# Patient Record
Sex: MALE | Race: WHITE | NOT HISPANIC OR LATINO | Employment: OTHER | URBAN - NONMETROPOLITAN AREA
[De-identification: names, ages, dates, MRNs, and addresses within clinical notes are randomized per-mention and may not be internally consistent; named-entity substitution may affect disease eponyms.]

---

## 2018-10-02 ENCOUNTER — TRANSCRIBE ORDERS (OUTPATIENT)
Dept: ADMINISTRATIVE | Facility: HOSPITAL | Age: 68
End: 2018-10-02

## 2018-10-02 ENCOUNTER — TRANSCRIBE ORDERS (OUTPATIENT)
Dept: INFUSION THERAPY | Facility: HOSPITAL | Age: 68
End: 2018-10-02

## 2018-10-02 DIAGNOSIS — D64.9 ANEMIA, UNSPECIFIED TYPE: Primary | ICD-10-CM

## 2018-10-02 DIAGNOSIS — K62.5 RECTAL BLEED: ICD-10-CM

## 2018-10-02 DIAGNOSIS — K90.9 INTESTINAL MALABSORPTION, UNSPECIFIED TYPE: ICD-10-CM

## 2018-10-02 DIAGNOSIS — D50.9 IRON DEFICIENCY ANEMIA, UNSPECIFIED IRON DEFICIENCY ANEMIA TYPE: Primary | ICD-10-CM

## 2018-10-03 ENCOUNTER — HOSPITAL ENCOUNTER (OUTPATIENT)
Dept: INFUSION THERAPY | Facility: HOSPITAL | Age: 68
Setting detail: INFUSION SERIES
Discharge: HOME | End: 2018-10-03
Attending: INTERNAL MEDICINE

## 2018-10-03 VITALS — SYSTOLIC BLOOD PRESSURE: 137 MMHG | HEART RATE: 69 BPM | TEMPERATURE: 98.3 F | DIASTOLIC BLOOD PRESSURE: 61 MMHG

## 2018-10-03 DIAGNOSIS — D50.9 IRON DEFICIENCY ANEMIA, UNSPECIFIED IRON DEFICIENCY ANEMIA TYPE: ICD-10-CM

## 2018-10-03 PROCEDURE — 25010000002 FERRIC CARBOXYMALTOSE 750 MG/15ML SOLUTION 15 ML VIAL: Performed by: INTERNAL MEDICINE

## 2018-10-03 PROCEDURE — 96365 THER/PROPH/DIAG IV INF INIT: CPT

## 2018-10-03 RX ADMIN — FERRIC CARBOXYMALTOSE INJECTION 750 MG: 50 INJECTION, SOLUTION INTRAVENOUS at 10:01

## 2018-10-03 NOTE — PATIENT INSTRUCTIONS
Ferric carboxymaltose injection  What is this medicine?  FERRIC CARBOXYMALTOSE (ferr-ik car-box-ee-mol-toes) is an iron complex. Iron is used to make healthy red blood cells, which carry oxygen and nutrients throughout the body. This medicine is used to treat anemia in people with chronic kidney disease or people who cannot take iron by mouth.  This medicine may be used for other purposes; ask your health care provider or pharmacist if you have questions.  COMMON BRAND NAME(S): Injectafer  What should I tell my health care provider before I take this medicine?  They need to know if you have any of these conditions:  -anemia not caused by low iron levels  -high levels of iron in the blood  -liver disease  -an unusual or allergic reaction to iron, other medicines, foods, dyes, or preservatives  -pregnant or trying to get pregnant  -breast-feeding  How should I use this medicine?  This medicine is for infusion into a vein. It is given by a health care professional in a hospital or clinic setting.  Talk to your pediatrician regarding the use of this medicine in children. Special care may be needed.  Overdosage: If you think you have taken too much of this medicine contact a poison control center or emergency room at once.  NOTE: This medicine is only for you. Do not share this medicine with others.  What if I miss a dose?  It is important not to miss your dose. Call your doctor or health care professional if you are unable to keep an appointment.  What may interact with this medicine?  Do not take this medicine with any of the following medications:  -deferoxamine  -dimercaprol  -other iron products  This medicine may also interact with the following medications:  -chloramphenicol  -deferasirox  This list may not describe all possible interactions. Give your health care provider a list of all the medicines, herbs, non-prescription drugs, or dietary supplements you use. Also tell them if you smoke, drink alcohol, or use  illegal drugs. Some items may interact with your medicine.  What should I watch for while using this medicine?  Visit your doctor or health care professional regularly. Tell your doctor if your symptoms do not start to get better or if they get worse. You may need blood work done while you are taking this medicine.  You may need to follow a special diet. Talk to your doctor. Foods that contain iron include: whole grains/cereals, dried fruits, beans, or peas, leafy green vegetables, and organ meats (liver, kidney).  What side effects may I notice from receiving this medicine?  Side effects that you should report to your doctor or health care professional as soon as possible:  -allergic reactions like skin rash, itching or hives, swelling of the face, lips, or tongue  -breathing problems  -changes in blood pressure  -feeling faint or lightheaded, falls  -flushing, sweating, or hot feelings  Side effects that usually do not require medical attention (report to your doctor or health care professional if they continue or are bothersome):  -changes in taste  -constipation  -dizziness  -headache  -nausea  -pain, redness, or irritation at site where injected  -vomiting  This list may not describe all possible side effects. Call your doctor for medical advice about side effects. You may report side effects to FDA at 9-151-FDA-1369.  Where should I keep my medicine?  This drug is given in a hospital or clinic and will not be stored at home.  NOTE: This sheet is a summary. It may not cover all possible information. If you have questions about this medicine, talk to your doctor, pharmacist, or health care provider.  © 2018 [employer] (2017-01-19 11:20:47)

## 2018-10-04 ENCOUNTER — HOSPITAL ENCOUNTER (OUTPATIENT)
Dept: CT IMAGING | Facility: HOSPITAL | Age: 68
Discharge: HOME | End: 2018-10-04
Attending: INTERNAL MEDICINE | Admitting: INTERNAL MEDICINE

## 2018-10-04 DIAGNOSIS — D64.9 ANEMIA, UNSPECIFIED TYPE: ICD-10-CM

## 2018-10-04 DIAGNOSIS — K62.5 RECTAL BLEED: ICD-10-CM

## 2018-10-04 LAB — CREAT BLDA-MCNC: 1.4 MG/DL (ref 0.6–1.3)

## 2018-10-04 PROCEDURE — 74177 CT ABD & PELVIS W/CONTRAST: CPT | Performed by: RADIOLOGY

## 2018-10-04 PROCEDURE — 25010000002 IOPAMIDOL 61 % SOLUTION: Performed by: INTERNAL MEDICINE

## 2018-10-04 PROCEDURE — 74177 CT ABD & PELVIS W/CONTRAST: CPT

## 2018-10-04 PROCEDURE — 82565 ASSAY OF CREATININE: CPT

## 2018-10-04 RX ADMIN — IOPAMIDOL 100 ML: 612 INJECTION, SOLUTION INTRAVENOUS at 14:32

## 2018-10-10 ENCOUNTER — HOSPITAL ENCOUNTER (OUTPATIENT)
Dept: INFUSION THERAPY | Facility: HOSPITAL | Age: 68
Setting detail: INFUSION SERIES
Discharge: HOME | End: 2018-10-10
Attending: INTERNAL MEDICINE

## 2018-10-10 VITALS
SYSTOLIC BLOOD PRESSURE: 154 MMHG | HEART RATE: 69 BPM | RESPIRATION RATE: 18 BRPM | TEMPERATURE: 98.3 F | DIASTOLIC BLOOD PRESSURE: 70 MMHG

## 2018-10-10 DIAGNOSIS — D50.9 IRON DEFICIENCY ANEMIA, UNSPECIFIED IRON DEFICIENCY ANEMIA TYPE: ICD-10-CM

## 2018-10-10 PROCEDURE — 96365 THER/PROPH/DIAG IV INF INIT: CPT

## 2018-10-10 PROCEDURE — 25010000002 FERRIC CARBOXYMALTOSE 750 MG/15ML SOLUTION 15 ML VIAL: Performed by: INTERNAL MEDICINE

## 2018-10-10 RX ADMIN — FERRIC CARBOXYMALTOSE INJECTION 750 MG: 50 INJECTION, SOLUTION INTRAVENOUS at 11:39

## 2018-10-10 NOTE — PATIENT INSTRUCTIONS
Ferric carboxymaltose injection  What is this medicine?  FERRIC CARBOXYMALTOSE (ferr-ik car-box-ee-mol-toes) is an iron complex. Iron is used to make healthy red blood cells, which carry oxygen and nutrients throughout the body. This medicine is used to treat anemia in people with chronic kidney disease or people who cannot take iron by mouth.  This medicine may be used for other purposes; ask your health care provider or pharmacist if you have questions.  COMMON BRAND NAME(S): Injectafer  What should I tell my health care provider before I take this medicine?  They need to know if you have any of these conditions:  -anemia not caused by low iron levels  -high levels of iron in the blood  -liver disease  -an unusual or allergic reaction to iron, other medicines, foods, dyes, or preservatives  -pregnant or trying to get pregnant  -breast-feeding  How should I use this medicine?  This medicine is for infusion into a vein. It is given by a health care professional in a hospital or clinic setting.  Talk to your pediatrician regarding the use of this medicine in children. Special care may be needed.  Overdosage: If you think you have taken too much of this medicine contact a poison control center or emergency room at once.  NOTE: This medicine is only for you. Do not share this medicine with others.  What if I miss a dose?  It is important not to miss your dose. Call your doctor or health care professional if you are unable to keep an appointment.  What may interact with this medicine?  Do not take this medicine with any of the following medications:  -deferoxamine  -dimercaprol  -other iron products  This medicine may also interact with the following medications:  -chloramphenicol  -deferasirox  This list may not describe all possible interactions. Give your health care provider a list of all the medicines, herbs, non-prescription drugs, or dietary supplements you use. Also tell them if you smoke, drink alcohol, or use  illegal drugs. Some items may interact with your medicine.  What should I watch for while using this medicine?  Visit your doctor or health care professional regularly. Tell your doctor if your symptoms do not start to get better or if they get worse. You may need blood work done while you are taking this medicine.  You may need to follow a special diet. Talk to your doctor. Foods that contain iron include: whole grains/cereals, dried fruits, beans, or peas, leafy green vegetables, and organ meats (liver, kidney).  What side effects may I notice from receiving this medicine?  Side effects that you should report to your doctor or health care professional as soon as possible:  -allergic reactions like skin rash, itching or hives, swelling of the face, lips, or tongue  -breathing problems  -changes in blood pressure  -feeling faint or lightheaded, falls  -flushing, sweating, or hot feelings  Side effects that usually do not require medical attention (report to your doctor or health care professional if they continue or are bothersome):  -changes in taste  -constipation  -dizziness  -headache  -nausea  -pain, redness, or irritation at site where injected  -vomiting  This list may not describe all possible side effects. Call your doctor for medical advice about side effects. You may report side effects to FDA at 9-618-FDA-3257.  Where should I keep my medicine?  This drug is given in a hospital or clinic and will not be stored at home.  NOTE: This sheet is a summary. It may not cover all possible information. If you have questions about this medicine, talk to your doctor, pharmacist, or health care provider.  © 2018 [employer] Standard (2017-01-19 11:20:47)

## 2018-10-19 ENCOUNTER — CONSULT (OUTPATIENT)
Dept: ONCOLOGY | Facility: CLINIC | Age: 68
End: 2018-10-19

## 2018-10-19 VITALS
HEART RATE: 80 BPM | OXYGEN SATURATION: 98 % | BODY MASS INDEX: 40 KG/M2 | SYSTOLIC BLOOD PRESSURE: 147 MMHG | DIASTOLIC BLOOD PRESSURE: 72 MMHG | HEIGHT: 72 IN | RESPIRATION RATE: 18 BRPM | WEIGHT: 295.3 LBS | TEMPERATURE: 97.1 F

## 2018-10-19 DIAGNOSIS — C20 RECTAL CANCER (HCC): ICD-10-CM

## 2018-10-19 DIAGNOSIS — D50.9 IRON DEFICIENCY ANEMIA, UNSPECIFIED IRON DEFICIENCY ANEMIA TYPE: Primary | ICD-10-CM

## 2018-10-19 DIAGNOSIS — D53.9 NUTRITIONAL ANEMIA: ICD-10-CM

## 2018-10-19 DIAGNOSIS — Z72.0 TOBACCO ABUSE: ICD-10-CM

## 2018-10-19 DIAGNOSIS — R97.20 ELEVATED PROSTATE SPECIFIC ANTIGEN (PSA): ICD-10-CM

## 2018-10-19 LAB
ALBUMIN SERPL-MCNC: 4.8 G/DL (ref 3.4–4.8)
ALBUMIN/GLOB SERPL: 1.4 G/DL (ref 1.5–2.5)
ALP SERPL-CCNC: 78 U/L (ref 40–129)
ALT SERPL W P-5'-P-CCNC: 41 U/L (ref 10–44)
ANION GAP SERPL CALCULATED.3IONS-SCNC: 8.8 MMOL/L (ref 3.6–11.2)
ANISOCYTOSIS BLD QL: NORMAL
AST SERPL-CCNC: 50 U/L (ref 10–34)
BASOPHILS # BLD AUTO: 0.04 10*3/MM3 (ref 0–0.3)
BASOPHILS NFR BLD AUTO: 0.4 % (ref 0–2)
BILIRUB SERPL-MCNC: 0.5 MG/DL (ref 0.2–1.8)
BUN BLD-MCNC: 16 MG/DL (ref 7–21)
BUN/CREAT SERPL: 13.3 (ref 7–25)
CA-I BLD-MCNC: 4.84 MG/DL (ref 4.6–5.3)
CALCIUM SPEC-SCNC: 9.9 MG/DL (ref 7.7–10)
CEA SERPL-MCNC: 18.8 NG/ML (ref 0–5)
CHLORIDE SERPL-SCNC: 102 MMOL/L (ref 99–112)
CO2 SERPL-SCNC: 27.2 MMOL/L (ref 24.3–31.9)
CREAT BLD-MCNC: 1.2 MG/DL (ref 0.43–1.29)
DEPRECATED RDW RBC AUTO: 82.2 FL (ref 37–54)
EOSINOPHIL # BLD AUTO: 0.22 10*3/MM3 (ref 0–0.7)
EOSINOPHIL NFR BLD AUTO: 2 % (ref 0–7)
ERYTHROCYTE [DISTWIDTH] IN BLOOD BY AUTOMATED COUNT: 27.6 % (ref 11.5–14.5)
FERRITIN SERPL-MCNC: 468 NG/ML (ref 21.9–321.7)
FOLATE SERPL-MCNC: 7.48 NG/ML (ref 5.4–20)
GFR SERPL CREATININE-BSD FRML MDRD: 60 ML/MIN/1.73
GLOBULIN UR ELPH-MCNC: 3.5 GM/DL
GLUCOSE BLD-MCNC: 105 MG/DL (ref 70–110)
HCT VFR BLD AUTO: 42.2 % (ref 42–52)
HGB BLD-MCNC: 12.6 G/DL (ref 14–18)
HYPOCHROMIA BLD QL: NORMAL
IMM GRANULOCYTES # BLD: 0.05 10*3/MM3 (ref 0–0.03)
IMM GRANULOCYTES NFR BLD: 0.5 % (ref 0–0.5)
IRON 24H UR-MRATE: 44 MCG/DL (ref 53–167)
IRON SATN MFR SERPL: 11 % (ref 20–50)
LARGE PLATELETS: NORMAL
LYMPHOCYTES # BLD AUTO: 3.22 10*3/MM3 (ref 1–3)
LYMPHOCYTES NFR BLD AUTO: 29.5 % (ref 16–46)
MCH RBC QN AUTO: 25.6 PG (ref 27–33)
MCHC RBC AUTO-ENTMCNC: 29.9 G/DL (ref 33–37)
MCV RBC AUTO: 85.6 FL (ref 80–94)
MONOCYTES # BLD AUTO: 0.66 10*3/MM3 (ref 0.1–0.9)
MONOCYTES NFR BLD AUTO: 6 % (ref 0–12)
NEUTROPHILS # BLD AUTO: 6.74 10*3/MM3 (ref 1.4–6.5)
NEUTROPHILS NFR BLD AUTO: 61.6 % (ref 40–75)
OSMOLALITY SERPL CALC.SUM OF ELEC: 277.2 MOSM/KG (ref 273–305)
PLATELET # BLD AUTO: 239 10*3/MM3 (ref 130–400)
PMV BLD AUTO: 10.4 FL (ref 6–10)
POTASSIUM BLD-SCNC: 4.6 MMOL/L (ref 3.5–5.3)
PROT SERPL-MCNC: 8.3 G/DL (ref 6–8)
PSA SERPL-MCNC: 4.93 NG/ML (ref 0–4)
RBC # BLD AUTO: 4.93 10*6/MM3 (ref 4.7–6.1)
SODIUM BLD-SCNC: 138 MMOL/L (ref 135–153)
TIBC SERPL-MCNC: 387 MCG/DL (ref 241–421)
TSH SERPL DL<=0.05 MIU/L-ACNC: 1.77 MIU/ML (ref 0.55–4.78)
VIT B12 BLD-MCNC: 401 PG/ML (ref 211–911)
WBC NRBC COR # BLD: 10.93 10*3/MM3 (ref 4.5–12.5)

## 2018-10-19 PROCEDURE — 90674 CCIIV4 VAC NO PRSV 0.5 ML IM: CPT | Performed by: INTERNAL MEDICINE

## 2018-10-19 PROCEDURE — 80053 COMPREHEN METABOLIC PANEL: CPT | Performed by: INTERNAL MEDICINE

## 2018-10-19 PROCEDURE — 82746 ASSAY OF FOLIC ACID SERUM: CPT | Performed by: INTERNAL MEDICINE

## 2018-10-19 PROCEDURE — 83540 ASSAY OF IRON: CPT | Performed by: INTERNAL MEDICINE

## 2018-10-19 PROCEDURE — 82728 ASSAY OF FERRITIN: CPT | Performed by: INTERNAL MEDICINE

## 2018-10-19 PROCEDURE — 84443 ASSAY THYROID STIM HORMONE: CPT | Performed by: INTERNAL MEDICINE

## 2018-10-19 PROCEDURE — 99205 OFFICE O/P NEW HI 60 MIN: CPT | Performed by: INTERNAL MEDICINE

## 2018-10-19 PROCEDURE — 82378 CARCINOEMBRYONIC ANTIGEN: CPT | Performed by: INTERNAL MEDICINE

## 2018-10-19 PROCEDURE — 84153 ASSAY OF PSA TOTAL: CPT | Performed by: INTERNAL MEDICINE

## 2018-10-19 PROCEDURE — 85025 COMPLETE CBC W/AUTO DIFF WBC: CPT | Performed by: INTERNAL MEDICINE

## 2018-10-19 PROCEDURE — 82607 VITAMIN B-12: CPT | Performed by: INTERNAL MEDICINE

## 2018-10-19 PROCEDURE — 83550 IRON BINDING TEST: CPT | Performed by: INTERNAL MEDICINE

## 2018-10-19 PROCEDURE — 82330 ASSAY OF CALCIUM: CPT | Performed by: INTERNAL MEDICINE

## 2018-10-19 PROCEDURE — 85007 BL SMEAR W/DIFF WBC COUNT: CPT | Performed by: INTERNAL MEDICINE

## 2018-10-19 PROCEDURE — G0008 ADMIN INFLUENZA VIRUS VAC: HCPCS | Performed by: INTERNAL MEDICINE

## 2018-10-19 RX ORDER — FERROUS SULFATE 325(65) MG
325 TABLET ORAL 2 TIMES DAILY
COMMUNITY
End: 2020-07-21

## 2018-10-19 RX ORDER — AMLODIPINE BESYLATE 10 MG/1
5 TABLET ORAL DAILY
COMMUNITY
End: 2021-03-08 | Stop reason: SDUPTHER

## 2018-10-19 RX ORDER — ESCITALOPRAM OXALATE 10 MG/1
10 TABLET ORAL DAILY
Qty: 30 TABLET | Refills: 11 | Status: SHIPPED | OUTPATIENT
Start: 2018-10-19 | End: 2019-02-05

## 2018-10-19 NOTE — PROGRESS NOTES
DATE OF CONSULTATION:  10/19/2018    REASON FOR REFERRAL: Rectal cancer    REFERRING PHYSICIAN:  Joshua Barnhart MD    CHIEF COMPLAINT:  Rectal cancer      HISTORY OF PRESENT ILLNESS:   David Mandujano is a very pleasant 68 y.o. male who is being seen today at the request of Joshua Barnhart MD for evaluation and treatment of colorectal cancer. He began to notice rectal bleeding in January or February 2018, and he also began to notice fatigue in approximately July. He assumed he was out of shape when he started to become short of breath and started trying to exercise more frequently, which only exacerbated the shortness of breath. He was evaluated by his PCP, Raj Wang MD, who after testing, found him to be severely iron deficient, hyperglycemic, and he also had an elevated PSA. He was on vacation at the time, and his PCP asked him to return home for further evaluation. He was started on oral iron therapy and later received IV iron infusions. He was also scheduled for a colonoscopy with Dr. Barnhart, during which a suspicious rectal mass was biopsied and pathology was positive for an ulcerated invasive, moderately differentiated rectal adenocarcinoma. He and his wife are here for discussion of further treatment.     He continues to be fatigued and short of breath with activity, and he has lost about 18 pound over the last month due to a strict diet his wife has him on. She also says he has been started on several supplements and vitamins including pre and probiotics and Vit C. He is otherwise well and without complaint. He denies pain of any kind.  He is tolerating oral iron without significant difficulty.  He saw Dr. Blackman of Urology in [place] but his wife cancelled f/u appt because she thought other appts were more important for now.      PAST MEDICAL HISTORY:  Past Medical History:   Diagnosis Date   • Anemia    • Colon cancer (CMS/HCC)    • Hypertension    • Wrist fracture     surgically repaired        PAST SURGICAL HISTORY:  Past Surgical History:   Procedure Laterality Date   • COLONOSCOPY     • WRIST SURGERY         FAMILY HISTORY:  Family History   Problem Relation Age of Onset   • Colon cancer Maternal Grandfather    No other family history of malignancy.    SOCIAL HISTORY:  Social History     Social History   • Marital status: Single     Spouse name: N/A   • Number of children: N/A   • Years of education: N/A     Occupational History   • Not on file.     Social History Main Topics   • Smoking status: Never Smoker   • Smokeless tobacco: Current User     Types: Chew   • Alcohol use Yes      Comment: occasional   • Drug use: No   • Sexual activity: Defer     Other Topics Concern   • Not on file     Social History Narrative    He is , and is self employed/semi retired. He is a non smoker but chews tobacco. He used to drink daily, but says he has cut it in half.          REVIEW OF SYSTEMS:   A comprehensive 14 point review of systems was performed.  Significant findings as mentioned above.  All other systems reviewed and are negative.      MEDICATIONS:  The current medication list was reviewed in the EMR    Current Outpatient Prescriptions:   •  amLODIPine (NORVASC) 10 MG tablet, Take 10 mg by mouth Daily., Disp: , Rfl:   •  ferrous sulfate 325 (65 FE) MG tablet, Take 325 mg by mouth Daily With Breakfast., Disp: , Rfl:     ALLERGIES:  No Known Allergies    PHYSICAL EXAM:  Vitals:    10/19/18 1358   BP: 147/72   Pulse: 80   Resp: 18   Temp: 97.1 °F (36.2 °C)   SpO2: 98%   General:  Awake, alert and oriented, in no distress  HEENT:  Pupils are equal, round and reactive to light and accommodation, Extra-ocular movements full, Oropharyx clear, mucous membranes moist  Neck:  No JVD, thyromegaly or lymphadenopathy  CV:  Regular rate and rhythm, no murmurs, rubs or gallops  Resp:  Lungs are clear to auscultation bilaterally  Abd:  Soft, non-tender, somewhat -distended, bowel sounds present, no organomegaly  or masses  Ext:  No clubbing, cyanosis or edema  Lymph:  No cervical, supraclavicular, axillary, inguinal or femoral adenopathy  Neuro:  MS as above, CN II-XII intact, grossly non-focal exam      PATHOLOGY:  10-02-18        ENDOSCOPY:  10-02-18:        IMAGING:  10-04-18 CT Abdomen Pelvis With Contrast   Findings  LOWER THORAX: Patchy nonspecific subpleural nodularity in the left lung  base that could represent sequelae of chronic airspace disease or early  fibrosis.     ABDOMEN:        LIVER: Homogeneous. No focal hepatic mass or ductal dilatation.        GALLBLADDER: GALLSTONES WITHIN THE GALLBLADDER.        PANCREAS: Unremarkable. No mass or ductal dilatation.        SPLEEN: Homogeneous. No splenomegaly.        ADRENALS: No mass.        KIDNEYS: RIGHT RENAL CYST MEASURING 3.6 CM.        GI TRACT: NONSPECIFIC DISTAL SIGMOID COLONIC WALL THICKENING VERSUS  NONDISTENTION.        PERITONEUM: No free air. No free fluid or loculated fluid  collections.        MESENTERY: Unremarkable.        LYMPH NODES: No lymphadenopathy.        VASCULATURE: ATHEROSCLEROTIC VASCULAR CALCIFICATION.        ABDOMINAL WALL: SMALL BILATERAL HUMERAL HERNIAS CONTAINING ONLY FAT.        OTHER: None.     PELVIS:        BLADDER: No focal mass or significant wall thickening        REPRODUCTIVE: Unremarkable as visualized.        APPENDIX: Nondistended. No surrounding inflammation.     BONES: No acute bony abnormality.     IMPRESSION:  Impression:  1. Gallstones within the gallbladder.  2.Nonspecific distal sigmoid colonic wall thickening versus  nondistention.        RECENT LABS:  Lab Results   Component Value Date    WBC 8.3 06/25/2014    HGB 16.5 06/25/2014    HCT 49.3 06/25/2014    MCV 92.7 06/25/2014    RDW 12.5 06/25/2014     06/25/2014    NEUTRORELPCT 70.7 (H) 06/25/2014    LYMPHORELPCT 19.8 (L) 06/25/2014    MONORELPCT 6.9 06/25/2014    EOSRELPCT 2.0 06/25/2014    BASORELPCT 0.4 06/25/2014    NEUTROABS 5.9 06/25/2014    LYMPHSABS  1.7 06/25/2014       Lab Results   Component Value Date     06/25/2014    K 4.0 06/25/2014    CO2 30.9 06/25/2014     06/25/2014    BUN 18 06/25/2014    CREATININE 1.40 (H) 10/04/2018    GLUCOSE 118 (H) 06/25/2014    CALCIUM 10.1 (H) 06/25/2014    ALKPHOS 75 06/25/2014    AST 38 (H) 06/25/2014    ALT 38 06/25/2014    BILITOT 0.6 06/25/2014    ALBUMIN 4.8 06/25/2014    PROTEINTOT 7.9 06/25/2014       Lab Results   Component Value Date/Time    URICACID 9.1 (H) 06/25/2014 01:29 PM         ASSESSMENT & PLAN:  David Mandujano is a very pleasant 68 y.o. male with newly diagnosed rectal cancer.    1.  Rectal cancer:  -  Clinical stage is uncertain at this time.  I reviewed CT and it appears to my eye that the mass may be locally advanced.  -  Will obtain PET-CT for systemic staging purposes and to better evaluate pelvic LN that are borderline in appearance.  -  Will order CEA.  -  Will refer to Dr. Villalobos.  Suspect Dr. Villalobos will likely either order MRI or consider rectal U/S for staging purposes.  -  We discussed likely need for neoadjuvant chemoradiation, but will defer to Dr. Villalobos.  Will place referral to Dr. Brink as well.    2.  Iron deficiency anemia:  - He received IV iron and was placed on Ferrous Sulfate 325 mg BID.  -  Will check Ferritin, iron profile and CBC today.    3.  Prostatism with elevated PSA:  -  He has seen Dr. Blackman in [place].  I suggested he return to see Dr. Rolle who had started evaluation.  I think it is likely that he would benefit from further evaluation and treatment, particularly given upcoming need for chemotherapy, surgery, etc.  Will check PSA.    4.  Depressed mood / Anxiety:  -  Mrs. Mandujano is concerned about her  and says he struggles with anxiety.  He is working to cut back on his alcohol intake, which sounds like it may be related to these symptoms.  -  I suggested trial of Lexapro 10 mg daily.  Mr. Mandujano was somewhat hesitant but his wife was strongly in favor.   Will send prescription and they will talk things over and he will decide if he wants to try it or not.    5.  Prophylaxis:  I recommended Prevnar 13 and Influenza vaccines.  He was willing to take 2018 influenza vaccine so we administered this in the office today.    6.  ACO Quality measures  - David Mandujano does not smoke but he does use tobacco products in the form of chewing tobacco.  - I advised David of the risks of continuing to use tobacco, and I provided him with tobacco cessation educational materials in the After Visit Summary.   During this visit, I spent <3 minutes counseling the patient regarding tobacco cessation.  - Patient's Body mass index is 40.05 kg/m². BMI is above normal parameters. Recommendations include: nutrition counseling.  - Current outpatient and discharge medications have been reconciled for the patient.  Reviewed by: Jyoti Larios MD       This note was scribed for Jyoti Larios MD by Lynn Pinto RN.    I, Jyoti Larios MD, personally performed the services described in this documentation as scribed by the above named individual in my presence, and it is both accurate and complete.  10/19/2018     I spent 60 minutes with David Mandujano today.  More than 50% of the time was spent in counseling / coordination of care for the above problems.      Electronically Signed by: Jyoti Larios MD      CC:   MD Kathleen Shrestha Emmanuel C, MD Shawn Michael Acino, MD Bruce Belin, MD William Richards, MD

## 2018-11-05 NOTE — PROGRESS NOTES
DATE OF CONSULTATION:  11/6/2018    DIAGNOSIS:   Rectal cancer    CHIEF COMPLAINT:  Rectal cancer      HISTORY OF PRESENT ILLNESS:   David Mandujano is a very pleasant 68 y.o. male who was referred by Dr. Barnhart for evaluation and treatment of colorectal cancer. He began to notice rectal bleeding in January or February 2018, and he also began to notice fatigue in approximately July. He assumed he was out of shape when he started to become short of breath and started trying to exercise more frequently, which only exacerbated the shortness of breath. He was evaluated by his PCP, Raj Wang MD, who after testing, found him to be severely iron deficient, hyperglycemic, and he also had an elevated PSA. He was on vacation at the time, and his PCP asked him to return home for further evaluation. He was started on oral iron therapy and later received IV iron infusions. He was also scheduled for a colonoscopy with Dr. Barnhart, during which a suspicious rectal mass was biopsied and pathology was positive for an ulcerated invasive, moderately differentiated rectal adenocarcinoma. He and his wife are here for discussion of further treatment.     He continues to be fatigued and short of breath with activity, and he has lost about 18 pound over the last month due to a strict diet his wife has him on. She also says he has been started on several supplements and vitamins including pre and probiotics and Vit C. He is otherwise well and without complaint. He denies pain of any kind.  He is tolerating oral iron without significant difficulty.  He saw Dr. Blackman of Urology in [place] but his wife cancelled f/u appt because she thought other appts were more important for now.    INTERVAL HISTORY:  Mr. Mandujano is here today with his daughter for f/u of rectal cancer.  He says he is overall doing well.  He says he stopped the diet his wife had him on and he is feeling better.  He is to see Dr. Brink tomorrow to discuss radiation  and Dr. Villalobos on the 12th.  He says he has been working on his alcohol intake and has cut back on the number of days he drinks by about 1/2 and has also cut back the number of ounces he drinks by about 1/2.  He didn't quantify this further.  The Lexapro is at his pharmacy but he says he really didn't want it so he didn't take it.  His daughter, like his wife, encourages him to take it and he says he will try.  He continues to take Ferrous Sulfate BID and is tolerating it thus far.  He is anxious to hear the outcome of his testing.    PAST MEDICAL HISTORY:  Past Medical History:   Diagnosis Date   • Anemia    • Colon cancer (CMS/HCC)    • Hypertension    • Wrist fracture     surgically repaired       PAST SURGICAL HISTORY:  Past Surgical History:   Procedure Laterality Date   • COLONOSCOPY     • WRIST SURGERY         FAMILY HISTORY:  Family History   Problem Relation Age of Onset   • Colon cancer Maternal Grandfather    No other family history of malignancy.    SOCIAL HISTORY:  Social History     Social History   • Marital status: Single     Spouse name: N/A   • Number of children: N/A   • Years of education: N/A     Occupational History   • Not on file.     Social History Main Topics   • Smoking status: Never Smoker   • Smokeless tobacco: Current User     Types: Chew   • Alcohol use Yes      Comment: occasional   • Drug use: No   • Sexual activity: Defer     Other Topics Concern   • Not on file     Social History Narrative    He is , and is self employed/semi retired. He is a non smoker but chews tobacco. He used to drink daily, but says he has cut it in half.          REVIEW OF SYSTEMS:   A comprehensive 14 point review of systems was performed.  Significant findings as mentioned above.  All other systems reviewed and are negative.      MEDICATIONS:  The current medication list was reviewed in the EMR    Current Outpatient Prescriptions:   •  amLODIPine (NORVASC) 10 MG tablet, Take 10 mg by mouth Daily.,  Disp: , Rfl:   •  escitalopram (LEXAPRO) 10 MG tablet, Take 1 tablet by mouth Daily., Disp: 30 tablet, Rfl: 11  •  ferrous sulfate 325 (65 FE) MG tablet, Take 325 mg by mouth Daily With Breakfast., Disp: , Rfl:     ALLERGIES:  No Known Allergies    PHYSICAL EXAM:  Vitals:    11/06/18 1604   BP: (!) 205/80   Pulse: 106   Resp: 18   Temp: 98.7 °F (37.1 °C)   SpO2: 95%   General:  Awake, alert and oriented, anxious but in no distress  HEENT:  Pupils are equal, round and reactive to light and accommodation, Extra-ocular movements full, Oropharyx clear, mucous membranes moist  Neck:  No JVD, thyromegaly or lymphadenopathy  CV:  Regular rate and rhythm, no murmurs, rubs or gallops  Resp:  Lungs are clear to auscultation bilaterally  Abd:  Soft, non-tender, somewhat -distended, bowel sounds present, no organomegaly or masses  Ext:  No clubbing, cyanosis or edema  Lymph:  No cervical, supraclavicular, axillary, inguinal or femoral adenopathy  Neuro:  MS as above, CN II-XII intact, grossly non-focal exam      PATHOLOGY:  10-02-18        ENDOSCOPY:  10-02-18:        IMAGING:  10-04-18 CT Abdomen Pelvis With Contrast   Findings  - LOWER THORAX: Patchy nonspecific subpleural nodularity in the left lung base that could represent sequelae of chronic airspace disease or early fibrosis.     ABDOMEN:  - LIVER: Homogeneous. No focal hepatic mass or ductal dilatation.  - GALLBLADDER: GALLSTONES WITHIN THE GALLBLADDER.  - PANCREAS: Unremarkable. No mass or ductal dilatation.  - SPLEEN: Homogeneous. No splenomegaly.  - ADRENALS: No mass.  - KIDNEYS: RIGHT RENAL CYST MEASURING 3.6 CM.  - GI TRACT: NONSPECIFIC DISTAL SIGMOID COLONIC WALL THICKENING VERSUS  NONDISTENTION.  - PERITONEUM: No free air. No free fluid or loculated fluid collections.  - MESENTERY: Unremarkable.  - LYMPH NODES: No lymphadenopathy.  - VASCULATURE: ATHEROSCLEROTIC VASCULAR CALCIFICATION.  - ABDOMINAL WALL: SMALL BILATERAL HUMERAL HERNIAS CONTAINING ONLY FAT.  -  OTHER: None.     PELVIS:  - BLADDER: No focal mass or significant wall thickening  - REPRODUCTIVE: Unremarkable as visualized.  - APPENDIX: Nondistended. No surrounding inflammation.  - BONES: No acute bony abnormality.     IMPRESSION:  1. Gallstones within the gallbladder.  2.Nonspecific distal sigmoid colonic wall thickening versus nondistention.    PET/CT 10-29-18        RECENT LABS:  Lab Results   Component Value Date    WBC 10.93 10/19/2018    HGB 12.6 (L) 10/19/2018    HCT 42.2 10/19/2018    MCV 85.6 10/19/2018    RDW 27.6 (H) 10/19/2018     10/19/2018    NEUTRORELPCT 61.6 10/19/2018    LYMPHORELPCT 29.5 10/19/2018    MONORELPCT 6.0 10/19/2018    EOSRELPCT 2.0 10/19/2018    BASORELPCT 0.4 10/19/2018    NEUTROABS 6.74 (H) 10/19/2018    LYMPHSABS 3.22 (H) 10/19/2018       Lab Results   Component Value Date     10/19/2018    K 4.6 10/19/2018    CO2 27.2 10/19/2018     10/19/2018    BUN 16 10/19/2018    CREATININE 1.20 10/19/2018    EGFRIFNONA 60 (L) 10/19/2018    GLUCOSE 105 10/19/2018    CALCIUM 9.9 10/19/2018    ALKPHOS 78 10/19/2018    AST 50 (H) 10/19/2018    ALT 41 10/19/2018    BILITOT 0.5 10/19/2018    ALBUMIN 4.80 10/19/2018    PROTEINTOT 8.3 (H) 10/19/2018       Lab Results   Component Value Date/Time    URICACID 9.1 (H) 06/25/2014 01:29 PM     Lab Results   Component Value Date    CEA 18.80 (H) 10/19/2018     Lab Results   Component Value Date    PSA 4.930 (H) 10/19/2018       Lab Results   Component Value Date    FERRITIN 468.00 (H) 10/19/2018    IRON 44 (L) 10/19/2018    TIBC 387 10/19/2018    LABIRON 11 (L) 10/19/2018    OEMSZISG20 401 10/19/2018    FOLATE 7.48 10/19/2018     ASSESSMENT & PLAN:  David Mandujano is a very pleasant 68 y.o. male with newly diagnosed rectal cancer.    1.  Rectal cancer:  -  Clinical stage is uncertain at this time.  I reviewed CT and it appears to my eye that the mass may be locally advanced.  Suspect Dr. Villalobos will either perform U/S or order MRI.  Will  defer this to his judgement.  No pelvic LAD was noted on PET.  -  There is a non-specific sub-pleural nodular opacity in the L lung base which was PET negative.  Perhaps this was inflammatory in nature. This area will require f/u on future imaging.  -  He will meet Dr. Brink tomorrow and Dr. Villalobos on the 12th for treatment planning.  -  Suspect he will need neoadjuvant chemoradiation, but decision regarding this will wait pending evaluation by Dr. Villalobos and further evaluation of local disease.    2.  Iron deficiency anemia:  - He received IV iron and was placed on Ferrous Sulfate 325 mg BID.  -  Iron is improved but still low.  Tolerating iron well.  Will increase to TID.    3.  Prostatism with elevated PSA:  -  He has seen Dr. Blackman in [place].  I suggested he return to see Dr. Rolle who had started evaluation.  I think it is likely that he will benefit from further evaluation and treatment, particularly given upcoming need for chemotherapy, surgery, etc.  PSA somewhat elevated.      4.  Depressed mood / Anxiety:  -  Mrs. Mandujano and his daughter have been concerned about Mr. Mandujano who admits he struggles with anxiety.  He has been working to cut back on his alcohol intake, which sounds like it may be related to these symptoms.  -  I suggested trial of Lexapro 10 mg daily.  Mr. Mandujano was initially somewhat hesitant but says he will try this.    5.  Prophylaxis:  I recommended Prevnar 13 and Influenza vaccines.  He took 2018 influenza vaccine .  He says he will take Prevnar 13 when he is next here for appt.    6.  ACO Quality measures  - David Mandujano does not smoke but he does use tobacco products in the form of chewing tobacco.  - I advised David of the risks of continuing to use tobacco, and I provided him with tobacco cessation educational materials in the After Visit Summary.   During this visit, I spent <3 minutes counseling the patient regarding tobacco cessation.  - Patient's Body mass index is 40.58  kg/m². BMI is above normal parameters. Recommendations include: nutrition counseling.  - Current outpatient and discharge medications have been reconciled for the patient.  Reviewed by: Jyoti Larios MD     This note was scribed for Jyoti Larios MD by Lynn Pinto RN.    I, Jyoti Larios MD, personally performed the services described in this documentation as scribed by the above named individual in my presence, and it is both accurate and complete.  11/06/2018       I spent 30 minutes with David Mandujano today.  More than 50% of the time was spent in counseling / coordination of care for the above problems.      Electronically Signed by: Jyoti Larios MD      CC:   MD Kathleen Coppola Emmanuel C, MD Shawn Michael Acino, MD Bruce Belin, MD William Richards, MD

## 2018-11-06 ENCOUNTER — OFFICE VISIT (OUTPATIENT)
Dept: ONCOLOGY | Facility: CLINIC | Age: 68
End: 2018-11-06

## 2018-11-06 VITALS
OXYGEN SATURATION: 95 % | TEMPERATURE: 98.7 F | WEIGHT: 299.2 LBS | BODY MASS INDEX: 40.58 KG/M2 | SYSTOLIC BLOOD PRESSURE: 205 MMHG | RESPIRATION RATE: 18 BRPM | DIASTOLIC BLOOD PRESSURE: 80 MMHG | HEART RATE: 106 BPM

## 2018-11-06 DIAGNOSIS — F10.10 ALCOHOL ABUSE: ICD-10-CM

## 2018-11-06 DIAGNOSIS — C20 RECTAL CANCER (HCC): Primary | ICD-10-CM

## 2018-11-06 DIAGNOSIS — D50.9 IRON DEFICIENCY ANEMIA, UNSPECIFIED IRON DEFICIENCY ANEMIA TYPE: ICD-10-CM

## 2018-11-06 DIAGNOSIS — F41.9 ANXIETY: ICD-10-CM

## 2018-11-06 DIAGNOSIS — R97.20 ELEVATED PROSTATE SPECIFIC ANTIGEN (PSA): ICD-10-CM

## 2018-11-06 DIAGNOSIS — Z72.0 TOBACCO ABUSE: ICD-10-CM

## 2018-11-06 PROCEDURE — 99214 OFFICE O/P EST MOD 30 MIN: CPT | Performed by: INTERNAL MEDICINE

## 2018-11-12 ENCOUNTER — TRANSCRIBE ORDERS (OUTPATIENT)
Dept: ADMINISTRATIVE | Facility: HOSPITAL | Age: 68
End: 2018-11-12

## 2018-11-12 DIAGNOSIS — C20 RECTAL CANCER (HCC): Primary | ICD-10-CM

## 2018-11-13 ENCOUNTER — HOSPITAL ENCOUNTER (OUTPATIENT)
Dept: MRI IMAGING | Facility: HOSPITAL | Age: 68
Discharge: HOME | End: 2018-11-13
Attending: COLON & RECTAL SURGERY | Admitting: COLON & RECTAL SURGERY

## 2018-11-13 DIAGNOSIS — C20 RECTAL CANCER (HCC): ICD-10-CM

## 2018-11-13 PROCEDURE — 72195 MRI PELVIS W/O DYE: CPT

## 2018-11-20 ENCOUNTER — OFFICE VISIT (OUTPATIENT)
Dept: ONCOLOGY | Facility: CLINIC | Age: 68
End: 2018-11-20

## 2018-11-20 VITALS
HEART RATE: 98 BPM | RESPIRATION RATE: 18 BRPM | DIASTOLIC BLOOD PRESSURE: 70 MMHG | WEIGHT: 299.3 LBS | SYSTOLIC BLOOD PRESSURE: 184 MMHG | TEMPERATURE: 98.4 F | BODY MASS INDEX: 40.59 KG/M2 | OXYGEN SATURATION: 93 %

## 2018-11-20 DIAGNOSIS — F10.10 ALCOHOL ABUSE: ICD-10-CM

## 2018-11-20 DIAGNOSIS — F41.9 ANXIETY: ICD-10-CM

## 2018-11-20 DIAGNOSIS — C20 RECTAL CANCER (HCC): Primary | ICD-10-CM

## 2018-11-20 DIAGNOSIS — R97.20 ELEVATED PROSTATE SPECIFIC ANTIGEN (PSA): ICD-10-CM

## 2018-11-20 DIAGNOSIS — D50.9 IRON DEFICIENCY ANEMIA, UNSPECIFIED IRON DEFICIENCY ANEMIA TYPE: ICD-10-CM

## 2018-11-20 PROCEDURE — 99214 OFFICE O/P EST MOD 30 MIN: CPT | Performed by: INTERNAL MEDICINE

## 2018-11-20 PROCEDURE — 90670 PCV13 VACCINE IM: CPT | Performed by: INTERNAL MEDICINE

## 2018-11-20 PROCEDURE — G0008 ADMIN INFLUENZA VIRUS VAC: HCPCS | Performed by: INTERNAL MEDICINE

## 2018-11-20 NOTE — PROGRESS NOTES
DATE:  11/20/2018    DIAGNOSIS:   Rectal cancer    CHIEF COMPLAINT:  Rectal cancer    HISTORY OF PRESENT ILLNESS:   David Mandujano is a very pleasant 68 y.o. male who was referred by Dr. Barnhart for evaluation and treatment of colorectal cancer. He began to notice rectal bleeding in January or February 2018, and he also began to notice fatigue in approximately July. He assumed he was out of shape when he started to become short of breath and started trying to exercise more frequently, which only exacerbated the shortness of breath. He was evaluated by his PCP, Raj Wang MD, who after testing, found him to be severely iron deficient, hyperglycemic, and he also had an elevated PSA. He was on vacation at the time, and his PCP asked him to return home for further evaluation. He was started on oral iron therapy and later received IV iron infusions. He was also scheduled for a colonoscopy with Dr. Barnhart, during which a suspicious rectal mass was biopsied and pathology was positive for an ulcerated invasive, moderately differentiated rectal adenocarcinoma. He and his wife are here for discussion of further treatment.     He continues to be fatigued and short of breath with activity, and he has lost about 18 pound over the last month due to a strict diet his wife has him on. She also says he has been started on several supplements and vitamins including pre and probiotics and Vit C. He is otherwise well and without complaint. He denies pain of any kind.  He is tolerating oral iron without significant difficulty.  He saw Dr. Blackman of Urology in [place] but his wife cancelled f/u appt because she thought other appts were more important for now.    INTERVAL HISTORY:  Mr. Mandujano is here today for follow up of rectal cancer. Since his last visit, he has been to see Dr. Villalobos who recommended surgical intervention following neoadjuvant chemo/radiation. He was also evaluated by Dr. Brink who plans to do CT sim scan on  Tuesday. He has not been to see Urology yet, but plans to do that Monday. He hasn't yet started Lexapro because he wants to drink on Thanksgiving and didn't think he could do that if he started Lexapro.  He says he was doing better with his drinking but did worse last week because of stress.  He wont' quantify for me how much he is drinking.      PAST MEDICAL HISTORY:  Past Medical History:   Diagnosis Date   • Anemia    • Colon cancer (CMS/HCC)    • Hypertension    • Wrist fracture     surgically repaired       PAST SURGICAL HISTORY:  Past Surgical History:   Procedure Laterality Date   • COLONOSCOPY     • WRIST SURGERY         FAMILY HISTORY:  Family History   Problem Relation Age of Onset   • Colon cancer Maternal Grandfather    No other family history of malignancy.    SOCIAL HISTORY:  Social History     Socioeconomic History   • Marital status: Single     Spouse name: Not on file   • Number of children: Not on file   • Years of education: Not on file   • Highest education level: Not on file   Social Needs   • Financial resource strain: Not on file   • Food insecurity - worry: Not on file   • Food insecurity - inability: Not on file   • Transportation needs - medical: Not on file   • Transportation needs - non-medical: Not on file   Occupational History   • Not on file   Tobacco Use   • Smoking status: Never Smoker   • Smokeless tobacco: Current User     Types: Chew   Substance and Sexual Activity   • Alcohol use: Yes     Comment: occasional   • Drug use: No   • Sexual activity: Defer   Other Topics Concern   • Not on file   Social History Narrative    He is , and is self employed/semi retired. He is a non smoker but chews tobacco. He used to drink daily, but says he has cut it in half.          REVIEW OF SYSTEMS:   A comprehensive 14 point review of systems was performed.  Significant findings as mentioned above.  All other systems reviewed and are negative.      MEDICATIONS:  The current medication list  was reviewed in the EMR    Current Outpatient Medications:   •  amLODIPine (NORVASC) 10 MG tablet, Take 10 mg by mouth Daily., Disp: , Rfl:   •  escitalopram (LEXAPRO) 10 MG tablet, Take 1 tablet by mouth Daily., Disp: 30 tablet, Rfl: 11  •  ferrous sulfate 325 (65 FE) MG tablet, Take 325 mg by mouth Daily With Breakfast., Disp: , Rfl:     ALLERGIES:  No Known Allergies    PHYSICAL EXAM:  Vitals:    11/20/18 1239   BP: (!) 184/70   Pulse: 98   Resp: 18   Temp: 98.4 °F (36.9 °C)   SpO2: 93%   General:  Awake, alert and oriented, anxious but in no distress  HEENT:  Pupils are equal, round and reactive to light and accommodation, Extra-ocular movements full, Oropharyx clear, mucous membranes moist  Neck:  No JVD, thyromegaly or lymphadenopathy  CV:  Regular rate and rhythm, no murmurs, rubs or gallops  Resp:  Lungs are clear to auscultation bilaterally  Abd:  Soft, non-tender, somewhat -distended, bowel sounds present, no organomegaly or masses  Ext:  No clubbing, cyanosis or edema  Lymph:  No cervical, supraclavicular, axillary, inguinal or femoral adenopathy  Neuro:  MS as above, CN II-XII intact, grossly non-focal exam      PATHOLOGY:  10-02-18        ENDOSCOPY:  10-02-18:        IMAGING:  10-04-18 CT Abdomen Pelvis With Contrast   Findings  - LOWER THORAX: Patchy nonspecific subpleural nodularity in the left lung base that could represent sequelae of chronic airspace disease or early fibrosis.     ABDOMEN:  - LIVER: Homogeneous. No focal hepatic mass or ductal dilatation.  - GALLBLADDER: GALLSTONES WITHIN THE GALLBLADDER.  - PANCREAS: Unremarkable. No mass or ductal dilatation.  - SPLEEN: Homogeneous. No splenomegaly.  - ADRENALS: No mass.  - KIDNEYS: RIGHT RENAL CYST MEASURING 3.6 CM.  - GI TRACT: NONSPECIFIC DISTAL SIGMOID COLONIC WALL THICKENING VERSUS  NONDISTENTION.  - PERITONEUM: No free air. No free fluid or loculated fluid collections.  - MESENTERY: Unremarkable.  - LYMPH NODES: No lymphadenopathy.  -  VASCULATURE: ATHEROSCLEROTIC VASCULAR CALCIFICATION.  - ABDOMINAL WALL: SMALL BILATERAL HUMERAL HERNIAS CONTAINING ONLY FAT.  - OTHER: None.     PELVIS:  - BLADDER: No focal mass or significant wall thickening  - REPRODUCTIVE: Unremarkable as visualized.  - APPENDIX: Nondistended. No surrounding inflammation.  - BONES: No acute bony abnormality.     IMPRESSION:  1. Gallstones within the gallbladder.  2.Nonspecific distal sigmoid colonic wall thickening versus nondistention.    PET/CT 10-29-18    11-13-18 MRI Pelvis Without Contrast  FINDINGS:  1.) TUMOR LOCATION AND CHARACTERISTICS        i) Distance of Inferior margin of Tumor from the dentate line: 9.5 CM    ii) Tumor at or below the puborectalis sling:  NO   iii) Relationship to the anterior peritoneal reflection: BELOW   iv) Craniocaudal length of the tumor: 6cm   v) Clock face of tumor: 5o'clock to 2o'clock  vi) Polypoid/ Annular/ Semi-annular: SEMI-ANNULAR  vii) Mucinous: YES     2.) EXTRAMURAL DEPTH OF INVASION AND MR T-CATEGORY         i) Extramural depth of invasion (Use 0mm for T1 or T2 tumor):  APPROXIMATELY 5 MM    ii) T category: T3 B              *Please indicate structures with possible invasion.  Specify laterality,  sequence and slice#: (see list below)     *  Anterior peritoneal reflection (T4a tumor): NO  *  Puborectalis: NO  *  Bladder: NO  *  Vascular Involvement of Iliac Vessels: NO  *  Levator ani: NO  *  Ureters: NO  *  Obturator: NO  *  Prostate: MARGINAL/TETHERED  *  Piriformis: NO  *  Uterus: NOT APPLICABLE  *  Pelvic Bone: NO  *  Vagina: NOT APPLICABLE  *  Sacrum: NO  *  Urethra: NO  *  Other:           iii) For low rectal tumors (maximum tumor depth at or below the  puborectalis sling):     *  Not applicable (tumor above the puborectalis sling: NOT APPLICABLE  *    *  Level 1 (submucosa only, no involvement of internal anal sphincter):       *  Level 2 (confined to the internal anal sphincter; no involvement of  intersphincteric fat):       *  Level 3 (intersphincteric fat involved):      *  Level 4 (involves external sphincter or beyond):         3. RELATIONSHIP OF THE TUMOR TO MESORECTAL FASCIA (MRF)       i) Shortest distance 0mm of the definitive tumour border to the MRF  is: AT 12:00   ii) Are there any tumour spiculations closer to the MRF? NO     iii) Location of Tumor margin to MRF: 12:00, AT THE LEVEL PROSTATE     4. EXTRAMURAL VENOUS INVASION       i) Extramural Venous Invasion (EMVI): ABSENT     5. MESORECTAL LYMPH NODES AND TUMOUR DEPOSITS       i) Any suspicious mesorectal lymph nodes/tumor deposits: YES      *If yes, the most suspicious node/tumor deposit is: 15 MM IN  DIAMETER, ALONG THE UPPER MARGIN OF the tumor with minimum distance 7  MMmm from the MRF at 7o'clock     6. EXTRAMESORECTAL LYMPH NODES        i) Any suspicious extramesorectal lymph nodes: NO      *If yes, location and laterality of suspicious nodes:             Int. Iliac:                Ext. Iliac:                Common Iliac:                Obturator:                Inguinal:                Other:           ii) Is the BETH node station in the field of view: NO        *If Yes, are these nodes suspicious:         7. OTHER FINDINGS (COMPLICATIONS, METASTASES, LIMITATIONS)         NOTE: THERE IS SOME UNCERTAINTY WHETHER THE APPARENT SMALL FOCUS  OF TUMOR EXTENDING TO THE PROSTATE AT THE 12:00 POSITION COULD ACTUALLY  BE PROSTATE NODULE EXTENDING TOWARD THE TUMOR. RECTAL TUMOR IS FAVORED;  ALTHOUGH THIS DETERMINATION CANNOT BE MADE WITH COMPLETE CERTAINTY,  TUMOR IS CONSIDERED T3 B.         IMPRESSIONS:  MRI rectal cancer T category is: T3 B  Maximum EMD of invasion is: 5  Minimum tumor to MRF distance is: 0  Low rectal tumor component: NO  Mesorectal nodes/tumor deposits: SUSPICIOUS  EMVI: ABSENT  Extramesorectal nodes: NEGATIVE      RECENT LABS:  Lab Results   Component Value Date    WBC 10.93 10/19/2018    HGB 12.6 (L) 10/19/2018    HCT 42.2 10/19/2018    MCV 85.6  10/19/2018    RDW 27.6 (H) 10/19/2018     10/19/2018    NEUTRORELPCT 61.6 10/19/2018    LYMPHORELPCT 29.5 10/19/2018    MONORELPCT 6.0 10/19/2018    EOSRELPCT 2.0 10/19/2018    BASORELPCT 0.4 10/19/2018    NEUTROABS 6.74 (H) 10/19/2018    LYMPHSABS 3.22 (H) 10/19/2018       Lab Results   Component Value Date     10/19/2018    K 4.6 10/19/2018    CO2 27.2 10/19/2018     10/19/2018    BUN 16 10/19/2018    CREATININE 1.20 10/19/2018    EGFRIFNONA 60 (L) 10/19/2018    GLUCOSE 105 10/19/2018    CALCIUM 9.9 10/19/2018    ALKPHOS 78 10/19/2018    AST 50 (H) 10/19/2018    ALT 41 10/19/2018    BILITOT 0.5 10/19/2018    ALBUMIN 4.80 10/19/2018    PROTEINTOT 8.3 (H) 10/19/2018       Lab Results   Component Value Date/Time    URICACID 9.1 (H) 06/25/2014 01:29 PM     Lab Results   Component Value Date    CEA 18.80 (H) 10/19/2018     Lab Results   Component Value Date    PSA 4.930 (H) 10/19/2018       Lab Results   Component Value Date    FERRITIN 468.00 (H) 10/19/2018    IRON 44 (L) 10/19/2018    TIBC 387 10/19/2018    LABIRON 11 (L) 10/19/2018    EZZPLFOL77 401 10/19/2018    FOLATE 7.48 10/19/2018     ASSESSMENT & PLAN:  David Mandujano is a very pleasant 68 y.o. male with newly diagnosed rectal cancer. Clinically stage IIIB ([id number]).    1.  Rectal cancer:  -   Dr. Villalobos recommended neaoadjuvant chemoradiation and I am in agreement with this decision.  MRI showed a locally advanced tumor and suspicious pelvic LN.  PET-CT was negative except in the local tumor.   -  There is a non-specific sub-pleural nodular opacity in the L lung base which was PET negative.  Perhaps this was inflammatory in nature. This area will require f/u on future imaging.  -  I have recommended neoadjuvant chemoradiation with Xeloda 825 mg/m2 BID D1-5 or M-F during the course of radiaton.  His dose will be 500 mg x 4 or 2000 mg BID.  We discussed potential risks, benefits and side effects and he would like to proceed.    2.  Iron  "deficiency anemia:  - He received IV iron and was placed on Ferrous Sulfate 325 mg BID.  -  Iron was improved but still low.  Tolerating iron well, so I increased supplement to TID. Will repeat ferritin / iron studies when he returns.    3.  Prostatism with elevated PSA:  -  He has seen Dr. Blackman in [place].  I suggested he return to see Dr. Rolle who had started evaluation.  I think it is likely that he will benefit from further evaluation and treatment, particularly given upcoming need for chemotherapy, surgery, etc.  PSA somewhat elevated. Dr. Villalobos was in agreement.  To see Dr. Blackman next week.     4.  Depressed mood / Anxiety and Alcohol Abuse:  -  Mrs. Mandujano and his daughter have been concerned about Mr. Mandujano who admits he struggles with anxiety.  He has been working to cut back on his alcohol intake, which sounds like it may be related to these symptoms.  -  I suggested trial of Lexapro 10 mg daily.  Mr. Mandujano has beent hesitant but says he will start this tonight.  -  With regards to alcohol intake, he  Has not been willing to tell me how much he is drinking but does says that he was doing better.  He had cut back to drinking from daily to 3x/week and had cut back the amount of alcohol he normally drinks.  This last week was \"a bad week\" due to stress, but he says he is working to do better.  I offered referral for assistance but he declined.    5.  Prophylaxis:  He took 2018 influenza vaccine 10-19-18.  He agrees to take Prevnar 13 today.    6.  ACO Quality measures  - David Mandujano does not smoke but he does use tobacco products in the form of chewing tobacco.  - I advised David of the risks of continuing to use tobacco, and I provided him with tobacco cessation educational materials in the After Visit Summary.   During this visit, I spent <3 minutes counseling the patient regarding tobacco cessation.  - Patient's Body mass index is 40.59 kg/m². BMI is above normal parameters. Recommendations " include: nutrition counseling.  - Current outpatient and discharge medications have been reconciled for the patient.  Reviewed by: Jyoti Larios MD     This note was scribed for Jyoti Larios MD by Lynn Pinto RN.    I, Jyoti Larios MD, personally performed the services described in this documentation as scribed by the above named individual in my presence, and it is both accurate and complete.  11/20/2018        I spent 30 minutes with David Mandujano today.  More than 50% of the time was spent in counseling / coordination of care for the above problems.      Electronically Signed by: Jyoti Larios MD      CC:   MD Kathleen Coppola Emmanuel C, MD Shawn Michael Acino, MD Bruce Belin, MD William Richards, MD

## 2018-11-25 DIAGNOSIS — C20 RECTAL CANCER (HCC): ICD-10-CM

## 2018-11-28 ENCOUNTER — SPECIALTY PHARMACY (OUTPATIENT)
Dept: PHARMACY | Facility: HOSPITAL | Age: 68
End: 2018-11-28

## 2018-11-28 DIAGNOSIS — C20 RECTAL CANCER (HCC): Primary | ICD-10-CM

## 2018-11-28 RX ORDER — ONDANSETRON HYDROCHLORIDE 8 MG/1
8 TABLET, FILM COATED ORAL 3 TIMES DAILY PRN
Qty: 30 TABLET | Refills: 5 | Status: SHIPPED | OUTPATIENT
Start: 2018-11-28 | End: 2019-02-05

## 2018-11-28 RX ORDER — CAPECITABINE 500 MG/1
TABLET, FILM COATED ORAL
Qty: 200 TABLET | Refills: 0 | Status: SHIPPED | OUTPATIENT
Start: 2018-11-28 | End: 2019-01-02 | Stop reason: SDUPTHER

## 2019-01-02 ENCOUNTER — DOCUMENTATION (OUTPATIENT)
Dept: ONCOLOGY | Facility: HOSPITAL | Age: 69
End: 2019-01-02

## 2019-01-02 ENCOUNTER — SPECIALTY PHARMACY (OUTPATIENT)
Dept: PHARMACY | Facility: HOSPITAL | Age: 69
End: 2019-01-02

## 2019-01-02 DIAGNOSIS — C20 RECTAL CANCER (HCC): ICD-10-CM

## 2019-01-02 RX ORDER — CAPECITABINE 500 MG/1
TABLET, FILM COATED ORAL
Qty: 80 TABLET | Refills: 0 | Status: SHIPPED | OUTPATIENT
Start: 2019-01-02 | End: 2019-01-02 | Stop reason: SDUPTHER

## 2019-01-02 RX ORDER — CAPECITABINE 500 MG/1
TABLET, FILM COATED ORAL
Qty: 80 TABLET | Refills: 0 | Status: SHIPPED | OUTPATIENT
Start: 2019-01-02 | End: 2019-01-16 | Stop reason: SDUPTHER

## 2019-01-02 NOTE — PROGRESS NOTES
Called Replaced by [facility] radiation who stated he had completed 14/28 planned treatments. Patient states he needs RF on Xeloda. Contacted Krystal who stated she would send in 14 day refill for patient to take in combination w/ Radiation. Patient to  tomorrow.

## 2019-01-02 NOTE — PROGRESS NOTES
Specialty Pharmacy Note      Name:  David Mandujano  :  1950  Date:  2019         Past Medical History:   Diagnosis Date   • Anemia    • Colon cancer (CMS/HCC)    • Hypertension    • Wrist fracture     surgically repaired       Past Surgical History:   Procedure Laterality Date   • COLONOSCOPY     • WRIST SURGERY         Social History     Socioeconomic History   • Marital status: Single     Spouse name: Not on file   • Number of children: Not on file   • Years of education: Not on file   • Highest education level: Not on file   Social Needs   • Financial resource strain: Not on file   • Food insecurity - worry: Not on file   • Food insecurity - inability: Not on file   • Transportation needs - medical: Not on file   • Transportation needs - non-medical: Not on file   Occupational History   • Not on file   Tobacco Use   • Smoking status: Never Smoker   • Smokeless tobacco: Current User     Types: Chew   Substance and Sexual Activity   • Alcohol use: Yes     Comment: occasional   • Drug use: No   • Sexual activity: Defer   Other Topics Concern   • Not on file   Social History Narrative    He is , and is self employed/semi retired. He is a non smoker but chews tobacco. He used to drink daily, but says he has cut it in half.        Family History   Problem Relation Age of Onset   • Colon cancer Maternal Grandfather        No Known Allergies    Current Outpatient Medications   Medication Sig Dispense Refill   • amLODIPine (NORVASC) 10 MG tablet Take 10 mg by mouth Daily.     • capecitabine (XELODA) 500 MG chemo tablet Take 4 tab(s) by mouth in the AM and 4 tab(s) by mouth in the PM on Monday - Friday with radiation. 80 tablet 0   • escitalopram (LEXAPRO) 10 MG tablet Take 1 tablet by mouth Daily. 30 tablet 11   • ferrous sulfate 325 (65 FE) MG tablet Take 325 mg by mouth Daily With Breakfast.     • ondansetron (ZOFRAN) 8 MG tablet Take 1 tablet by mouth 3 (Three) Times a Day As Needed for Nausea  or Vomiting. 30 tablet 5     No current facility-administered medications for this visit.          LABORATORY:    Lab Results   Component Value Date    IRON 44 (L) 10/19/2018    TSH 1.768 10/19/2018    TIBC 387 10/19/2018     No results found for: PROTIME, INR, PTT  No results found for: HAV, HCVQUANT, JRUHRP64, HCVINFO, HCVGENOTYPE  No results found for: AMPHETSCREEN, BARBITSCNUR, LABBENZSCN, COCAINEUR, LABMETHSCN, ZAAMV6O, [id number], HEPBSAB, OXYCODONESCN, 6ACETYLMORP, BUPRENORSCNU, LABOPIASCN, PCPUR, THCURSCR  Last Urine Toxicity     There is no flowsheet data to display.          ASSESSMENT/PLAN:    Patient Update Assessment (new medications, allergies, medical history): Patient being treated with Xeloda and XRT for   rectal cancer. Per radiation clinic, patient has 2 weeks of radiation left.    Medication(s): Xeloda 825 mg/m2 BID days 1-5 (Monday-Friday) with radiation.     Currently Taking Medication(s): Patient currently taking 2000 mg on Monday-Friday with radiation    Prior Authorization Status: Approved    Any Issues Identified: None identified    Appropriate to Process Prescription(s): Patient still has 2 weeks of radiation remaining per confirmation with radiation team.

## 2019-01-11 ENCOUNTER — OFFICE VISIT (OUTPATIENT)
Dept: ONCOLOGY | Facility: CLINIC | Age: 69
End: 2019-01-11

## 2019-01-11 VITALS
SYSTOLIC BLOOD PRESSURE: 154 MMHG | BODY MASS INDEX: 38.84 KG/M2 | RESPIRATION RATE: 18 BRPM | DIASTOLIC BLOOD PRESSURE: 72 MMHG | HEART RATE: 82 BPM | TEMPERATURE: 99.3 F | WEIGHT: 286.4 LBS | OXYGEN SATURATION: 95 %

## 2019-01-11 DIAGNOSIS — D50.9 IRON DEFICIENCY ANEMIA, UNSPECIFIED IRON DEFICIENCY ANEMIA TYPE: ICD-10-CM

## 2019-01-11 DIAGNOSIS — R01.1 HEART MURMUR: ICD-10-CM

## 2019-01-11 DIAGNOSIS — C20 RECTAL CANCER (HCC): Primary | ICD-10-CM

## 2019-01-11 DIAGNOSIS — R97.20 ELEVATED PROSTATE SPECIFIC ANTIGEN (PSA): ICD-10-CM

## 2019-01-11 DIAGNOSIS — F10.10 ALCOHOL ABUSE: ICD-10-CM

## 2019-01-11 LAB
ALBUMIN SERPL-MCNC: 4.2 G/DL (ref 3.4–4.8)
ALBUMIN/GLOB SERPL: 1.4 G/DL (ref 1.5–2.5)
ALP SERPL-CCNC: 80 U/L (ref 40–129)
ALT SERPL W P-5'-P-CCNC: 41 U/L (ref 10–44)
ANION GAP SERPL CALCULATED.3IONS-SCNC: 11.1 MMOL/L (ref 3.6–11.2)
AST SERPL-CCNC: 33 U/L (ref 10–34)
BASOPHILS # BLD AUTO: 0.01 10*3/MM3 (ref 0–0.3)
BASOPHILS NFR BLD AUTO: 0.1 % (ref 0–2)
BILIRUB SERPL-MCNC: 0.5 MG/DL (ref 0.2–1.8)
BUN BLD-MCNC: 16 MG/DL (ref 7–21)
BUN/CREAT SERPL: 14.2 (ref 7–25)
CALCIUM SPEC-SCNC: 9.1 MG/DL (ref 7.7–10)
CEA SERPL-MCNC: 17.8 NG/ML (ref 0–5)
CHLORIDE SERPL-SCNC: 105 MMOL/L (ref 99–112)
CO2 SERPL-SCNC: 23.9 MMOL/L (ref 24.3–31.9)
CREAT BLD-MCNC: 1.13 MG/DL (ref 0.43–1.29)
DEPRECATED RDW RBC AUTO: 52 FL (ref 37–54)
EOSINOPHIL # BLD AUTO: 0.18 10*3/MM3 (ref 0–0.7)
EOSINOPHIL NFR BLD AUTO: 2.6 % (ref 0–7)
ERYTHROCYTE [DISTWIDTH] IN BLOOD BY AUTOMATED COUNT: 15.8 % (ref 11.5–14.5)
FERRITIN SERPL-MCNC: 69 NG/ML (ref 21.9–321.7)
GFR SERPL CREATININE-BSD FRML MDRD: 65 ML/MIN/1.73
GLOBULIN UR ELPH-MCNC: 3 GM/DL
GLUCOSE BLD-MCNC: 106 MG/DL (ref 70–110)
HCT VFR BLD AUTO: 41.2 % (ref 42–52)
HGB BLD-MCNC: 13.5 G/DL (ref 14–18)
IMM GRANULOCYTES # BLD AUTO: 0.02 10*3/MM3 (ref 0–0.03)
IMM GRANULOCYTES NFR BLD AUTO: 0.3 % (ref 0–0.5)
IRON 24H UR-MRATE: 37 MCG/DL (ref 53–167)
IRON SATN MFR SERPL: 9 % (ref 20–50)
LYMPHOCYTES # BLD AUTO: 0.9 10*3/MM3 (ref 1–3)
LYMPHOCYTES NFR BLD AUTO: 12.8 % (ref 16–46)
MCH RBC QN AUTO: 31.2 PG (ref 27–33)
MCHC RBC AUTO-ENTMCNC: 32.8 G/DL (ref 33–37)
MCV RBC AUTO: 95.2 FL (ref 80–94)
MONOCYTES # BLD AUTO: 0.45 10*3/MM3 (ref 0.1–0.9)
MONOCYTES NFR BLD AUTO: 6.4 % (ref 0–12)
NEUTROPHILS # BLD AUTO: 5.47 10*3/MM3 (ref 1.4–6.5)
NEUTROPHILS NFR BLD AUTO: 77.8 % (ref 40–75)
OSMOLALITY SERPL CALC.SUM OF ELEC: 281 MOSM/KG (ref 273–305)
PLATELET # BLD AUTO: 144 10*3/MM3 (ref 130–400)
PMV BLD AUTO: 9.8 FL (ref 6–10)
POTASSIUM BLD-SCNC: 3.6 MMOL/L (ref 3.5–5.3)
PROT SERPL-MCNC: 7.2 G/DL (ref 6–8)
RBC # BLD AUTO: 4.33 10*6/MM3 (ref 4.7–6.1)
SODIUM BLD-SCNC: 140 MMOL/L (ref 135–153)
TIBC SERPL-MCNC: 398 MCG/DL (ref 241–421)
WBC NRBC COR # BLD: 7.03 10*3/MM3 (ref 4.5–12.5)

## 2019-01-11 PROCEDURE — 82378 CARCINOEMBRYONIC ANTIGEN: CPT | Performed by: INTERNAL MEDICINE

## 2019-01-11 PROCEDURE — 80053 COMPREHEN METABOLIC PANEL: CPT | Performed by: INTERNAL MEDICINE

## 2019-01-11 PROCEDURE — 99214 OFFICE O/P EST MOD 30 MIN: CPT | Performed by: INTERNAL MEDICINE

## 2019-01-11 PROCEDURE — 85025 COMPLETE CBC W/AUTO DIFF WBC: CPT | Performed by: INTERNAL MEDICINE

## 2019-01-11 PROCEDURE — 36415 COLL VENOUS BLD VENIPUNCTURE: CPT | Performed by: INTERNAL MEDICINE

## 2019-01-11 PROCEDURE — 83550 IRON BINDING TEST: CPT | Performed by: INTERNAL MEDICINE

## 2019-01-11 PROCEDURE — 83540 ASSAY OF IRON: CPT | Performed by: INTERNAL MEDICINE

## 2019-01-11 PROCEDURE — 82728 ASSAY OF FERRITIN: CPT | Performed by: INTERNAL MEDICINE

## 2019-01-11 NOTE — PROGRESS NOTES
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  His dose is 500 mg x 4 or 2000 mg BID    CHIEF COMPLAINT:  Rectal cancer    HISTORY OF PRESENT ILLNESS:   David Mandujano is a very pleasant 68 y.o. male who was referred by Dr. Barnhart for evaluation and treatment of colorectal cancer. He began to notice rectal bleeding in January or February 2018, and he also began to notice fatigue in approximately July. He assumed he was out of shape when he started to become short of breath and started trying to exercise more frequently, which only exacerbated the shortness of breath. He was evaluated by his PCP, Raj Wang MD, who after testing, found him to be severely iron deficient, hyperglycemic, and he also had an elevated PSA. He was on vacation at the time, and his PCP asked him to return home for further evaluation. He was started on oral iron therapy and later received IV iron infusions. He was also scheduled for a colonoscopy with Dr. Barnhart, during which a suspicious rectal mass was biopsied and pathology was positive for an ulcerated invasive, moderately differentiated rectal adenocarcinoma. He and his wife are here for discussion of further treatment.     He continues to be fatigued and short of breath with activity, and he has lost about 18 pound over the last month due to a strict diet his wife has him on. She also says he has been started on several supplements and vitamins including pre and probiotics and Vit C. He is otherwise well and without complaint. He denies pain of any kind.  He is tolerating oral iron without significant difficulty.  He saw Dr. Blackman of Urology in [place] but his wife cancelled f/u appt because she thought other appts were more important for now.    INTERVAL HISTORY:  Mr. Mandujano  is here today for follow up of rectal cancer. He is feeling well today. He is taking Xeloda 2000 mg BID w/ XRT and tolerating his treatment well. He is about MCC through radiation and tolerating it well. He is taking 1 ferrous sulfate tab per day and says he feels that his iron is up and he didn't feel that he needed to take 3 tabs per day.  He says he has mostly stopped drinking.  He says he rarely has diarrhea (1-3x/day) and denies rectal bleeding.     PAST MEDICAL HISTORY:  Past Medical History:   Diagnosis Date   • Anemia    • Colon cancer (CMS/HCC)    • Hypertension    • Wrist fracture     surgically repaired       PAST SURGICAL HISTORY:  Past Surgical History:   Procedure Laterality Date   • COLONOSCOPY     • WRIST SURGERY         FAMILY HISTORY:  Family History   Problem Relation Age of Onset   • Colon cancer Maternal Grandfather    No other family history of malignancy.    SOCIAL HISTORY:  Social History     Socioeconomic History   • Marital status: Single     Spouse name: Not on file   • Number of children: Not on file   • Years of education: Not on file   • Highest education level: Not on file   Social Needs   • Financial resource strain: Not on file   • Food insecurity - worry: Not on file   • Food insecurity - inability: Not on file   • Transportation needs - medical: Not on file   • Transportation needs - non-medical: Not on file   Occupational History   • Not on file   Tobacco Use   • Smoking status: Never Smoker   • Smokeless tobacco: Current User     Types: Chew   Substance and Sexual Activity   • Alcohol use: Yes     Comment: occasional   • Drug use: No   • Sexual activity: Defer   Other Topics Concern   • Not on file   Social History Narrative    He is , and is self employed/semi retired. He is a non smoker but chews tobacco. He used to drink daily, but says he has cut it in half.          REVIEW OF SYSTEMS:   A comprehensive 14 point review of systems was performed.  Significant  findings as mentioned above.  All other systems reviewed and are negative.      MEDICATIONS:  The current medication list was reviewed in the EMR    Current Outpatient Medications:   •  amLODIPine (NORVASC) 10 MG tablet, Take 10 mg by mouth Daily., Disp: , Rfl:   •  capecitabine (XELODA) 500 MG chemo tablet, Take 4 tab(s) by mouth in the AM and 4 tab(s) by mouth in the PM on Monday - Friday with radiation., Disp: 80 tablet, Rfl: 0  •  escitalopram (LEXAPRO) 10 MG tablet, Take 1 tablet by mouth Daily., Disp: 30 tablet, Rfl: 11  •  ferrous sulfate 325 (65 FE) MG tablet, Take 325 mg by mouth Daily With Breakfast., Disp: , Rfl:   •  ondansetron (ZOFRAN) 8 MG tablet, Take 1 tablet by mouth 3 (Three) Times a Day As Needed for Nausea or Vomiting., Disp: 30 tablet, Rfl: 5    ALLERGIES:  No Known Allergies    PHYSICAL EXAM:  Vitals:    01/11/19 1555   BP: 154/72   Pulse: 82   Resp: 18   Temp: 99.3 °F (37.4 °C)   SpO2: 95%   General:  Awake, alert and oriented, in good spirits  HEENT:  Pupils are equal, round and reactive to light and accommodation, Extra-ocular movements full, Oropharyx clear, mucous membranes moist  Neck:  No JVD, thyromegaly or lymphadenopathy  CV:  Regular rate and rhythm, III/IV holosystolic murmur, no rubs or gallops  Resp:  Lungs are clear to auscultation bilaterally  Abd:  Soft, non-tender, somewhat -distended, bowel sounds present, no organomegaly or masses  Ext:  No clubbing, cyanosis or edema.  No palmar/plantar erythema or skin changes  Lymph:  No cervical, supraclavicular, axillary, inguinal or femoral adenopathy  Neuro:  MS as above, CN II-XII intact, grossly non-focal exam      PATHOLOGY:  10-02-18        ENDOSCOPY:  10-02-18:        IMAGING:  10-04-18 CT Abdomen Pelvis With Contrast   Findings  - LOWER THORAX: Patchy nonspecific subpleural nodularity in the left lung base that could represent sequelae of chronic airspace disease or early fibrosis.     ABDOMEN:  - LIVER: Homogeneous. No focal  hepatic mass or ductal dilatation.  - GALLBLADDER: GALLSTONES WITHIN THE GALLBLADDER.  - PANCREAS: Unremarkable. No mass or ductal dilatation.  - SPLEEN: Homogeneous. No splenomegaly.  - ADRENALS: No mass.  - KIDNEYS: RIGHT RENAL CYST MEASURING 3.6 CM.  - GI TRACT: NONSPECIFIC DISTAL SIGMOID COLONIC WALL THICKENING VERSUS  NONDISTENTION.  - PERITONEUM: No free air. No free fluid or loculated fluid collections.  - MESENTERY: Unremarkable.  - LYMPH NODES: No lymphadenopathy.  - VASCULATURE: ATHEROSCLEROTIC VASCULAR CALCIFICATION.  - ABDOMINAL WALL: SMALL BILATERAL HUMERAL HERNIAS CONTAINING ONLY FAT.  - OTHER: None.     PELVIS:  - BLADDER: No focal mass or significant wall thickening  - REPRODUCTIVE: Unremarkable as visualized.  - APPENDIX: Nondistended. No surrounding inflammation.  - BONES: No acute bony abnormality.     IMPRESSION:  1. Gallstones within the gallbladder.  2.Nonspecific distal sigmoid colonic wall thickening versus nondistention.    PET/CT 10-29-18    11-13-18 MRI Pelvis Without Contrast  FINDINGS:  1.) TUMOR LOCATION AND CHARACTERISTICS        i) Distance of Inferior margin of Tumor from the dentate line: 9.5 CM    ii) Tumor at or below the puborectalis sling:  NO   iii) Relationship to the anterior peritoneal reflection: BELOW   iv) Craniocaudal length of the tumor: 6cm   v) Clock face of tumor: 5o'clock to 2o'clock  vi) Polypoid/ Annular/ Semi-annular: SEMI-ANNULAR  vii) Mucinous: YES     2.) EXTRAMURAL DEPTH OF INVASION AND MR T-CATEGORY         i) Extramural depth of invasion (Use 0mm for T1 or T2 tumor):  APPROXIMATELY 5 MM    ii) T category: T3 B              *Please indicate structures with possible invasion.  Specify laterality,  sequence and slice#: (see list below)     *  Anterior peritoneal reflection (T4a tumor): NO  *  Puborectalis: NO  *  Bladder: NO  *  Vascular Involvement of Iliac Vessels: NO  *  Levator ani: NO  *  Ureters: NO  *  Obturator: NO  *  Prostate: MARGINAL/TETHERED  *   Piriformis: NO  *  Uterus: NOT APPLICABLE  *  Pelvic Bone: NO  *  Vagina: NOT APPLICABLE  *  Sacrum: NO  *  Urethra: NO  *  Other:           iii) For low rectal tumors (maximum tumor depth at or below the  puborectalis sling):     *  Not applicable (tumor above the puborectalis sling: NOT APPLICABLE  *    *  Level 1 (submucosa only, no involvement of internal anal sphincter):       *  Level 2 (confined to the internal anal sphincter; no involvement of  intersphincteric fat):      *  Level 3 (intersphincteric fat involved):      *  Level 4 (involves external sphincter or beyond):         3. RELATIONSHIP OF THE TUMOR TO MESORECTAL FASCIA (MRF)       i) Shortest distance 0mm of the definitive tumour border to the MRF  is: AT 12:00   ii) Are there any tumour spiculations closer to the MRF? NO     iii) Location of Tumor margin to MRF: 12:00, AT THE LEVEL PROSTATE     4. EXTRAMURAL VENOUS INVASION       i) Extramural Venous Invasion (EMVI): ABSENT     5. MESORECTAL LYMPH NODES AND TUMOUR DEPOSITS       i) Any suspicious mesorectal lymph nodes/tumor deposits: YES      *If yes, the most suspicious node/tumor deposit is: 15 MM IN  DIAMETER, ALONG THE UPPER MARGIN OF the tumor with minimum distance 7  MMmm from the MRF at 7o'clock     6. EXTRAMESORECTAL LYMPH NODES        i) Any suspicious extramesorectal lymph nodes: NO      *If yes, location and laterality of suspicious nodes:             Int. Iliac:                Ext. Iliac:                Common Iliac:                Obturator:                Inguinal:                Other:           ii) Is the BETH node station in the field of view: NO        *If Yes, are these nodes suspicious:         7. OTHER FINDINGS (COMPLICATIONS, METASTASES, LIMITATIONS)         NOTE: THERE IS SOME UNCERTAINTY WHETHER THE APPARENT SMALL FOCUS  OF TUMOR EXTENDING TO THE PROSTATE AT THE 12:00 POSITION COULD ACTUALLY  BE PROSTATE NODULE EXTENDING TOWARD THE TUMOR. RECTAL TUMOR IS FAVORED;  ALTHOUGH  THIS DETERMINATION CANNOT BE MADE WITH COMPLETE CERTAINTY,  TUMOR IS CONSIDERED T3 B.         IMPRESSIONS:  MRI rectal cancer T category is: T3 B  Maximum EMD of invasion is: 5  Minimum tumor to MRF distance is: 0  Low rectal tumor component: NO  Mesorectal nodes/tumor deposits: SUSPICIOUS  EMVI: ABSENT  Extramesorectal nodes: NEGATIVE      RECENT LABS:  Lab Results   Component Value Date    WBC 7.03 01/11/2019    HGB 13.5 (L) 01/11/2019    HCT 41.2 (L) 01/11/2019    MCV 95.2 (H) 01/11/2019    RDW 15.8 (H) 01/11/2019     01/11/2019    NEUTRORELPCT 77.8 (H) 01/11/2019    LYMPHORELPCT 12.8 (L) 01/11/2019    MONORELPCT 6.4 01/11/2019    EOSRELPCT 2.6 01/11/2019    BASORELPCT 0.1 01/11/2019    NEUTROABS 5.47 01/11/2019    LYMPHSABS 0.90 (L) 01/11/2019       Lab Results   Component Value Date     10/19/2018    K 4.6 10/19/2018    CO2 27.2 10/19/2018     10/19/2018    BUN 16 10/19/2018    CREATININE 1.20 10/19/2018    EGFRIFNONA 60 (L) 10/19/2018    GLUCOSE 105 10/19/2018    CALCIUM 9.9 10/19/2018    ALKPHOS 78 10/19/2018    AST 50 (H) 10/19/2018    ALT 41 10/19/2018    BILITOT 0.5 10/19/2018    ALBUMIN 4.80 10/19/2018    PROTEINTOT 8.3 (H) 10/19/2018       Lab Results   Component Value Date/Time    URICACID 9.1 (H) 06/25/2014 01:29 PM     Lab Results   Component Value Date    CEA 18.80 (H) 10/19/2018     Lab Results   Component Value Date    PSA 4.930 (H) 10/19/2018       Lab Results   Component Value Date    FERRITIN 468.00 (H) 10/19/2018    IRON 44 (L) 10/19/2018    TIBC 387 10/19/2018    LABIRON 11 (L) 10/19/2018    UMYEQSRX01 401 10/19/2018    FOLATE 7.48 10/19/2018     ASSESSMENT & PLAN:  David Mandujano is a very pleasant 68 y.o. male with newly diagnosed rectal cancer. Clinically stage IIIB ([id number]).    1.  Rectal cancer:  -   Dr. Villalobos recommended neaoadjuvant chemoradiation and I am in agreement with this decision.  MRI showed a locally advanced tumor and suspicious pelvic LN.  PET-CT was  negative except in the local tumor.   -  There is a non-specific sub-pleural nodular opacity in the L lung base which was PET negative.  Perhaps this was inflammatory in nature. This area will require f/u on future imaging.  -  I have recommended neoadjuvant chemoradiation with Xeloda 825 mg/m2 BID D1-5 or M-F during the course of radiaton.  His dose is 500 mg x 4 or 2000 mg BID.    - He is tolerating his treatment very well and denies any side effects aside from mild intermittent diarrhea which he says hasn't been bad enough to take medication for.    2.  Iron deficiency anemia:  - He received IV iron and was placed on Ferrous Sulfate 325 mg BID.  Temporarily increased to TID but then cut back to once/day.  Hb is improved.  Will repeat iron studies today.    3.  Prostatism with elevated PSA:  -  He has seen Dr. Blackman in [place].  I suggested he return to see Dr. Blackman who had started evaluation.  I think it is likely that he will benefit from further evaluation and treatment, particularly given upcoming need for chemotherapy, surgery, etc.  PSA was somewhat elevated. He says he saw Dr. Blackman back but there were no further recommendations at present.    4.  Depressed mood / Anxiety and Alcohol Abuse:  -  Mrs. Mandujano and his daughter have been concerned about Mr. Mandujano who admits he struggles with anxiety.  He started taking Lexapro 10 mg daily which he is tolerating well.  He says he has mostly stopped drinking but is eating cookies from [place] instead.    5.  Prominent systolic murmur:  He says someone noticed this about 5 years ago and recommended further evaluation which he declined at the time.  I didn't notice this during his prior visits and it is quite prominent today.  He has no symptoms.  Will order echocardiogram.    6.  Prophylaxis:  He took 2018 influenza vaccine 10-19-18. He took Prevnar 13 on 11-20-18.    7.  ACO Quality measures  - David Mandujano does not smoke but he does use tobacco products in  the form of chewing tobacco.  - I advised David of the risks of continuing to use tobacco, and I provided him with tobacco cessation educational materials in the After Visit Summary.   During this visit, I spent <3 minutes counseling the patient regarding tobacco cessation.  - Patient's Body mass index is 38.84 kg/m². BMI is above normal parameters. Recommendations include: f/u with PCP after treatment of his malignancy..  - Current outpatient and discharge medications have been reconciled for the patient.  Reviewed by: Jyoti Larios MD    This note was scribed for Jyoti Larios MD by Lynn Pinto RN.    I, Jyoti Larios MD, personally performed the services described in this documentation as scribed by the above named individual in my presence, and it is both accurate and complete.  01/11/2019       I spent 25 minutes with David Mandujano today.  More than 50% of the time was spent in counseling / coordination of care for the above problems.      Electronically Signed by: Jyoti Larios MD      CC:   MD Kathleen Coppola Emmanuel C, MD Shawn Michael Acino, MD Bruce Belin, MD William Richards, MD

## 2019-01-16 ENCOUNTER — SPECIALTY PHARMACY (OUTPATIENT)
Dept: PHARMACY | Facility: HOSPITAL | Age: 69
End: 2019-01-16

## 2019-01-16 DIAGNOSIS — C20 RECTAL CANCER (HCC): ICD-10-CM

## 2019-01-16 DIAGNOSIS — D50.9 IRON DEFICIENCY ANEMIA, UNSPECIFIED IRON DEFICIENCY ANEMIA TYPE: Primary | ICD-10-CM

## 2019-01-16 RX ORDER — CAPECITABINE 500 MG/1
TABLET, FILM COATED ORAL
Qty: 128 TABLET | Refills: 0 | Status: SHIPPED | OUTPATIENT
Start: 2019-01-16 | End: 2019-02-05

## 2019-01-16 NOTE — PROGRESS NOTES
Specialty Pharmacy Note      Name:  David Mandujano  :  1950  Date:  2019         Past Medical History:   Diagnosis Date   • Anemia    • Colon cancer (CMS/HCC)    • Hypertension    • Wrist fracture     surgically repaired       Past Surgical History:   Procedure Laterality Date   • COLONOSCOPY     • WRIST SURGERY         Social History     Socioeconomic History   • Marital status: Single     Spouse name: Not on file   • Number of children: Not on file   • Years of education: Not on file   • Highest education level: Not on file   Social Needs   • Financial resource strain: Not on file   • Food insecurity - worry: Not on file   • Food insecurity - inability: Not on file   • Transportation needs - medical: Not on file   • Transportation needs - non-medical: Not on file   Occupational History   • Not on file   Tobacco Use   • Smoking status: Never Smoker   • Smokeless tobacco: Current User     Types: Chew   Substance and Sexual Activity   • Alcohol use: Yes     Comment: occasional   • Drug use: No   • Sexual activity: Defer   Other Topics Concern   • Not on file   Social History Narrative    He is , and is self employed/semi retired. He is a non smoker but chews tobacco. He used to drink daily, but says he has cut it in half.        Family History   Problem Relation Age of Onset   • Colon cancer Maternal Grandfather        No Known Allergies    Current Outpatient Medications   Medication Sig Dispense Refill   • amLODIPine (NORVASC) 10 MG tablet Take 10 mg by mouth Daily.     • capecitabine (XELODA) 500 MG chemo tablet Take 4 tab(s) by mouth in the AM and 4 tab(s) by mouth in the PM on Monday - Friday with radiation. 128 tablet 0   • escitalopram (LEXAPRO) 10 MG tablet Take 1 tablet by mouth Daily. 30 tablet 11   • ferrous sulfate 325 (65 FE) MG tablet Take 325 mg by mouth Daily With Breakfast.     • ondansetron (ZOFRAN) 8 MG tablet Take 1 tablet by mouth 3 (Three) Times a Day As Needed for  Nausea or Vomiting. 30 tablet 5     No current facility-administered medications for this visit.          LABORATORY:    Lab Results   Component Value Date    IRON 37 (L) 01/11/2019    TSH 1.768 10/19/2018    TIBC 398 01/11/2019     No results found for: PROTIME, INR, PTT  No results found for: HAV, HCVQUANT, [id number], HCVINFO, HCVGENOTYPE  No results found for: AMPHETSCREEN, BARBITSCNUR, LABBENZSCN, COCAINEUR, LABMETHSCN, RCRHJ7F, [id number], HEPBSAB, OXYCODONESCN, 6ACETYLMORP, BUPRENORSCNU, LABOPIASCN, PCPUR, THCURSCR  Last Urine Toxicity     There is no flowsheet data to display.          ASSESSMENT/PLAN:    Patient Update Assessment (new medications, allergies, medical history): Patient is currently being treated for rectal cancer with Xeloda + radiation treatment. He called oncology office today and reports that his radiation treatment has been extended and will need more Xeloda (16 days worth).    Medication(s): Xeloda 500 mg - Take 4 tablets by mouth BID on Monday-Friday with during radiation treatment    Currently Taking Medication(s): Yes    Experiencing Side Effects: Patient is tolerating well    Prior Authorization Status: Approved    Financial Assistance Status: none needed    Any Issues Identified: No issues identified at present    Appropriate to Process Prescription(s): Yes, patient's radiation treatment has been extended and so he needs additional Xeloda to complete treatment    Counseling Offered: Declined    Next Specialty Pharmacy Visit: Patient should not need additional specialty pharmacy visit since he will have enough medication to complete currently planned radiation.

## 2019-01-18 ENCOUNTER — LAB (OUTPATIENT)
Dept: ONCOLOGY | Facility: CLINIC | Age: 69
End: 2019-01-18

## 2019-01-18 DIAGNOSIS — D50.9 IRON DEFICIENCY ANEMIA, UNSPECIFIED IRON DEFICIENCY ANEMIA TYPE: ICD-10-CM

## 2019-01-18 DIAGNOSIS — C20 RECTAL CANCER (HCC): ICD-10-CM

## 2019-01-18 LAB
ALBUMIN SERPL-MCNC: 4.4 G/DL (ref 3.4–4.8)
ALBUMIN/GLOB SERPL: 1.3 G/DL (ref 1.5–2.5)
ALP SERPL-CCNC: 103 U/L (ref 40–129)
ALT SERPL W P-5'-P-CCNC: 27 U/L (ref 10–44)
ANION GAP SERPL CALCULATED.3IONS-SCNC: 9 MMOL/L (ref 3.6–11.2)
AST SERPL-CCNC: 22 U/L (ref 10–34)
BASOPHILS # BLD AUTO: 0.02 10*3/MM3 (ref 0–0.3)
BASOPHILS NFR BLD AUTO: 0.3 % (ref 0–2)
BILIRUB SERPL-MCNC: 0.6 MG/DL (ref 0.2–1.8)
BUN BLD-MCNC: 14 MG/DL (ref 7–21)
BUN/CREAT SERPL: 12.8 (ref 7–25)
CALCIUM SPEC-SCNC: 9.5 MG/DL (ref 7.7–10)
CHLORIDE SERPL-SCNC: 107 MMOL/L (ref 99–112)
CO2 SERPL-SCNC: 24 MMOL/L (ref 24.3–31.9)
CREAT BLD-MCNC: 1.09 MG/DL (ref 0.43–1.29)
DEPRECATED RDW RBC AUTO: 52.1 FL (ref 37–54)
EOSINOPHIL # BLD AUTO: 0.25 10*3/MM3 (ref 0–0.7)
EOSINOPHIL NFR BLD AUTO: 3.2 % (ref 0–7)
ERYTHROCYTE [DISTWIDTH] IN BLOOD BY AUTOMATED COUNT: 16 % (ref 11.5–14.5)
GFR SERPL CREATININE-BSD FRML MDRD: 67 ML/MIN/1.73
GLOBULIN UR ELPH-MCNC: 3.3 GM/DL
GLUCOSE BLD-MCNC: 105 MG/DL (ref 70–110)
HCT VFR BLD AUTO: 43.2 % (ref 42–52)
HGB BLD-MCNC: 14.1 G/DL (ref 14–18)
IMM GRANULOCYTES # BLD AUTO: 0.03 10*3/MM3 (ref 0–0.03)
IMM GRANULOCYTES NFR BLD AUTO: 0.4 % (ref 0–0.5)
LYMPHOCYTES # BLD AUTO: 0.95 10*3/MM3 (ref 1–3)
LYMPHOCYTES NFR BLD AUTO: 12.1 % (ref 16–46)
MCH RBC QN AUTO: 30.8 PG (ref 27–33)
MCHC RBC AUTO-ENTMCNC: 32.6 G/DL (ref 33–37)
MCV RBC AUTO: 94.3 FL (ref 80–94)
MONOCYTES # BLD AUTO: 0.67 10*3/MM3 (ref 0.1–0.9)
MONOCYTES NFR BLD AUTO: 8.5 % (ref 0–12)
NEUTROPHILS # BLD AUTO: 5.96 10*3/MM3 (ref 1.4–6.5)
NEUTROPHILS NFR BLD AUTO: 75.5 % (ref 40–75)
OSMOLALITY SERPL CALC.SUM OF ELEC: 280.2 MOSM/KG (ref 273–305)
PLATELET # BLD AUTO: 201 10*3/MM3 (ref 130–400)
PMV BLD AUTO: 9.2 FL (ref 6–10)
POTASSIUM BLD-SCNC: 3.9 MMOL/L (ref 3.5–5.3)
PROT SERPL-MCNC: 7.7 G/DL (ref 6–8)
RBC # BLD AUTO: 4.58 10*6/MM3 (ref 4.7–6.1)
SODIUM BLD-SCNC: 140 MMOL/L (ref 135–153)
WBC NRBC COR # BLD: 7.88 10*3/MM3 (ref 4.5–12.5)

## 2019-01-18 PROCEDURE — 80053 COMPREHEN METABOLIC PANEL: CPT | Performed by: INTERNAL MEDICINE

## 2019-01-18 PROCEDURE — 85025 COMPLETE CBC W/AUTO DIFF WBC: CPT | Performed by: INTERNAL MEDICINE

## 2019-01-29 ENCOUNTER — HOSPITAL ENCOUNTER (OUTPATIENT)
Dept: CARDIOLOGY | Facility: HOSPITAL | Age: 69
Discharge: HOME | End: 2019-01-29
Attending: INTERNAL MEDICINE | Admitting: INTERNAL MEDICINE

## 2019-01-29 ENCOUNTER — LAB (OUTPATIENT)
Dept: ONCOLOGY | Facility: CLINIC | Age: 69
End: 2019-01-29

## 2019-01-29 VITALS
DIASTOLIC BLOOD PRESSURE: 83 MMHG | TEMPERATURE: 97 F | HEART RATE: 87 BPM | RESPIRATION RATE: 18 BRPM | OXYGEN SATURATION: 99 % | SYSTOLIC BLOOD PRESSURE: 199 MMHG

## 2019-01-29 DIAGNOSIS — R01.1 HEART MURMUR: ICD-10-CM

## 2019-01-29 DIAGNOSIS — D50.9 IRON DEFICIENCY ANEMIA, UNSPECIFIED IRON DEFICIENCY ANEMIA TYPE: ICD-10-CM

## 2019-01-29 DIAGNOSIS — C20 RECTAL CANCER (HCC): ICD-10-CM

## 2019-01-29 LAB
ALBUMIN SERPL-MCNC: 4.2 G/DL (ref 3.4–4.8)
ALBUMIN/GLOB SERPL: 1.3 G/DL (ref 1.5–2.5)
ALP SERPL-CCNC: 94 U/L (ref 40–129)
ALT SERPL W P-5'-P-CCNC: 30 U/L (ref 10–44)
ANION GAP SERPL CALCULATED.3IONS-SCNC: 12 MMOL/L (ref 3.6–11.2)
AST SERPL-CCNC: 29 U/L (ref 10–34)
BASOPHILS # BLD AUTO: 0.01 10*3/MM3 (ref 0–0.3)
BASOPHILS NFR BLD AUTO: 0.1 % (ref 0–2)
BILIRUB SERPL-MCNC: 0.7 MG/DL (ref 0.2–1.8)
BUN BLD-MCNC: 13 MG/DL (ref 7–21)
BUN/CREAT SERPL: 10.6 (ref 7–25)
CALCIUM SPEC-SCNC: 9.1 MG/DL (ref 7.7–10)
CHLORIDE SERPL-SCNC: 104 MMOL/L (ref 99–112)
CO2 SERPL-SCNC: 25 MMOL/L (ref 24.3–31.9)
CREAT BLD-MCNC: 1.23 MG/DL (ref 0.43–1.29)
DEPRECATED RDW RBC AUTO: 54.9 FL (ref 37–54)
EOSINOPHIL # BLD AUTO: 0.29 10*3/MM3 (ref 0–0.7)
EOSINOPHIL NFR BLD AUTO: 3.6 % (ref 0–7)
ERYTHROCYTE [DISTWIDTH] IN BLOOD BY AUTOMATED COUNT: 16.4 % (ref 11.5–14.5)
GFR SERPL CREATININE-BSD FRML MDRD: 59 ML/MIN/1.73
GLOBULIN UR ELPH-MCNC: 3.2 GM/DL
GLUCOSE BLD-MCNC: 145 MG/DL (ref 70–110)
HCT VFR BLD AUTO: 44 % (ref 42–52)
HGB BLD-MCNC: 14.5 G/DL (ref 14–18)
IMM GRANULOCYTES # BLD AUTO: 0.03 10*3/MM3 (ref 0–0.03)
IMM GRANULOCYTES NFR BLD AUTO: 0.4 % (ref 0–0.5)
LYMPHOCYTES # BLD AUTO: 1.08 10*3/MM3 (ref 1–3)
LYMPHOCYTES NFR BLD AUTO: 13.4 % (ref 16–46)
MCH RBC QN AUTO: 31.9 PG (ref 27–33)
MCHC RBC AUTO-ENTMCNC: 33 G/DL (ref 33–37)
MCV RBC AUTO: 96.9 FL (ref 80–94)
MONOCYTES # BLD AUTO: 0.54 10*3/MM3 (ref 0.1–0.9)
MONOCYTES NFR BLD AUTO: 6.7 % (ref 0–12)
NEUTROPHILS # BLD AUTO: 6.13 10*3/MM3 (ref 1.4–6.5)
NEUTROPHILS NFR BLD AUTO: 75.8 % (ref 40–75)
OSMOLALITY SERPL CALC.SUM OF ELEC: 284 MOSM/KG (ref 273–305)
PLATELET # BLD AUTO: 131 10*3/MM3 (ref 130–400)
PMV BLD AUTO: 9.5 FL (ref 6–10)
POTASSIUM BLD-SCNC: 3.9 MMOL/L (ref 3.5–5.3)
PROT SERPL-MCNC: 7.4 G/DL (ref 6–8)
RBC # BLD AUTO: 4.54 10*6/MM3 (ref 4.7–6.1)
SODIUM BLD-SCNC: 141 MMOL/L (ref 135–153)
WBC NRBC COR # BLD: 8.08 10*3/MM3 (ref 4.5–12.5)

## 2019-01-29 PROCEDURE — 80053 COMPREHEN METABOLIC PANEL: CPT | Performed by: INTERNAL MEDICINE

## 2019-01-29 PROCEDURE — 93306 TTE W/DOPPLER COMPLETE: CPT | Performed by: INTERNAL MEDICINE

## 2019-01-29 PROCEDURE — 85025 COMPLETE CBC W/AUTO DIFF WBC: CPT | Performed by: INTERNAL MEDICINE

## 2019-01-29 PROCEDURE — 93306 TTE W/DOPPLER COMPLETE: CPT

## 2019-01-30 LAB
BH CV ECHO MEAS - % IVS THICK: -13.7 %
BH CV ECHO MEAS - % LVPW THICK: 0.49 %
BH CV ECHO MEAS - ACS: 2.4 CM
BH CV ECHO MEAS - AO MAX PG (FULL): 9.8 MMHG
BH CV ECHO MEAS - AO MAX PG: 47 MMHG
BH CV ECHO MEAS - AO MEAN PG (FULL): 0.14 MMHG
BH CV ECHO MEAS - AO MEAN PG: 18.6 MMHG
BH CV ECHO MEAS - AO ROOT AREA (BSA CORRECTED): 1.5
BH CV ECHO MEAS - AO ROOT AREA: 10.9 CM^2
BH CV ECHO MEAS - AO ROOT DIAM: 3.7 CM
BH CV ECHO MEAS - AO V2 MAX: 342.9 CM/SEC
BH CV ECHO MEAS - AO V2 MEAN: 184.3 CM/SEC
BH CV ECHO MEAS - AO V2 VTI: 73.3 CM
BH CV ECHO MEAS - AVA(I,A): 2.9 CM^2
BH CV ECHO MEAS - AVA(I,D): 2.9 CM^2
BH CV ECHO MEAS - AVA(V,A): 3.3 CM^2
BH CV ECHO MEAS - AVA(V,D): 3.3 CM^2
BH CV ECHO MEAS - BSA(HAYCOCK): 2.6 M^2
BH CV ECHO MEAS - BSA: 2.5 M^2
BH CV ECHO MEAS - BZI_BMI: 38.8 KILOGRAMS/M^2
BH CV ECHO MEAS - BZI_METRIC_HEIGHT: 182.9 CM
BH CV ECHO MEAS - BZI_METRIC_WEIGHT: 129.7 KG
BH CV ECHO MEAS - EDV(CUBED): 135.6 ML
BH CV ECHO MEAS - EDV(MOD-SP4): 114 ML
BH CV ECHO MEAS - EDV(TEICH): 125.9 ML
BH CV ECHO MEAS - EF(CUBED): 75 %
BH CV ECHO MEAS - EF(MOD-SP4): 65.8 %
BH CV ECHO MEAS - EF(TEICH): 66.5 %
BH CV ECHO MEAS - ESV(CUBED): 34 ML
BH CV ECHO MEAS - ESV(MOD-SP4): 39 ML
BH CV ECHO MEAS - ESV(TEICH): 42.2 ML
BH CV ECHO MEAS - FS: 37 %
BH CV ECHO MEAS - IVS/LVPW: 1.2
BH CV ECHO MEAS - IVSD: 1.8 CM
BH CV ECHO MEAS - IVSS: 1.6 CM
BH CV ECHO MEAS - LA DIMENSION: 4 CM
BH CV ECHO MEAS - LA/AO: 1.1
BH CV ECHO MEAS - LV DIASTOLIC VOL/BSA (35-75): 46 ML/M^2
BH CV ECHO MEAS - LV MASS(C)D: 394.4 GRAMS
BH CV ECHO MEAS - LV MASS(C)DI: 159 GRAMS/M^2
BH CV ECHO MEAS - LV MASS(C)S: 184.2 GRAMS
BH CV ECHO MEAS - LV MASS(C)SI: 74.3 GRAMS/M^2
BH CV ECHO MEAS - LV MAX PG: 37.3 MMHG
BH CV ECHO MEAS - LV MEAN PG: 18.4 MMHG
BH CV ECHO MEAS - LV SYSTOLIC VOL/BSA (12-30): 15.7 ML/M^2
BH CV ECHO MEAS - LV V1 MAX: 305.2 CM/SEC
BH CV ECHO MEAS - LV V1 MEAN: 188.6 CM/SEC
BH CV ECHO MEAS - LV V1 VTI: 58.4 CM
BH CV ECHO MEAS - LVIDD: 5.1 CM
BH CV ECHO MEAS - LVIDS: 3.2 CM
BH CV ECHO MEAS - LVLD AP4: 8.5 CM
BH CV ECHO MEAS - LVLS AP4: 7 CM
BH CV ECHO MEAS - LVOT AREA (M): 3.8 CM^2
BH CV ECHO MEAS - LVOT AREA: 3.7 CM^2
BH CV ECHO MEAS - LVOT DIAM: 2.2 CM
BH CV ECHO MEAS - LVPWD: 1.5 CM
BH CV ECHO MEAS - LVPWS: 1.6 CM
BH CV ECHO MEAS - MV A MAX VEL: 135.7 CM/SEC
BH CV ECHO MEAS - MV E MAX VEL: 100.2 CM/SEC
BH CV ECHO MEAS - MV E/A: 0.74
BH CV ECHO MEAS - PA ACC SLOPE: 1491 CM/SEC^2
BH CV ECHO MEAS - PA ACC TIME: 0.11 SEC
BH CV ECHO MEAS - PA PR(ACCEL): 31.5 MMHG
BH CV ECHO MEAS - SI(AO): 322.2 ML/M^2
BH CV ECHO MEAS - SI(CUBED): 41 ML/M^2
BH CV ECHO MEAS - SI(LVOT): 86.9 ML/M^2
BH CV ECHO MEAS - SI(MOD-SP4): 30.2 ML/M^2
BH CV ECHO MEAS - SI(TEICH): 33.8 ML/M^2
BH CV ECHO MEAS - SV(AO): 799.1 ML
BH CV ECHO MEAS - SV(CUBED): 101.6 ML
BH CV ECHO MEAS - SV(LVOT): 215.6 ML
BH CV ECHO MEAS - SV(MOD-SP4): 75 ML
BH CV ECHO MEAS - SV(TEICH): 83.7 ML
MAXIMAL PREDICTED HEART RATE: 152 BPM
STRESS TARGET HR: 129 BPM

## 2019-02-01 ENCOUNTER — OFFICE VISIT (OUTPATIENT)
Dept: ONCOLOGY | Facility: CLINIC | Age: 69
End: 2019-02-01

## 2019-02-01 VITALS
SYSTOLIC BLOOD PRESSURE: 149 MMHG | BODY MASS INDEX: 38.79 KG/M2 | TEMPERATURE: 98.3 F | OXYGEN SATURATION: 97 % | RESPIRATION RATE: 20 BRPM | DIASTOLIC BLOOD PRESSURE: 73 MMHG | WEIGHT: 286 LBS | HEART RATE: 85 BPM

## 2019-02-01 DIAGNOSIS — C20 RECTAL CANCER (HCC): ICD-10-CM

## 2019-02-01 DIAGNOSIS — F41.9 ANXIETY: ICD-10-CM

## 2019-02-01 DIAGNOSIS — Q23.9 ABNORMALITY OF AORTIC VALVE: ICD-10-CM

## 2019-02-01 DIAGNOSIS — F10.10 ALCOHOL ABUSE: ICD-10-CM

## 2019-02-01 DIAGNOSIS — D50.9 IRON DEFICIENCY ANEMIA, UNSPECIFIED IRON DEFICIENCY ANEMIA TYPE: ICD-10-CM

## 2019-02-01 DIAGNOSIS — F32.A DEPRESSION, UNSPECIFIED DEPRESSION TYPE: ICD-10-CM

## 2019-02-01 DIAGNOSIS — R01.1 SYSTOLIC MURMUR: Primary | ICD-10-CM

## 2019-02-01 PROCEDURE — 99214 OFFICE O/P EST MOD 30 MIN: CPT | Performed by: NURSE PRACTITIONER

## 2019-02-01 NOTE — PROGRESS NOTES
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  His dose is 500 mg x 4 or 2000 mg BID    CHIEF COMPLAINT:  Follow up of Rectal cancer    HISTORY OF PRESENT ILLNESS:   David Mandujano is a very pleasant 68 y.o. male who was referred by Dr. Barnhart for evaluation and treatment of colorectal cancer. He began to notice rectal bleeding in January or February 2018, and he also began to notice fatigue in approximately July. He assumed he was out of shape when he started to become short of breath and started trying to exercise more frequently, which only exacerbated the shortness of breath. He was evaluated by his PCP, Raj Wang MD, who after testing, found him to be severely iron deficient, hyperglycemic, and he also had an elevated PSA. He was on vacation at the time, and his PCP asked him to return home for further evaluation. He was started on oral iron therapy and later received IV iron infusions. He was also scheduled for a colonoscopy with Dr. Barnhart, during which a suspicious rectal mass was biopsied and pathology was positive for an ulcerated invasive, moderately differentiated rectal adenocarcinoma. He and his wife are here for discussion of further treatment.     He continues to be fatigued and short of breath with activity, and he has lost about 18 pound over the last month due to a strict diet his wife has him on. She also says he has been started on several supplements and vitamins including pre and probiotics and Vit C. He is otherwise well and without complaint. He denies pain of any kind.  He is tolerating oral iron without significant difficulty.  He saw Dr. Blackman of Urology in [place] but his wife cancelled f/u appt because she thought other appts were more important for now.    INTERVAL HISTORY:  Mr. Mandujano  is here today for follow up of rectal cancer. He has been receiving neoadjuvant chemoradiation with Xeloda 825 mg/m2 BID D1-5 or M-F during the course of radiation which he completed last week. Overall, he reports tolerating this well with mild diarrhea (now resolved) as his only noticeable SE. He will follow up with Dr. Villalobos on 2/11/19 with plan for surgery in mid-March. He continues to take oral iron which he is tolerating well. He denies any blood loss from any source. He plans to go to [place] before his surgery which he is excited about. He reports good appetite and stable weight. He reports stable mood and is no longer taking Lexapro. He denies any complaints today. He says he has been trying to be as active as he can and has been swimming for exercise at the gym.     PAST MEDICAL HISTORY:  Past Medical History:   Diagnosis Date   • Anemia    • Colon cancer (CMS/HCC)    • Hypertension    • Wrist fracture     surgically repaired       PAST SURGICAL HISTORY:  Past Surgical History:   Procedure Laterality Date   • COLONOSCOPY     • WRIST SURGERY         FAMILY HISTORY:  Family History   Problem Relation Age of Onset   • Colon cancer Maternal Grandfather    No other family history of malignancy.    SOCIAL HISTORY:  Social History     Socioeconomic History   • Marital status: Single     Spouse name: Not on file   • Number of children: Not on file   • Years of education: Not on file   • Highest education level: Not on file   Social Needs   • Financial resource strain: Not on file   • Food insecurity - worry: Not on file   • Food insecurity - inability: Not on file   • Transportation needs - medical: Not on file   • Transportation needs - non-medical: Not on file   Occupational History   • Not on file   Tobacco Use   • Smoking status: Never Smoker   • Smokeless tobacco: Current User     Types: Chew   Substance and Sexual Activity   • Alcohol use: Yes     Comment: occasional   • Drug use: No   • Sexual activity:  Defer   Other Topics Concern   • Not on file   Social History Narrative    He is , and is self employed/semi retired. He is a non smoker but chews tobacco. He used to drink daily, but says he has cut it in half.      REVIEW OF SYSTEMS:   A comprehensive 14 point review of systems was performed.  Significant findings as mentioned above.  All other systems reviewed and are negative.      MEDICATIONS:  The current medication list was reviewed in the EMR    Current Outpatient Medications:   •  amLODIPine (NORVASC) 10 MG tablet, Take 10 mg by mouth Daily., Disp: , Rfl:   •  capecitabine (XELODA) 500 MG chemo tablet, Take 4 tab(s) by mouth in the AM and 4 tab(s) by mouth in the PM on Monday - Friday with radiation., Disp: 128 tablet, Rfl: 0  •  escitalopram (LEXAPRO) 10 MG tablet, Take 1 tablet by mouth Daily., Disp: 30 tablet, Rfl: 11  •  ferrous sulfate 325 (65 FE) MG tablet, Take 325 mg by mouth Daily With Breakfast., Disp: , Rfl:   •  ondansetron (ZOFRAN) 8 MG tablet, Take 1 tablet by mouth 3 (Three) Times a Day As Needed for Nausea or Vomiting., Disp: 30 tablet, Rfl: 5    ALLERGIES:  No Known Allergies    PHYSICAL EXAM:  Vitals:    02/01/19 1421   BP: 149/73   Pulse: 85   Resp: 20   Temp: 98.3 °F (36.8 °C)   SpO2: 97%   General:  Awake, alert and oriented, in good spirits  HEENT:  Pupils are equal, round and reactive to light and accommodation, Extra-ocular movements full, Oropharyx clear, mucous membranes moist  Neck:  No JVD, thyromegaly or lymphadenopathy  CV:  Regular rate and rhythm, III/IV systolic murmur, no rubs or gallops  Resp:  Lungs are clear to auscultation bilaterally  Abd:  Soft, non-tender, somewhat -distended, bowel sounds present, no organomegaly or masses  Ext:  No clubbing, cyanosis or edema.    Lymph:  No cervical, supraclavicular, axillary, adenopathy  Neuro:  grossly non-focal exam      PATHOLOGY:  10-02-18        ENDOSCOPY:  10-02-18:        IMAGING:  10-04-18 CT Abdomen Pelvis With  Contrast   Findings  - LOWER THORAX: Patchy nonspecific subpleural nodularity in the left lung base that could represent sequelae of chronic airspace disease or early fibrosis.     ABDOMEN:  - LIVER: Homogeneous. No focal hepatic mass or ductal dilatation.  - GALLBLADDER: GALLSTONES WITHIN THE GALLBLADDER.  - PANCREAS: Unremarkable. No mass or ductal dilatation.  - SPLEEN: Homogeneous. No splenomegaly.  - ADRENALS: No mass.  - KIDNEYS: RIGHT RENAL CYST MEASURING 3.6 CM.  - GI TRACT: NONSPECIFIC DISTAL SIGMOID COLONIC WALL THICKENING VERSUS  NONDISTENTION.  - PERITONEUM: No free air. No free fluid or loculated fluid collections.  - MESENTERY: Unremarkable.  - LYMPH NODES: No lymphadenopathy.  - VASCULATURE: ATHEROSCLEROTIC VASCULAR CALCIFICATION.  - ABDOMINAL WALL: SMALL BILATERAL HUMERAL HERNIAS CONTAINING ONLY FAT.  - OTHER: None.     PELVIS:  - BLADDER: No focal mass or significant wall thickening  - REPRODUCTIVE: Unremarkable as visualized.  - APPENDIX: Nondistended. No surrounding inflammation.  - BONES: No acute bony abnormality.     IMPRESSION:  1. Gallstones within the gallbladder.  2.Nonspecific distal sigmoid colonic wall thickening versus nondistention.    PET/CT 10-29-18    11-13-18 MRI Pelvis Without Contrast  FINDINGS:  1.) TUMOR LOCATION AND CHARACTERISTICS        i) Distance of Inferior margin of Tumor from the dentate line: 9.5 CM    ii) Tumor at or below the puborectalis sling:  NO   iii) Relationship to the anterior peritoneal reflection: BELOW   iv) Craniocaudal length of the tumor: 6cm   v) Clock face of tumor: 5o'clock to 2o'clock  vi) Polypoid/ Annular/ Semi-annular: SEMI-ANNULAR  vii) Mucinous: YES     2.) EXTRAMURAL DEPTH OF INVASION AND MR T-CATEGORY         i) Extramural depth of invasion (Use 0mm for T1 or T2 tumor):  APPROXIMATELY 5 MM    ii) T category: T3 B              *Please indicate structures with possible invasion.  Specify laterality,  sequence and slice#: (see list below)     *   Anterior peritoneal reflection (T4a tumor): NO  *  Puborectalis: NO  *  Bladder: NO  *  Vascular Involvement of Iliac Vessels: NO  *  Levator ani: NO  *  Ureters: NO  *  Obturator: NO  *  Prostate: MARGINAL/TETHERED  *  Piriformis: NO  *  Uterus: NOT APPLICABLE  *  Pelvic Bone: NO  *  Vagina: NOT APPLICABLE  *  Sacrum: NO  *  Urethra: NO  *  Other:           iii) For low rectal tumors (maximum tumor depth at or below the  puborectalis sling):     *  Not applicable (tumor above the puborectalis sling: NOT APPLICABLE  *    *  Level 1 (submucosa only, no involvement of internal anal sphincter):       *  Level 2 (confined to the internal anal sphincter; no involvement of  intersphincteric fat):      *  Level 3 (intersphincteric fat involved):      *  Level 4 (involves external sphincter or beyond):         3. RELATIONSHIP OF THE TUMOR TO MESORECTAL FASCIA (MRF)       i) Shortest distance 0mm of the definitive tumour border to the MRF  is: AT 12:00   ii) Are there any tumour spiculations closer to the MRF? NO     iii) Location of Tumor margin to MRF: 12:00, AT THE LEVEL PROSTATE     4. EXTRAMURAL VENOUS INVASION       i) Extramural Venous Invasion (EMVI): ABSENT     5. MESORECTAL LYMPH NODES AND TUMOUR DEPOSITS       i) Any suspicious mesorectal lymph nodes/tumor deposits: YES      *If yes, the most suspicious node/tumor deposit is: 15 MM IN  DIAMETER, ALONG THE UPPER MARGIN OF the tumor with minimum distance 7  MMmm from the MRF at 7o'clock     6. EXTRAMESORECTAL LYMPH NODES        i) Any suspicious extramesorectal lymph nodes: NO      *If yes, location and laterality of suspicious nodes:             Int. Iliac:                Ext. Iliac:                Common Iliac:                Obturator:                Inguinal:                Other:           ii) Is the BETH node station in the field of view: NO        *If Yes, are these nodes suspicious:         7. OTHER FINDINGS (COMPLICATIONS, METASTASES, LIMITATIONS)          NOTE: THERE IS SOME UNCERTAINTY WHETHER THE APPARENT SMALL FOCUS  OF TUMOR EXTENDING TO THE PROSTATE AT THE 12:00 POSITION COULD ACTUALLY  BE PROSTATE NODULE EXTENDING TOWARD THE TUMOR. RECTAL TUMOR IS FAVORED;  ALTHOUGH THIS DETERMINATION CANNOT BE MADE WITH COMPLETE CERTAINTY,  TUMOR IS CONSIDERED T3 B.         IMPRESSIONS:  MRI rectal cancer T category is: T3 B  Maximum EMD of invasion is: 5  Minimum tumor to MRF distance is: 0  Low rectal tumor component: NO  Mesorectal nodes/tumor deposits: SUSPICIOUS  EMVI: ABSENT  Extramesorectal nodes: NEGATIVE    RECENT LABS:  Lab Results   Component Value Date    WBC 8.08 01/29/2019    HGB 14.5 01/29/2019    HCT 44.0 01/29/2019    MCV 96.9 (H) 01/29/2019    RDW 16.4 (H) 01/29/2019     01/29/2019    NEUTRORELPCT 75.8 (H) 01/29/2019    LYMPHORELPCT 13.4 (L) 01/29/2019    MONORELPCT 6.7 01/29/2019    EOSRELPCT 3.6 01/29/2019    BASORELPCT 0.1 01/29/2019    NEUTROABS 6.13 01/29/2019    LYMPHSABS 1.08 01/29/2019       Lab Results   Component Value Date     01/29/2019    K 3.9 01/29/2019    CO2 25.0 01/29/2019     01/29/2019    BUN 13 01/29/2019    CREATININE 1.23 01/29/2019    EGFRIFNONA 59 (L) 01/29/2019    GLUCOSE 145 (H) 01/29/2019    CALCIUM 9.1 01/29/2019    ALKPHOS 94 01/29/2019    AST 29 01/29/2019    ALT 30 01/29/2019    BILITOT 0.7 01/29/2019    ALBUMIN 4.20 01/29/2019    PROTEINTOT 7.4 01/29/2019       Lab Results   Component Value Date/Time    URICACID 9.1 (H) 06/25/2014 01:29 PM     Lab Results   Component Value Date    CEA 17.80 (H) 01/11/2019    CEA 18.80 (H) 10/19/2018     Lab Results   Component Value Date    PSA 4.930 (H) 10/19/2018       Lab Results   Component Value Date    FERRITIN 69.00 01/11/2019    IRON 37 (L) 01/11/2019    TIBC 398 01/11/2019    LABIRON 9 (L) 01/11/2019    WFSTLFWW97 401 10/19/2018    FOLATE 7.48 10/19/2018     ASSESSMENT & PLAN:  David Mandujano is a very pleasant 68 y.o. male with newly diagnosed rectal cancer.  Clinically stage IIIB ([id number]).    1.  Rectal cancer:  -   Dr. Villalobos recommended neaoadjuvant chemoradiation and agreed with this decision.  MRI showed a locally advanced tumor and suspicious pelvic LN.  PET-CT was negative except in the local tumor.   -  There is a non-specific sub-pleural nodular opacity in the L lung base which was PET negative.  Perhaps this was inflammatory in nature. This area will require f/u on future imaging.  - Recommended neoadjuvant chemoradiation with Xeloda 825 mg/m2 BID D1-5 or M-F during the course of radiaton.  His dose is 500 mg x 4 or 2000 mg BID. Overall, he tolerated this well and completed treatment last week. He is scheduled to follow up with Dr. Villalobos on 2/11/19 with plan for surgery in Mid-March. Will plan to follow up after surgery towards end of March with repeat labs.       2.  Iron deficiency anemia:  - He received IV iron and was placed on Ferrous Sulfate 325 mg BID. He is tolerating this well. Repeat iron studies show iron remains low. Will continue oral iron for now and continue to monitor. Will repeat iron studies with next follow up. If not improved, may need to replace with IV Feraheme.     3.  Depressed mood / Anxiety and Alcohol Abuse:  - Previously given trial of Lexapro but he did not find this helpful and discontinued this. Currently, he says he is doing well in this regard. He says he has cut back on drinking as well.     4.  Prominent systolic murmur:  He says someone noticed this about 5 years ago and recommended further evaluation which he declined at the time. He is asymptomatic. However, ordered echocardiogram to further evaluate which was done on 1/29/19 and summarized above. Given mild valve abnormalities, patient would likely benefit from cardiology evaluation and patient was agreeable. Will place referral today.     5.  Prophylaxis:  He took 2018 influenza vaccine 10-19-18. He took Prevnar 13 on 11-20-18.    6.  ACO Quality measures  - David POLLARD  Delbert does not smoke but he does use tobacco products in the form of chewing tobacco.  - I advised David of the risks of continuing to use tobacco.  During this visit, I spent <3 minutes counseling the patient regarding tobacco cessation.  - Patient's Body mass index is 38.79 kg/m². BMI is above normal parameters. Recommendations include: f/u with PCP after treatment of his malignancy..  - Current outpatient and discharge medications have been reconciled for the patient.  Reviewed by: JOSE CARLOS Damon    I spent 25 minutes with David Mandujano today.  More than 50% of the time was spent in counseling / coordination of care for the above problems.      Electronically Signed by: JOSE CARLOS Damon    CC:   MD Kathleen Coppola Emmanuel C, MD Shawn Michael Acino, MD Bruce Belin, MD William Richards, MD

## 2019-02-05 ENCOUNTER — OFFICE VISIT (OUTPATIENT)
Dept: CARDIOLOGY | Facility: CLINIC | Age: 69
End: 2019-02-05

## 2019-02-05 VITALS
RESPIRATION RATE: 16 BRPM | HEART RATE: 78 BPM | WEIGHT: 287 LBS | BODY MASS INDEX: 38.87 KG/M2 | SYSTOLIC BLOOD PRESSURE: 142 MMHG | DIASTOLIC BLOOD PRESSURE: 77 MMHG | HEIGHT: 72 IN

## 2019-02-05 DIAGNOSIS — Z01.810 PREOPERATIVE CARDIOVASCULAR EXAMINATION: Primary | ICD-10-CM

## 2019-02-05 DIAGNOSIS — I42.2 HYPERTROPHIC CARDIOMYOPATHY (HCC): ICD-10-CM

## 2019-02-05 DIAGNOSIS — C20 RECTAL CANCER (HCC): ICD-10-CM

## 2019-02-05 DIAGNOSIS — I10 ESSENTIAL HYPERTENSION: ICD-10-CM

## 2019-02-05 DIAGNOSIS — D50.9 IRON DEFICIENCY ANEMIA, UNSPECIFIED IRON DEFICIENCY ANEMIA TYPE: ICD-10-CM

## 2019-02-05 PROCEDURE — 99204 OFFICE O/P NEW MOD 45 MIN: CPT | Performed by: INTERNAL MEDICINE

## 2019-02-05 PROCEDURE — 93000 ELECTROCARDIOGRAM COMPLETE: CPT | Performed by: INTERNAL MEDICINE

## 2019-02-05 RX ORDER — METOPROLOL SUCCINATE 50 MG/1
50 TABLET, EXTENDED RELEASE ORAL DAILY
Qty: 30 TABLET | Refills: 11 | Status: SHIPPED | OUTPATIENT
Start: 2019-02-05 | End: 2021-03-08 | Stop reason: SDUPTHER

## 2019-02-05 NOTE — PROGRESS NOTES
Jyoti Larios MD  David Mandujano  1950 02/05/2019    Patient Active Problem List   Diagnosis   • Iron deficiency anemia, unspecified   • Rectal cancer (CMS/HCC)   • Preoperative cardiovascular examination   • Hypertrophic cardiomyopathy with LV OT gradient of about 68 mmHg in February 2019.   • Essential hypertension       Dear :    Subjective     David Mandujano is a 68 y.o. male with the problems as listed above, presents    Chief complaint: Pre operative cardiac evaluation and risk assessment and cardiac clearance prior to undergoing colon surgery for rectal cancer.  Patient was also noted to have hypertrophic cardiomyopathy on recent echo Doppler study.    History of Present Illness: Mr. Mandujano is a pleasant 68-year-old  male with no previous history of known coronary artery disease, was noted to have colon cancer recently for which she underwent radiation and chemotherapy.  He is now referred  here to have preoperative cardiac evaluation and risk assessment and cardiac clearance prior to colon resection.  He had an Echo Doppler study recently that revealed asymmetrical septal hypertrophy with LVOT obstruction with an LVOT gradient of about 68 mmHg.  He also had some aortic valve sclerosis with mild aortic stenosis and mild aortic incompetence.  No other significant valvular abnormalities noted.  On further questioning he denies any complaints of chest pain or discomfort.  He had some dyspnea with exertion recently ( NYHA class III)  and says this has improved after his anemia was treated with iron infusions.  He denies any complaints of dizziness or syncope.  He denies any family history of sudden cardiac death or passing out.  He is a nonsmoker but chews tobacco.  He has history of hypertension for unknown period of time and for some time he was not taking his medications for hypertension.  He is nondiabetic.    No Known Allergies:      Current Outpatient Medications:   •   amLODIPine (NORVASC) 10 MG tablet, Take 10 mg by mouth Daily., Disp: , Rfl:   •  ferrous sulfate 325 (65 FE) MG tablet, Take 325 mg by mouth 2 (Two) Times a Day., Disp: , Rfl:     Past Medical History:   Diagnosis Date   • Anemia    • Colon cancer (CMS/HCC)     s/p chemo therapy   • Hypertension    • Wrist fracture     surgically repaired     Past Surgical History:   Procedure Laterality Date   • COLONOSCOPY     • WRIST SURGERY       Family History   Problem Relation Age of Onset   • Colon cancer Maternal Grandfather    • Other Sister    • Heart disease Sister      Social History     Tobacco Use   • Smoking status: Never Smoker   • Smokeless tobacco: Current User     Types: Chew   Substance Use Topics   • Alcohol use: Yes     Comment: occasional   • Drug use: No       Review of Systems   Constitution: Positive for malaise/fatigue. Negative for diaphoresis, fever, weakness, weight gain and weight loss.   HENT: Positive for hearing loss. Negative for congestion, ear discharge, ear pain, hoarse voice, nosebleeds, sore throat and tinnitus.    Eyes: Negative for blurred vision, discharge, double vision and pain.   Cardiovascular: Negative for chest pain, dyspnea on exertion, irregular heartbeat, leg swelling and palpitations.   Respiratory: Negative for cough, hemoptysis, shortness of breath and wheezing.    Endocrine: Negative for cold intolerance, heat intolerance, polydipsia, polyphagia and polyuria.   Hematologic/Lymphatic: Negative for bleeding problem. Does not bruise/bleed easily.   Skin: Negative for color change, dry skin, flushing, itching and rash.   Musculoskeletal: Negative for arthritis, back pain, joint pain, joint swelling, muscle cramps, muscle weakness, neck pain and stiffness.   Gastrointestinal: Negative for abdominal pain, change in bowel habit, constipation, diarrhea, heartburn, nausea and vomiting.   Genitourinary: Negative for bladder incontinence, decreased libido, dysuria, frequency and  "hematuria.   Neurological: Negative for disturbances in coordination, dizziness, focal weakness, headaches, light-headedness, loss of balance, numbness, paresthesias and tremors.   Psychiatric/Behavioral: Negative for altered mental status, depression and memory loss. The patient does not have insomnia.    Allergic/Immunologic: Negative for hives.       Objective   Blood pressure 142/77, pulse 78, resp. rate 16, height 182.9 cm (72\"), weight 130 kg (287 lb).  Body mass index is 38.92 kg/m².        Physical Exam   Constitutional: He is oriented to person, place, and time. He appears well-developed and well-nourished.   HENT:   Mouth/Throat: Oropharynx is clear and moist.   Eyes: EOM are normal. Pupils are equal, round, and reactive to light.   Neck: Neck supple. No JVD present. No tracheal deviation present. No thyromegaly present.   Cardiovascular: Normal rate, regular rhythm, S1 normal and S2 normal. Exam reveals no gallop and no friction rub.   Murmur heard.   Medium-pitched harsh systolic murmur is present at the upper right sternal border and upper left sternal border. The intensity increases with valsalva. The intensity increases with respiration.  Pulmonary/Chest: Effort normal and breath sounds normal.   Abdominal: Soft. Bowel sounds are normal. He exhibits no mass. There is no tenderness.   Musculoskeletal: Normal range of motion. He exhibits no edema.   Lymphadenopathy:     He has no cervical adenopathy.   Neurological: He is alert and oriented to person, place, and time.   Skin: Skin is warm and dry. No rash noted.   Psychiatric: He has a normal mood and affect.       Lab Results   Component Value Date     01/29/2019    K 3.9 01/29/2019     01/29/2019    CO2 25.0 01/29/2019    BUN 13 01/29/2019    CREATININE 1.23 01/29/2019    GLUCOSE 145 (H) 01/29/2019    CALCIUM 9.1 01/29/2019    AST 29 01/29/2019    ALT 30 01/29/2019    ALKPHOS 94 01/29/2019    LABIL2 1.5 06/25/2014     No results found for: " CKTOTAL  Lab Results   Component Value Date    WBC 8.08 2019    HGB 14.5 2019    HCT 44.0 2019     2019     No results found for: INR  No results found for: MG  Lab Results   Component Value Date    TSH 1.768 10/19/2018    PSA 4.930 (H) 10/19/2018    CHLPL 261 (H) 2014    TRIG 237 (H) 2014    HDL 74 2014     (H) 2014       David Mandujano   Echocardiogram   Order# [id number]   Reading physician: Rogelio Joe MD Ordering physician: Jyoti Larios MD Study date: 19   Patient Information     Patient Name  David Mandujano MRN  [id number] Sex  Male  (Age)  1950 (68 y.o.)   Sedation Narrator Report     Interpretation Summary     · Normal left ventricular cavity size noted. All left ventricular wall segments contract normally.  · Left ventricular wall thickness is consistent with mild to moderate septal asymmetric hypertrophy.  · LVOT obstruction with LVOT gradient of 68 mm Hg.  · Estimated EF appears to be in the range of greater than 70%.  · Left ventricular diastolic dysfunction (grade I) consistent with impaired relaxation.  · The aortic valve is abnormal in structure. There is moderate calcification of the aortic valve mainly affecting the non and right coronary cusp(s).Mild aortic valve regurgitation is present. Mild aortic valve stenosis is present.  · The mitral valve is abnormal in structure. Moderate MAC is present. Moderate posterior leaflet calcification.. Mild mitral valve regurgitation is present. No significant mitral valve stenosis is present.  · The tricuspid valve is normal. No tricuspid valve stenosis is present. No tricuspid valve regurgitation is present.  · Mild dilation of the aortic root is present.  · There is no evidence of pericardial effusion.  · Comments: No previous studies available for comparison.           ECG 12 Lead  Date/Time: 2019 2:22 PM  Performed by: Rogelio Joe MD  Authorized by: Rogelio Joe  MD MAGAN   Rhythm: sinus rhythm  Conduction: conduction normal  ST Segments: ST segments normal  Comments: Nonspecific still T-wave changes noted in leads 1 and aVL.              Assessment/Plan    Diagnosis Plan   1. Preoperative cardiovascular examination  ECG 12 Lead   2. Hypertrophic cardiomyopathy with LV OT gradient of about 68 mmHg in February 2019.     3. Essential hypertension, fair control.      4. Rectal cancer status post chemotherapy and radiation therapy.      5. Iron deficiency anemia, unspecified iron deficiency anemia type          Recommendations:  1. I will decrease amlodipine to 5 mg daily and start him on metoprolol 50 mg by mouth twice a day for his hypertrophic cardiomyopathy.  2. I would like to refer him to  cardiology for his hypertrophic cardiomyopathy for further evaluation, recommendations and management.    No Follow-up on file.    As always, , I appreciate very much the opportunity to participate in the cardiovascular care of your patients. Please do not hesitate to call me with any questions with regards to David Mandujano's evaluation and management.       With Best Regards,        Rogelio Joe MD, FACC    Dragon disclaimer:  Much of this encounter note is an electronic transcription/translation of spoken language to printed text. The electronic translation of spoken language may permit erroneous, or at times, nonsensical words or phrases to be inadvertently transcribed; Although I have reviewed the note for such errors, some may still exist.

## 2019-02-07 NOTE — PROGRESS NOTES
Specialty Pharmacy Note      Name:  David Mandujano  :  1950  Date: 2018        Past Medical History:   Diagnosis Date   • Anemia    • Colon cancer (CMS/HCC)     s/p chemo therapy   • Hypertension    • Wrist fracture     surgically repaired       Past Surgical History:   Procedure Laterality Date   • COLONOSCOPY     • WRIST SURGERY         Social History     Socioeconomic History   • Marital status: Single     Spouse name: Not on file   • Number of children: Not on file   • Years of education: Not on file   • Highest education level: Not on file   Social Needs   • Financial resource strain: Not on file   • Food insecurity - worry: Not on file   • Food insecurity - inability: Not on file   • Transportation needs - medical: Not on file   • Transportation needs - non-medical: Not on file   Occupational History   • Not on file   Tobacco Use   • Smoking status: Never Smoker   • Smokeless tobacco: Current User     Types: Chew   Substance and Sexual Activity   • Alcohol use: Yes     Comment: occasional   • Drug use: No   • Sexual activity: Defer   Other Topics Concern   • Not on file   Social History Narrative    He is , and is self employed/semi retired. He is a non smoker but chews tobacco. He used to drink daily, but says he has cut it in half.        Family History   Problem Relation Age of Onset   • Colon cancer Maternal Grandfather    • Other Sister    • Heart disease Sister        No Known Allergies    Current Outpatient Medications   Medication Sig Dispense Refill   • amLODIPine (NORVASC) 10 MG tablet Take 10 mg by mouth Daily.     • ferrous sulfate 325 (65 FE) MG tablet Take 325 mg by mouth 2 (Two) Times a Day.     • metoprolol succinate XL (TOPROL-XL) 50 MG 24 hr tablet Take 1 tablet by mouth Daily. 30 tablet 11     No current facility-administered medications for this visit.          LABORATORY:    Lab Results   Component Value Date    IRON 37 (L) 2019    TSH 1.768 10/19/2018     [facility] 398 01/11/2019     No results found for: PROTIME, INR, PTT  No results found for: HAV, HCVQUANT, AJZNQA73, HCVINFO, HCVGENOTYPE  No results found for: AMPHETSCREEN, BARBITSCNUR, LABBENZSCN, COCAINEUR, LABMETHSCN, CIRWD3V, QKJBO7XGPDOS, HEPBSAB, OXYCODONESCN, 6ACETYLMORP, BUPRENORSCNU, LABOPIASCN, PCPUR, THCURSCR  Last Urine Toxicity     There is no flowsheet data to display.          ASSESSMENT/PLAN:    Patient Update Assessment (new medications, allergies, medical history): updated    Medication(s): Neoadjuvant chemoradiation with Xeloda 825 mg/m2 BID D1-5 or M-F during the course of radiaton.  His dose is 500 mg x 4 or 2000 mg BID.    Currently Taking Medication(s): New start    Effectiveness of Medication: N/A    Experiencing Side Effects: N/A    Prior Authorization Status: Prior authorization was obtained    Financial Assistance Status: None needed at this time    Any Issues Identified: No issues at this time.    Appropriate to Process Prescription(s): Yes, it is appropriate to process    Counseling Offered: Yes, patient was counseled regarding administration, common/serious side effects, etc.

## 2019-02-15 ENCOUNTER — CONSULT (OUTPATIENT)
Dept: CARDIOLOGY | Facility: CLINIC | Age: 69
End: 2019-02-15

## 2019-02-15 VITALS
BODY MASS INDEX: 38.41 KG/M2 | OXYGEN SATURATION: 96 % | DIASTOLIC BLOOD PRESSURE: 78 MMHG | SYSTOLIC BLOOD PRESSURE: 140 MMHG | WEIGHT: 283.6 LBS | HEIGHT: 72 IN | HEART RATE: 72 BPM

## 2019-02-15 DIAGNOSIS — I05.0 MITRAL VALVE STENOSIS, UNSPECIFIED ETIOLOGY: ICD-10-CM

## 2019-02-15 DIAGNOSIS — I10 ESSENTIAL HYPERTENSION: ICD-10-CM

## 2019-02-15 DIAGNOSIS — E78.2 MIXED HYPERLIPIDEMIA: ICD-10-CM

## 2019-02-15 DIAGNOSIS — Z01.810 PREOP CARDIOVASCULAR EXAM: Primary | ICD-10-CM

## 2019-02-15 DIAGNOSIS — I35.0 AORTIC VALVE STENOSIS, ETIOLOGY OF CARDIAC VALVE DISEASE UNSPECIFIED: ICD-10-CM

## 2019-02-15 PROCEDURE — 99204 OFFICE O/P NEW MOD 45 MIN: CPT | Performed by: INTERNAL MEDICINE

## 2019-02-15 NOTE — PROGRESS NOTES
"[facility] at [facility]  Consultation H&P  David Mandujano  1950     VISIT DATE:  02/15/19    PCP: Jyoti Larios MD  [address] [id number]    IDENTIFICATION: A 68 y.o. male from [place], [place]. Works part time in his small business of [employer].    CC:  Chief Complaint   Patient presents with   • Heart Murmur     Consult   • surgery clearance       PROBLEM LIST:  1. VHD  1. 1/30/19 echo: EF 70%, grade 1 diastolic dysfunction, mild to mod septal asymmetric hypertrophy, AV calcification with functionally bicuspid valve, mild AI and moderate AS, moderate MAC with mild MR, mild MS  2. HTN  3. HLD  1. 6/25/14   HL 74   2. No recent lipids  4. Probable DM  1. 2014 serum glucose 114  2. 1/29/19 serum glucose 145, no a1c  5. Probable ZAHEER  6. Rectal cancer  1. 10/18 Invasive moderately differentiated adenocarcinoma of the rectum, per Dr. Villalobos  2. S/p chemo/radiation for resxn 2019  7. Chewing tobacco abuse   8. Anemia  1. w colon ca, requiring IV iron  9. Surgical Hx:  1. Wrist fx repair 2000    Allergies  No Known Allergies    Current Medications    Current Outpatient Medications:   •  amLODIPine (NORVASC) 10 MG tablet, Take 5 mg by mouth Daily., Disp: , Rfl:   •  ferrous sulfate 325 (65 FE) MG tablet, Take 325 mg by mouth 2 (Two) Times a Day., Disp: , Rfl:   •  metoprolol succinate XL (TOPROL-XL) 50 MG 24 hr tablet, Take 1 tablet by mouth Daily., Disp: 30 tablet, Rfl: 11     History of Present Illness   HPI  David Mandujano is a 68 y.o. year old male with the above mentioned PMH who presents for consult for evaluation of cardiac clearance for colon resection for colon cancer.      The pt reports that he had been told \"off and on\" that he had a heart murmur for the last 5 years, but an echo was not done until recently. The patient's PCP heard a heart murmur in January and ordered the echo that revealed an LVOT gradient of 68 mmHg. The patient was previously " "evaluated by Dr. Joe who recommended that he be evaluated at [facility] due to hypertrophic cardiomyopathy.  Upon further review by Dr. Barrera, the pt has scarring suggestive of rheumatic heart disease with aortic stenosis and mitral stenosis.    Pt reports he was dyspneic, fatigued, and had LE edema last fall, but he was anemic and his symptoms improved after Fe supplementation. Pt denies any chest pain, dyspnea at rest, dyspnea on exertion, orthopnea, palpitations, lower extremity edema, or claudication. Pt denies history of CHF, DVT, PE, MI, CVA, TIA, or rheumatic fever. Does endorse snoring, and wife has witnessed apneic spells. He denies PND.      He states he used to exercise regularly going to the gym multiples a week doing cardio and weights. He stopped this in June when he started feeling poorly. He states since his iron has improved, he has a few times gotten in his exercise pool, and had no limitations.     Pt's reports BP at home tends to be 150s/80s. Reports he eats \"jar after jar\" of pickles. Pt reports he had been on atorvastatin historically, but his doctor retired and he got off of it, and did not resume. Reports he's been told his lipids are ok. His LDL was 140 in 2014. He's never been told he was prediabetic. His recent serum glucose was 145, and we have record of it being 114 in 2014. We do not have an a1c on record. Pt reports his PCP hsa not addressed this.     He is following w Dr. Villalobos and finished 5 weeks of chemo/radiation last month. His grandmother had colon cancer. He had not been getting screening colonoscopies.     ROS  Review of Systems   Constitution: Positive for malaise/fatigue.   Cardiovascular: Negative for chest pain, dyspnea on exertion, leg swelling, near-syncope and syncope.   All other systems reviewed and are negative.      SOCIAL HX  Social History     Socioeconomic History   • Marital status: Single     Spouse name: Not on file   • Number of children: Not on file   • Years " "of education: Not on file   • Highest education level: Not on file   Social Needs   • Financial resource strain: Not on file   • Food insecurity - worry: Not on file   • Food insecurity - inability: Not on file   • Transportation needs - medical: Not on file   • Transportation needs - non-medical: Not on file   Occupational History   • Not on file   Tobacco Use   • Smoking status: Never Smoker   • Smokeless tobacco: Current User     Types: Chew   Substance and Sexual Activity   • Alcohol use: Yes     Comment: occasional   • Drug use: No   • Sexual activity: Defer   Other Topics Concern   • Not on file   Social History Narrative    He is , and is self employed/semi retired. He is a non smoker but chews tobacco. He used to drink daily, but says he has cut it in half.        FAMILY HX  Family History   Problem Relation Age of Onset   • Colon cancer Maternal Grandfather    • Other Sister    • Heart disease Sister        Vitals:    02/15/19 1250   BP: 140/78   BP Location: Right arm   Patient Position: Sitting   Pulse: 72   SpO2: 96%   Weight: 129 kg (283 lb 9.6 oz)   Height: 182.9 cm (72\")       PHYSICAL EXAMINATION:  Physical Exam   Constitutional: He is oriented to person, place, and time. He appears well-developed and well-nourished. No distress.   obese   HENT:   Head: Normocephalic and atraumatic.   Right Ear: External ear normal.   Left Ear: External ear normal.   Nose: Nose normal.   Eyes: Conjunctivae and EOM are normal.   Neck: Neck supple. No hepatojugular reflux and no JVD present. Carotid bruit is not present. No thyromegaly present.   Cardiovascular: Normal rate, regular rhythm, S1 normal, S2 normal, intact distal pulses and normal pulses. Exam reveals no gallop, no distant heart sounds and no midsystolic click.   Murmur heard.   Harsh midsystolic murmur is present at the upper right sternal border radiating to the neck.  Pulses:       Radial pulses are 2+ on the right side, and 2+ on the left side. "        Dorsalis pedis pulses are 2+ on the right side, and 2+ on the left side.        Posterior tibial pulses are 2+ on the right side, and 2+ on the left side.   Pulmonary/Chest: Effort normal and breath sounds normal. No respiratory distress. He has no decreased breath sounds. He has no wheezes. He has no rhonchi. He has no rales.   Abdominal: Soft. Bowel sounds are normal. There is no hepatosplenomegaly. There is no tenderness.   Musculoskeletal: Normal range of motion. He exhibits no edema.   Neurological: He is alert and oriented to person, place, and time.   No focal deficits.   Skin: Skin is warm and dry. No erythema.   Psychiatric: He has a normal mood and affect. Thought content normal.   Nursing note and vitals reviewed.      Diagnostic Data:  Procedures  Lab Results   Component Value Date    CHLPL 261 (H) 06/25/2014    TRIG 237 (H) 06/25/2014    HDL 74 06/25/2014     Lab Results   Component Value Date    GLUCOSE 145 (H) 01/29/2019    BUN 13 01/29/2019    CREATININE 1.23 01/29/2019     01/29/2019    K 3.9 01/29/2019     01/29/2019    CO2 25.0 01/29/2019     No results found for: HGBA1C  Lab Results   Component Value Date    WBC 8.08 01/29/2019    HGB 14.5 01/29/2019    HCT 44.0 01/29/2019     01/29/2019       ASSESSMENT:   Diagnosis Plan   1. Preop cardiovascular exam     2. Aortic valve stenosis, etiology of cardiac valve disease unspecified     3. Mitral valve stenosis, unspecified etiology     4. Essential hypertension     5. Mixed hyperlipidemia       PLAN:  1. Pt is not prohibitive risk for colon resection w Dr. Villalobos.  2. W AS and bicuspid valve, pt will likely require AVR in the future. We will follow echos to assess progression of valvular disease. Volume status perioperatively will be important.  3. Emphasized the importance of tight BP control and avoiding sodium rich foods such as pickles.  4. Pt needs lipid panel, will be statin benefit group. Discussed plaque rupture  benefits of statins. Pt to discuss w PCP.  5. W elevated serum glucose pt is likely untreated diabetic. Counseled pt he needs to f/u w his PCP regarding this and will likely be started on metformin. Counseled to avoid starch rich foods.   6. Pt currently has no anginal equivalent symptoms. Consider ischemic testing in the future but this would not need to be done prior to surgery.     Scribed for David Barrera MD by Tabitha Pina PA-C. 2/15/2019  1:51 PM   David Barrera MD, [facility]  I, David Barrera MD, personally performed the services described in this documentation as scribed by the above named individual in my presence, and it is both accurate and complete.  2/15/2019  1:56 PM

## 2019-03-04 ENCOUNTER — TRANSCRIBE ORDERS (OUTPATIENT)
Dept: ADMINISTRATIVE | Facility: HOSPITAL | Age: 69
End: 2019-03-04

## 2019-03-04 DIAGNOSIS — C20 RECTAL CANCER (HCC): Primary | ICD-10-CM

## 2019-03-06 ENCOUNTER — HOSPITAL ENCOUNTER (OUTPATIENT)
Facility: HOSPITAL | Age: 69
Setting detail: SURGERY ADMIT
End: 2019-03-06
Attending: COLON & RECTAL SURGERY | Admitting: COLON & RECTAL SURGERY

## 2019-03-06 ENCOUNTER — HOSPITAL ENCOUNTER (OUTPATIENT)
Dept: MRI IMAGING | Facility: HOSPITAL | Age: 69
Discharge: HOME | End: 2019-03-06
Admitting: COLON & RECTAL SURGERY

## 2019-03-06 DIAGNOSIS — C20 RECTAL CANCER (HCC): ICD-10-CM

## 2019-03-06 PROCEDURE — 72195 MRI PELVIS W/O DYE: CPT

## 2019-03-20 ENCOUNTER — OFFICE VISIT (OUTPATIENT)
Dept: ONCOLOGY | Facility: CLINIC | Age: 69
End: 2019-03-20

## 2019-03-20 ENCOUNTER — LAB (OUTPATIENT)
Dept: ONCOLOGY | Facility: CLINIC | Age: 69
End: 2019-03-20

## 2019-03-20 VITALS
BODY MASS INDEX: 39.14 KG/M2 | TEMPERATURE: 97.7 F | SYSTOLIC BLOOD PRESSURE: 151 MMHG | DIASTOLIC BLOOD PRESSURE: 82 MMHG | RESPIRATION RATE: 18 BRPM | OXYGEN SATURATION: 98 % | WEIGHT: 288.6 LBS | HEART RATE: 77 BPM

## 2019-03-20 DIAGNOSIS — Q23.0 AORTIC STENOSIS DUE TO BICUSPID AORTIC VALVE: ICD-10-CM

## 2019-03-20 DIAGNOSIS — C20 RECTAL CANCER (HCC): ICD-10-CM

## 2019-03-20 DIAGNOSIS — Q23.1 AORTIC STENOSIS DUE TO BICUSPID AORTIC VALVE: ICD-10-CM

## 2019-03-20 DIAGNOSIS — D50.9 IRON DEFICIENCY ANEMIA, UNSPECIFIED IRON DEFICIENCY ANEMIA TYPE: ICD-10-CM

## 2019-03-20 DIAGNOSIS — R73.9 HYPERGLYCEMIA: ICD-10-CM

## 2019-03-20 DIAGNOSIS — F41.9 ANXIETY: ICD-10-CM

## 2019-03-20 DIAGNOSIS — F10.10 ALCOHOL ABUSE: ICD-10-CM

## 2019-03-20 DIAGNOSIS — C20 RECTAL CANCER (HCC): Primary | ICD-10-CM

## 2019-03-20 LAB
ALBUMIN SERPL-MCNC: 4.38 G/DL (ref 3.5–5.2)
ALBUMIN/GLOB SERPL: 1.2 G/DL
ALP SERPL-CCNC: 94 U/L (ref 39–117)
ALT SERPL W P-5'-P-CCNC: 32 U/L (ref 1–41)
ANION GAP SERPL CALCULATED.3IONS-SCNC: 13.9 MMOL/L
AST SERPL-CCNC: 34 U/L (ref 1–40)
BASOPHILS # BLD AUTO: 0.02 10*3/MM3 (ref 0–0.2)
BASOPHILS NFR BLD AUTO: 0.2 % (ref 0–1.5)
BILIRUB SERPL-MCNC: 0.5 MG/DL (ref 0.2–1.2)
BUN BLD-MCNC: 18 MG/DL (ref 8–23)
BUN/CREAT SERPL: 15.7 (ref 7–25)
CALCIUM SPEC-SCNC: 9.8 MG/DL (ref 8.6–10.5)
CHLORIDE SERPL-SCNC: 96 MMOL/L (ref 98–107)
CO2 SERPL-SCNC: 25.1 MMOL/L (ref 22–29)
CREAT BLD-MCNC: 1.15 MG/DL (ref 0.76–1.27)
DEPRECATED RDW RBC AUTO: 52.5 FL (ref 37–54)
EOSINOPHIL # BLD AUTO: 0.26 10*3/MM3 (ref 0–0.4)
EOSINOPHIL NFR BLD AUTO: 3 % (ref 0.3–6.2)
ERYTHROCYTE [DISTWIDTH] IN BLOOD BY AUTOMATED COUNT: 14.8 % (ref 12.3–15.4)
FERRITIN SERPL-MCNC: 171 NG/ML (ref 30–400)
GFR SERPL CREATININE-BSD FRML MDRD: 63 ML/MIN/1.73
GLOBULIN UR ELPH-MCNC: 3.7 GM/DL
GLUCOSE BLD-MCNC: 121 MG/DL (ref 65–99)
HBA1C MFR BLD: 5.4 % (ref 4.8–5.6)
HCT VFR BLD AUTO: 45.6 % (ref 37.5–51)
HGB BLD-MCNC: 15.5 G/DL (ref 13–17.7)
IMM GRANULOCYTES # BLD AUTO: 0.05 10*3/MM3 (ref 0–0.05)
IMM GRANULOCYTES NFR BLD AUTO: 0.6 % (ref 0–0.5)
IRON 24H UR-MRATE: 162 MCG/DL (ref 59–158)
IRON SATN MFR SERPL: 35 % (ref 20–50)
LYMPHOCYTES # BLD AUTO: 1.35 10*3/MM3 (ref 0.7–3.1)
LYMPHOCYTES NFR BLD AUTO: 15.4 % (ref 19.6–45.3)
MCH RBC QN AUTO: 33.2 PG (ref 26.6–33)
MCHC RBC AUTO-ENTMCNC: 34 G/DL (ref 31.5–35.7)
MCV RBC AUTO: 97.6 FL (ref 79–97)
MONOCYTES # BLD AUTO: 0.71 10*3/MM3 (ref 0.1–0.9)
MONOCYTES NFR BLD AUTO: 8.1 % (ref 5–12)
NEUTROPHILS # BLD AUTO: 6.38 10*3/MM3 (ref 1.4–7)
NEUTROPHILS NFR BLD AUTO: 72.7 % (ref 42.7–76)
PLATELET # BLD AUTO: 168 10*3/MM3 (ref 140–450)
PMV BLD AUTO: 10 FL (ref 6–12)
POTASSIUM BLD-SCNC: 4.6 MMOL/L (ref 3.5–5.2)
PROT SERPL-MCNC: 8.1 G/DL (ref 6–8.5)
RBC # BLD AUTO: 4.67 10*6/MM3 (ref 4.14–5.8)
RETICS # AUTO: 0.09 10*6/MM3 (ref 0.02–0.13)
RETICS/RBC NFR AUTO: 1.83 % (ref 0.5–1.5)
SODIUM BLD-SCNC: 135 MMOL/L (ref 136–145)
TIBC SERPL-MCNC: 457 MCG/DL (ref 298–536)
TRANSFERRIN SERPL-MCNC: 307 MG/DL (ref 200–360)
WBC NRBC COR # BLD: 8.77 10*3/MM3 (ref 3.4–10.8)

## 2019-03-20 PROCEDURE — 80053 COMPREHEN METABOLIC PANEL: CPT | Performed by: NURSE PRACTITIONER

## 2019-03-20 PROCEDURE — 85025 COMPLETE CBC W/AUTO DIFF WBC: CPT | Performed by: INTERNAL MEDICINE

## 2019-03-20 PROCEDURE — 82728 ASSAY OF FERRITIN: CPT | Performed by: NURSE PRACTITIONER

## 2019-03-20 PROCEDURE — 85045 AUTOMATED RETICULOCYTE COUNT: CPT | Performed by: INTERNAL MEDICINE

## 2019-03-20 PROCEDURE — 83540 ASSAY OF IRON: CPT | Performed by: NURSE PRACTITIONER

## 2019-03-20 PROCEDURE — 84466 ASSAY OF TRANSFERRIN: CPT | Performed by: NURSE PRACTITIONER

## 2019-03-20 PROCEDURE — 99214 OFFICE O/P EST MOD 30 MIN: CPT | Performed by: INTERNAL MEDICINE

## 2019-03-20 PROCEDURE — 82378 CARCINOEMBRYONIC ANTIGEN: CPT | Performed by: INTERNAL MEDICINE

## 2019-03-20 PROCEDURE — 83036 HEMOGLOBIN GLYCOSYLATED A1C: CPT | Performed by: INTERNAL MEDICINE

## 2019-03-20 NOTE — PROGRESS NOTES
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  His dose is 500 mg x 4 or 2000 mg BID  Treatment finished 1-21-19.    CHIEF COMPLAINT:  Follow up of Rectal cancer    HISTORY OF PRESENT ILLNESS:   David Mandujano is a very pleasant 68 y.o. male who was referred by Dr. Barnhart for evaluation and treatment of colorectal cancer. He began to notice rectal bleeding in January or February 2018, and he also began to notice fatigue in approximately July. He assumed he was out of shape when he started to become short of breath and started trying to exercise more frequently, which only exacerbated the shortness of breath. He was evaluated by his PCP, Raj Wang MD, who after testing, found him to be severely iron deficient, hyperglycemic, and he also had an elevated PSA. He was on vacation at the time, and his PCP asked him to return home for further evaluation. He was started on oral iron therapy and later received IV iron infusions. He was also scheduled for a colonoscopy with Dr. Barnhart, during which a suspicious rectal mass was biopsied and pathology was positive for an ulcerated invasive, moderately differentiated rectal adenocarcinoma. He and his wife are here for discussion of further treatment.     He continues to be fatigued and short of breath with activity, and he has lost about 18 pound over the last month due to a strict diet his wife has him on. She also says he has been started on several supplements and vitamins including pre and probiotics and Vit C. He is otherwise well and without complaint. He denies pain of any kind.  He is tolerating oral iron without significant difficulty.  He saw Dr. Blackman of Urology in [place] but his wife cancelled f/u appt because she thought other appts were more important for  "now.    INTERVAL HISTORY:  Mr. Mandujano is here today for follow up of rectal cancer.  Since his visit, he states he is \"feeling well physically but not well mentally\". He was evaluated by Dr. 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 He is understandably anxious regarding this procedure. He follows up with Dr. Villalobos on Monday. Surgery is tentatively scheduled for 4-5-19.    PAST MEDICAL HISTORY:  Past Medical History:   Diagnosis Date   • Anemia    • Colon cancer (CMS/HCC)     s/p chemo therapy   • Hypertension    • Wrist fracture     surgically repaired       PAST SURGICAL HISTORY:  Past Surgical History:   Procedure Laterality Date   • COLONOSCOPY     • WRIST SURGERY         FAMILY HISTORY:  Family History   Problem Relation Age of Onset   • Colon cancer Maternal Grandfather    • Other Sister    • Heart disease Sister    No other family history of malignancy.    SOCIAL HISTORY:  Social History     Socioeconomic History   • Marital status: Single     Spouse name: Not on file   • Number of children: Not on file   • Years of education: Not on file   • Highest education level: Not on file   Tobacco Use   • Smoking status: Never Smoker   • Smokeless tobacco: Current User     Types: Chew   Substance and Sexual Activity   • Alcohol use: Yes     Comment: occasional   • Drug use: No   • Sexual activity: Defer   Social History Narrative    He is , and is self employed/semi retired. He is a non smoker but chews tobacco. He used to drink daily, but says he has cut it in half.      REVIEW OF SYSTEMS:   A comprehensive 14 point review of systems was performed.  Significant findings as mentioned above.  All other systems reviewed and are negative.      MEDICATIONS:  The current medication list was reviewed in the EMR    Current Outpatient Medications:   •  amLODIPine (NORVASC) 10 MG tablet, Take 5 mg by mouth Daily., Disp: , Rfl: "   •  ferrous sulfate 325 (65 FE) MG tablet, Take 325 mg by mouth 2 (Two) Times a Day., Disp: , Rfl:   •  metoprolol succinate XL (TOPROL-XL) 50 MG 24 hr tablet, Take 1 tablet by mouth Daily., Disp: 30 tablet, Rfl: 11    ALLERGIES:  No Known Allergies    PHYSICAL EXAM:  Vitals:    03/20/19 1554   BP: (!) 194/89   Pulse: 77   Resp: 18   Temp: 97.7 °F (36.5 °C)   SpO2: 98%   General:  Awake, alert and oriented, appears anxious but otw well.  HEENT:  Pupils are equal, round and reactive to light and accommodation, Extra-ocular movements full, Oropharyx clear, mucous membranes moist  Neck:  No JVD, thyromegaly or lymphadenopathy  CV:  Regular rate and rhythm, III/IV systolic murmur, no rubs or gallops  Resp:  Lungs are clear to auscultation bilaterally  Abd:  Soft, non-tender, somewhat -distended, bowel sounds present, no organomegaly or masses  Ext:  No clubbing, cyanosis or edema.    Lymph:  No cervical, supraclavicular, axillary, adenopathy  Neuro:  grossly non-focal exam      PATHOLOGY:  10-02-18        ENDOSCOPY:  10-02-18:        IMAGING:  10-04-18 CT Abdomen Pelvis With Contrast   Findings  - LOWER THORAX: Patchy nonspecific subpleural nodularity in the left lung base that could represent sequelae of chronic airspace disease or early fibrosis.     ABDOMEN:  - LIVER: Homogeneous. No focal hepatic mass or ductal dilatation.  - GALLBLADDER: GALLSTONES WITHIN THE GALLBLADDER.  - PANCREAS: Unremarkable. No mass or ductal dilatation.  - SPLEEN: Homogeneous. No splenomegaly.  - ADRENALS: No mass.  - KIDNEYS: RIGHT RENAL CYST MEASURING 3.6 CM.  - GI TRACT: NONSPECIFIC DISTAL SIGMOID COLONIC WALL THICKENING VERSUS  NONDISTENTION.  - PERITONEUM: No free air. No free fluid or loculated fluid collections.  - MESENTERY: Unremarkable.  - LYMPH NODES: No lymphadenopathy.  - VASCULATURE: ATHEROSCLEROTIC VASCULAR CALCIFICATION.  - ABDOMINAL WALL: SMALL BILATERAL HUMERAL HERNIAS CONTAINING ONLY FAT.  - OTHER: None.     PELVIS:  -  BLADDER: No focal mass or significant wall thickening  - REPRODUCTIVE: Unremarkable as visualized.  - APPENDIX: Nondistended. No surrounding inflammation.  - BONES: No acute bony abnormality.     IMPRESSION:  1. Gallstones within the gallbladder.  2.Nonspecific distal sigmoid colonic wall thickening versus nondistention.    PET/CT 10-29-18    11-13-18 MRI Pelvis Without Contrast  FINDINGS:  1.) TUMOR LOCATION AND CHARACTERISTICS        i) Distance of Inferior margin of Tumor from the dentate line: 9.5 CM    ii) Tumor at or below the puborectalis sling:  NO   iii) Relationship to the anterior peritoneal reflection: BELOW   iv) Craniocaudal length of the tumor: 6cm   v) Clock face of tumor: 5o'clock to 2o'clock  vi) Polypoid/ Annular/ Semi-annular: SEMI-ANNULAR  vii) Mucinous: YES     2.) EXTRAMURAL DEPTH OF INVASION AND MR T-CATEGORY         i) Extramural depth of invasion (Use 0mm for T1 or T2 tumor):  APPROXIMATELY 5 MM    ii) T category: T3 B              *Please indicate structures with possible invasion.  Specify laterality,  sequence and slice#: (see list below)     *  Anterior peritoneal reflection (T4a tumor): NO  *  Puborectalis: NO  *  Bladder: NO  *  Vascular Involvement of Iliac Vessels: NO  *  Levator ani: NO  *  Ureters: NO  *  Obturator: NO  *  Prostate: MARGINAL/TETHERED  *  Piriformis: NO  *  Uterus: NOT APPLICABLE  *  Pelvic Bone: NO  *  Vagina: NOT APPLICABLE  *  Sacrum: NO  *  Urethra: NO  *  Other:           iii) For low rectal tumors (maximum tumor depth at or below the  puborectalis sling):     *  Not applicable (tumor above the puborectalis sling: NOT APPLICABLE  *    *  Level 1 (submucosa only, no involvement of internal anal sphincter):       *  Level 2 (confined to the internal anal sphincter; no involvement of  intersphincteric fat):      *  Level 3 (intersphincteric fat involved):      *  Level 4 (involves external sphincter or beyond):         3. RELATIONSHIP OF THE TUMOR TO MESORECTAL  FASCIA (MRF)       i) Shortest distance 0mm of the definitive tumour border to the MRF  is: AT 12:00   ii) Are there any tumour spiculations closer to the MRF? NO     iii) Location of Tumor margin to MRF: 12:00, AT THE LEVEL PROSTATE     4. EXTRAMURAL VENOUS INVASION       i) Extramural Venous Invasion (EMVI): ABSENT     5. MESORECTAL LYMPH NODES AND TUMOUR DEPOSITS       i) Any suspicious mesorectal lymph nodes/tumor deposits: YES      *If yes, the most suspicious node/tumor deposit is: 15 MM IN  DIAMETER, ALONG THE UPPER MARGIN OF the tumor with minimum distance 7  MMmm from the MRF at 7o'clock     6. EXTRAMESORECTAL LYMPH NODES        i) Any suspicious extramesorectal lymph nodes: NO      *If yes, location and laterality of suspicious nodes:             Int. Iliac:                Ext. Iliac:                Common Iliac:                Obturator:                Inguinal:                Other:           ii) Is the BETH node station in the field of view: NO        *If Yes, are these nodes suspicious:         7. OTHER FINDINGS (COMPLICATIONS, METASTASES, LIMITATIONS)         NOTE: THERE IS SOME UNCERTAINTY WHETHER THE APPARENT SMALL FOCUS  OF TUMOR EXTENDING TO THE PROSTATE AT THE 12:00 POSITION COULD ACTUALLY  BE PROSTATE NODULE EXTENDING TOWARD THE TUMOR. RECTAL TUMOR IS FAVORED;  ALTHOUGH THIS DETERMINATION CANNOT BE MADE WITH COMPLETE CERTAINTY,  TUMOR IS CONSIDERED T3 B.         IMPRESSIONS:  MRI rectal cancer T category is: T3 B  Maximum EMD of invasion is: 5  Minimum tumor to MRF distance is: 0  Low rectal tumor component: NO  Mesorectal nodes/tumor deposits: SUSPICIOUS  EMVI: ABSENT  Extramesorectal nodes: NEGATIVE    RECENT LABS:  Lab Results   Component Value Date    WBC 8.77 03/20/2019    HGB 15.5 03/20/2019    HCT 45.6 03/20/2019    MCV 97.6 (H) 03/20/2019    RDW 14.8 03/20/2019     03/20/2019    NEUTRORELPCT 72.7 03/20/2019    LYMPHORELPCT 15.4 (L) 03/20/2019    MONORELPCT 8.1 03/20/2019     EOSRELPCT 3.0 03/20/2019    BASORELPCT 0.2 03/20/2019    NEUTROABS 6.38 03/20/2019    LYMPHSABS 1.35 03/20/2019       Lab Results   Component Value Date     (L) 03/20/2019    K 4.6 03/20/2019    CO2 25.1 03/20/2019    CL 96 (L) 03/20/2019    BUN 18 03/20/2019    CREATININE 1.15 03/20/2019    EGFRIFNONA 63 03/20/2019    GLUCOSE 121 (H) 03/20/2019    CALCIUM 9.8 03/20/2019    ALKPHOS 94 03/20/2019    AST 34 03/20/2019    ALT 32 03/20/2019    BILITOT 0.5 03/20/2019    ALBUMIN 4.38 03/20/2019    PROTEINTOT 8.1 03/20/2019       Lab Results   Component Value Date/Time    URICACID 9.1 (H) 06/25/2014 01:29 PM     Lab Results   Component Value Date    CEA 3.40 03/20/2019    CEA 17.80 (H) 01/11/2019    CEA 18.80 (H) 10/19/2018     Lab Results   Component Value Date    PSA 4.930 (H) 10/19/2018       Lab Results   Component Value Date    FERRITIN 171.00 03/20/2019    IRON 162 (H) 03/20/2019    TIBC 457 03/20/2019    LABIRON 35 03/20/2019    MCYWBYIB86 401 10/19/2018    FOLATE 7.48 10/19/2018     ASSESSMENT & PLAN:  David Mandujano is a very pleasant 68 y.o. male with newly diagnosed rectal cancer. Clinically stage IIIC ([id number]).    1.  Rectal cancer:  -   Dr. Villalobos recommended neaoadjuvant chemoradiation and agreed with this decision.  MRI showed a locally advanced tumor and suspicious pelvic LN.  PET-CT was negative except in the local tumor.   -  There is a non-specific sub-pleural nodular opacity in the L lung base which was PET negative.  Perhaps this was inflammatory in nature. This area will require f/u on future imaging.  - Recommended neoadjuvant chemoradiation with Xeloda 825 mg/m2 BID D1-5 or M-F during the course of radiaton.  His dose is 500 mg x 4 or 2000 mg BID. Overall, he tolerated this well and completed treatment l1-21-19.  Unfortunately f/u MRI showed invasion of the prostate.  As such, Dr. Villalobos has recommended pelvic exenteration with tentative plan for surgery 4-5-19.  Mr. Mandujano is understandably  "anxious but does seem to understand the importance of the surgery.  We discussed rationale for surgery.    -  He says he would like to get a second opinion before proceeding with such a big surgery.  Will refer to Dr. Marilynn Yadav for further evaluation/second opinion.    2.  Iron deficiency anemia:  - Now normalized.  Will discontinue oral iron..     3.  Depressed mood / Anxiety and Alcohol Abuse:  - Previously given trial of Lexapro but he did not find this helpful and discontinued this. Currently, he says he is doing well in this regard. He says he has cut back on drinking as well. He again refused to quantify how much he is drinking.  We did discuss the risk of alcohol withdrawal when he is admitted for surgery.  He assures me, \"that won't be a problem.\"  Dr. Villalobos is aware of his alcohol abuse.    4.  Prominent systolic murmur:   -   He was evaluated by Dr. Joe and then by Dr. Barrera.  He underwent echocardiogrm 1-30-19 which showed EF 70%, grade 1 diastolic dysfunction, mild to mod septal asymmetric hypertrophy, AV calcification with functionally bicuspid valve, mild AI and moderate AS, moderate MAC with mild MR, mild MS  -  Thought is that at some point in the future he will require AVR but he is felt to be an acceptable surgical risk at this point.      5.  Prophylaxis:  He took 2018 influenza vaccine 10-19-18. He took Prevnar 13 on 11-20-18.    6.  ACO Quality measures  - David Mandujano does not smoke but he does use tobacco products in the form of chewing tobacco.  - I advised David of the risks of continuing to use tobacco.  During this visit, I spent <3 minutes counseling the patient regarding tobacco cessation.  - Current outpatient and discharge medications have been reconciled for the patient.  Reviewed by: Jyoti Larios MD    This note was scribed for Jyoti Larios MD by Lynn Pinto RN.    I, Jyoti Larios MD, personally performed the services described in this documentation as " scribed by the above named individual in my presence, and it is both accurate and complete.  03/20/2019    I spent 25 minutes with David Mandujano today.  More than 50% of the time was spent in counseling / coordination of care for the above problems.      Electronically Signed by: Jyoti Larios MD      CC:   MD Kathleen Coppola Emmanuel C, MD Shawn Michael Acino, MD Bruce Belin, MD William Richards, MD Sandra Beck, MD

## 2019-03-21 LAB — CEA SERPL-MCNC: 3.4 NG/ML

## 2019-05-02 ENCOUNTER — OFFICE VISIT (OUTPATIENT)
Dept: ONCOLOGY | Facility: CLINIC | Age: 69
End: 2019-05-02

## 2019-05-02 VITALS
HEART RATE: 107 BPM | TEMPERATURE: 98.6 F | DIASTOLIC BLOOD PRESSURE: 87 MMHG | SYSTOLIC BLOOD PRESSURE: 154 MMHG | WEIGHT: 276.2 LBS | BODY MASS INDEX: 37.46 KG/M2 | RESPIRATION RATE: 18 BRPM | OXYGEN SATURATION: 97 %

## 2019-05-02 DIAGNOSIS — D50.9 IRON DEFICIENCY ANEMIA, UNSPECIFIED IRON DEFICIENCY ANEMIA TYPE: Primary | ICD-10-CM

## 2019-05-02 DIAGNOSIS — Q23.1 AORTIC STENOSIS DUE TO BICUSPID AORTIC VALVE: ICD-10-CM

## 2019-05-02 DIAGNOSIS — C20 RECTAL CANCER (HCC): ICD-10-CM

## 2019-05-02 DIAGNOSIS — F10.10 ALCOHOL ABUSE: ICD-10-CM

## 2019-05-02 DIAGNOSIS — Q23.0 AORTIC STENOSIS DUE TO BICUSPID AORTIC VALVE: ICD-10-CM

## 2019-05-02 LAB
ALBUMIN SERPL-MCNC: 4.1 G/DL (ref 3.5–5.2)
ALBUMIN/GLOB SERPL: 1.1 G/DL
ALP SERPL-CCNC: 97 U/L (ref 39–117)
ALT SERPL W P-5'-P-CCNC: 22 U/L (ref 1–41)
ANION GAP SERPL CALCULATED.3IONS-SCNC: 16.4 MMOL/L
AST SERPL-CCNC: 18 U/L (ref 1–40)
BASOPHILS # BLD AUTO: 0.02 10*3/MM3 (ref 0–0.2)
BASOPHILS NFR BLD AUTO: 0.2 % (ref 0–1.5)
BILIRUB SERPL-MCNC: 0.4 MG/DL (ref 0.2–1.2)
BUN BLD-MCNC: 14 MG/DL (ref 8–23)
BUN/CREAT SERPL: 10.7 (ref 7–25)
CALCIUM SPEC-SCNC: 9.6 MG/DL (ref 8.6–10.5)
CHLORIDE SERPL-SCNC: 102 MMOL/L (ref 98–107)
CO2 SERPL-SCNC: 21.6 MMOL/L (ref 22–29)
CREAT BLD-MCNC: 1.31 MG/DL (ref 0.76–1.27)
DEPRECATED RDW RBC AUTO: 44 FL (ref 37–54)
EOSINOPHIL # BLD AUTO: 0.29 10*3/MM3 (ref 0–0.4)
EOSINOPHIL NFR BLD AUTO: 3 % (ref 0.3–6.2)
ERYTHROCYTE [DISTWIDTH] IN BLOOD BY AUTOMATED COUNT: 13.5 % (ref 12.3–15.4)
GFR SERPL CREATININE-BSD FRML MDRD: 54 ML/MIN/1.73
GLOBULIN UR ELPH-MCNC: 3.8 GM/DL
GLUCOSE BLD-MCNC: 114 MG/DL (ref 65–99)
HCT VFR BLD AUTO: 43.9 % (ref 37.5–51)
HGB BLD-MCNC: 14.9 G/DL (ref 13–17.7)
IMM GRANULOCYTES # BLD AUTO: 0.05 10*3/MM3 (ref 0–0.05)
IMM GRANULOCYTES NFR BLD AUTO: 0.5 % (ref 0–0.5)
LYMPHOCYTES # BLD AUTO: 1.41 10*3/MM3 (ref 0.7–3.1)
LYMPHOCYTES NFR BLD AUTO: 14.6 % (ref 19.6–45.3)
MCH RBC QN AUTO: 32 PG (ref 26.6–33)
MCHC RBC AUTO-ENTMCNC: 33.9 G/DL (ref 31.5–35.7)
MCV RBC AUTO: 94.4 FL (ref 79–97)
MONOCYTES # BLD AUTO: 0.55 10*3/MM3 (ref 0.1–0.9)
MONOCYTES NFR BLD AUTO: 5.7 % (ref 5–12)
NEUTROPHILS # BLD AUTO: 7.37 10*3/MM3 (ref 1.7–7)
NEUTROPHILS NFR BLD AUTO: 76 % (ref 42.7–76)
PLATELET # BLD AUTO: 201 10*3/MM3 (ref 140–450)
PMV BLD AUTO: 9.3 FL (ref 6–12)
POTASSIUM BLD-SCNC: 4.2 MMOL/L (ref 3.5–5.2)
PROT SERPL-MCNC: 7.9 G/DL (ref 6–8.5)
RBC # BLD AUTO: 4.65 10*6/MM3 (ref 4.14–5.8)
SODIUM BLD-SCNC: 140 MMOL/L (ref 136–145)
WBC NRBC COR # BLD: 9.69 10*3/MM3 (ref 3.4–10.8)

## 2019-05-02 PROCEDURE — 85025 COMPLETE CBC W/AUTO DIFF WBC: CPT | Performed by: INTERNAL MEDICINE

## 2019-05-02 PROCEDURE — 99214 OFFICE O/P EST MOD 30 MIN: CPT | Performed by: INTERNAL MEDICINE

## 2019-05-02 PROCEDURE — 36415 COLL VENOUS BLD VENIPUNCTURE: CPT | Performed by: INTERNAL MEDICINE

## 2019-05-02 PROCEDURE — 80053 COMPREHEN METABOLIC PANEL: CPT | Performed by: INTERNAL MEDICINE

## 2019-05-02 RX ORDER — DEXTROSE MONOHYDRATE 50 MG/ML
250 INJECTION, SOLUTION INTRAVENOUS ONCE
Status: CANCELLED | OUTPATIENT
Start: 2019-05-28

## 2019-05-02 RX ORDER — FAMOTIDINE 10 MG/ML
20 INJECTION, SOLUTION INTRAVENOUS AS NEEDED
Status: CANCELLED | OUTPATIENT
Start: 2019-05-28

## 2019-05-02 RX ORDER — PALONOSETRON 0.05 MG/ML
0.25 INJECTION, SOLUTION INTRAVENOUS ONCE
Status: CANCELLED | OUTPATIENT
Start: 2019-05-28

## 2019-05-02 RX ORDER — FLUOROURACIL 50 MG/ML
400 INJECTION, SOLUTION INTRAVENOUS ONCE
Status: CANCELLED | OUTPATIENT
Start: 2019-05-28

## 2019-05-02 RX ORDER — DIPHENHYDRAMINE HYDROCHLORIDE 50 MG/ML
50 INJECTION INTRAMUSCULAR; INTRAVENOUS AS NEEDED
Status: CANCELLED | OUTPATIENT
Start: 2019-05-28

## 2019-05-02 NOTE — PROGRESS NOTES
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  Combined chemoradiation with Xeloda 825 mg/m2 BID D1-5 or M-F during the course of radiation.  His dose is 500 mg x 4 or 2000 mg BID  Treatment finished 1-21-19.    2.  Dr. Yadav performed LAR with coloanal anstomosis and loop ileostomy 4-09-19.  Pathology showed  Residual invasive adneocarcinoma.  Tumor invaded the muscularis propria.  Surgical margins clear.  0/14 resected LNs involved.  ypT2N0.      CHIEF COMPLAINT:  Follow up of Rectal cancer    HISTORY OF PRESENT ILLNESS:   David Mandujano is a very pleasant 68 y.o. male who was referred by Dr. Barnhart for evaluation and treatment of colorectal cancer. He began to notice rectal bleeding in January or February 2018, and he also began to notice fatigue in approximately July. He assumed he was out of shape when he started to become short of breath and started trying to exercise more frequently, which only exacerbated the shortness of breath. He was evaluated by his PCP, Raj Wang MD, who after testing, found him to be severely iron deficient, hyperglycemic, and he also had an elevated PSA. He was on vacation at the time, and his PCP asked him to return home for further evaluation. He was started on oral iron therapy and later received IV iron infusions. He was also scheduled for a colonoscopy with Dr. Barnhart, during which a suspicious rectal mass was biopsied and pathology was positive for an ulcerated invasive, moderately differentiated rectal adenocarcinoma.       INTERVAL HISTORY:  Mr. Mandujano is here today for follow up of rectal cancer.  He saw Dr. Yadav who felt it would be possible to do his surgery without pelvic exenteration.  She took him to the OR and performed LAR with clear margins.  He is doing well following his surgery.  He does have some difficulty with his ileostomy and plans to see the stoma  nurse when he follows up with Dr. Yadav next week.  We discussed his pathology as well as rationale for adjuvant chemotherapy.     PAST MEDICAL HISTORY:  Past Medical History:   Diagnosis Date   • Anemia    • Colon cancer (CMS/HCC)     s/p chemo therapy   • Hypertension    • Wrist fracture     surgically repaired       PAST SURGICAL HISTORY:  Past Surgical History:   Procedure Laterality Date   • COLONOSCOPY     • WRIST SURGERY         FAMILY HISTORY:  Family History   Problem Relation Age of Onset   • Colon cancer Maternal Grandfather    • Other Sister    • Heart disease Sister    No other family history of malignancy.    SOCIAL HISTORY:  Social History     Socioeconomic History   • Marital status: Single     Spouse name: Not on file   • Number of children: Not on file   • Years of education: Not on file   • Highest education level: Not on file   Tobacco Use   • Smoking status: Never Smoker   • Smokeless tobacco: Current User     Types: Chew   Substance and Sexual Activity   • Alcohol use: Yes     Comment: occasional   • Drug use: No   • Sexual activity: Defer   Social History Narrative    He is , and is self employed/semi retired. He is a non smoker but chews tobacco. He used to drink daily, but says he has cut it in half.      REVIEW OF SYSTEMS:   A comprehensive 14 point review of systems was performed.  Significant findings as mentioned above.  All other systems reviewed and are negative.      MEDICATIONS:  The current medication list was reviewed in the EMR    Current Outpatient Medications:   •  amLODIPine (NORVASC) 10 MG tablet, Take 5 mg by mouth Daily., Disp: , Rfl:   •  ferrous sulfate 325 (65 FE) MG tablet, Take 325 mg by mouth 2 (Two) Times a Day., Disp: , Rfl:   •  metoprolol succinate XL (TOPROL-XL) 50 MG 24 hr tablet, Take 1 tablet by mouth Daily., Disp: 30 tablet, Rfl: 11    ALLERGIES:  No Known Allergies    PHYSICAL EXAM:  Vitals:    05/02/19 1615   BP: 154/87   Pulse: 107   Resp: 18   Temp:  98.6 °F (37 °C)   SpO2: 97%   General:  Awake, alert and oriented, appears anxious but otw well.  HEENT:  Pupils are equal, round and reactive to light and accommodation, Extra-ocular movements full, Oropharyx clear, mucous membranes moist  Neck:  No JVD, thyromegaly or lymphadenopathy  CV:  Regular rate and rhythm, III/IV systolic murmur, no rubs or gallops  Resp:  Lungs are clear to auscultation bilaterally  Abd:  Soft, non-tender, somewhat -distended, bowel sounds present, no organomegaly or masses.  Surgical incisions healing nicely.  Stoma functioning well.  Ext:  No clubbing, cyanosis or edema.    Lymph:  No cervical, supraclavicular, axillary, adenopathy  Neuro:  grossly non-focal exam      PATHOLOGY:  10-02-18      04-01-19:        04-09-19:        ENDOSCOPY:  10-02-18:        IMAGING:  10-04-18 CT Abdomen Pelvis With Contrast   Findings  - LOWER THORAX: Patchy nonspecific subpleural nodularity in the left lung base that could represent sequelae of chronic airspace disease or early fibrosis.     ABDOMEN:  - LIVER: Homogeneous. No focal hepatic mass or ductal dilatation.  - GALLBLADDER: GALLSTONES WITHIN THE GALLBLADDER.  - PANCREAS: Unremarkable. No mass or ductal dilatation.  - SPLEEN: Homogeneous. No splenomegaly.  - ADRENALS: No mass.  - KIDNEYS: RIGHT RENAL CYST MEASURING 3.6 CM.  - GI TRACT: NONSPECIFIC DISTAL SIGMOID COLONIC WALL THICKENING VERSUS  NONDISTENTION.  - PERITONEUM: No free air. No free fluid or loculated fluid collections.  - MESENTERY: Unremarkable.  - LYMPH NODES: No lymphadenopathy.  - VASCULATURE: ATHEROSCLEROTIC VASCULAR CALCIFICATION.  - ABDOMINAL WALL: SMALL BILATERAL HUMERAL HERNIAS CONTAINING ONLY FAT.  - OTHER: None.     PELVIS:  - BLADDER: No focal mass or significant wall thickening  - REPRODUCTIVE: Unremarkable as visualized.  - APPENDIX: Nondistended. No surrounding inflammation.  - BONES: No acute bony abnormality.     IMPRESSION:  1. Gallstones within the  gallbladder.  2.Nonspecific distal sigmoid colonic wall thickening versus nondistention.    PET/CT 10-29-18    11-13-18 MRI Pelvis Without Contrast  FINDINGS:  1.) TUMOR LOCATION AND CHARACTERISTICS        i) Distance of Inferior margin of Tumor from the dentate line: 9.5 CM    ii) Tumor at or below the puborectalis sling:  NO   iii) Relationship to the anterior peritoneal reflection: BELOW   iv) Craniocaudal length of the tumor: 6cm   v) Clock face of tumor: 5o'clock to 2o'clock  vi) Polypoid/ Annular/ Semi-annular: SEMI-ANNULAR  vii) Mucinous: YES     2.) EXTRAMURAL DEPTH OF INVASION AND MR T-CATEGORY         i) Extramural depth of invasion (Use 0mm for T1 or T2 tumor):  APPROXIMATELY 5 MM    ii) T category: T3 B              *Please indicate structures with possible invasion.  Specify laterality,  sequence and slice#: (see list below)     *  Anterior peritoneal reflection (T4a tumor): NO  *  Puborectalis: NO  *  Bladder: NO  *  Vascular Involvement of Iliac Vessels: NO  *  Levator ani: NO  *  Ureters: NO  *  Obturator: NO  *  Prostate: MARGINAL/TETHERED  *  Piriformis: NO  *  Uterus: NOT APPLICABLE  *  Pelvic Bone: NO  *  Vagina: NOT APPLICABLE  *  Sacrum: NO  *  Urethra: NO  *  Other:           iii) For low rectal tumors (maximum tumor depth at or below the  puborectalis sling):     *  Not applicable (tumor above the puborectalis sling: NOT APPLICABLE  *    *  Level 1 (submucosa only, no involvement of internal anal sphincter):       *  Level 2 (confined to the internal anal sphincter; no involvement of  intersphincteric fat):      *  Level 3 (intersphincteric fat involved):      *  Level 4 (involves external sphincter or beyond):         3. RELATIONSHIP OF THE TUMOR TO MESORECTAL FASCIA (MRF)       i) Shortest distance 0mm of the definitive tumour border to the MRF  is: AT 12:00   ii) Are there any tumour spiculations closer to the MRF? NO     iii) Location of Tumor margin to MRF: 12:00, AT THE LEVEL PROSTATE      4. EXTRAMURAL VENOUS INVASION       i) Extramural Venous Invasion (EMVI): ABSENT     5. MESORECTAL LYMPH NODES AND TUMOUR DEPOSITS       i) Any suspicious mesorectal lymph nodes/tumor deposits: YES      *If yes, the most suspicious node/tumor deposit is: 15 MM IN  DIAMETER, ALONG THE UPPER MARGIN OF the tumor with minimum distance 7  MMmm from the MRF at 7o'clock     6. EXTRAMESORECTAL LYMPH NODES        i) Any suspicious extramesorectal lymph nodes: NO      *If yes, location and laterality of suspicious nodes:             Int. Iliac:                Ext. Iliac:                Common Iliac:                Obturator:                Inguinal:                Other:           ii) Is the BETH node station in the field of view: NO        *If Yes, are these nodes suspicious:         7. OTHER FINDINGS (COMPLICATIONS, METASTASES, LIMITATIONS)         NOTE: THERE IS SOME UNCERTAINTY WHETHER THE APPARENT SMALL FOCUS  OF TUMOR EXTENDING TO THE PROSTATE AT THE 12:00 POSITION COULD ACTUALLY  BE PROSTATE NODULE EXTENDING TOWARD THE TUMOR. RECTAL TUMOR IS FAVORED;  ALTHOUGH THIS DETERMINATION CANNOT BE MADE WITH COMPLETE CERTAINTY,  TUMOR IS CONSIDERED T3 B.         IMPRESSIONS:  MRI rectal cancer T category is: T3 B  Maximum EMD of invasion is: 5  Minimum tumor to MRF distance is: 0  Low rectal tumor component: NO  Mesorectal nodes/tumor deposits: SUSPICIOUS  EMVI: ABSENT  Extramesorectal nodes: NEGATIVE    RECENT LABS:  Lab Results   Component Value Date    WBC 9.69 05/02/2019    HGB 14.9 05/02/2019    HCT 43.9 05/02/2019    MCV 94.4 05/02/2019    RDW 13.5 05/02/2019     05/02/2019    NEUTRORELPCT 76.0 05/02/2019    LYMPHORELPCT 14.6 (L) 05/02/2019    MONORELPCT 5.7 05/02/2019    EOSRELPCT 3.0 05/02/2019    BASORELPCT 0.2 05/02/2019    NEUTROABS 7.37 (H) 05/02/2019    LYMPHSABS 1.41 05/02/2019       Lab Results   Component Value Date     05/02/2019    K 4.2 05/02/2019    CO2 21.6 (L) 05/02/2019     05/02/2019     BUN 14 05/02/2019    CREATININE 1.31 (H) 05/02/2019    EGFRIFNONA 54 (L) 05/02/2019    GLUCOSE 114 (H) 05/02/2019    CALCIUM 9.6 05/02/2019    ALKPHOS 97 05/02/2019    AST 18 05/02/2019    ALT 22 05/02/2019    BILITOT 0.4 05/02/2019    ALBUMIN 4.10 05/02/2019    PROTEINTOT 7.9 05/02/2019       Lab Results   Component Value Date/Time    URICACID 9.1 (H) 06/25/2014 01:29 PM     Lab Results   Component Value Date    CEA 3.40 03/20/2019    CEA 17.80 (H) 01/11/2019    CEA 18.80 (H) 10/19/2018     Lab Results   Component Value Date    PSA 4.930 (H) 10/19/2018       Lab Results   Component Value Date    FERRITIN 171.00 03/20/2019    IRON 162 (H) 03/20/2019    TIBC 457 03/20/2019    LABIRON 35 03/20/2019    GVYAYCGB39 401 10/19/2018    FOLATE 7.48 10/19/2018     ASSESSMENT & PLAN:  David Mandujano is a very pleasant 68 y.o. male with newly diagnosed rectal cancer. Clinically stage IIIC ([id number]).    1.  Rectal cancer:  -  He received standard neoadjuvant chemoradiation as above given locally advanced tumor with suspicious pelvic lymph node.    - pre-treatment MRI showed a locally advanced tumor and suspicious pelvic LN.  PET-CT was negative except in the local tumor.   -  There was a non-specific sub-pleural nodular opacity in the L lung base which was PET negative.  Perhaps this was inflammatory in nature. This area will require f/u on future imaging.  - Recommended neoadjuvant chemoradiation with Xeloda 825 mg/m2 BID D1-5 or M-F during the course of radiaton.  His dose was 500 mg x 4 or 2000 mg BID. Overall, he tolerated this well and completed treatment l1-21-19.    -  He saw Dr. Marilynn Yadav and underwent successful surgical resection as above.    -  I recommended adjuvant FOLFOX chemotherapy to help prevent recurrence .  We discussed rational for and potential risks, benefits and side effects of treatment and he would like to proceed.  -  Will refer for PAC placement.    -  Will tentatively plan to start the week of  5-13.     2.  Iron deficiency anemia:  - Normalized., so discontinued oral iron.  Will repeat iron studies with his next f/u.    3.  Depressed mood / Anxiety and Alcohol Abuse:  - Previously given trial of Lexapro but he did not find this helpful and discontinued this. Currently, he says he is doing well in this regard. He did quit drinking completely around the time of his surgery.  He says Dr. Yadav did mention to him that his liver didn't look good during his operation.  -  We discussed potential involvement in support groups and /or referral for alcohol abuse counseling, but he declined.      4.  Prominent systolic murmur:   -   He was evaluated by Dr. Joe and then by Dr. Barrera.  He underwent echocardiogrm 1-30-19 which showed EF 70%, grade 1 diastolic dysfunction, mild to mod septal asymmetric hypertrophy, AV calcification with functionally bicuspid valve, mild AI and moderate AS, moderate MAC with mild MR, mild MS  -  Thought is that at some point in the future he will require AVR.      5.  Prophylaxis:  He took 2018 influenza vaccine 10-19-18. He took Prevnar 13 on 11-20-18.    6.  ACO Quality measures  - David Mandujano does not smoke but he does use tobacco products in the form of chewing tobacco.  - I advised David of the risks of continuing to use tobacco.  During this visit, I spent <3 minutes counseling the patient regarding tobacco cessation.  - Current outpatient and discharge medications have been reconciled for the patient.  Reviewed by: Jyoti Larios MD    This note was scribed for Jyoti Larios MD by Lynn Pinto RN.    I, Jyoti Larios MD, personally performed the services described in this documentation as scribed by the above named individual in my presence, and it is both accurate and complete.  05/02/2019       I spent 35 minutes with David Mandujano today.  More than 50% of the time was spent in counseling / coordination of care for the above problems.      Electronically  Signed by: Jyoti Larios MD      CC:   MD Kathleen Coppola Emmanuel C, MD Shawn Michael Acino, MD William Richards, MD Sandra Beck, MD

## 2019-05-06 DIAGNOSIS — C20 RECTAL CANCER (HCC): Primary | ICD-10-CM

## 2019-05-13 ENCOUNTER — OFFICE VISIT (OUTPATIENT)
Dept: SURGERY | Facility: CLINIC | Age: 69
End: 2019-05-13

## 2019-05-13 VITALS
WEIGHT: 276 LBS | HEART RATE: 104 BPM | HEIGHT: 72 IN | BODY MASS INDEX: 37.38 KG/M2 | SYSTOLIC BLOOD PRESSURE: 178 MMHG | DIASTOLIC BLOOD PRESSURE: 96 MMHG

## 2019-05-13 DIAGNOSIS — C20 RECTAL CANCER (HCC): Primary | ICD-10-CM

## 2019-05-13 PROCEDURE — 99202 OFFICE O/P NEW SF 15 MIN: CPT | Performed by: SURGERY

## 2019-05-13 NOTE — PROGRESS NOTES
"Subjective   David Mandujano is a 68 y.o. male here today for possible port placement referred by Dr. Larios.    History of Present Illness  Mr. Mandujano was seen in the office today to discuss port placement for adjuvant chemotherapy for his rectal cancer.  No Known Allergies  Current Outpatient Medications   Medication Sig Dispense Refill   • amLODIPine (NORVASC) 10 MG tablet Take 5 mg by mouth Daily.     • ferrous sulfate 325 (65 FE) MG tablet Take 325 mg by mouth 2 (Two) Times a Day.     • metoprolol succinate XL (TOPROL-XL) 50 MG 24 hr tablet Take 1 tablet by mouth Daily. 30 tablet 11     No current facility-administered medications for this visit.      Past Medical History:   Diagnosis Date   • Anemia    • Colon cancer (CMS/HCC)     s/p chemo therapy   • Hypertension    • Wrist fracture     surgically repaired     Past Surgical History:   Procedure Laterality Date   • COLONOSCOPY     • WRIST SURGERY       Review of Systems  General: negative  Integumentary: negative  Eyes: negative  ENT: negative  Respiratory: negative  Gastrointestinal: negative  Cardiovascular: negative  Neurological: negative  Psychiatric: negative  Hematologic/Lymphatic: negative  Genitourinary: negative  Musculoskeletal: negative  Endocrine: negative  Breasts: negative        Objective   /96 (BP Location: Left arm)   Pulse 104   Ht 182.9 cm (72\")   Wt 125 kg (276 lb)   BMI 37.43 kg/m²   Physical Exam  General:  This is a WD WN male in no acute distress  HEENT exam:  WNL. Sclera are anicteric.  EOMI  Neck:  supple, FROM, without thyromegaly, cervical or supraclavicular adenopathy  Lungs:  Respiratory effort normal. Auscultation: Clear, without wheezes, rhonchi, rales  Heart:  Regular rate and rhythm, without murmur, gallop, rub.  No pedal edema  Abdomen: Right-sided ileostomy.  Healing surgical scar  Musculoskeletal:  muscle strength/tone is normal.  Gait and station: normal. No digital cyanosis  Psyc:  alert, oriented x 3.  Mood " and affect are appropriate  skin:  Warm with good turgor.  Without rash or lesion  extremities:  Examination of the extremities revealed no cyanosis, clubbing or edema.  Results/Data    Procedures       Assessment/Plan   Rectal cancer    Proceed with port placement       Discussion/Summary: Risks discussed and information booklet provided    Patient's Body mass index is 37.43 kg/m². BMI is above normal and info given.       Errors in dictation may reflect use of voice recognition software and not all errors in transcription may have been detected prior to signing.    Future Appointments   Date Time Provider Department Center   5/13/2019  3:40 PM Cindy Corrales MD [facility] None   5/22/2019 10:00 AM Jyoti Larios MD [facility]   5/22/2019 10:00 AM [facility] NURSE [facility]   5/29/2019 10:00 AM Jyoti Larios MD [facility]

## 2019-05-14 ENCOUNTER — TELEPHONE (OUTPATIENT)
Dept: SURGERY | Facility: CLINIC | Age: 69
End: 2019-05-14

## 2019-05-14 RX ORDER — CEFAZOLIN SODIUM 2 G/50ML
2 SOLUTION INTRAVENOUS ONCE
Status: CANCELLED | OUTPATIENT
Start: 2019-05-20 | End: 2019-05-14

## 2019-05-14 NOTE — H&P (VIEW-ONLY)
"Subjective   David Mandujano is a 68 y.o. male here today for possible port placement referred by Dr. Larios.    History of Present Illness  Mr. Mandujano was seen in the office today to discuss port placement for adjuvant chemotherapy for his rectal cancer.  No Known Allergies  Current Outpatient Medications   Medication Sig Dispense Refill   • amLODIPine (NORVASC) 10 MG tablet Take 5 mg by mouth Daily.     • ferrous sulfate 325 (65 FE) MG tablet Take 325 mg by mouth 2 (Two) Times a Day.     • metoprolol succinate XL (TOPROL-XL) 50 MG 24 hr tablet Take 1 tablet by mouth Daily. 30 tablet 11     No current facility-administered medications for this visit.      Past Medical History:   Diagnosis Date   • Anemia    • Colon cancer (CMS/HCC)     s/p chemo therapy   • Hypertension    • Wrist fracture     surgically repaired     Past Surgical History:   Procedure Laterality Date   • COLONOSCOPY     • WRIST SURGERY       Review of Systems  General: negative  Integumentary: negative  Eyes: negative  ENT: negative  Respiratory: negative  Gastrointestinal: negative  Cardiovascular: negative  Neurological: negative  Psychiatric: negative  Hematologic/Lymphatic: negative  Genitourinary: negative  Musculoskeletal: negative  Endocrine: negative  Breasts: negative        Objective   /96 (BP Location: Left arm)   Pulse 104   Ht 182.9 cm (72\")   Wt 125 kg (276 lb)   BMI 37.43 kg/m²    Physical Exam  General:  This is a WD WN male in no acute distress  HEENT exam:  WNL. Sclera are anicteric.  EOMI  Neck:  supple, FROM, without thyromegaly, cervical or supraclavicular adenopathy  Lungs:  Respiratory effort normal. Auscultation: Clear, without wheezes, rhonchi, rales  Heart:  Regular rate and rhythm, without murmur, gallop, rub.  No pedal edema  Abdomen: Right-sided ileostomy.  Healing surgical scar  Musculoskeletal:  muscle strength/tone is normal.  Gait and station: normal. No digital cyanosis  Psyc:  alert, oriented x 3.  Mood " and affect are appropriate  skin:  Warm with good turgor.  Without rash or lesion  extremities:  Examination of the extremities revealed no cyanosis, clubbing or edema.  Results/Data    Procedures       Assessment/Plan   Rectal cancer    Proceed with port placement       Discussion/Summary: Risks discussed and information booklet provided    Patient's Body mass index is 37.43 kg/m². BMI is above normal and info given.       Errors in dictation may reflect use of voice recognition software and not all errors in transcription may have been detected prior to signing.    Future Appointments   Date Time Provider Department Center   5/13/2019  3:40 PM Cindy Reyna MD [facility] None   5/22/2019 10:00 AM Jyoti Larios MD [facility]   5/22/2019 10:00 AM [facility]   5/29/2019 10:00 AM Jyoti Larios MD [facility]     This document has been electronically signed by Cindy REYNA MD on May 14, 2019 7:26 AM

## 2019-05-17 ENCOUNTER — APPOINTMENT (OUTPATIENT)
Dept: PREADMISSION TESTING | Facility: HOSPITAL | Age: 69
End: 2019-05-17

## 2019-05-17 ENCOUNTER — TELEPHONE (OUTPATIENT)
Dept: SURGERY | Facility: CLINIC | Age: 69
End: 2019-05-17

## 2019-05-20 ENCOUNTER — ANESTHESIA (OUTPATIENT)
Dept: PERIOP | Facility: HOSPITAL | Age: 69
End: 2019-05-20

## 2019-05-20 ENCOUNTER — APPOINTMENT (OUTPATIENT)
Dept: GENERAL RADIOLOGY | Facility: HOSPITAL | Age: 69
End: 2019-05-20

## 2019-05-20 ENCOUNTER — HOSPITAL ENCOUNTER (OUTPATIENT)
Facility: HOSPITAL | Age: 69
Setting detail: HOSPITAL OUTPATIENT SURGERY
Discharge: HOME | End: 2019-05-20
Attending: SURGERY | Admitting: SURGERY

## 2019-05-20 ENCOUNTER — ANESTHESIA EVENT (OUTPATIENT)
Dept: PERIOP | Facility: HOSPITAL | Age: 69
End: 2019-05-20

## 2019-05-20 VITALS
RESPIRATION RATE: 15 BRPM | DIASTOLIC BLOOD PRESSURE: 74 MMHG | TEMPERATURE: 98 F | SYSTOLIC BLOOD PRESSURE: 144 MMHG | OXYGEN SATURATION: 97 % | WEIGHT: 275 LBS | HEART RATE: 76 BPM | HEIGHT: 72 IN | BODY MASS INDEX: 37.25 KG/M2

## 2019-05-20 DIAGNOSIS — C20 RECTAL CANCER (HCC): ICD-10-CM

## 2019-05-20 PROCEDURE — 25010000002 PROPOFOL 10 MG/ML EMULSION: Performed by: NURSE ANESTHETIST, CERTIFIED REGISTERED

## 2019-05-20 PROCEDURE — 25010000002 PROPOFOL 1000 MG/ML EMULSION: Performed by: NURSE ANESTHETIST, CERTIFIED REGISTERED

## 2019-05-20 PROCEDURE — 25010000002 HEPARIN (PORCINE) PER 1000 UNITS: Performed by: SURGERY

## 2019-05-20 PROCEDURE — 25010000003 CEFAZOLIN SODIUM-DEXTROSE 2-3 GM-%(50ML) RECONSTITUTED SOLUTION: Performed by: SURGERY

## 2019-05-20 PROCEDURE — 71045 X-RAY EXAM CHEST 1 VIEW: CPT | Performed by: RADIOLOGY

## 2019-05-20 PROCEDURE — 25010000002 MIDAZOLAM PER 1 MG: Performed by: NURSE ANESTHETIST, CERTIFIED REGISTERED

## 2019-05-20 PROCEDURE — 25010000002 ONDANSETRON PER 1 MG: Performed by: NURSE ANESTHETIST, CERTIFIED REGISTERED

## 2019-05-20 PROCEDURE — 71045 X-RAY EXAM CHEST 1 VIEW: CPT

## 2019-05-20 PROCEDURE — 76000 FLUOROSCOPY <1 HR PHYS/QHP: CPT

## 2019-05-20 PROCEDURE — C1788 PORT, INDWELLING, IMP: HCPCS | Performed by: SURGERY

## 2019-05-20 PROCEDURE — 77001 FLUOROGUIDE FOR VEIN DEVICE: CPT | Performed by: SURGERY

## 2019-05-20 PROCEDURE — 25010000002 HYDRALAZINE PER 20 MG: Performed by: NURSE ANESTHETIST, CERTIFIED REGISTERED

## 2019-05-20 PROCEDURE — 36561 INSERT TUNNELED CV CATH: CPT | Performed by: SURGERY

## 2019-05-20 PROCEDURE — 25010000002 FENTANYL CITRATE (PF) 100 MCG/2ML SOLUTION: Performed by: NURSE ANESTHETIST, CERTIFIED REGISTERED

## 2019-05-20 DEVICE — VACCESS CT POWER-INJECTABLE IMPLANTABLE PORT (WITH SUTURE PLUGS) (8F)
Type: IMPLANTABLE DEVICE | Status: FUNCTIONAL
Brand: VACCESS

## 2019-05-20 RX ORDER — FENTANYL CITRATE 50 UG/ML
INJECTION, SOLUTION INTRAMUSCULAR; INTRAVENOUS AS NEEDED
Status: DISCONTINUED | OUTPATIENT
Start: 2019-05-20 | End: 2019-05-20 | Stop reason: SURG

## 2019-05-20 RX ORDER — MEPERIDINE HYDROCHLORIDE 25 MG/ML
12.5 INJECTION INTRAMUSCULAR; INTRAVENOUS; SUBCUTANEOUS
Status: DISCONTINUED | OUTPATIENT
Start: 2019-05-20 | End: 2019-05-20 | Stop reason: HOSPADM

## 2019-05-20 RX ORDER — HYDRALAZINE HYDROCHLORIDE 20 MG/ML
INJECTION INTRAMUSCULAR; INTRAVENOUS AS NEEDED
Status: DISCONTINUED | OUTPATIENT
Start: 2019-05-20 | End: 2019-05-20 | Stop reason: SURG

## 2019-05-20 RX ORDER — FENTANYL CITRATE 50 UG/ML
50 INJECTION, SOLUTION INTRAMUSCULAR; INTRAVENOUS
Status: DISCONTINUED | OUTPATIENT
Start: 2019-05-20 | End: 2019-05-20 | Stop reason: HOSPADM

## 2019-05-20 RX ORDER — SODIUM CHLORIDE 0.9 % (FLUSH) 0.9 %
3-10 SYRINGE (ML) INJECTION AS NEEDED
Status: DISCONTINUED | OUTPATIENT
Start: 2019-05-20 | End: 2019-05-20 | Stop reason: HOSPADM

## 2019-05-20 RX ORDER — SODIUM CHLORIDE 0.9 % (FLUSH) 0.9 %
3 SYRINGE (ML) INJECTION EVERY 12 HOURS SCHEDULED
Status: DISCONTINUED | OUTPATIENT
Start: 2019-05-20 | End: 2019-05-20 | Stop reason: HOSPADM

## 2019-05-20 RX ORDER — HYDROCODONE BITARTRATE AND ACETAMINOPHEN 7.5; 325 MG/1; MG/1
1 TABLET ORAL 4 TIMES DAILY PRN
Qty: 12 TABLET | Refills: 0 | Status: SHIPPED | OUTPATIENT
Start: 2019-05-20 | End: 2021-04-01

## 2019-05-20 RX ORDER — ONDANSETRON 2 MG/ML
INJECTION INTRAMUSCULAR; INTRAVENOUS AS NEEDED
Status: DISCONTINUED | OUTPATIENT
Start: 2019-05-20 | End: 2019-05-20 | Stop reason: SURG

## 2019-05-20 RX ORDER — BACTERIOSTATIC SODIUM CHLORIDE 0.9 %
VIAL (ML) INJECTION AS NEEDED
Status: DISCONTINUED | OUTPATIENT
Start: 2019-05-20 | End: 2019-05-20 | Stop reason: HOSPADM

## 2019-05-20 RX ORDER — MAGNESIUM HYDROXIDE 1200 MG/15ML
LIQUID ORAL AS NEEDED
Status: DISCONTINUED | OUTPATIENT
Start: 2019-05-20 | End: 2019-05-20 | Stop reason: HOSPADM

## 2019-05-20 RX ORDER — IPRATROPIUM BROMIDE AND ALBUTEROL SULFATE 2.5; .5 MG/3ML; MG/3ML
3 SOLUTION RESPIRATORY (INHALATION) ONCE AS NEEDED
Status: DISCONTINUED | OUTPATIENT
Start: 2019-05-20 | End: 2019-05-20 | Stop reason: HOSPADM

## 2019-05-20 RX ORDER — OXYCODONE HYDROCHLORIDE AND ACETAMINOPHEN 5; 325 MG/1; MG/1
1 TABLET ORAL ONCE AS NEEDED
Status: DISCONTINUED | OUTPATIENT
Start: 2019-05-20 | End: 2019-05-20 | Stop reason: HOSPADM

## 2019-05-20 RX ORDER — HEPARIN SODIUM 5000 [USP'U]/ML
INJECTION, SOLUTION INTRAVENOUS; SUBCUTANEOUS AS NEEDED
Status: DISCONTINUED | OUTPATIENT
Start: 2019-05-20 | End: 2019-05-20 | Stop reason: HOSPADM

## 2019-05-20 RX ORDER — CEFAZOLIN SODIUM 2 G/50ML
2 SOLUTION INTRAVENOUS ONCE
Status: COMPLETED | OUTPATIENT
Start: 2019-05-20 | End: 2019-05-20

## 2019-05-20 RX ORDER — ONDANSETRON 2 MG/ML
4 INJECTION INTRAMUSCULAR; INTRAVENOUS ONCE AS NEEDED
Status: DISCONTINUED | OUTPATIENT
Start: 2019-05-20 | End: 2019-05-20 | Stop reason: HOSPADM

## 2019-05-20 RX ORDER — PROPOFOL 10 MG/ML
VIAL (ML) INTRAVENOUS AS NEEDED
Status: DISCONTINUED | OUTPATIENT
Start: 2019-05-20 | End: 2019-05-20 | Stop reason: SURG

## 2019-05-20 RX ORDER — CEFAZOLIN SODIUM 2 G/50ML
2 SOLUTION INTRAVENOUS ONCE
Status: DISCONTINUED | OUTPATIENT
Start: 2019-05-20 | End: 2019-05-20 | Stop reason: HOSPADM

## 2019-05-20 RX ORDER — SODIUM CHLORIDE, SODIUM LACTATE, POTASSIUM CHLORIDE, CALCIUM CHLORIDE 600; 310; 30; 20 MG/100ML; MG/100ML; MG/100ML; MG/100ML
125 INJECTION, SOLUTION INTRAVENOUS CONTINUOUS
Status: DISCONTINUED | OUTPATIENT
Start: 2019-05-20 | End: 2019-05-20 | Stop reason: HOSPADM

## 2019-05-20 RX ORDER — MIDAZOLAM HYDROCHLORIDE 1 MG/ML
INJECTION INTRAMUSCULAR; INTRAVENOUS AS NEEDED
Status: DISCONTINUED | OUTPATIENT
Start: 2019-05-20 | End: 2019-05-20 | Stop reason: SURG

## 2019-05-20 RX ORDER — FAMOTIDINE 10 MG/ML
INJECTION, SOLUTION INTRAVENOUS AS NEEDED
Status: DISCONTINUED | OUTPATIENT
Start: 2019-05-20 | End: 2019-05-20 | Stop reason: SURG

## 2019-05-20 RX ORDER — METOPROLOL TARTRATE 5 MG/5ML
INJECTION INTRAVENOUS AS NEEDED
Status: DISCONTINUED | OUTPATIENT
Start: 2019-05-20 | End: 2019-05-20 | Stop reason: SURG

## 2019-05-20 RX ORDER — SODIUM CHLORIDE 9 MG/ML
INJECTION, SOLUTION INTRAVENOUS AS NEEDED
Status: DISCONTINUED | OUTPATIENT
Start: 2019-05-20 | End: 2019-05-20 | Stop reason: HOSPADM

## 2019-05-20 RX ADMIN — SODIUM CHLORIDE, POTASSIUM CHLORIDE, SODIUM LACTATE AND CALCIUM CHLORIDE 1000 ML: 600; 310; 30; 20 INJECTION, SOLUTION INTRAVENOUS at 10:04

## 2019-05-20 RX ADMIN — FENTANYL CITRATE 100 MCG: 50 INJECTION INTRAMUSCULAR; INTRAVENOUS at 10:57

## 2019-05-20 RX ADMIN — ONDANSETRON 4 MG: 2 INJECTION, SOLUTION INTRAMUSCULAR; INTRAVENOUS at 10:57

## 2019-05-20 RX ADMIN — MIDAZOLAM HYDROCHLORIDE 2 MG: 1 INJECTION, SOLUTION INTRAMUSCULAR; INTRAVENOUS at 10:57

## 2019-05-20 RX ADMIN — HYDRALAZINE HYDROCHLORIDE 20 MG: 20 INJECTION INTRAMUSCULAR; INTRAVENOUS at 11:12

## 2019-05-20 RX ADMIN — FAMOTIDINE 20 MG: 10 INJECTION, SOLUTION INTRAVENOUS at 10:57

## 2019-05-20 RX ADMIN — PROPOFOL 160 MCG/KG/MIN: 10 INJECTION, EMULSION INTRAVENOUS at 11:03

## 2019-05-20 RX ADMIN — CEFAZOLIN SODIUM 2 G: 2 SOLUTION INTRAVENOUS at 10:57

## 2019-05-20 RX ADMIN — METOPROLOL TARTRATE 5 MG: 1 INJECTION, SOLUTION INTRAVENOUS at 11:07

## 2019-05-20 RX ADMIN — SODIUM CHLORIDE, POTASSIUM CHLORIDE, SODIUM LACTATE AND CALCIUM CHLORIDE 125 ML/HR: 600; 310; 30; 20 INJECTION, SOLUTION INTRAVENOUS at 10:54

## 2019-05-20 RX ADMIN — PROPOFOL 100 MG: 10 INJECTION, EMULSION INTRAVENOUS at 11:03

## 2019-05-20 NOTE — ANESTHESIA PREPROCEDURE EVALUATION
Anesthesia Evaluation     Patient summary reviewed and Nursing notes reviewed   no history of anesthetic complications:  NPO Solid Status: > 8 hours  NPO Liquid Status: > 8 hours           Airway   Mallampati: III  TM distance: >3 FB  Neck ROM: full  Possible difficult intubation  Dental - normal exam     Pulmonary - negative pulmonary ROS and normal exam   Cardiovascular - normal exam  Exercise tolerance: good (4-7 METS)    NYHA Classification: II    (+) hypertension, valvular problems/murmurs,       Neuro/Psych- negative ROS  GI/Hepatic/Renal/Endo - negative ROS     Musculoskeletal     Abdominal  - normal exam    Bowel sounds: normal.   Substance History - negative use     OB/GYN negative ob/gyn ROS         Other   (+) arthritis   history of cancer                  Anesthesia Plan    ASA 3     general     intravenous induction   Anesthetic plan, all risks, benefits, and alternatives have been provided, discussed and informed consent has been obtained with: patient.    Plan discussed with CRNA.

## 2019-05-20 NOTE — ANESTHESIA POSTPROCEDURE EVALUATION
Patient: David Mandujano    Procedure Summary     Date:  05/20/19 Room / Location:   [facility] OR 02 /  [facility]    Anesthesia Start:  1057 Anesthesia Stop:  1141    Procedure:  INSERTION VENOUS ACCESS DEVICE (N/A ) Diagnosis:       Rectal cancer (CMS/HCC)      (Rectal cancer (CMS/HCC) [C20])    Surgeon:  Cindy Corrales MD Provider:  Joshua Perez DO    Anesthesia Type:  Not recorded ASA Status:  3          Anesthesia Type: No value filed.  Last vitals  BP   178/85 (05/20/19 1057)   Temp   97.2 °F (36.2 °C) (05/20/19 1141)   Pulse   75 (05/20/19 1141)   Resp   15 (05/20/19 1141)     SpO2   96 % (05/20/19 1141)     Post Anesthesia Care and Evaluation    Patient location during evaluation: PHASE II  Patient participation: complete - patient participated  Level of consciousness: awake and alert  Pain score: 1  Pain management: adequate  Airway patency: patent  Anesthetic complications: No anesthetic complications  PONV Status: controlled  Cardiovascular status: acceptable  Respiratory status: acceptable  Hydration status: acceptable

## 2019-05-20 NOTE — OP NOTE
[id number] insertion    Surgeon:  Cindy Corrales M.D., F.A.C.S.    Assistant;  none    Indications: This patient presents for placement of an [id number] for dhemotherapy    Pre-operative Diagnosis: rectal cancer    Post-operative Diagnosis:  Same    Anesthesia: general    Procedure Details   After obtaining informed consent and receiving preoperative antibiotic patient was taken to the operating room and placed in the supine position.  After administration of anesthesia the chest and neck were prepped and draped in sterile fashion.  The left subclavian vein was cannulated with use of the Seldinger technique.  The guidewire was passed and position was confirmed under fluoroscopy.  Following this, a transverse incision was made inferior to the site of guidewire entry and carried down to the pectoralis fashion.  A pocket large enough to admit the port without tension was created.  The guidewire was brought through the incision.    The port and catheter were attached and flushed with heparinized saline, 50 units per mL.  The catheter was cut to appropriate length.  Under fluoroscopic guidance, the dilator sheath was passed over the guidewire.  The guidewire and dilator were removed and the catheter was passed over the peel-away sheath which was then removed.  There was no resistance to irrigation or aspiration of blood.  Fluoroscopy confirmed the catheter to be in good position without kinking.  The port was then sutured in with 3-0 Prolene sutures.  After ensuring hemostasis, the skin was closed in a 2 layer closure of 3-0 Vicryl sutures to Phan's fascia.  The skin was closed with a 4-0 Vicryl subcuticular stitch.  Dressing was placed.     Patient tolerated the procedure well, was taken to the recovery room in stable condition where chest x-ray pending.    Instrument, sponge, and needle counts were correct at closure and at the conclusion of the case.     Findings:  Flouroscopy demonstrated good position of the  port    Estimated Blood Loss: 10 mL or less    Blood administered:  none           Drains: None           Specimens: None        Grafts and Implants: yes         Complications: none           Condition: Stable

## 2019-05-20 NOTE — NURSING NOTE
Patient wanted to go back out to the lobby and wait for a ride home to show up. Offered patient to lay down and get fluids in the meantime but refused.

## 2019-05-21 DIAGNOSIS — C20 RECTAL CANCER (HCC): ICD-10-CM

## 2019-05-21 RX ORDER — PALONOSETRON 0.05 MG/ML
0.25 INJECTION, SOLUTION INTRAVENOUS ONCE
Status: CANCELLED | OUTPATIENT
Start: 2019-06-11

## 2019-05-21 RX ORDER — FLUOROURACIL 50 MG/ML
400 INJECTION, SOLUTION INTRAVENOUS ONCE
Status: CANCELLED | OUTPATIENT
Start: 2019-06-11

## 2019-05-21 RX ORDER — DIPHENHYDRAMINE HYDROCHLORIDE 50 MG/ML
50 INJECTION INTRAMUSCULAR; INTRAVENOUS AS NEEDED
Status: CANCELLED | OUTPATIENT
Start: 2019-06-11

## 2019-05-21 RX ORDER — FAMOTIDINE 10 MG/ML
20 INJECTION, SOLUTION INTRAVENOUS AS NEEDED
Status: CANCELLED | OUTPATIENT
Start: 2019-06-11

## 2019-05-21 RX ORDER — DEXTROSE MONOHYDRATE 50 MG/ML
250 INJECTION, SOLUTION INTRAVENOUS ONCE
Status: CANCELLED | OUTPATIENT
Start: 2019-06-11

## 2019-05-28 ENCOUNTER — DOCUMENTATION (OUTPATIENT)
Dept: ONCOLOGY | Facility: HOSPITAL | Age: 69
End: 2019-05-28

## 2019-05-28 ENCOUNTER — LAB (OUTPATIENT)
Dept: ONCOLOGY | Facility: CLINIC | Age: 69
End: 2019-05-28

## 2019-05-28 ENCOUNTER — SPECIALTY PHARMACY (OUTPATIENT)
Dept: PHARMACY | Facility: HOSPITAL | Age: 69
End: 2019-05-28

## 2019-05-28 ENCOUNTER — INFUSION (OUTPATIENT)
Dept: ONCOLOGY | Facility: HOSPITAL | Age: 69
End: 2019-05-28

## 2019-05-28 VITALS
SYSTOLIC BLOOD PRESSURE: 173 MMHG | HEART RATE: 77 BPM | DIASTOLIC BLOOD PRESSURE: 89 MMHG | OXYGEN SATURATION: 97 % | WEIGHT: 273.9 LBS | RESPIRATION RATE: 18 BRPM | TEMPERATURE: 98 F | BODY MASS INDEX: 37.15 KG/M2

## 2019-05-28 DIAGNOSIS — C20 RECTAL CANCER (HCC): ICD-10-CM

## 2019-05-28 DIAGNOSIS — Z95.828 PORT-A-CATH IN PLACE: Primary | ICD-10-CM

## 2019-05-28 LAB
ALBUMIN SERPL-MCNC: 4.18 G/DL (ref 3.5–5.2)
ALBUMIN/GLOB SERPL: 1.2 G/DL
ALP SERPL-CCNC: 94 U/L (ref 39–117)
ALT SERPL W P-5'-P-CCNC: 27 U/L (ref 1–41)
ANION GAP SERPL CALCULATED.3IONS-SCNC: 14.9 MMOL/L
AST SERPL-CCNC: 29 U/L (ref 1–40)
BASOPHILS # BLD AUTO: 0.02 10*3/MM3 (ref 0–0.2)
BASOPHILS NFR BLD AUTO: 0.2 % (ref 0–1.5)
BILIRUB SERPL-MCNC: 0.5 MG/DL (ref 0.2–1.2)
BUN BLD-MCNC: 13 MG/DL (ref 8–23)
BUN/CREAT SERPL: 10.3 (ref 7–25)
CALCIUM SPEC-SCNC: 9.4 MG/DL (ref 8.6–10.5)
CHLORIDE SERPL-SCNC: 106 MMOL/L (ref 98–107)
CO2 SERPL-SCNC: 21.1 MMOL/L (ref 22–29)
CREAT BLD-MCNC: 1.26 MG/DL (ref 0.76–1.27)
DEPRECATED RDW RBC AUTO: 49.3 FL (ref 37–54)
EOSINOPHIL # BLD AUTO: 0.19 10*3/MM3 (ref 0–0.4)
EOSINOPHIL NFR BLD AUTO: 2.2 % (ref 0.3–6.2)
ERYTHROCYTE [DISTWIDTH] IN BLOOD BY AUTOMATED COUNT: 14.8 % (ref 12.3–15.4)
GFR SERPL CREATININE-BSD FRML MDRD: 57 ML/MIN/1.73
GLOBULIN UR ELPH-MCNC: 3.6 GM/DL
GLUCOSE BLD-MCNC: 132 MG/DL (ref 65–99)
HCT VFR BLD AUTO: 43.3 % (ref 37.5–51)
HGB BLD-MCNC: 14.4 G/DL (ref 13–17.7)
IMM GRANULOCYTES # BLD AUTO: 0.06 10*3/MM3 (ref 0–0.05)
IMM GRANULOCYTES NFR BLD AUTO: 0.7 % (ref 0–0.5)
LYMPHOCYTES # BLD AUTO: 1.24 10*3/MM3 (ref 0.7–3.1)
LYMPHOCYTES NFR BLD AUTO: 14.6 % (ref 19.6–45.3)
MCH RBC QN AUTO: 31.9 PG (ref 26.6–33)
MCHC RBC AUTO-ENTMCNC: 33.3 G/DL (ref 31.5–35.7)
MCV RBC AUTO: 95.8 FL (ref 79–97)
MONOCYTES # BLD AUTO: 0.51 10*3/MM3 (ref 0.1–0.9)
MONOCYTES NFR BLD AUTO: 6 % (ref 5–12)
NEUTROPHILS # BLD AUTO: 6.47 10*3/MM3 (ref 1.7–7)
NEUTROPHILS NFR BLD AUTO: 76.3 % (ref 42.7–76)
PLATELET # BLD AUTO: 185 10*3/MM3 (ref 140–450)
PMV BLD AUTO: 9.5 FL (ref 6–12)
POTASSIUM BLD-SCNC: 4.3 MMOL/L (ref 3.5–5.2)
PROT SERPL-MCNC: 7.8 G/DL (ref 6–8.5)
RBC # BLD AUTO: 4.52 10*6/MM3 (ref 4.14–5.8)
SODIUM BLD-SCNC: 142 MMOL/L (ref 136–145)
WBC NRBC COR # BLD: 8.49 10*3/MM3 (ref 3.4–10.8)

## 2019-05-28 PROCEDURE — 85025 COMPLETE CBC W/AUTO DIFF WBC: CPT | Performed by: INTERNAL MEDICINE

## 2019-05-28 PROCEDURE — 36591 DRAW BLOOD OFF VENOUS DEVICE: CPT

## 2019-05-28 PROCEDURE — 80053 COMPREHEN METABOLIC PANEL: CPT | Performed by: INTERNAL MEDICINE

## 2019-05-28 RX ORDER — CAPECITABINE 500 MG/1
TABLET, FILM COATED ORAL
Qty: 112 TABLET | Refills: 5 | Status: SHIPPED | OUTPATIENT
Start: 2019-05-28 | End: 2020-01-28

## 2019-05-28 RX ORDER — SODIUM CHLORIDE 0.9 % (FLUSH) 0.9 %
10 SYRINGE (ML) INJECTION AS NEEDED
Status: DISCONTINUED | OUTPATIENT
Start: 2019-05-28 | End: 2019-05-28 | Stop reason: HOSPADM

## 2019-05-28 RX ORDER — CAPECITABINE 150 MG/1
TABLET, FILM COATED ORAL
Qty: 28 TABLET | Refills: 5 | Status: SHIPPED | OUTPATIENT
Start: 2019-05-28 | End: 2021-04-01

## 2019-05-28 RX ORDER — SODIUM CHLORIDE 0.9 % (FLUSH) 0.9 %
10 SYRINGE (ML) INJECTION AS NEEDED
Status: CANCELLED | OUTPATIENT
Start: 2019-06-05

## 2019-05-28 RX ADMIN — SODIUM CHLORIDE, PRESERVATIVE FREE 500 UNITS: 5 INJECTION INTRAVENOUS at 11:27

## 2019-05-28 RX ADMIN — SODIUM CHLORIDE, PRESERVATIVE FREE 10 ML: 5 INJECTION INTRAVENOUS at 11:26

## 2019-05-28 NOTE — PROGRESS NOTES
SS initiated social service assessment with patient.  Pt is 67 Y/O white male diagnosed with Rectal Cancer.  Pt states that he has two children: Bouchra and Guillermo.      Pt doesn't utilize any home health or durable medical equipment.    Advance Care Planning   Advance Care Planning Discussion:  Patient states that he does have a living will and daughter Bouchra is patient's power of .    Patient independent with transportation.    Patient declines any assistance programs at this time.    Patient provided with strawberry boost this date per dietary services.    Patient receives Medicare A/B and [employer] Supplement.    Patient completed [facility] Distress Thermometer and scored a 1.  Patient did not indicate depression as an area of concern at this time.    SS will follow as needed.

## 2019-05-31 ENCOUNTER — OFFICE VISIT (OUTPATIENT)
Dept: SURGERY | Facility: CLINIC | Age: 69
End: 2019-05-31

## 2019-05-31 VITALS
WEIGHT: 273 LBS | DIASTOLIC BLOOD PRESSURE: 92 MMHG | SYSTOLIC BLOOD PRESSURE: 178 MMHG | HEART RATE: 92 BPM | BODY MASS INDEX: 36.98 KG/M2 | HEIGHT: 72 IN

## 2019-05-31 DIAGNOSIS — Z95.828 PORT-A-CATH IN PLACE: Primary | ICD-10-CM

## 2019-05-31 PROCEDURE — 99024 POSTOP FOLLOW-UP VISIT: CPT | Performed by: SURGERY

## 2019-05-31 NOTE — PROGRESS NOTES
"Subjective   David Mandujano is a 68 y.o. male here today to have port checked.    History of Present Illness  The patient called the office today with concerns that his port might be infected.  He states it was used earlier in this week and there apparently was some blood dripping out at the end and then he had a lot of swelling.  He does state that the area has decreased in swelling but he is concerned that it is red and might be infected.  No Known Allergies      Current Outpatient Medications   Medication Sig Dispense Refill   • amLODIPine (NORVASC) 10 MG tablet Take 5 mg by mouth Daily.     • capecitabine (XELODA) 150 MG chemo tablet Take 1 tab by mouth in the AM and 1 tab by mouth in the PM on days 1-14, then off for 7 days. Take with 500 mg tabs for total dose of 2150mg 28 tablet 5   • capecitabine (XELODA) 500 MG chemo tablet Take 4 tabs by mouth in the AM and 4 tabs by mouth in the PM on days 1-14, then off 7 days. Take with 150mg tabs for total dose of 2150mg. 112 tablet 5   • ferrous sulfate 325 (65 FE) MG tablet Take 325 mg by mouth 2 (Two) Times a Day.     • HYDROcodone-acetaminophen (NORCO) 7.5-325 MG per tablet Take 1 tablet by mouth 4 (Four) Times a Day As Needed for Moderate Pain . 12 tablet 0   • metoprolol succinate XL (TOPROL-XL) 50 MG 24 hr tablet Take 1 tablet by mouth Daily. 30 tablet 11     No current facility-administered medications for this visit.          Objective   /92 (BP Location: Left arm)   Pulse 92   Ht 182.9 cm (72\")   Wt 124 kg (273 lb)   BMI 37.03 kg/m²    Physical Exam  On examination this is a well-developed well-nourished male in no acute distress  HEENT examination: Within normal limits.  Conjunctiva pink.  Nose and ears appear normal.  Neck: Supple, full range of motion.  No JVD.  Musculoskeletal: Full range of motion all extremities without focal weakness. Normal gait. No digital cyanosis.  Psych: Patient is alert, oriented x3. Mood and affect are " appropriate.  Skin: There is some fullness around the port of a possible recent infiltration/hematoma but there is no erythema, cellulitis or evidence of infection  Results/Data      Procedures     Assessment/Plan   Status post port placement with no evidence of infection    Follow-up as needed       Discussion/Summary    Errors in dictation may reflect use of voice recognition software and not all errors in transcription may have been detected prior to signing.    Future Appointments   Date Time Provider Department Center   6/4/2019 10:30 AM MATT NURSE LAB [facility]   6/4/2019 11:00 AM Yessy Tilley APRN [facility]

## 2019-06-02 DIAGNOSIS — C20 RECTAL CANCER (HCC): ICD-10-CM

## 2019-06-02 RX ORDER — PALONOSETRON 0.05 MG/ML
0.25 INJECTION, SOLUTION INTRAVENOUS ONCE
Status: CANCELLED | OUTPATIENT
Start: 2019-06-05

## 2019-06-02 RX ORDER — DIPHENHYDRAMINE HYDROCHLORIDE 50 MG/ML
50 INJECTION INTRAMUSCULAR; INTRAVENOUS AS NEEDED
Status: CANCELLED | OUTPATIENT
Start: 2019-06-05

## 2019-06-02 RX ORDER — DEXTROSE MONOHYDRATE 50 MG/ML
250 INJECTION, SOLUTION INTRAVENOUS ONCE
Status: CANCELLED | OUTPATIENT
Start: 2019-06-05

## 2019-06-02 RX ORDER — FAMOTIDINE 10 MG/ML
20 INJECTION, SOLUTION INTRAVENOUS AS NEEDED
Status: CANCELLED | OUTPATIENT
Start: 2019-06-05

## 2019-06-05 ENCOUNTER — INFUSION (OUTPATIENT)
Dept: ONCOLOGY | Facility: HOSPITAL | Age: 69
End: 2019-06-05

## 2019-06-05 ENCOUNTER — DOCUMENTATION (OUTPATIENT)
Dept: ONCOLOGY | Facility: HOSPITAL | Age: 69
End: 2019-06-05

## 2019-06-05 VITALS
BODY MASS INDEX: 37.22 KG/M2 | RESPIRATION RATE: 18 BRPM | WEIGHT: 274.4 LBS | TEMPERATURE: 97.7 F | SYSTOLIC BLOOD PRESSURE: 158 MMHG | DIASTOLIC BLOOD PRESSURE: 78 MMHG | HEART RATE: 76 BPM | OXYGEN SATURATION: 96 %

## 2019-06-05 DIAGNOSIS — D50.9 IRON DEFICIENCY ANEMIA, UNSPECIFIED IRON DEFICIENCY ANEMIA TYPE: ICD-10-CM

## 2019-06-05 DIAGNOSIS — Z95.828 PORT-A-CATH IN PLACE: ICD-10-CM

## 2019-06-05 DIAGNOSIS — C20 RECTAL CANCER (HCC): Primary | ICD-10-CM

## 2019-06-05 LAB
ALBUMIN SERPL-MCNC: 4.46 G/DL (ref 3.5–5.2)
ALBUMIN/GLOB SERPL: 1.3 G/DL
ALP SERPL-CCNC: 88 U/L (ref 39–117)
ALT SERPL W P-5'-P-CCNC: 26 U/L (ref 1–41)
ANION GAP SERPL CALCULATED.3IONS-SCNC: 14.7 MMOL/L
AST SERPL-CCNC: 29 U/L (ref 1–40)
BASOPHILS # BLD AUTO: 0.03 10*3/MM3 (ref 0–0.2)
BASOPHILS NFR BLD AUTO: 0.3 % (ref 0–1.5)
BILIRUB SERPL-MCNC: 0.6 MG/DL (ref 0.2–1.2)
BUN BLD-MCNC: 15 MG/DL (ref 8–23)
BUN/CREAT SERPL: 12.2 (ref 7–25)
CALCIUM SPEC-SCNC: 9.7 MG/DL (ref 8.6–10.5)
CHLORIDE SERPL-SCNC: 104 MMOL/L (ref 98–107)
CO2 SERPL-SCNC: 21.3 MMOL/L (ref 22–29)
CREAT BLD-MCNC: 1.23 MG/DL (ref 0.76–1.27)
DEPRECATED RDW RBC AUTO: 51.4 FL (ref 37–54)
EOSINOPHIL # BLD AUTO: 0.18 10*3/MM3 (ref 0–0.4)
EOSINOPHIL NFR BLD AUTO: 2 % (ref 0.3–6.2)
ERYTHROCYTE [DISTWIDTH] IN BLOOD BY AUTOMATED COUNT: 15.3 % (ref 12.3–15.4)
GFR SERPL CREATININE-BSD FRML MDRD: 59 ML/MIN/1.73
GLOBULIN UR ELPH-MCNC: 3.4 GM/DL
GLUCOSE BLD-MCNC: 125 MG/DL (ref 65–99)
HCT VFR BLD AUTO: 43.9 % (ref 37.5–51)
HGB BLD-MCNC: 14.7 G/DL (ref 13–17.7)
IMM GRANULOCYTES # BLD AUTO: 0.05 10*3/MM3 (ref 0–0.05)
IMM GRANULOCYTES NFR BLD AUTO: 0.6 % (ref 0–0.5)
LYMPHOCYTES # BLD AUTO: 1.42 10*3/MM3 (ref 0.7–3.1)
LYMPHOCYTES NFR BLD AUTO: 16.2 % (ref 19.6–45.3)
MCH RBC QN AUTO: 32.3 PG (ref 26.6–33)
MCHC RBC AUTO-ENTMCNC: 33.5 G/DL (ref 31.5–35.7)
MCV RBC AUTO: 96.5 FL (ref 79–97)
MONOCYTES # BLD AUTO: 0.51 10*3/MM3 (ref 0.1–0.9)
MONOCYTES NFR BLD AUTO: 5.8 % (ref 5–12)
NEUTROPHILS # BLD AUTO: 6.6 10*3/MM3 (ref 1.7–7)
NEUTROPHILS NFR BLD AUTO: 75.1 % (ref 42.7–76)
PLATELET # BLD AUTO: 161 10*3/MM3 (ref 140–450)
PMV BLD AUTO: 9.5 FL (ref 6–12)
POTASSIUM BLD-SCNC: 4.3 MMOL/L (ref 3.5–5.2)
PROT SERPL-MCNC: 7.9 G/DL (ref 6–8.5)
RBC # BLD AUTO: 4.55 10*6/MM3 (ref 4.14–5.8)
SODIUM BLD-SCNC: 140 MMOL/L (ref 136–145)
WBC NRBC COR # BLD: 8.79 10*3/MM3 (ref 3.4–10.8)

## 2019-06-05 PROCEDURE — 83540 ASSAY OF IRON: CPT

## 2019-06-05 PROCEDURE — 96415 CHEMO IV INFUSION ADDL HR: CPT

## 2019-06-05 PROCEDURE — 84466 ASSAY OF TRANSFERRIN: CPT

## 2019-06-05 PROCEDURE — 82728 ASSAY OF FERRITIN: CPT

## 2019-06-05 PROCEDURE — 80053 COMPREHEN METABOLIC PANEL: CPT

## 2019-06-05 PROCEDURE — 96413 CHEMO IV INFUSION 1 HR: CPT

## 2019-06-05 PROCEDURE — 96375 TX/PRO/DX INJ NEW DRUG ADDON: CPT

## 2019-06-05 PROCEDURE — 25010000002 OXALIPLATIN PER 0.5 MG: Performed by: INTERNAL MEDICINE

## 2019-06-05 PROCEDURE — 25010000002 DEXAMETHASONE SODIUM PHOSPHATE 20 MG/5ML SOLUTION 5 ML VIAL: Performed by: INTERNAL MEDICINE

## 2019-06-05 PROCEDURE — 25010000002 PALONOSETRON 0.25 MG/5ML SOLUTION PREFILLED SYRINGE: Performed by: INTERNAL MEDICINE

## 2019-06-05 PROCEDURE — 85025 COMPLETE CBC W/AUTO DIFF WBC: CPT

## 2019-06-05 RX ORDER — PROCHLORPERAZINE MALEATE 10 MG
10 TABLET ORAL EVERY 6 HOURS PRN
Qty: 30 TABLET | Refills: 5 | Status: SHIPPED | OUTPATIENT
Start: 2019-06-05 | End: 2019-06-05 | Stop reason: SDUPTHER

## 2019-06-05 RX ORDER — PALONOSETRON 0.05 MG/ML
0.25 INJECTION, SOLUTION INTRAVENOUS ONCE
Status: COMPLETED | OUTPATIENT
Start: 2019-06-05 | End: 2019-06-05

## 2019-06-05 RX ORDER — SODIUM CHLORIDE 0.9 % (FLUSH) 0.9 %
10 SYRINGE (ML) INJECTION AS NEEDED
Status: DISCONTINUED | OUTPATIENT
Start: 2019-06-05 | End: 2019-06-05 | Stop reason: HOSPADM

## 2019-06-05 RX ORDER — ONDANSETRON 8 MG/1
8 TABLET, ORALLY DISINTEGRATING ORAL 3 TIMES DAILY PRN
Qty: 30 TABLET | Refills: 5 | Status: SHIPPED | OUTPATIENT
Start: 2019-06-05 | End: 2021-04-01

## 2019-06-05 RX ORDER — SODIUM CHLORIDE 0.9 % (FLUSH) 0.9 %
10 SYRINGE (ML) INJECTION AS NEEDED
Status: CANCELLED | OUTPATIENT
Start: 2019-06-26

## 2019-06-05 RX ORDER — DEXTROSE MONOHYDRATE 50 MG/ML
250 INJECTION, SOLUTION INTRAVENOUS ONCE
Status: COMPLETED | OUTPATIENT
Start: 2019-06-05 | End: 2019-06-05

## 2019-06-05 RX ORDER — PROCHLORPERAZINE MALEATE 5 MG/1
10 TABLET ORAL EVERY 6 HOURS PRN
Qty: 60 TABLET | Refills: 5 | Status: SHIPPED | OUTPATIENT
Start: 2019-06-05 | End: 2022-03-10 | Stop reason: ALTCHOICE

## 2019-06-05 RX ADMIN — SODIUM CHLORIDE, PRESERVATIVE FREE 10 ML: 5 INJECTION INTRAVENOUS at 13:54

## 2019-06-05 RX ADMIN — DEXAMETHASONE SODIUM PHOSPHATE 12 MG: 4 INJECTION, SOLUTION INTRAMUSCULAR; INTRAVENOUS at 11:03

## 2019-06-05 RX ADMIN — DEXTROSE MONOHYDRATE 250 ML: 50 INJECTION, SOLUTION INTRAVENOUS at 10:59

## 2019-06-05 RX ADMIN — OXALIPLATIN 315 MG: 5 INJECTION, SOLUTION, CONCENTRATE INTRAVENOUS at 11:32

## 2019-06-05 RX ADMIN — SODIUM CHLORIDE, PRESERVATIVE FREE 500 UNITS: 5 INJECTION INTRAVENOUS at 13:55

## 2019-06-05 RX ADMIN — PALONOSETRON 0.25 MG: 0.25 INJECTION, SOLUTION INTRAVENOUS at 11:00

## 2019-06-05 NOTE — PROGRESS NOTES
Specialty Pharmacy Note      Name:  David Mandujano  :  1950  Date:  2019         Past Medical History:   Diagnosis Date   • Anemia    • Arthritis    • Colon cancer (CMS/HCC)     s/p chemo therapy   • Heart murmur    • Hypertension    • Wrist fracture     surgically repaired       Past Surgical History:   Procedure Laterality Date   • ABDOMINAL SURGERY     • COLON SURGERY     • COLONOSCOPY     • FRACTURE SURGERY Left    • VENOUS ACCESS DEVICE (PORT) INSERTION N/A 2019    Procedure: INSERTION VENOUS ACCESS DEVICE;  Surgeon: Cindy Corrales MD;  Location: [facility];  Service: General   • WRIST SURGERY         Social History     Socioeconomic History   • Marital status: Single     Spouse name: Not on file   • Number of children: Not on file   • Years of education: Not on file   • Highest education level: Not on file   Tobacco Use   • Smoking status: Never Smoker   • Smokeless tobacco: Current User     Types: Chew   Substance and Sexual Activity   • Alcohol use: Yes     Comment: occasional   • Drug use: No   • Sexual activity: Defer   Social History Narrative    He is , and is self employed/semi retired. He is a non smoker but chews tobacco. He used to drink daily, but says he has cut it in half.        Family History   Problem Relation Age of Onset   • Colon cancer Maternal Grandfather    • Other Sister    • Heart disease Sister        No Known Allergies    Current Outpatient Medications   Medication Sig Dispense Refill   • amLODIPine (NORVASC) 10 MG tablet Take 5 mg by mouth Daily.     • capecitabine (XELODA) 150 MG chemo tablet Take 1 tab by mouth in the AM and 1 tab by mouth in the PM on days 1-14, then off for 7 days. Take with 500 mg tabs for total dose of 2150mg 28 tablet 5   • capecitabine (XELODA) 500 MG chemo tablet Take 4 tabs by mouth in the AM and 4 tabs by mouth in the PM on days 1-14, then off 7 days. Take with 150mg tabs for total dose of 2150mg. 112 tablet 5   • ferrous  sulfate 325 (65 FE) MG tablet Take 325 mg by mouth 2 (Two) Times a Day.     • HYDROcodone-acetaminophen (NORCO) 7.5-325 MG per tablet Take 1 tablet by mouth 4 (Four) Times a Day As Needed for Moderate Pain . 12 tablet 0   • metoprolol succinate XL (TOPROL-XL) 50 MG 24 hr tablet Take 1 tablet by mouth Daily. 30 tablet 11   • ondansetron ODT (ZOFRAN-ODT) 8 MG disintegrating tablet Dissolve 1 tablet on the tongue 3 (Three) Times a Day As Needed for Nausea or Vomiting. 30 tablet 5   • prochlorperazine (COMPAZINE) 5 MG tablet Take 2 tablets by mouth Every 6 (Six) Hours As Needed for Nausea or Vomiting. 60 tablet 5     No current facility-administered medications for this visit.      Facility-Administered Medications Ordered in Other Visits   Medication Dose Route Frequency Provider Last Rate Last Dose   • heparin flush (porcine) 100 UNIT/ML injection 500 Units  500 Units Intravenous PRN Jyoti Larios MD       • oxaliplatin (ELOXATIN) 315 mg in dextrose (D5W) 5 % 313 mL chemo IVPB  315 mg Intravenous Once RicJyoti rousseau MD   315 mg at 06/05/19 1132   • sodium chloride 0.9 % flush 10 mL  10 mL Intravenous PRN Jyoti Larios MD             LABORATORY:    Lab Results   Component Value Date    IRON 162 (H) 03/20/2019    TSH 1.768 10/19/2018    TIBC 457 03/20/2019     No results found for: PROTIME, INR, PTT  No results found for: HAV, HCVQUANT, MOCCFU80, HCVINFO, HCVGENOTYPE  No results found for: AMPHETSCREEN, BARBITSCNUR, LABBENZSCN, COCAINEUR, LABMETHSCN, IPIXM6A, EVYGF2CDQRFF, HEPBSAB, OXYCODONESCN, 6ACETYLMORP, BUPRENORSCNU, LABOPIASCN, PCPUR, THCURSCR  Last Urine Toxicity     There is no flowsheet data to display.          ASSESSMENT/PLAN:    Patient Update Assessment (new medications, allergies, medical history): Pharmacy consulted to arrange Xeloda therapy for patient as part of CapeOX regimen that includes oxaliplatin on day 1 with Xeloda days 1-14 every 21 days. Patient was previously taking Xeloda with  XRT and tolerated treatment very well.    Medication(s): Xeloda 500 mg - Take 4 tablets by mouth in the morning and evening on days 1-14 along with one 150 mg tablet in the morning and evening on days 1-14 for total dose of 2150 mg/dose.    Currently Taking Medication(s): Patient started Xeloda and oxaliplatin on 6/5/19    Effectiveness of Medication: To be assessed by physician where clinically appropriate    Experiencing Side Effects: Patient started CapeOX dose of Xeloda on 6/5/19. He did take Xeloda previously with radiation and states that he had no remarkable side effects other than a few days of tiredness.     Prior Authorization Status: No prior authorization required. Covered in full by patient's Medicare part B benefits.    Financial Assistance Status: none needed at present    Any Issues Identified: Not aware of any issues    Appropriate to Process Prescription(s): After discussion with patient and Dr. Larios, it is appropriate to process and dispense Xeloda for patient to start treatment. Patient signed chemotherapy consent for Xeloda and oxaliplatin.    Counseling Offered: Patient was provided with information sheet for Xeloda and contents were discussed including common/serious side effects and administration instructions and administration schedule. Patient stated that he was familiar with the medication since he had taken it before. He did verbalize understanding of administration schedule of 2 weeks on/1 week off. He does say he has some Xeloda pills leftover from where he took them with radiation. I instructed him to put these away and to take the new Xeloda prescriptions provided today.     Next Specialty Pharmacy Visit: [facility] to start on 6/26/19, therefore will place on schedule for 6/24/19.

## 2019-06-06 ENCOUNTER — TELEPHONE (OUTPATIENT)
Dept: ONCOLOGY | Facility: HOSPITAL | Age: 69
End: 2019-06-06

## 2019-06-06 DIAGNOSIS — D50.9 IRON DEFICIENCY ANEMIA, UNSPECIFIED IRON DEFICIENCY ANEMIA TYPE: Primary | ICD-10-CM

## 2019-06-06 LAB
FERRITIN SERPL-MCNC: 213.6 NG/ML (ref 30–400)
IRON 24H UR-MRATE: 83 MCG/DL (ref 59–158)
IRON SATN MFR SERPL: 19 % (ref 20–50)
TIBC SERPL-MCNC: 443 MCG/DL (ref 298–536)
TRANSFERRIN SERPL-MCNC: 297 MG/DL (ref 200–360)

## 2019-06-06 NOTE — PROGRESS NOTES
SS spoke with patient and spouse this date.  Patient receiving first treatment.  No needs identified at this time.  SS will follow as needed.

## 2019-06-06 NOTE — TELEPHONE ENCOUNTER
Called to check on patient following yesterday's chemo treatment.  Pt states that he is doing well, only having the expected side effect of Oxaliplatin with tingling to fingertips while touching cold items.  Pt denies any adverse effects otherwise.  Encouraged patient to call with any needs or concerns prior to next appointment.  Pt verbalized understanding.

## 2019-06-11 ENCOUNTER — OFFICE VISIT (OUTPATIENT)
Dept: ONCOLOGY | Facility: CLINIC | Age: 69
End: 2019-06-11

## 2019-06-11 ENCOUNTER — LAB (OUTPATIENT)
Dept: ONCOLOGY | Facility: CLINIC | Age: 69
End: 2019-06-11

## 2019-06-11 VITALS
TEMPERATURE: 97.5 F | OXYGEN SATURATION: 98 % | BODY MASS INDEX: 36.48 KG/M2 | DIASTOLIC BLOOD PRESSURE: 71 MMHG | SYSTOLIC BLOOD PRESSURE: 125 MMHG | HEART RATE: 73 BPM | RESPIRATION RATE: 18 BRPM | WEIGHT: 269 LBS

## 2019-06-11 DIAGNOSIS — Q23.0 AORTIC STENOSIS DUE TO BICUSPID AORTIC VALVE: ICD-10-CM

## 2019-06-11 DIAGNOSIS — F32.A DEPRESSION, UNSPECIFIED DEPRESSION TYPE: ICD-10-CM

## 2019-06-11 DIAGNOSIS — D50.9 IRON DEFICIENCY ANEMIA, UNSPECIFIED IRON DEFICIENCY ANEMIA TYPE: ICD-10-CM

## 2019-06-11 DIAGNOSIS — R01.1 SYSTOLIC MURMUR: ICD-10-CM

## 2019-06-11 DIAGNOSIS — Q23.1 AORTIC STENOSIS DUE TO BICUSPID AORTIC VALVE: ICD-10-CM

## 2019-06-11 DIAGNOSIS — C20 RECTAL CANCER (HCC): Primary | ICD-10-CM

## 2019-06-11 DIAGNOSIS — F10.10 ALCOHOL ABUSE: ICD-10-CM

## 2019-06-11 DIAGNOSIS — C20 RECTAL CANCER (HCC): ICD-10-CM

## 2019-06-11 LAB
ALBUMIN SERPL-MCNC: 4.27 G/DL (ref 3.5–5.2)
ALBUMIN/GLOB SERPL: 1.2 G/DL
ALP SERPL-CCNC: 92 U/L (ref 39–117)
ALT SERPL W P-5'-P-CCNC: 48 U/L (ref 1–41)
ANION GAP SERPL CALCULATED.3IONS-SCNC: 16.3 MMOL/L
AST SERPL-CCNC: 41 U/L (ref 1–40)
BASOPHILS # BLD AUTO: 0.01 10*3/MM3 (ref 0–0.2)
BASOPHILS NFR BLD AUTO: 0.1 % (ref 0–1.5)
BILIRUB SERPL-MCNC: 0.5 MG/DL (ref 0.2–1.2)
BUN BLD-MCNC: 25 MG/DL (ref 8–23)
BUN/CREAT SERPL: 21.6 (ref 7–25)
CALCIUM SPEC-SCNC: 9.6 MG/DL (ref 8.6–10.5)
CHLORIDE SERPL-SCNC: 104 MMOL/L (ref 98–107)
CO2 SERPL-SCNC: 17.7 MMOL/L (ref 22–29)
CREAT BLD-MCNC: 1.16 MG/DL (ref 0.76–1.27)
DEPRECATED RDW RBC AUTO: 50.3 FL (ref 37–54)
EOSINOPHIL # BLD AUTO: 0.46 10*3/MM3 (ref 0–0.4)
EOSINOPHIL NFR BLD AUTO: 4.7 % (ref 0.3–6.2)
ERYTHROCYTE [DISTWIDTH] IN BLOOD BY AUTOMATED COUNT: 14.9 % (ref 12.3–15.4)
GFR SERPL CREATININE-BSD FRML MDRD: 63 ML/MIN/1.73
GLOBULIN UR ELPH-MCNC: 3.6 GM/DL
GLUCOSE BLD-MCNC: 142 MG/DL (ref 65–99)
HCT VFR BLD AUTO: 45.7 % (ref 37.5–51)
HGB BLD-MCNC: 15.1 G/DL (ref 13–17.7)
IMM GRANULOCYTES # BLD AUTO: 0.03 10*3/MM3 (ref 0–0.05)
IMM GRANULOCYTES NFR BLD AUTO: 0.3 % (ref 0–0.5)
LYMPHOCYTES # BLD AUTO: 1.54 10*3/MM3 (ref 0.7–3.1)
LYMPHOCYTES NFR BLD AUTO: 15.6 % (ref 19.6–45.3)
MCH RBC QN AUTO: 31.5 PG (ref 26.6–33)
MCHC RBC AUTO-ENTMCNC: 33 G/DL (ref 31.5–35.7)
MCV RBC AUTO: 95.4 FL (ref 79–97)
MONOCYTES # BLD AUTO: 0.71 10*3/MM3 (ref 0.1–0.9)
MONOCYTES NFR BLD AUTO: 7.2 % (ref 5–12)
NEUTROPHILS # BLD AUTO: 7.11 10*3/MM3 (ref 1.7–7)
NEUTROPHILS NFR BLD AUTO: 72.1 % (ref 42.7–76)
PLATELET # BLD AUTO: 146 10*3/MM3 (ref 140–450)
PMV BLD AUTO: 10 FL (ref 6–12)
POTASSIUM BLD-SCNC: 4.1 MMOL/L (ref 3.5–5.2)
PROT SERPL-MCNC: 7.9 G/DL (ref 6–8.5)
RBC # BLD AUTO: 4.79 10*6/MM3 (ref 4.14–5.8)
SODIUM BLD-SCNC: 138 MMOL/L (ref 136–145)
WBC NRBC COR # BLD: 9.86 10*3/MM3 (ref 3.4–10.8)

## 2019-06-11 PROCEDURE — 99214 OFFICE O/P EST MOD 30 MIN: CPT | Performed by: NURSE PRACTITIONER

## 2019-06-11 PROCEDURE — 85025 COMPLETE CBC W/AUTO DIFF WBC: CPT | Performed by: INTERNAL MEDICINE

## 2019-06-11 PROCEDURE — 80053 COMPREHEN METABOLIC PANEL: CPT | Performed by: INTERNAL MEDICINE

## 2019-06-11 PROCEDURE — 36415 COLL VENOUS BLD VENIPUNCTURE: CPT

## 2019-06-11 NOTE — PROGRESS NOTES
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  Combined chemoradiation with Xeloda 825 mg/m2 BID D1-5 or M-F during the course of radiation.  His dose is 500 mg x 4 or 2000 mg BID  Treatment finished 1-21-19.    2.  Dr. Yadav performed LAR with coloanal anstomosis and loop ileostomy 4-09-19.  Pathology showed  Residual invasive adneocarcinoma.  Tumor invaded the muscularis propria.  Surgical margins clear.  0/14 resected LNs involved.  ypT2N0.      3.       CHIEF COMPLAINT:  Follow up of Rectal cancer/toxicity check    HISTORY OF PRESENT ILLNESS:   David Mandujano is a very pleasant 68 y.o. male who was referred by Dr. Barnhart for evaluation and treatment of colorectal cancer. He began to notice rectal bleeding in January or February 2018, and he also began to notice fatigue in approximately July. He assumed he was out of shape when he started to become short of breath and started trying to exercise more frequently, which only exacerbated the shortness of breath. He was evaluated by his PCP, Raj Wang MD, who after testing, found him to be severely iron deficient, hyperglycemic, and he also had an elevated PSA. He was on vacation at the time, and his PCP asked him to return home for further evaluation. He was started on oral iron therapy and later received IV iron infusions. He was also scheduled for a colonoscopy with Dr. Barnhart, during which a suspicious rectal mass was biopsied and pathology was positive for an ulcerated invasive, moderately differentiated rectal adenocarcinoma.     INTERVAL HISTORY:  Mr. Mandujano is here today for follow up of rectal cancer and toxicity check. He was recently started on adjuvant CapeOX treatment and will complete cycle 1 this week. So far, he reports tolerating the treatment well with no noticeable SE. Previously planned to do adjuvant treatment with FOLFOX but patient  preferred to continue with oral Xeloda as he previously tolerated this well. He denies any specific complaints today. He reports eating and hydrating well. He reports his ileostomy is functioning well.     PAST MEDICAL HISTORY:  Past Medical History:   Diagnosis Date   • Anemia    • Arthritis    • Colon cancer (CMS/HCC)     s/p chemo therapy   • Heart murmur    • Hypertension    • Wrist fracture     surgically repaired       PAST SURGICAL HISTORY:  Past Surgical History:   Procedure Laterality Date   • ABDOMINAL SURGERY     • COLON SURGERY     • COLONOSCOPY     • FRACTURE SURGERY Left    • VENOUS ACCESS DEVICE (PORT) INSERTION N/A 5/20/2019    Procedure: INSERTION VENOUS ACCESS DEVICE;  Surgeon: Cindy Corrlaes MD;  Location: [facility];  Service: General   • WRIST SURGERY         FAMILY HISTORY:  Family History   Problem Relation Age of Onset   • Colon cancer Maternal Grandfather    • Other Sister    • Heart disease Sister    No other family history of malignancy.    SOCIAL HISTORY:  Social History     Socioeconomic History   • Marital status: Single     Spouse name: Not on file   • Number of children: Not on file   • Years of education: Not on file   • Highest education level: Not on file   Tobacco Use   • Smoking status: Never Smoker   • Smokeless tobacco: Current User     Types: Chew   Substance and Sexual Activity   • Alcohol use: Yes     Comment: occasional   • Drug use: No   • Sexual activity: Defer   Social History Narrative    He is , and is self employed/semi retired. He is a non smoker but chews tobacco. He used to drink daily, but says he has cut it in half.      REVIEW OF SYSTEMS:   A comprehensive 14 point review of systems was performed.  Significant findings as mentioned above.  All other systems reviewed and are negative.      MEDICATIONS:  The current medication list was reviewed in the EMR    Current Outpatient Medications:   •  amLODIPine (NORVASC) 10 MG tablet, Take 5 mg by mouth  Daily., Disp: , Rfl:   •  capecitabine (XELODA) 150 MG chemo tablet, Take 1 tab by mouth in the AM and 1 tab by mouth in the PM on days 1-14, then off for 7 days. Take with 500 mg tabs for total dose of 2150mg, Disp: 28 tablet, Rfl: 5  •  capecitabine (XELODA) 500 MG chemo tablet, Take 4 tabs by mouth in the AM and 4 tabs by mouth in the PM on days 1-14, then off 7 days. Take with 150mg tabs for total dose of 2150mg., Disp: 112 tablet, Rfl: 5  •  ferrous sulfate 325 (65 FE) MG tablet, Take 325 mg by mouth 2 (Two) Times a Day., Disp: , Rfl:   •  HYDROcodone-acetaminophen (NORCO) 7.5-325 MG per tablet, Take 1 tablet by mouth 4 (Four) Times a Day As Needed for Moderate Pain ., Disp: 12 tablet, Rfl: 0  •  metoprolol succinate XL (TOPROL-XL) 50 MG 24 hr tablet, Take 1 tablet by mouth Daily., Disp: 30 tablet, Rfl: 11  •  ondansetron ODT (ZOFRAN-ODT) 8 MG disintegrating tablet, Dissolve 1 tablet on the tongue 3 (Three) Times a Day As Needed for Nausea or Vomiting., Disp: 30 tablet, Rfl: 5  •  prochlorperazine (COMPAZINE) 5 MG tablet, Take 2 tablets by mouth Every 6 (Six) Hours As Needed for Nausea or Vomiting., Disp: 60 tablet, Rfl: 5    ALLERGIES:  No Known Allergies    PHYSICAL EXAM:  Vitals:    06/11/19 1244   BP: 125/71   Pulse: 73   Resp: 18   Temp: 97.5 °F (36.4 °C)   SpO2: 98%   General:  Awake, alert and oriented, in no distress. Flat affect.   HEENT:  Pupils are equal, round and reactive to light and accommodation, Extra-ocular movements full, Oropharyx clear, mucous membranes moist  Neck:  No JVD, thyromegaly or lymphadenopathy  CV:  Regular rate and rhythm, III/IV systolic murmur, no rubs or gallops  Resp:  Lungs are clear to auscultation bilaterally  Abd:  Soft, non-tender, sl distended, bowel sounds present, no organomegaly or masses.  Surgical incisions healed nicely.  Stoma functioning well.  Ext:  No clubbing, cyanosis or edema.    Lymph:  No cervical, supraclavicular, axillary, adenopathy  Neuro:  grossly  non-focal exam      PATHOLOGY:  10-02-18      04-01-19:        04-09-19:        ENDOSCOPY:  10-02-18:        IMAGING:  10-04-18 CT Abdomen Pelvis With Contrast   Findings  - LOWER THORAX: Patchy nonspecific subpleural nodularity in the left lung base that could represent sequelae of chronic airspace disease or early fibrosis.     ABDOMEN:  - LIVER: Homogeneous. No focal hepatic mass or ductal dilatation.  - GALLBLADDER: GALLSTONES WITHIN THE GALLBLADDER.  - PANCREAS: Unremarkable. No mass or ductal dilatation.  - SPLEEN: Homogeneous. No splenomegaly.  - ADRENALS: No mass.  - KIDNEYS: RIGHT RENAL CYST MEASURING 3.6 CM.  - GI TRACT: NONSPECIFIC DISTAL SIGMOID COLONIC WALL THICKENING VERSUS  NONDISTENTION.  - PERITONEUM: No free air. No free fluid or loculated fluid collections.  - MESENTERY: Unremarkable.  - LYMPH NODES: No lymphadenopathy.  - VASCULATURE: ATHEROSCLEROTIC VASCULAR CALCIFICATION.  - ABDOMINAL WALL: SMALL BILATERAL HUMERAL HERNIAS CONTAINING ONLY FAT.  - OTHER: None.     PELVIS:  - BLADDER: No focal mass or significant wall thickening  - REPRODUCTIVE: Unremarkable as visualized.  - APPENDIX: Nondistended. No surrounding inflammation.  - BONES: No acute bony abnormality.     IMPRESSION:  1. Gallstones within the gallbladder.  2.Nonspecific distal sigmoid colonic wall thickening versus nondistention.    PET/CT 10-29-18    11-13-18 MRI Pelvis Without Contrast  FINDINGS:  1.) TUMOR LOCATION AND CHARACTERISTICS        i) Distance of Inferior margin of Tumor from the dentate line: 9.5 CM    ii) Tumor at or below the puborectalis sling:  NO   iii) Relationship to the anterior peritoneal reflection: BELOW   iv) Craniocaudal length of the tumor: 6cm   v) Clock face of tumor: 5o'clock to 2o'clock  vi) Polypoid/ Annular/ Semi-annular: SEMI-ANNULAR  vii) Mucinous: YES     2.) EXTRAMURAL DEPTH OF INVASION AND MR T-CATEGORY         i) Extramural depth of invasion (Use 0mm for T1 or T2 tumor):  APPROXIMATELY 5 MM     ii) T category: T3 B              *Please indicate structures with possible invasion.  Specify laterality,  sequence and slice#: (see list below)     *  Anterior peritoneal reflection (T4a tumor): NO  *  Puborectalis: NO  *  Bladder: NO  *  Vascular Involvement of Iliac Vessels: NO  *  Levator ani: NO  *  Ureters: NO  *  Obturator: NO  *  Prostate: MARGINAL/TETHERED  *  Piriformis: NO  *  Uterus: NOT APPLICABLE  *  Pelvic Bone: NO  *  Vagina: NOT APPLICABLE  *  Sacrum: NO  *  Urethra: NO  *  Other:           iii) For low rectal tumors (maximum tumor depth at or below the  puborectalis sling):     *  Not applicable (tumor above the puborectalis sling: NOT APPLICABLE  *    *  Level 1 (submucosa only, no involvement of internal anal sphincter):       *  Level 2 (confined to the internal anal sphincter; no involvement of  intersphincteric fat):      *  Level 3 (intersphincteric fat involved):      *  Level 4 (involves external sphincter or beyond):         3. RELATIONSHIP OF THE TUMOR TO MESORECTAL FASCIA (MRF)       i) Shortest distance 0mm of the definitive tumour border to the MRF  is: AT 12:00   ii) Are there any tumour spiculations closer to the MRF? NO     iii) Location of Tumor margin to MRF: 12:00, AT THE LEVEL PROSTATE     4. EXTRAMURAL VENOUS INVASION       i) Extramural Venous Invasion (EMVI): ABSENT     5. MESORECTAL LYMPH NODES AND TUMOUR DEPOSITS       i) Any suspicious mesorectal lymph nodes/tumor deposits: YES      *If yes, the most suspicious node/tumor deposit is: 15 MM IN  DIAMETER, ALONG THE UPPER MARGIN OF the tumor with minimum distance 7  MMmm from the MRF at 7o'clock     6. EXTRAMESORECTAL LYMPH NODES        i) Any suspicious extramesorectal lymph nodes: NO      *If yes, location and laterality of suspicious nodes:             Int. Iliac:                Ext. Iliac:                Common Iliac:                Obturator:                Inguinal:                Other:           ii) Is the BETH node  station in the field of view: NO        *If Yes, are these nodes suspicious:         7. OTHER FINDINGS (COMPLICATIONS, METASTASES, LIMITATIONS)         NOTE: THERE IS SOME UNCERTAINTY WHETHER THE APPARENT SMALL FOCUS  OF TUMOR EXTENDING TO THE PROSTATE AT THE 12:00 POSITION COULD ACTUALLY  BE PROSTATE NODULE EXTENDING TOWARD THE TUMOR. RECTAL TUMOR IS FAVORED;  ALTHOUGH THIS DETERMINATION CANNOT BE MADE WITH COMPLETE CERTAINTY,  TUMOR IS CONSIDERED T3 B.         IMPRESSIONS:  MRI rectal cancer T category is: T3 B  Maximum EMD of invasion is: 5  Minimum tumor to MRF distance is: 0  Low rectal tumor component: NO  Mesorectal nodes/tumor deposits: SUSPICIOUS  EMVI: ABSENT  Extramesorectal nodes: NEGATIVE    RECENT LABS:  Lab Results   Component Value Date    WBC 9.86 06/11/2019    HGB 15.1 06/11/2019    HCT 45.7 06/11/2019    MCV 95.4 06/11/2019    RDW 14.9 06/11/2019     06/11/2019    NEUTRORELPCT 72.1 06/11/2019    LYMPHORELPCT 15.6 (L) 06/11/2019    MONORELPCT 7.2 06/11/2019    EOSRELPCT 4.7 06/11/2019    BASORELPCT 0.1 06/11/2019    NEUTROABS 7.11 (H) 06/11/2019    LYMPHSABS 1.54 06/11/2019       Lab Results   Component Value Date     06/11/2019    K 4.1 06/11/2019    CO2 17.7 (L) 06/11/2019     06/11/2019    BUN 25 (H) 06/11/2019    CREATININE 1.16 06/11/2019    EGFRIFNONA 63 06/11/2019    GLUCOSE 142 (H) 06/11/2019    CALCIUM 9.6 06/11/2019    ALKPHOS 92 06/11/2019    AST 41 (H) 06/11/2019    ALT 48 (H) 06/11/2019    BILITOT 0.5 06/11/2019    ALBUMIN 4.27 06/11/2019    PROTEINTOT 7.9 06/11/2019       Lab Results   Component Value Date/Time    URICACID 9.1 (H) 06/25/2014 01:29 PM     Lab Results   Component Value Date    CEA 3.40 03/20/2019    CEA 17.80 (H) 01/11/2019    CEA 18.80 (H) 10/19/2018     Lab Results   Component Value Date    PSA 4.930 (H) 10/19/2018       Lab Results   Component Value Date    FERRITIN 213.60 06/05/2019    IRON 83 06/05/2019    TIBC 443 06/05/2019    LABIRON 19 (L)  06/05/2019    [id number] 401 10/19/2018    FOLATE 7.48 10/19/2018     ASSESSMENT & PLAN:  David Mandujano is a very pleasant 68 y.o. male with newly diagnosed rectal cancer. Clinically stage IIIC ([id number]).    1.  Rectal cancer:  -  He received standard neoadjuvant chemoradiation as above given locally advanced tumor with suspicious pelvic lymph node.    - Pre-treatment MRI showed a locally advanced tumor and suspicious pelvic LN.  PET-CT was negative except in the local tumor.   -  There was a non-specific sub-pleural nodular opacity in the L lung base which was PET negative.  Perhaps this was inflammatory in nature. This area will require f/u on future imaging.  - Recommended neoadjuvant chemoradiation with Xeloda 825 mg/m2 BID D1-5 or M-F during the course of radiaton.  His dose was 500 mg x 4 or 2000 mg BID. Overall, he tolerated this well and completed treatment 1-21-19.    -  He saw Dr. Marilynn Yadav and underwent successful surgical resection as above.    -  Recommended adjuvant FOLFOX chemotherapy to help prevent recurrence .  We discussed rational for and potential risks, benefits and side effects of treatment and he would like to proceed. He then decided he would rather have CapeOX treatment as patient preferred to continue with oral Xeloda as he previously tolerated this well.  He was started on adjuvant CapeOX treatment and will complete cycle 1 this week. So far, he reports tolerating the treatment well with no noticeable SE.    -Will continue with treatment and follow up as scheduled for another symptom/toxicity check.     2.  Iron deficiency anemia:  - He is taking oral iron, ferrous sulfate BID and tolerating well. Iron improved. Will continue for now and continue to monitor. Hg is normal.     3.  Depressed mood / Anxiety and Alcohol Abuse:  - Previously given trial of Lexapro but he did not find this helpful and discontinued this. Currently, he says he is doing well in this regard. He did quit  drinking completely around the time of his surgery.  He says Dr. Yadav did mention to him that his liver didn't look good during his operation.  -  We discussed potential involvement in support groups and /or referral for alcohol abuse counseling, but he declined.      4.  Prominent systolic murmur:   -   He was evaluated by Dr. Joe and then by Dr. Barrera.  He underwent echocardiogrm 1-30-19 which showed EF 70%, grade 1 diastolic dysfunction, mild to mod septal asymmetric hypertrophy, AV calcification with functionally bicuspid valve, mild AI and moderate AS, moderate MAC with mild MR, mild MS  -  Thought is that at some point in the future he will require AVR.      5.  Prophylaxis:  He took 2018 influenza vaccine 10-19-18. He took Prevnar 13 on 11-20-18.    6.  ACO Quality measures  - David Mandujano does not smoke but he does use tobacco products in the form of chewing tobacco.  - I advised David of the risks of continuing to use tobacco.  During this visit, I spent <3 minutes counseling the patient regarding tobacco cessation.  -He had flu vaccine as above.   - Current outpatient and discharge medications have been reconciled for the patient.  Reviewed by: JOSE CARLOS Damon    I spent 25 minutes with David Mandujano today.  More than 50% of the time was spent in counseling / coordination of care for the above problems.      Electronically Signed by: JOSE CARLOS Damon      CC:   MD Kathleen Coppola Emmanuel C, MD Shawn Michael Acino, MD William Richards, MD Sandra Beck, MD

## 2019-06-23 DIAGNOSIS — C20 RECTAL CANCER (HCC): ICD-10-CM

## 2019-06-23 RX ORDER — FAMOTIDINE 10 MG/ML
20 INJECTION, SOLUTION INTRAVENOUS AS NEEDED
Status: CANCELLED | OUTPATIENT
Start: 2019-06-26

## 2019-06-23 RX ORDER — DEXTROSE MONOHYDRATE 50 MG/ML
250 INJECTION, SOLUTION INTRAVENOUS ONCE
Status: CANCELLED | OUTPATIENT
Start: 2019-06-26

## 2019-06-23 RX ORDER — DIPHENHYDRAMINE HYDROCHLORIDE 50 MG/ML
50 INJECTION INTRAMUSCULAR; INTRAVENOUS AS NEEDED
Status: CANCELLED | OUTPATIENT
Start: 2019-06-26

## 2019-06-23 RX ORDER — PALONOSETRON 0.05 MG/ML
0.25 INJECTION, SOLUTION INTRAVENOUS ONCE
Status: CANCELLED | OUTPATIENT
Start: 2019-06-26

## 2019-06-26 ENCOUNTER — INFUSION (OUTPATIENT)
Dept: ONCOLOGY | Facility: HOSPITAL | Age: 69
End: 2019-06-26

## 2019-06-26 ENCOUNTER — OFFICE VISIT (OUTPATIENT)
Dept: ONCOLOGY | Facility: CLINIC | Age: 69
End: 2019-06-26

## 2019-06-26 VITALS
SYSTOLIC BLOOD PRESSURE: 143 MMHG | RESPIRATION RATE: 18 BRPM | WEIGHT: 268.7 LBS | BODY MASS INDEX: 36.44 KG/M2 | TEMPERATURE: 98.6 F | HEART RATE: 91 BPM | OXYGEN SATURATION: 97 % | DIASTOLIC BLOOD PRESSURE: 87 MMHG

## 2019-06-26 VITALS
DIASTOLIC BLOOD PRESSURE: 87 MMHG | OXYGEN SATURATION: 97 % | SYSTOLIC BLOOD PRESSURE: 143 MMHG | WEIGHT: 268.7 LBS | HEART RATE: 91 BPM | TEMPERATURE: 98.6 F | RESPIRATION RATE: 18 BRPM | BODY MASS INDEX: 36.44 KG/M2

## 2019-06-26 DIAGNOSIS — F10.10 ALCOHOL ABUSE: ICD-10-CM

## 2019-06-26 DIAGNOSIS — C20 RECTAL CANCER (HCC): ICD-10-CM

## 2019-06-26 DIAGNOSIS — R01.1 SYSTOLIC MURMUR: ICD-10-CM

## 2019-06-26 DIAGNOSIS — Z95.828 PORT-A-CATH IN PLACE: ICD-10-CM

## 2019-06-26 DIAGNOSIS — F32.A DEPRESSION, UNSPECIFIED DEPRESSION TYPE: ICD-10-CM

## 2019-06-26 DIAGNOSIS — D50.9 IRON DEFICIENCY ANEMIA, UNSPECIFIED IRON DEFICIENCY ANEMIA TYPE: Primary | ICD-10-CM

## 2019-06-26 DIAGNOSIS — C20 RECTAL CANCER (HCC): Primary | ICD-10-CM

## 2019-06-26 LAB
ALBUMIN SERPL-MCNC: 4.29 G/DL (ref 3.5–5.2)
ALBUMIN/GLOB SERPL: 1.2 G/DL
ALP SERPL-CCNC: 130 U/L (ref 39–117)
ALT SERPL W P-5'-P-CCNC: 27 U/L (ref 1–41)
ANION GAP SERPL CALCULATED.3IONS-SCNC: 13.6 MMOL/L (ref 5–15)
AST SERPL-CCNC: 29 U/L (ref 1–40)
BASOPHILS # BLD AUTO: 0.02 10*3/MM3 (ref 0–0.2)
BASOPHILS NFR BLD AUTO: 0.3 % (ref 0–1.5)
BILIRUB SERPL-MCNC: 0.6 MG/DL (ref 0.2–1.2)
BUN BLD-MCNC: 15 MG/DL (ref 8–23)
BUN/CREAT SERPL: 11.4 (ref 7–25)
CALCIUM SPEC-SCNC: 10.2 MG/DL (ref 8.6–10.5)
CEA SERPL-MCNC: 2.28 NG/ML
CHLORIDE SERPL-SCNC: 105 MMOL/L (ref 98–107)
CO2 SERPL-SCNC: 20.4 MMOL/L (ref 22–29)
CREAT BLD-MCNC: 1.32 MG/DL (ref 0.76–1.27)
DEPRECATED RDW RBC AUTO: 56.1 FL (ref 37–54)
EOSINOPHIL # BLD AUTO: 0.13 10*3/MM3 (ref 0–0.4)
EOSINOPHIL NFR BLD AUTO: 1.7 % (ref 0.3–6.2)
ERYTHROCYTE [DISTWIDTH] IN BLOOD BY AUTOMATED COUNT: 16.2 % (ref 12.3–15.4)
GFR SERPL CREATININE-BSD FRML MDRD: 54 ML/MIN/1.73
GLOBULIN UR ELPH-MCNC: 3.7 GM/DL
GLUCOSE BLD-MCNC: 137 MG/DL (ref 65–99)
HCT VFR BLD AUTO: 41.8 % (ref 37.5–51)
HGB BLD-MCNC: 14.6 G/DL (ref 13–17.7)
IMM GRANULOCYTES # BLD AUTO: 0.03 10*3/MM3 (ref 0–0.05)
IMM GRANULOCYTES NFR BLD AUTO: 0.4 % (ref 0–0.5)
LYMPHOCYTES # BLD AUTO: 1.33 10*3/MM3 (ref 0.7–3.1)
LYMPHOCYTES NFR BLD AUTO: 17.4 % (ref 19.6–45.3)
MCH RBC QN AUTO: 33 PG (ref 26.6–33)
MCHC RBC AUTO-ENTMCNC: 34.9 G/DL (ref 31.5–35.7)
MCV RBC AUTO: 94.4 FL (ref 79–97)
MONOCYTES # BLD AUTO: 0.77 10*3/MM3 (ref 0.1–0.9)
MONOCYTES NFR BLD AUTO: 10.1 % (ref 5–12)
NEUTROPHILS # BLD AUTO: 5.37 10*3/MM3 (ref 1.7–7)
NEUTROPHILS NFR BLD AUTO: 70.1 % (ref 42.7–76)
PLATELET # BLD AUTO: 140 10*3/MM3 (ref 140–450)
PMV BLD AUTO: 9.8 FL (ref 6–12)
POTASSIUM BLD-SCNC: 4.2 MMOL/L (ref 3.5–5.2)
PROT SERPL-MCNC: 8 G/DL (ref 6–8.5)
RBC # BLD AUTO: 4.43 10*6/MM3 (ref 4.14–5.8)
SODIUM BLD-SCNC: 139 MMOL/L (ref 136–145)
WBC NRBC COR # BLD: 7.65 10*3/MM3 (ref 3.4–10.8)

## 2019-06-26 PROCEDURE — 99214 OFFICE O/P EST MOD 30 MIN: CPT | Performed by: INTERNAL MEDICINE

## 2019-06-26 PROCEDURE — 80053 COMPREHEN METABOLIC PANEL: CPT

## 2019-06-26 PROCEDURE — 96375 TX/PRO/DX INJ NEW DRUG ADDON: CPT

## 2019-06-26 PROCEDURE — 25010000002 PALONOSETRON 0.25 MG/5ML SOLUTION PREFILLED SYRINGE: Performed by: INTERNAL MEDICINE

## 2019-06-26 PROCEDURE — 85025 COMPLETE CBC W/AUTO DIFF WBC: CPT

## 2019-06-26 PROCEDURE — 25010000002 OXALIPLATIN PER 0.5 MG: Performed by: INTERNAL MEDICINE

## 2019-06-26 PROCEDURE — 25010000002 DEXAMETHASONE SODIUM PHOSPHATE 20 MG/5ML SOLUTION 5 ML VIAL: Performed by: INTERNAL MEDICINE

## 2019-06-26 PROCEDURE — 96413 CHEMO IV INFUSION 1 HR: CPT

## 2019-06-26 PROCEDURE — 96415 CHEMO IV INFUSION ADDL HR: CPT

## 2019-06-26 PROCEDURE — 82378 CARCINOEMBRYONIC ANTIGEN: CPT

## 2019-06-26 RX ORDER — SODIUM CHLORIDE 0.9 % (FLUSH) 0.9 %
10 SYRINGE (ML) INJECTION AS NEEDED
Status: DISCONTINUED | OUTPATIENT
Start: 2019-06-26 | End: 2019-06-26 | Stop reason: HOSPADM

## 2019-06-26 RX ORDER — SODIUM CHLORIDE 0.9 % (FLUSH) 0.9 %
10 SYRINGE (ML) INJECTION AS NEEDED
Status: CANCELLED | OUTPATIENT
Start: 2019-07-17

## 2019-06-26 RX ORDER — PALONOSETRON 0.05 MG/ML
0.25 INJECTION, SOLUTION INTRAVENOUS ONCE
Status: COMPLETED | OUTPATIENT
Start: 2019-06-26 | End: 2019-06-26

## 2019-06-26 RX ORDER — DEXTROSE MONOHYDRATE 50 MG/ML
250 INJECTION, SOLUTION INTRAVENOUS ONCE
Status: COMPLETED | OUTPATIENT
Start: 2019-06-26 | End: 2019-06-26

## 2019-06-26 RX ADMIN — PALONOSETRON 0.25 MG: 0.25 INJECTION, SOLUTION INTRAVENOUS at 10:30

## 2019-06-26 RX ADMIN — SODIUM CHLORIDE, PRESERVATIVE FREE 10 ML: 5 INJECTION INTRAVENOUS at 12:50

## 2019-06-26 RX ADMIN — OXALIPLATIN 315 MG: 5 INJECTION, SOLUTION, CONCENTRATE INTRAVENOUS at 10:45

## 2019-06-26 RX ADMIN — DEXTROSE MONOHYDRATE 250 ML: 50 INJECTION, SOLUTION INTRAVENOUS at 10:30

## 2019-06-26 RX ADMIN — SODIUM CHLORIDE, PRESERVATIVE FREE 500 UNITS: 5 INJECTION INTRAVENOUS at 12:50

## 2019-06-26 RX ADMIN — DEXAMETHASONE SODIUM PHOSPHATE 12 MG: 4 INJECTION, SOLUTION INTRAMUSCULAR; INTRAVENOUS at 10:30
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
## 2019-06-26 NOTE — PATIENT INSTRUCTIONS
Steps to Quit Smoking  Smoking tobacco can be bad for your health. It can also affect almost every organ in your body. Smoking puts you and people around you at risk for many serious long-lasting (chronic) diseases. Quitting smoking is hard, but it is one of the best things that you can do for your health. It is never too late to quit.  What are the benefits of quitting smoking?  When you quit smoking, you lower your risk for getting serious diseases and conditions. They can include:  · Lung cancer or lung disease.  · Heart disease.  · Stroke.  · Heart attack.  · Not being able to have children (infertility).  · Weak bones (osteoporosis) and broken bones (fractures).    If you have coughing, wheezing, and shortness of breath, those symptoms may get better when you quit. You may also get sick less often. If you are pregnant, quitting smoking can help to lower your chances of having a baby of low birth weight.  What can I do to help me quit smoking?  Talk with your doctor about what can help you quit smoking. Some things you can do (strategies) include:  · Quitting smoking totally, instead of slowly cutting back how much you smoke over a period of time.  · Going to in-person counseling. You are more likely to quit if you go to many counseling sessions.  · Using resources and support systems, such as:  ? Online chats with a counselor.  ? Phone quitlines.  ? Printed self-help materials.  ? Support groups or group counseling.  ? Text messaging programs.  ? Mobile phone apps or applications.  · Taking medicines. Some of these medicines may have nicotine in them. If you are pregnant or breastfeeding, do not take any medicines to quit smoking unless your doctor says it is okay. Talk with your doctor about counseling or other things that can help you.    Talk with your doctor about using more than one strategy at the same time, such as taking medicines while you are also going to in-person counseling. This can help make  quitting easier.  What things can I do to make it easier to quit?  Quitting smoking might feel very hard at first, but there is a lot that you can do to make it easier. Take these steps:  · Talk to your family and friends. Ask them to support and encourage you.  · Call phone quitlines, reach out to support groups, or work with a counselor.  · Ask people who smoke to not smoke around you.  · Avoid places that make you want (trigger) to smoke, such as:  ? Bars.  ? Parties.  ? Smoke-break areas at work.  · Spend time with people who do not smoke.  · Lower the stress in your life. Stress can make you want to smoke. Try these things to help your stress:  ? Getting regular exercise.  ? Deep-breathing exercises.  ? Yoga.  ? Meditating.  ? Doing a body scan. To do this, close your eyes, focus on one area of your body at a time from head to toe, and notice which parts of your body are tense. Try to relax the muscles in those areas.  · Download or buy apps on your mobile phone or tablet that can help you stick to your quit plan. There are many free apps, such as QuitGuide from the CDC (Centers for Disease Control and Prevention). You can find more support from smokefree.gov and other websites.    This information is not intended to replace advice given to you by your health care provider. Make sure you discuss any questions you have with your health care provider.  Document Released: 10/14/2010 Document Revised: 08/15/2017 Document Reviewed: 05/03/2016  [employer] Patient Education © 2019 [employer]

## 2019-07-16 DIAGNOSIS — C20 RECTAL CANCER (HCC): ICD-10-CM

## 2019-07-16 RX ORDER — PALONOSETRON 0.05 MG/ML
0.25 INJECTION, SOLUTION INTRAVENOUS ONCE
Status: CANCELLED | OUTPATIENT
Start: 2019-07-17

## 2019-07-16 RX ORDER — DIPHENHYDRAMINE HYDROCHLORIDE 50 MG/ML
50 INJECTION INTRAMUSCULAR; INTRAVENOUS AS NEEDED
Status: CANCELLED | OUTPATIENT
Start: 2019-07-17

## 2019-07-16 RX ORDER — DEXTROSE MONOHYDRATE 50 MG/ML
250 INJECTION, SOLUTION INTRAVENOUS ONCE
Status: CANCELLED | OUTPATIENT
Start: 2019-07-17

## 2019-07-16 RX ORDER — FAMOTIDINE 10 MG/ML
20 INJECTION, SOLUTION INTRAVENOUS AS NEEDED
Status: CANCELLED | OUTPATIENT
Start: 2019-07-17

## 2019-07-17 ENCOUNTER — INFUSION (OUTPATIENT)
Dept: ONCOLOGY | Facility: HOSPITAL | Age: 69
End: 2019-07-17

## 2019-07-17 ENCOUNTER — OFFICE VISIT (OUTPATIENT)
Dept: ONCOLOGY | Facility: CLINIC | Age: 69
End: 2019-07-17

## 2019-07-17 VITALS
WEIGHT: 271.2 LBS | BODY MASS INDEX: 36.78 KG/M2 | TEMPERATURE: 99.3 F | DIASTOLIC BLOOD PRESSURE: 76 MMHG | HEART RATE: 83 BPM | OXYGEN SATURATION: 96 % | SYSTOLIC BLOOD PRESSURE: 131 MMHG | RESPIRATION RATE: 18 BRPM

## 2019-07-17 VITALS
OXYGEN SATURATION: 96 % | TEMPERATURE: 99.3 F | RESPIRATION RATE: 18 BRPM | HEART RATE: 83 BPM | WEIGHT: 271.2 LBS | BODY MASS INDEX: 36.78 KG/M2 | DIASTOLIC BLOOD PRESSURE: 76 MMHG | SYSTOLIC BLOOD PRESSURE: 131 MMHG

## 2019-07-17 DIAGNOSIS — R19.7 DIARRHEA, UNSPECIFIED TYPE: ICD-10-CM

## 2019-07-17 DIAGNOSIS — F41.9 ANXIETY: ICD-10-CM

## 2019-07-17 DIAGNOSIS — F10.10 ALCOHOL ABUSE: ICD-10-CM

## 2019-07-17 DIAGNOSIS — C20 RECTAL CANCER (HCC): Primary | ICD-10-CM

## 2019-07-17 DIAGNOSIS — Z87.39 HISTORY OF GOUT: ICD-10-CM

## 2019-07-17 DIAGNOSIS — D50.9 IRON DEFICIENCY ANEMIA, UNSPECIFIED IRON DEFICIENCY ANEMIA TYPE: ICD-10-CM

## 2019-07-17 DIAGNOSIS — Z95.828 PORT-A-CATH IN PLACE: ICD-10-CM

## 2019-07-17 DIAGNOSIS — R01.1 SYSTOLIC MURMUR: ICD-10-CM

## 2019-07-17 LAB
ALBUMIN SERPL-MCNC: 3.75 G/DL (ref 3.5–5.2)
ALBUMIN/GLOB SERPL: 1.1 G/DL
ALP SERPL-CCNC: 112 U/L (ref 39–117)
ALT SERPL W P-5'-P-CCNC: 16 U/L (ref 1–41)
ANION GAP SERPL CALCULATED.3IONS-SCNC: 14.6 MMOL/L (ref 5–15)
AST SERPL-CCNC: 28 U/L (ref 1–40)
BASOPHILS # BLD AUTO: 0.02 10*3/MM3 (ref 0–0.2)
BASOPHILS NFR BLD AUTO: 0.3 % (ref 0–1.5)
BILIRUB SERPL-MCNC: 0.7 MG/DL (ref 0.2–1.2)
BUN BLD-MCNC: 11 MG/DL (ref 8–23)
BUN/CREAT SERPL: 8.4 (ref 7–25)
CALCIUM SPEC-SCNC: 9.7 MG/DL (ref 8.6–10.5)
CHLORIDE SERPL-SCNC: 101 MMOL/L (ref 98–107)
CO2 SERPL-SCNC: 23.4 MMOL/L (ref 22–29)
CREAT BLD-MCNC: 1.31 MG/DL (ref 0.76–1.27)
DEPRECATED RDW RBC AUTO: 61.7 FL (ref 37–54)
EOSINOPHIL # BLD AUTO: 0.18 10*3/MM3 (ref 0–0.4)
EOSINOPHIL NFR BLD AUTO: 2.6 % (ref 0.3–6.2)
ERYTHROCYTE [DISTWIDTH] IN BLOOD BY AUTOMATED COUNT: 17.4 % (ref 12.3–15.4)
GFR SERPL CREATININE-BSD FRML MDRD: 54 ML/MIN/1.73
GLOBULIN UR ELPH-MCNC: 3.6 GM/DL
GLUCOSE BLD-MCNC: 126 MG/DL (ref 65–99)
HCT VFR BLD AUTO: 36.5 % (ref 37.5–51)
HGB BLD-MCNC: 12.5 G/DL (ref 13–17.7)
IMM GRANULOCYTES # BLD AUTO: 0.02 10*3/MM3 (ref 0–0.05)
IMM GRANULOCYTES NFR BLD AUTO: 0.3 % (ref 0–0.5)
LYMPHOCYTES # BLD AUTO: 1.31 10*3/MM3 (ref 0.7–3.1)
LYMPHOCYTES NFR BLD AUTO: 19.1 % (ref 19.6–45.3)
MCH RBC QN AUTO: 34 PG (ref 26.6–33)
MCHC RBC AUTO-ENTMCNC: 34.2 G/DL (ref 31.5–35.7)
MCV RBC AUTO: 99.2 FL (ref 79–97)
MONOCYTES # BLD AUTO: 0.77 10*3/MM3 (ref 0.1–0.9)
MONOCYTES NFR BLD AUTO: 11.2 % (ref 5–12)
NEUTROPHILS # BLD AUTO: 4.55 10*3/MM3 (ref 1.7–7)
NEUTROPHILS NFR BLD AUTO: 66.5 % (ref 42.7–76)
PLATELET # BLD AUTO: 148 10*3/MM3 (ref 140–450)
PMV BLD AUTO: 9.8 FL (ref 6–12)
POTASSIUM BLD-SCNC: 3.9 MMOL/L (ref 3.5–5.2)
PROT SERPL-MCNC: 7.3 G/DL (ref 6–8.5)
RBC # BLD AUTO: 3.68 10*6/MM3 (ref 4.14–5.8)
SODIUM BLD-SCNC: 139 MMOL/L (ref 136–145)
URATE SERPL-MCNC: 8.9 MG/DL (ref 3.4–7)
WBC NRBC COR # BLD: 6.85 10*3/MM3 (ref 3.4–10.8)

## 2019-07-17 PROCEDURE — 25010000002 PALONOSETRON 0.25 MG/5ML SOLUTION PREFILLED SYRINGE: Performed by: INTERNAL MEDICINE

## 2019-07-17 PROCEDURE — 80053 COMPREHEN METABOLIC PANEL: CPT

## 2019-07-17 PROCEDURE — 25010000002 DEXAMETHASONE SODIUM PHOSPHATE 20 MG/5ML SOLUTION 5 ML VIAL: Performed by: INTERNAL MEDICINE

## 2019-07-17 PROCEDURE — 84550 ASSAY OF BLOOD/URIC ACID: CPT | Performed by: NURSE PRACTITIONER

## 2019-07-17 PROCEDURE — 99214 OFFICE O/P EST MOD 30 MIN: CPT | Performed by: NURSE PRACTITIONER

## 2019-07-17 PROCEDURE — 25010000002 OXALIPLATIN PER 0.5 MG: Performed by: INTERNAL MEDICINE

## 2019-07-17 PROCEDURE — 85025 COMPLETE CBC W/AUTO DIFF WBC: CPT

## 2019-07-17 PROCEDURE — 96413 CHEMO IV INFUSION 1 HR: CPT

## 2019-07-17 PROCEDURE — 96375 TX/PRO/DX INJ NEW DRUG ADDON: CPT

## 2019-07-17 PROCEDURE — 96415 CHEMO IV INFUSION ADDL HR: CPT

## 2019-07-17 RX ORDER — PALONOSETRON 0.05 MG/ML
0.25 INJECTION, SOLUTION INTRAVENOUS ONCE
Status: COMPLETED | OUTPATIENT
Start: 2019-07-17 | End: 2019-07-17

## 2019-07-17 RX ORDER — SODIUM CHLORIDE 0.9 % (FLUSH) 0.9 %
10 SYRINGE (ML) INJECTION AS NEEDED
Status: DISCONTINUED | OUTPATIENT
Start: 2019-07-17 | End: 2019-07-17 | Stop reason: HOSPADM

## 2019-07-17 RX ORDER — DEXTROSE MONOHYDRATE 50 MG/ML
250 INJECTION, SOLUTION INTRAVENOUS ONCE
Status: COMPLETED | OUTPATIENT
Start: 2019-07-17 | End: 2019-07-17

## 2019-07-17 RX ORDER — SODIUM CHLORIDE 0.9 % (FLUSH) 0.9 %
10 SYRINGE (ML) INJECTION AS NEEDED
Status: CANCELLED | OUTPATIENT
Start: 2019-08-07

## 2019-07-17 RX ADMIN — PALONOSETRON 0.25 MG: 0.25 INJECTION, SOLUTION INTRAVENOUS at 11:20

## 2019-07-17 RX ADMIN — SODIUM CHLORIDE, PRESERVATIVE FREE 10 ML: 5 INJECTION INTRAVENOUS at 14:05

## 2019-07-17 RX ADMIN — DEXAMETHASONE SODIUM PHOSPHATE 12 MG: 4 INJECTION, SOLUTION INTRAMUSCULAR; INTRAVENOUS at 11:21

## 2019-07-17 RX ADMIN — DEXTROSE MONOHYDRATE 250 ML: 50 INJECTION, SOLUTION INTRAVENOUS at 11:19

## 2019-07-17 RX ADMIN — OXALIPLATIN 315 MG: 5 INJECTION, SOLUTION, CONCENTRATE INTRAVENOUS at 11:47

## 2019-07-17 RX ADMIN — SODIUM CHLORIDE, PRESERVATIVE FREE 500 UNITS: 5 INJECTION INTRAVENOUS at 14:06

## 2019-07-17 NOTE — PROGRESS NOTES
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  Combined chemoradiation with Xeloda 825 mg/m2 BID D1-5 or M-F during the course of radiation.  His dose is 500 mg x 4 or 2000 mg BID  Treatment finished 19.    2.  Dr. Yadav performed LAR with coloanal anstomosis and loop ileostomy 19.  Pathology showed  Residual invasive adneocarcinoma.  Tumor invaded the muscularis propria.  Surgical margins clear.  0 resected LNs involved.  ypT2N0.      3.       CHIEF COMPLAINT:  Follow up of Rectal cancer/toxicity check    HISTORY OF PRESENT ILLNESS:   David Mandujano is a very pleasant 68 y.o. male who was referred by Dr. Barnhart for evaluation and treatment of colorectal cancer. He began to notice rectal bleeding in January or 2018, and he also began to notice fatigue in approximately July. He assumed he was out of shape when he started to become short of breath and started trying to exercise more frequently, which only exacerbated the shortness of breath. He was evaluated by his PCP, Raj Wang MD, who after testing, found him to be severely iron deficient, hyperglycemic, and he also had an elevated PSA. He was on vacation at the time, and his PCP asked him to return home for further evaluation. He was started on oral iron therapy and later received IV iron infusions. He was also scheduled for a colonoscopy with Dr. Barnhart, during which a suspicious rectal mass was biopsied and pathology was positive for an ulcerated invasive, moderately differentiated rectal adenocarcinoma.     INTERVAL HISTORY:  Mr. Mandujano is here today for follow up of rectal cancer and toxicity check. He is receiving CapeOx treatment and has completed two cycles to date. With treatment, he reports having some expected SE including fatigue and diarrhea. He says he is tired of feeling tired.  He did not take Xeloda this morning but will take it tonight. He reports ongoing diarrhea which has been tolerable, but over the last week he says this has been worsening. He says some days he will empty his bag every 2-3 hours and other days it is more like every 45 minutes-hour. He has not been taking any medications to help with his symptoms. He says he tried imodium a long time ago which was helpful but hasn't taken any of this recently. He reports hydrating well, at least 4 bottles of water/day along with gatorade. He reports having swelling of his right foot and believes he might be getting gout again. He would like to have his uric acid level checked. He denies any other complaints today.     PAST MEDICAL HISTORY:  Past Medical History:   Diagnosis Date   • Anemia    • Arthritis    • Colon cancer (CMS/HCC)     s/p chemo therapy   • Heart murmur    • Hypertension    • Wrist fracture     surgically repaired       PAST SURGICAL HISTORY:  Past Surgical History:   Procedure Laterality Date   • ABDOMINAL SURGERY     • COLON SURGERY     • COLONOSCOPY     • FRACTURE SURGERY Left    • VENOUS ACCESS DEVICE (PORT) INSERTION N/A 5/20/2019    Procedure: INSERTION VENOUS ACCESS DEVICE;  Surgeon: Cindy Corrales MD;  Location: [facility];  Service: General   • WRIST SURGERY         FAMILY HISTORY:  Family History   Problem Relation Age of Onset   • Colon cancer Maternal Grandfather    • Other Sister    • Heart disease Sister    No other family history of malignancy.    SOCIAL HISTORY:  Social History     Socioeconomic History   • Marital status: Single     Spouse name: Not on file   • Number of children: Not on file   • Years of education: Not on file   • Highest education level: Not on file   Tobacco Use   • Smoking status: Never Smoker   • Smokeless tobacco: Current User     Types: Chew   Substance and Sexual Activity   • Alcohol use: Yes     Comment: occasional   • Drug use: No   • Sexual activity: Defer   Social  History Narrative    He is , and is self employed/semi retired. He is a non smoker but chews tobacco. He used to drink daily, but says he has cut it in half.      REVIEW OF SYSTEMS:   A comprehensive 14 point review of systems was performed.  Significant findings as mentioned above.  All other systems reviewed and are negative.      MEDICATIONS:  The current medication list was reviewed in the EMR    Current Outpatient Medications:   •  amLODIPine (NORVASC) 10 MG tablet, Take 5 mg by mouth Daily., Disp: , Rfl:   •  capecitabine (XELODA) 150 MG chemo tablet, Take 1 tab by mouth in the AM and 1 tab by mouth in the PM on days 1-14, then off for 7 days. Take with 500 mg tabs for total dose of 2150mg, Disp: 28 tablet, Rfl: 5  •  capecitabine (XELODA) 500 MG chemo tablet, Take 4 tabs by mouth in the AM and 4 tabs by mouth in the PM on days 1-14, then off 7 days. Take with 150mg tabs for total dose of 2150mg., Disp: 112 tablet, Rfl: 5  •  ferrous sulfate 325 (65 FE) MG tablet, Take 325 mg by mouth 2 (Two) Times a Day., Disp: , Rfl:   •  HYDROcodone-acetaminophen (NORCO) 7.5-325 MG per tablet, Take 1 tablet by mouth 4 (Four) Times a Day As Needed for Moderate Pain ., Disp: 12 tablet, Rfl: 0  •  metoprolol succinate XL (TOPROL-XL) 50 MG 24 hr tablet, Take 1 tablet by mouth Daily., Disp: 30 tablet, Rfl: 11  •  ondansetron ODT (ZOFRAN-ODT) 8 MG disintegrating tablet, Dissolve 1 tablet on the tongue 3 (Three) Times a Day As Needed for Nausea or Vomiting., Disp: 30 tablet, Rfl: 5  •  prochlorperazine (COMPAZINE) 5 MG tablet, Take 2 tablets by mouth Every 6 (Six) Hours As Needed for Nausea or Vomiting., Disp: 60 tablet, Rfl: 5    ALLERGIES:  No Known Allergies    PHYSICAL EXAM:  Vitals:    07/17/19 0949   BP: 131/76   Pulse: 83   Resp: 18   Temp: 99.3 °F (37.4 °C)   SpO2: 96%   General:  Awake, alert and oriented, in no distress.   HEENT:  Pupils are equal, round and reactive to light and accommodation, Extra-ocular  movements full, Oropharyx clear, mucous membranes moist  Neck:  No JVD, thyromegaly or lymphadenopathy  CV:  Regular rate and rhythm, III/IV systolic murmur, no rubs or gallops  Resp:  Lungs are clear to auscultation bilaterally  Abd:  Soft, non-tender, sl distended, bowel sounds present, no organomegaly or masses.  Surgical incisions well-healed.  Stoma functioning well.  Ext:  No clubbing, cyanosis or edema.  Patient refused to let me examine his right foot/toe which has been swelling/painful.   Lymph:  No cervical, supraclavicular, axillary, adenopathy  Neuro:  grossly non-focal exam    PATHOLOGY:  10-02-18      04-01-19:        04-09-19:        ENDOSCOPY:  10-02-18:        IMAGING:  10-04-18 CT Abdomen Pelvis With Contrast   Findings  - LOWER THORAX: Patchy nonspecific subpleural nodularity in the left lung base that could represent sequelae of chronic airspace disease or early fibrosis.     ABDOMEN:  - LIVER: Homogeneous. No focal hepatic mass or ductal dilatation.  - GALLBLADDER: GALLSTONES WITHIN THE GALLBLADDER.  - PANCREAS: Unremarkable. No mass or ductal dilatation.  - SPLEEN: Homogeneous. No splenomegaly.  - ADRENALS: No mass.  - KIDNEYS: RIGHT RENAL CYST MEASURING 3.6 CM.  - GI TRACT: NONSPECIFIC DISTAL SIGMOID COLONIC WALL THICKENING VERSUS  NONDISTENTION.  - PERITONEUM: No free air. No free fluid or loculated fluid collections.  - MESENTERY: Unremarkable.  - LYMPH NODES: No lymphadenopathy.  - VASCULATURE: ATHEROSCLEROTIC VASCULAR CALCIFICATION.  - ABDOMINAL WALL: SMALL BILATERAL HUMERAL HERNIAS CONTAINING ONLY FAT.  - OTHER: None.     PELVIS:  - BLADDER: No focal mass or significant wall thickening  - REPRODUCTIVE: Unremarkable as visualized.  - APPENDIX: Nondistended. No surrounding inflammation.  - BONES: No acute bony abnormality.     IMPRESSION:  1. Gallstones within the gallbladder.  2.Nonspecific distal sigmoid colonic wall thickening versus nondistention.    PET/CT 10-29-18    11-13-18 MRI  Pelvis Without Contrast  FINDINGS:  1.) TUMOR LOCATION AND CHARACTERISTICS        i) Distance of Inferior margin of Tumor from the dentate line: 9.5 CM    ii) Tumor at or below the puborectalis sling:  NO   iii) Relationship to the anterior peritoneal reflection: BELOW   iv) Craniocaudal length of the tumor: 6cm   v) Clock face of tumor: 5o'clock to 2o'clock  vi) Polypoid/ Annular/ Semi-annular: SEMI-ANNULAR  vii) Mucinous: YES     2.) EXTRAMURAL DEPTH OF INVASION AND MR T-CATEGORY         i) Extramural depth of invasion (Use 0mm for T1 or T2 tumor):  APPROXIMATELY 5 MM    ii) T category: T3 B              *Please indicate structures with possible invasion.  Specify laterality,  sequence and slice#: (see list below)     *  Anterior peritoneal reflection (T4a tumor): NO  *  Puborectalis: NO  *  Bladder: NO  *  Vascular Involvement of Iliac Vessels: NO  *  Levator ani: NO  *  Ureters: NO  *  Obturator: NO  *  Prostate: MARGINAL/TETHERED  *  Piriformis: NO  *  Uterus: NOT APPLICABLE  *  Pelvic Bone: NO  *  Vagina: NOT APPLICABLE  *  Sacrum: NO  *  Urethra: NO  *  Other:           iii) For low rectal tumors (maximum tumor depth at or below the  puborectalis sling):     *  Not applicable (tumor above the puborectalis sling: NOT APPLICABLE  *    *  Level 1 (submucosa only, no involvement of internal anal sphincter):       *  Level 2 (confined to the internal anal sphincter; no involvement of  intersphincteric fat):      *  Level 3 (intersphincteric fat involved):      *  Level 4 (involves external sphincter or beyond):         3. RELATIONSHIP OF THE TUMOR TO MESORECTAL FASCIA (MRF)       i) Shortest distance 0mm of the definitive tumour border to the MRF  is: AT 12:00   ii) Are there any tumour spiculations closer to the MRF? NO     iii) Location of Tumor margin to MRF: 12:00, AT THE LEVEL PROSTATE     4. EXTRAMURAL VENOUS INVASION       i) Extramural Venous Invasion (EMVI): ABSENT     5. MESORECTAL LYMPH NODES AND TUMOUR  DEPOSITS       i) Any suspicious mesorectal lymph nodes/tumor deposits: YES      *If yes, the most suspicious node/tumor deposit is: 15 MM IN  DIAMETER, ALONG THE UPPER MARGIN OF the tumor with minimum distance 7  MMmm from the MRF at 7o'clock     6. EXTRAMESORECTAL LYMPH NODES        i) Any suspicious extramesorectal lymph nodes: NO      *If yes, location and laterality of suspicious nodes:             Int. Iliac:                Ext. Iliac:                Common Iliac:                Obturator:                Inguinal:                Other:           ii) Is the BETH node station in the field of view: NO        *If Yes, are these nodes suspicious:         7. OTHER FINDINGS (COMPLICATIONS, METASTASES, LIMITATIONS)         NOTE: THERE IS SOME UNCERTAINTY WHETHER THE APPARENT SMALL FOCUS  OF TUMOR EXTENDING TO THE PROSTATE AT THE 12:00 POSITION COULD ACTUALLY  BE PROSTATE NODULE EXTENDING TOWARD THE TUMOR. RECTAL TUMOR IS FAVORED;  ALTHOUGH THIS DETERMINATION CANNOT BE MADE WITH COMPLETE CERTAINTY,  TUMOR IS CONSIDERED T3 B.         IMPRESSIONS:  MRI rectal cancer T category is: T3 B  Maximum EMD of invasion is: 5  Minimum tumor to MRF distance is: 0  Low rectal tumor component: NO  Mesorectal nodes/tumor deposits: SUSPICIOUS  EMVI: ABSENT  Extramesorectal nodes: NEGATIVE    RECENT LABS:  Lab Results   Component Value Date    WBC 6.85 07/17/2019    HGB 12.5 (L) 07/17/2019    HCT 36.5 (L) 07/17/2019    MCV 99.2 (H) 07/17/2019    RDW 17.4 (H) 07/17/2019     07/17/2019    NEUTRORELPCT 66.5 07/17/2019    LYMPHORELPCT 19.1 (L) 07/17/2019    MONORELPCT 11.2 07/17/2019    EOSRELPCT 2.6 07/17/2019    BASORELPCT 0.3 07/17/2019    NEUTROABS 4.55 07/17/2019    LYMPHSABS 1.31 07/17/2019       Lab Results   Component Value Date     07/17/2019    K 3.9 07/17/2019    CO2 23.4 07/17/2019     07/17/2019    BUN 11 07/17/2019    CREATININE 1.31 (H) 07/17/2019    EGFRIFNONA 54 (L) 07/17/2019    GLUCOSE 126 (H) 07/17/2019     CALCIUM 9.7 07/17/2019    ALKPHOS 112 07/17/2019    AST 28 07/17/2019    ALT 16 07/17/2019    BILITOT 0.7 07/17/2019    ALBUMIN 3.75 07/17/2019    PROTEINTOT 7.3 07/17/2019       Lab Results   Component Value Date/Time    URICACID 8.9 (H) 07/17/2019 10:16 AM     Lab Results   Component Value Date    CEA 2.28 06/26/2019    CEA 3.40 03/20/2019    CEA 17.80 (H) 01/11/2019    CEA 18.80 (H) 10/19/2018     Lab Results   Component Value Date    PSA 4.930 (H) 10/19/2018       Lab Results   Component Value Date    FERRITIN 213.60 06/05/2019    IRON 83 06/05/2019    TIBC 443 06/05/2019    LABIRON 19 (L) 06/05/2019    [id number] 401 10/19/2018    FOLATE 7.48 10/19/2018     ASSESSMENT & PLAN:  David Mandujano is a very pleasant 68 y.o. male with newly diagnosed rectal cancer. Clinically stage IIIC ([id number]).    1.  Rectal cancer:  -  He received standard neoadjuvant chemoradiation as above given locally advanced tumor with suspicious pelvic lymph node.    - Pre-treatment MRI showed a locally advanced tumor and suspicious pelvic LN.  PET-CT was negative except in the local tumor.   -  There was a non-specific sub-pleural nodular opacity in the L lung base which was PET negative.  Perhaps this was inflammatory in nature. This area will require f/u on future imaging.  - Recommended neoadjuvant chemoradiation with Xeloda 825 mg/m2 BID D1-5 or M-F during the course of radiaton.  His dose was 500 mg x 4 or 2000 mg BID. Overall, he tolerated this well and completed treatment 1-21-19.    -  He saw Dr. Marilynn Yadav and underwent successful surgical resection as above.    -  He is now taking CapeOx treatment to prevent recurrence and has completed two cycles to date. Overall, he is tolerating this well aside from fatigue and diarrhea (see below). He did not take Xeloda this morning but he says he will take this tonight. Strongly advised medical compliance.   -Exam/labs from today without cause for concern. Will proceed with cycle 3  today. He will follow up as previously planned with Dr. Larios for another symptom/toxicity check.    2.  Diarrhea:  -Previously mild/tolerable, now worsening. He has not been taking imodium as advised. Strongly recommended he do so. If this doesn't help, can add lomotil. Planned to check stool studies today but patient refused. He says he is hydrating well. Will monitor.     3.  Iron deficiency anemia:  - He is taking oral iron, ferrous sulfate BID and tolerating well. Iron improved. Will continue for about 2 more months and repeat testing.      4.  Depressed mood / Anxiety and Alcohol Abuse:  - Previously given trial of Lexapro but he did not find this helpful and discontinued this. Currently, he says he is doing well in this regard. He did quit drinking completely around the time of his surgery.  He says Dr. Yadav did mention to him that his liver didn't look good during his operation.  -  We discussed potential involvement in support groups and /or referral for alcohol abuse counseling, but he declined.     5.  Prominent systolic murmur:   -   He was evaluated by Dr. Joe and then by Dr. Barrrea.  He underwent echocardiogram 1-30-19 which showed EF 70%, grade 1 diastolic dysfunction, mild to mod septal asymmetric hypertrophy, AV calcification with functionally bicuspid valve, mild AI and moderate AS, moderate MAC with mild MR, mild MS  -  Thought is that at some point in the future he will require AVR.     6. H/o gout:  -Patient complains of right foot/toe pain and swelling. Uric acid level 8.9 today. Patient refused to let me examine this area. Therefore, advised him to follow up with PCP for further evaluation/management.     7.  Prophylaxis:  He took 2018 influenza vaccine 10-19-18. He took Prevnar 13 on 11-20-18.    8.  ACO Quality measures  - David Mandujano does not smoke, but he does use tobacco products in the form of chewing tobacco.  - I advised David of the risks of continuing to use tobacco.  During  this visit, I spent <3 minutes counseling the patient regarding tobacco cessation.  - David POLLARD Mandujano received 2018 flu vaccine.  - Current outpatient and discharge medications have been reconciled for the patient.  Reviewed by: JOSE CARLOS Damon    I spent 25 minutes with David POLLARD Delbert today.  More than 50% of the time was spent in counseling / coordination of care for the above problems.      Electronically Signed by: JOSE CARLOS Damon      CC:   MD Kathleen Coppola Emmanuel C, MD Shawn Michael Acino, MD William Richards, MD Sandra Beck, MD

## 2019-07-26 ENCOUNTER — APPOINTMENT (OUTPATIENT)
Dept: PHARMACY | Facility: HOSPITAL | Age: 69
End: 2019-07-26

## 2019-08-02 DIAGNOSIS — C20 RECTAL CANCER (HCC): ICD-10-CM

## 2019-08-02 RX ORDER — FAMOTIDINE 10 MG/ML
20 INJECTION, SOLUTION INTRAVENOUS AS NEEDED
Status: CANCELLED | OUTPATIENT
Start: 2019-08-07

## 2019-08-02 RX ORDER — DEXTROSE MONOHYDRATE 50 MG/ML
250 INJECTION, SOLUTION INTRAVENOUS ONCE
Status: CANCELLED | OUTPATIENT
Start: 2019-08-07

## 2019-08-02 RX ORDER — DIPHENHYDRAMINE HYDROCHLORIDE 50 MG/ML
50 INJECTION INTRAMUSCULAR; INTRAVENOUS AS NEEDED
Status: CANCELLED | OUTPATIENT
Start: 2019-08-07

## 2019-08-02 RX ORDER — PALONOSETRON 0.05 MG/ML
0.25 INJECTION, SOLUTION INTRAVENOUS ONCE
Status: CANCELLED | OUTPATIENT
Start: 2019-08-07

## 2019-08-07 ENCOUNTER — INFUSION (OUTPATIENT)
Dept: ONCOLOGY | Facility: HOSPITAL | Age: 69
End: 2019-08-07

## 2019-08-07 ENCOUNTER — OFFICE VISIT (OUTPATIENT)
Dept: ONCOLOGY | Facility: CLINIC | Age: 69
End: 2019-08-07

## 2019-08-07 ENCOUNTER — LAB (OUTPATIENT)
Dept: ONCOLOGY | Facility: CLINIC | Age: 69
End: 2019-08-07

## 2019-08-07 VITALS
SYSTOLIC BLOOD PRESSURE: 150 MMHG | OXYGEN SATURATION: 98 % | HEART RATE: 72 BPM | WEIGHT: 273.2 LBS | DIASTOLIC BLOOD PRESSURE: 75 MMHG | TEMPERATURE: 98.9 F | BODY MASS INDEX: 37.05 KG/M2 | RESPIRATION RATE: 20 BRPM

## 2019-08-07 VITALS
OXYGEN SATURATION: 98 % | TEMPERATURE: 98.9 F | BODY MASS INDEX: 37.05 KG/M2 | WEIGHT: 273.2 LBS | HEART RATE: 72 BPM | RESPIRATION RATE: 20 BRPM | SYSTOLIC BLOOD PRESSURE: 150 MMHG | DIASTOLIC BLOOD PRESSURE: 75 MMHG

## 2019-08-07 DIAGNOSIS — C20 RECTAL CANCER (HCC): Primary | ICD-10-CM

## 2019-08-07 DIAGNOSIS — F41.9 ANXIETY: ICD-10-CM

## 2019-08-07 DIAGNOSIS — R19.7 DIARRHEA, UNSPECIFIED TYPE: ICD-10-CM

## 2019-08-07 DIAGNOSIS — C20 RECTAL CANCER (HCC): ICD-10-CM

## 2019-08-07 DIAGNOSIS — Z95.828 PORT-A-CATH IN PLACE: ICD-10-CM

## 2019-08-07 DIAGNOSIS — R01.1 SYSTOLIC MURMUR: ICD-10-CM

## 2019-08-07 DIAGNOSIS — F32.A DEPRESSION, UNSPECIFIED DEPRESSION TYPE: ICD-10-CM

## 2019-08-07 DIAGNOSIS — D50.9 IRON DEFICIENCY ANEMIA, UNSPECIFIED IRON DEFICIENCY ANEMIA TYPE: ICD-10-CM

## 2019-08-07 LAB
ALBUMIN SERPL-MCNC: 3.67 G/DL (ref 3.5–5.2)
ALBUMIN/GLOB SERPL: 1 G/DL
ALP SERPL-CCNC: 103 U/L (ref 39–117)
ALT SERPL W P-5'-P-CCNC: 9 U/L (ref 1–41)
ANION GAP SERPL CALCULATED.3IONS-SCNC: 13.7 MMOL/L (ref 5–15)
AST SERPL-CCNC: 19 U/L (ref 1–40)
BASOPHILS # BLD AUTO: 0.02 10*3/MM3 (ref 0–0.2)
BASOPHILS NFR BLD AUTO: 0.3 % (ref 0–1.5)
BILIRUB SERPL-MCNC: 0.8 MG/DL (ref 0.2–1.2)
BUN BLD-MCNC: 15 MG/DL (ref 8–23)
BUN/CREAT SERPL: 12.2 (ref 7–25)
CALCIUM SPEC-SCNC: 9.6 MG/DL (ref 8.6–10.5)
CHLORIDE SERPL-SCNC: 104 MMOL/L (ref 98–107)
CO2 SERPL-SCNC: 21.3 MMOL/L (ref 22–29)
CREAT BLD-MCNC: 1.23 MG/DL (ref 0.76–1.27)
DEPRECATED RDW RBC AUTO: 69.6 FL (ref 37–54)
EOSINOPHIL # BLD AUTO: 0.16 10*3/MM3 (ref 0–0.4)
EOSINOPHIL NFR BLD AUTO: 2.3 % (ref 0.3–6.2)
ERYTHROCYTE [DISTWIDTH] IN BLOOD BY AUTOMATED COUNT: 18.9 % (ref 12.3–15.4)
GFR SERPL CREATININE-BSD FRML MDRD: 58 ML/MIN/1.73
GLOBULIN UR ELPH-MCNC: 3.6 GM/DL
GLUCOSE BLD-MCNC: 126 MG/DL (ref 65–99)
HCT VFR BLD AUTO: 37.7 % (ref 37.5–51)
HGB BLD-MCNC: 12.7 G/DL (ref 13–17.7)
IMM GRANULOCYTES # BLD AUTO: 0.02 10*3/MM3 (ref 0–0.05)
IMM GRANULOCYTES NFR BLD AUTO: 0.3 % (ref 0–0.5)
LYMPHOCYTES # BLD AUTO: 1.51 10*3/MM3 (ref 0.7–3.1)
LYMPHOCYTES NFR BLD AUTO: 22.1 % (ref 19.6–45.3)
MCH RBC QN AUTO: 35.4 PG (ref 26.6–33)
MCHC RBC AUTO-ENTMCNC: 33.7 G/DL (ref 31.5–35.7)
MCV RBC AUTO: 105 FL (ref 79–97)
MONOCYTES # BLD AUTO: 0.87 10*3/MM3 (ref 0.1–0.9)
MONOCYTES NFR BLD AUTO: 12.7 % (ref 5–12)
NEUTROPHILS # BLD AUTO: 4.26 10*3/MM3 (ref 1.7–7)
NEUTROPHILS NFR BLD AUTO: 62.3 % (ref 42.7–76)
PLATELET # BLD AUTO: 106 10*3/MM3 (ref 140–450)
PMV BLD AUTO: 9.5 FL (ref 6–12)
POTASSIUM BLD-SCNC: 4.1 MMOL/L (ref 3.5–5.2)
PROT SERPL-MCNC: 7.3 G/DL (ref 6–8.5)
RBC # BLD AUTO: 3.59 10*6/MM3 (ref 4.14–5.8)
SODIUM BLD-SCNC: 139 MMOL/L (ref 136–145)
WBC NRBC COR # BLD: 6.84 10*3/MM3 (ref 3.4–10.8)

## 2019-08-07 PROCEDURE — 99214 OFFICE O/P EST MOD 30 MIN: CPT | Performed by: NURSE PRACTITIONER

## 2019-08-07 PROCEDURE — 25010000002 OXALIPLATIN PER 0.5 MG: Performed by: INTERNAL MEDICINE

## 2019-08-07 PROCEDURE — 25010000002 DEXAMETHASONE SODIUM PHOSPHATE 20 MG/5ML SOLUTION 5 ML VIAL: Performed by: INTERNAL MEDICINE

## 2019-08-07 PROCEDURE — 25010000002 PALONOSETRON PER 25 MCG: Performed by: INTERNAL MEDICINE

## 2019-08-07 PROCEDURE — 96413 CHEMO IV INFUSION 1 HR: CPT

## 2019-08-07 PROCEDURE — 80053 COMPREHEN METABOLIC PANEL: CPT | Performed by: INTERNAL MEDICINE

## 2019-08-07 PROCEDURE — 96375 TX/PRO/DX INJ NEW DRUG ADDON: CPT

## 2019-08-07 PROCEDURE — 85025 COMPLETE CBC W/AUTO DIFF WBC: CPT | Performed by: INTERNAL MEDICINE

## 2019-08-07 PROCEDURE — 96367 TX/PROPH/DG ADDL SEQ IV INF: CPT

## 2019-08-07 PROCEDURE — 96415 CHEMO IV INFUSION ADDL HR: CPT

## 2019-08-07 RX ORDER — PALONOSETRON 0.05 MG/ML
0.25 INJECTION, SOLUTION INTRAVENOUS ONCE
Status: COMPLETED | OUTPATIENT
Start: 2019-08-07 | End: 2019-08-07

## 2019-08-07 RX ORDER — SODIUM CHLORIDE 0.9 % (FLUSH) 0.9 %
10 SYRINGE (ML) INJECTION AS NEEDED
Status: DISCONTINUED | OUTPATIENT
Start: 2019-08-07 | End: 2019-08-07 | Stop reason: HOSPADM

## 2019-08-07 RX ORDER — DEXTROSE MONOHYDRATE 50 MG/ML
250 INJECTION, SOLUTION INTRAVENOUS ONCE
Status: COMPLETED | OUTPATIENT
Start: 2019-08-07 | End: 2019-08-07

## 2019-08-07 RX ORDER — SODIUM CHLORIDE 0.9 % (FLUSH) 0.9 %
10 SYRINGE (ML) INJECTION AS NEEDED
Status: CANCELLED | OUTPATIENT
Start: 2019-08-28

## 2019-08-07 RX ADMIN — OXALIPLATIN 315 MG: 5 INJECTION, SOLUTION, CONCENTRATE INTRAVENOUS at 11:00

## 2019-08-07 RX ADMIN — SODIUM CHLORIDE, PRESERVATIVE FREE 500 UNITS: 5 INJECTION INTRAVENOUS at 13:19

## 2019-08-07 RX ADMIN — DEXAMETHASONE SODIUM PHOSPHATE 12 MG: 4 INJECTION, SOLUTION INTRAMUSCULAR; INTRAVENOUS at 10:34

## 2019-08-07 RX ADMIN — DEXTROSE MONOHYDRATE 250 ML: 50 INJECTION, SOLUTION INTRAVENOUS at 10:32

## 2019-08-07 RX ADMIN — PALONOSETRON 0.25 MG: 0.25 INJECTION, SOLUTION INTRAVENOUS at 10:33

## 2019-08-07 RX ADMIN — SODIUM CHLORIDE, PRESERVATIVE FREE 10 ML: 5 INJECTION INTRAVENOUS at 13:19

## 2019-08-07 NOTE — PROGRESS NOTES
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  Combined chemoradiation with Xeloda 825 mg/m2 BID D1-5 or M-F during the course of radiation.  His dose is 500 mg x 4 or 2000 mg BID  Treatment finished 19.    2.  Dr. Yadav performed LAR with coloanal anstomosis and loop ileostomy 19.  Pathology showed  Residual invasive adneocarcinoma.  Tumor invaded the muscularis propria.  Surgical margins clear.  0/14 resected LNs involved.  ypT2N0.      3.       CHIEF COMPLAINT:  Follow up of Rectal cancer/toxicity check    HISTORY OF PRESENT ILLNESS:   David Mandujano is a very pleasant 69 y.o. male who was referred by Dr. Barnhart for evaluation and treatment of colorectal cancer. He began to notice rectal bleeding in January or 2018, and he also began to notice fatigue in approximately July. He assumed he was out of shape when he started to become short of breath and started trying to exercise more frequently, which only exacerbated the shortness of breath. He was evaluated by his PCP, Raj Wang MD, who after testing, found him to be severely iron deficient, hyperglycemic, and he also had an elevated PSA. He was on vacation at the time, and his PCP asked him to return home for further evaluation. He was started on oral iron therapy and later received IV iron infusions. He was also scheduled for a colonoscopy with Dr. Barnhart, during which a suspicious rectal mass was biopsied and pathology was positive for an ulcerated invasive, moderately differentiated rectal adenocarcinoma.     INTERVAL HISTORY:  Mr. Mandujano is here today for follow up of rectal cancer and toxicity check. He is receiving CapeOx treatment and has completed three cycles to date. Since his last visit, he reports he has been doing \"great\". Overall, he is tolerating his treatment well. He reports " "making changes to his diet which has helped with diarrhea. He has not tried taking imodium as advised on several occasions. He reports eating and hydrating well. Patient says he has been taking Xeloda but will occasionally \"forget to take it\" but says he makes it up later on his off week. He had not been picking up his prescription from the pharmacy but says he had an extra bottle at home that he had during radiation and has been using this. He previously thought he had gout in his right great toe. However, he says this has resolved and he did not have to follow up with PCP. He continues to have fatigue but this remains tolerable. He denies any other complaints today.     PAST MEDICAL HISTORY:  Past Medical History:   Diagnosis Date   • Anemia    • Arthritis    • Colon cancer (CMS/HCC)     s/p chemo therapy   • Heart murmur    • Hypertension    • Wrist fracture     surgically repaired       PAST SURGICAL HISTORY:  Past Surgical History:   Procedure Laterality Date   • ABDOMINAL SURGERY     • COLON SURGERY     • COLONOSCOPY     • FRACTURE SURGERY Left    • VENOUS ACCESS DEVICE (PORT) INSERTION N/A 5/20/2019    Procedure: INSERTION VENOUS ACCESS DEVICE;  Surgeon: Cindy Corrales MD;  Location: [facility];  Service: General   • WRIST SURGERY         FAMILY HISTORY:  Family History   Problem Relation Age of Onset   • Colon cancer Maternal Grandfather    • Other Sister    • Heart disease Sister    No other family history of malignancy.    SOCIAL HISTORY:  Social History     Socioeconomic History   • Marital status: Single     Spouse name: Not on file   • Number of children: Not on file   • Years of education: Not on file   • Highest education level: Not on file   Tobacco Use   • Smoking status: Never Smoker   • Smokeless tobacco: Current User     Types: Chew   Substance and Sexual Activity   • Alcohol use: Yes     Comment: occasional   • Drug use: No   • Sexual activity: Defer   Social History Narrative    He is " , and is self employed/semi retired. He is a non smoker but chews tobacco. He used to drink daily, but says he has cut it in half.      REVIEW OF SYSTEMS:   A comprehensive 14 point review of systems was performed.  Significant findings as mentioned above.  All other systems reviewed and are negative.      MEDICATIONS:  The current medication list was reviewed in the EMR    Current Outpatient Medications:   •  amLODIPine (NORVASC) 10 MG tablet, Take 5 mg by mouth Daily., Disp: , Rfl:   •  capecitabine (XELODA) 150 MG chemo tablet, Take 1 tab by mouth in the AM and 1 tab by mouth in the PM on days 1-14, then off for 7 days. Take with 500 mg tabs for total dose of 2150mg, Disp: 28 tablet, Rfl: 5  •  capecitabine (XELODA) 500 MG chemo tablet, Take 4 tabs by mouth in the AM and 4 tabs by mouth in the PM on days 1-14, then off 7 days. Take with 150mg tabs for total dose of 2150mg., Disp: 112 tablet, Rfl: 5  •  ferrous sulfate 325 (65 FE) MG tablet, Take 325 mg by mouth 2 (Two) Times a Day., Disp: , Rfl:   •  HYDROcodone-acetaminophen (NORCO) 7.5-325 MG per tablet, Take 1 tablet by mouth 4 (Four) Times a Day As Needed for Moderate Pain ., Disp: 12 tablet, Rfl: 0  •  metoprolol succinate XL (TOPROL-XL) 50 MG 24 hr tablet, Take 1 tablet by mouth Daily., Disp: 30 tablet, Rfl: 11  •  ondansetron ODT (ZOFRAN-ODT) 8 MG disintegrating tablet, Dissolve 1 tablet on the tongue 3 (Three) Times a Day As Needed for Nausea or Vomiting., Disp: 30 tablet, Rfl: 5  •  prochlorperazine (COMPAZINE) 5 MG tablet, Take 2 tablets by mouth Every 6 (Six) Hours As Needed for Nausea or Vomiting., Disp: 60 tablet, Rfl: 5  No current facility-administered medications for this visit.     Facility-Administered Medications Ordered in Other Visits:   •  dexamethasone sodium phosphate 12 mg in sodium chloride 0.9 % IVPB, 12 mg, Intravenous, Once, Jyoti Larios MD  •  dextrose (D5W) 5 % infusion 250 mL, 250 mL, Intravenous, Once, Jyoti Larios  MD BRIDGER  •  heparin flush (porcine) 100 UNIT/ML injection 500 Units, 500 Units, Intravenous, PRN, Jyoti Larios MD  •  oxaliplatin (ELOXATIN) 315 mg in dextrose (D5W) 5 % 313 mL chemo IVPB, 315 mg, Intravenous, Once, Jyoti Larios MD  •  palonosetron (ALOXI) injection 0.25 mg, 0.25 mg, Intravenous, Once, Jyoti Larios MD  •  sodium chloride 0.9 % flush 10 mL, 10 mL, Intravenous, PRN, Jyoti Larios MD    ALLERGIES:  No Known Allergies    PHYSICAL EXAM:  Vitals:    08/07/19 0938   BP: 150/75   Pulse: 72   Resp: 20   Temp: 98.9 °F (37.2 °C)   SpO2: 98%   General:  Awake, alert and oriented, in no distress.   HEENT:  Pupils are equal, round and reactive to light and accommodation, Extra-ocular movements full, Oropharyx clear, mucous membranes moist  Neck:  No JVD, thyromegaly or lymphadenopathy  CV:  Regular rate and rhythm, III/IV systolic murmur, no rubs or gallops  Resp:  Lungs are clear to auscultation bilaterally  Abd:  Soft, non-tender, sl distended, bowel sounds present, no organomegaly or masses.  Surgical incisions well-healed.  Stoma functioning well.  Ext:  No clubbing, cyanosis or edema.  Lymph:  No cervical, supraclavicular, axillary, adenopathy  Neuro:  grossly non-focal exam    PATHOLOGY:  10-02-18      04-01-19:        04-09-19:        ENDOSCOPY:  10-02-18:        IMAGING:  10-04-18 CT Abdomen Pelvis With Contrast   Findings  - LOWER THORAX: Patchy nonspecific subpleural nodularity in the left lung base that could represent sequelae of chronic airspace disease or early fibrosis.     ABDOMEN:  - LIVER: Homogeneous. No focal hepatic mass or ductal dilatation.  - GALLBLADDER: GALLSTONES WITHIN THE GALLBLADDER.  - PANCREAS: Unremarkable. No mass or ductal dilatation.  - SPLEEN: Homogeneous. No splenomegaly.  - ADRENALS: No mass.  - KIDNEYS: RIGHT RENAL CYST MEASURING 3.6 CM.  - GI TRACT: NONSPECIFIC DISTAL SIGMOID COLONIC WALL THICKENING VERSUS  NONDISTENTION.  - PERITONEUM: No free air. No  free fluid or loculated fluid collections.  - MESENTERY: Unremarkable.  - LYMPH NODES: No lymphadenopathy.  - VASCULATURE: ATHEROSCLEROTIC VASCULAR CALCIFICATION.  - ABDOMINAL WALL: SMALL BILATERAL HUMERAL HERNIAS CONTAINING ONLY FAT.  - OTHER: None.     PELVIS:  - BLADDER: No focal mass or significant wall thickening  - REPRODUCTIVE: Unremarkable as visualized.  - APPENDIX: Nondistended. No surrounding inflammation.  - BONES: No acute bony abnormality.     IMPRESSION:  1. Gallstones within the gallbladder.  2.Nonspecific distal sigmoid colonic wall thickening versus nondistention.    PET/CT 10-29-18    11-13-18 MRI Pelvis Without Contrast  FINDINGS:  1.) TUMOR LOCATION AND CHARACTERISTICS        i) Distance of Inferior margin of Tumor from the dentate line: 9.5 CM    ii) Tumor at or below the puborectalis sling:  NO   iii) Relationship to the anterior peritoneal reflection: BELOW   iv) Craniocaudal length of the tumor: 6cm   v) Clock face of tumor: 5o'clock to 2o'clock  vi) Polypoid/ Annular/ Semi-annular: SEMI-ANNULAR  vii) Mucinous: YES     2.) EXTRAMURAL DEPTH OF INVASION AND MR T-CATEGORY         i) Extramural depth of invasion (Use 0mm for T1 or T2 tumor):  APPROXIMATELY 5 MM    ii) T category: T3 B              *Please indicate structures with possible invasion.  Specify laterality,  sequence and slice#: (see list below)     *  Anterior peritoneal reflection (T4a tumor): NO  *  Puborectalis: NO  *  Bladder: NO  *  Vascular Involvement of Iliac Vessels: NO  *  Levator ani: NO  *  Ureters: NO  *  Obturator: NO  *  Prostate: MARGINAL/TETHERED  *  Piriformis: NO  *  Uterus: NOT APPLICABLE  *  Pelvic Bone: NO  *  Vagina: NOT APPLICABLE  *  Sacrum: NO  *  Urethra: NO  *  Other:           iii) For low rectal tumors (maximum tumor depth at or below the  puborectalis sling):     *  Not applicable (tumor above the puborectalis sling: NOT APPLICABLE  *    *  Level 1 (submucosa only, no involvement of internal anal  sphincter):       *  Level 2 (confined to the internal anal sphincter; no involvement of  intersphincteric fat):      *  Level 3 (intersphincteric fat involved):      *  Level 4 (involves external sphincter or beyond):         3. RELATIONSHIP OF THE TUMOR TO MESORECTAL FASCIA (MRF)       i) Shortest distance 0mm of the definitive tumour border to the MRF  is: AT 12:00   ii) Are there any tumour spiculations closer to the MRF? NO     iii) Location of Tumor margin to MRF: 12:00, AT THE LEVEL PROSTATE     4. EXTRAMURAL VENOUS INVASION       i) Extramural Venous Invasion (EMVI): ABSENT     5. MESORECTAL LYMPH NODES AND TUMOUR DEPOSITS       i) Any suspicious mesorectal lymph nodes/tumor deposits: YES      *If yes, the most suspicious node/tumor deposit is: 15 MM IN  DIAMETER, ALONG THE UPPER MARGIN OF the tumor with minimum distance 7  MMmm from the MRF at 7o'clock     6. EXTRAMESORECTAL LYMPH NODES        i) Any suspicious extramesorectal lymph nodes: NO      *If yes, location and laterality of suspicious nodes:             Int. Iliac:                Ext. Iliac:                Common Iliac:                Obturator:                Inguinal:                Other:           ii) Is the BETH node station in the field of view: NO        *If Yes, are these nodes suspicious:         7. OTHER FINDINGS (COMPLICATIONS, METASTASES, LIMITATIONS)         NOTE: THERE IS SOME UNCERTAINTY WHETHER THE APPARENT SMALL FOCUS  OF TUMOR EXTENDING TO THE PROSTATE AT THE 12:00 POSITION COULD ACTUALLY  BE PROSTATE NODULE EXTENDING TOWARD THE TUMOR. RECTAL TUMOR IS FAVORED;  ALTHOUGH THIS DETERMINATION CANNOT BE MADE WITH COMPLETE CERTAINTY,  TUMOR IS CONSIDERED T3 B.         IMPRESSIONS:  MRI rectal cancer T category is: T3 B  Maximum EMD of invasion is: 5  Minimum tumor to MRF distance is: 0  Low rectal tumor component: NO  Mesorectal nodes/tumor deposits: SUSPICIOUS  EMVI: ABSENT  Extramesorectal nodes: NEGATIVE    RECENT LABS:  Lab Results    Component Value Date    WBC 6.84 08/07/2019    HGB 12.7 (L) 08/07/2019    HCT 37.7 08/07/2019    .0 (H) 08/07/2019    RDW 18.9 (H) 08/07/2019     (L) 08/07/2019    NEUTRORELPCT 62.3 08/07/2019    LYMPHORELPCT 22.1 08/07/2019    MONORELPCT 12.7 (H) 08/07/2019    EOSRELPCT 2.3 08/07/2019    BASORELPCT 0.3 08/07/2019    NEUTROABS 4.26 08/07/2019    LYMPHSABS 1.51 08/07/2019       Lab Results   Component Value Date     08/07/2019    K 4.1 08/07/2019    CO2 21.3 (L) 08/07/2019     08/07/2019    BUN 15 08/07/2019    CREATININE 1.23 08/07/2019    EGFRIFNONA 58 (L) 08/07/2019    GLUCOSE 126 (H) 08/07/2019    CALCIUM 9.6 08/07/2019    ALKPHOS 103 08/07/2019    AST 19 08/07/2019    ALT 9 08/07/2019    BILITOT 0.8 08/07/2019    ALBUMIN 3.67 08/07/2019    PROTEINTOT 7.3 08/07/2019       Lab Results   Component Value Date/Time    URICACID 8.9 (H) 07/17/2019 10:16 AM     Lab Results   Component Value Date    CEA 2.28 06/26/2019    CEA 3.40 03/20/2019    CEA 17.80 (H) 01/11/2019    CEA 18.80 (H) 10/19/2018     Lab Results   Component Value Date    PSA 4.930 (H) 10/19/2018       Lab Results   Component Value Date    FERRITIN 213.60 06/05/2019    IRON 83 06/05/2019    TIBC 443 06/05/2019    LABIRON 19 (L) 06/05/2019    [id number] 401 10/19/2018    FOLATE 7.48 10/19/2018     ASSESSMENT & PLAN:  David Mandujano is a very pleasant 69 y.o. male with newly diagnosed rectal cancer. Clinically stage IIIC ([id number]).    1.  Rectal cancer:  -  He received standard neoadjuvant chemoradiation as above given locally advanced tumor with suspicious pelvic lymph node.    - Pre-treatment MRI showed a locally advanced tumor and suspicious pelvic LN.  PET-CT was negative except in the local tumor.   -  There was a non-specific sub-pleural nodular opacity in the L lung base which was PET negative.  Perhaps this was inflammatory in nature. This area will require f/u on future imaging.  - Recommended neoadjuvant chemoradiation  "with Xeloda 825 mg/m2 BID D1-5 or M-F during the course of radiaton.  His dose was 500 mg x 4 or 2000 mg BID. Overall, he tolerated this well and completed treatment 1-21-19.    -  He saw Dr. Marilynn Yadav and underwent successful surgical resection as above.    -  He is now taking CapeOx treatment to prevent recurrence and has completed three cycles to date. Overall, he says he is tolerating the treatment well. Patient says he has been taking Xeloda but will occasionally \"forget to take it\" but says he makes it up later on his off week. He had not been picking up his prescription from the pharmacy but says he had an extra bottle at home that he had during radiation and has been using this. I again discussed the importance of medical compliance with the recommended treatment regimen as did his infusion RN. He understands that if he does not take his treatment as recommended, this will significantly increase his risk for recurrence. He verbalized understanding.   -Otherwise, exam/labs from today without cause for concern. Will proceed with cycle 4 today. He will follow up with Dr. Larios for another symptom/toxicity check on day of cycle 5.     2.  Diarrhea:  -Improved since last visit. He says he has changed his diet which has been helpful. He has been advised to take imodium prn on several occasions but he says he has not tried this yet. Will monitor.     3.  Iron deficiency anemia:  - He is taking oral iron, ferrous sulfate BID and tolerating well. Iron improved. Will continue and repeat testing with next follow up.      4.  Depressed mood / Anxiety and Alcohol Abuse:  - Previously given trial of Lexapro but he did not find this helpful and discontinued this. Currently, he says he is doing well in this regard. He did quit drinking completely around the time of his surgery but has recently been drinking again. However, he says he has cut this down. He says Dr. Yadav did mention to him that his liver didn't look good " during his operation.  -  We discussed potential involvement in support groups and /or referral for alcohol abuse counseling, but he declined.     5.  Prominent systolic murmur:   -   He was evaluated by Dr. Joe and then by Dr. Barrera.  He underwent echocardiogram 1-30-19 which showed EF 70%, grade 1 diastolic dysfunction, mild to mod septal asymmetric hypertrophy, AV calcification with functionally bicuspid valve, mild AI and moderate AS, moderate MAC with mild MR, mild MS  -  Thought is that at some point in the future he will require AVR.     6. Gout:  -Patient felt he had gout recently in his right great toe. He planned to follow up with PCP but says he did not have to as his symptoms resolved.       7.  Prophylaxis:  He took 2018 influenza vaccine 10-19-18. He took Prevnar 13 on 11-20-18.    8.  ACO Quality measures  - David Mandujano does not smoke, but he does use tobacco products in the form of chewing tobacco.  - I advised David of the risks of continuing to use tobacco.  During this visit, I spent <3 minutes counseling the patient regarding tobacco cessation.  - David Mandujano received 2018 flu vaccine.  - Current outpatient and discharge medications have been reconciled for the patient.  Reviewed by: JOSE CARLOS Damon    I spent 25 minutes with David Mandujano today.  In the office today, more than 50% of this time was spent face-to-face with him  in counseling / coordination of care, reviewing his interim medical history and counseling on the current treatment plan.  All questions were answered to his satisfaction.         Electronically Signed by: JOSE CARLOS Damon      CC:   MD Kathleen Coppola Emmanuel C, MD Shawn Michael Acino, MD William Richards, MD Sandra Beck, MD

## 2019-08-07 NOTE — PROGRESS NOTES
"Pt aware that Xeloda prescription was [facility].  Pt stated he still had some pills left and he would pick them up when he runs out.  Pt did verify he was taking xeloda but is non-compliant.  Pt stated he did not take it this am and does miss doses \"here and there.\" Pt aware how to take xeloda and re-educated pt regarding the importance of taking it as prescribed.   "

## 2019-08-26 DIAGNOSIS — C20 RECTAL CANCER (HCC): ICD-10-CM

## 2019-08-26 RX ORDER — PALONOSETRON 0.05 MG/ML
0.25 INJECTION, SOLUTION INTRAVENOUS ONCE
Status: CANCELLED | OUTPATIENT
Start: 2019-08-28

## 2019-08-26 RX ORDER — FAMOTIDINE 10 MG/ML
20 INJECTION, SOLUTION INTRAVENOUS AS NEEDED
Status: CANCELLED | OUTPATIENT
Start: 2019-08-28

## 2019-08-26 RX ORDER — DIPHENHYDRAMINE HYDROCHLORIDE 50 MG/ML
50 INJECTION INTRAMUSCULAR; INTRAVENOUS AS NEEDED
Status: CANCELLED | OUTPATIENT
Start: 2019-08-28

## 2019-08-26 RX ORDER — DEXTROSE MONOHYDRATE 50 MG/ML
250 INJECTION, SOLUTION INTRAVENOUS ONCE
Status: CANCELLED | OUTPATIENT
Start: 2019-08-28

## 2019-08-28 ENCOUNTER — LAB (OUTPATIENT)
Dept: ONCOLOGY | Facility: CLINIC | Age: 69
End: 2019-08-28

## 2019-08-28 ENCOUNTER — INFUSION (OUTPATIENT)
Dept: ONCOLOGY | Facility: HOSPITAL | Age: 69
End: 2019-08-28

## 2019-08-28 ENCOUNTER — OFFICE VISIT (OUTPATIENT)
Dept: ONCOLOGY | Facility: CLINIC | Age: 69
End: 2019-08-28

## 2019-08-28 VITALS
OXYGEN SATURATION: 98 % | DIASTOLIC BLOOD PRESSURE: 80 MMHG | RESPIRATION RATE: 22 BRPM | SYSTOLIC BLOOD PRESSURE: 166 MMHG | HEART RATE: 85 BPM | BODY MASS INDEX: 35.56 KG/M2 | WEIGHT: 262.2 LBS | SYSTOLIC BLOOD PRESSURE: 166 MMHG | OXYGEN SATURATION: 98 % | RESPIRATION RATE: 22 BRPM | BODY MASS INDEX: 35.56 KG/M2 | TEMPERATURE: 98.4 F | DIASTOLIC BLOOD PRESSURE: 80 MMHG | HEART RATE: 85 BPM | WEIGHT: 262.2 LBS | TEMPERATURE: 98.4 F

## 2019-08-28 DIAGNOSIS — C20 RECTAL CANCER (HCC): Primary | ICD-10-CM

## 2019-08-28 DIAGNOSIS — Z95.828 PORT-A-CATH IN PLACE: ICD-10-CM

## 2019-08-28 DIAGNOSIS — C20 RECTAL CANCER (HCC): ICD-10-CM

## 2019-08-28 DIAGNOSIS — E86.0 DEHYDRATION: ICD-10-CM

## 2019-08-28 DIAGNOSIS — R19.7 DIARRHEA, UNSPECIFIED TYPE: ICD-10-CM

## 2019-08-28 DIAGNOSIS — D50.9 IRON DEFICIENCY ANEMIA, UNSPECIFIED IRON DEFICIENCY ANEMIA TYPE: ICD-10-CM

## 2019-08-28 LAB
ALBUMIN SERPL-MCNC: 4.37 G/DL (ref 3.5–5.2)
ALBUMIN/GLOB SERPL: 1.2 G/DL
ALP SERPL-CCNC: 108 U/L (ref 39–117)
ALT SERPL W P-5'-P-CCNC: 10 U/L (ref 1–41)
ANION GAP SERPL CALCULATED.3IONS-SCNC: 14.9 MMOL/L (ref 5–15)
AST SERPL-CCNC: 22 U/L (ref 1–40)
BASOPHILS # BLD AUTO: 0.01 10*3/MM3 (ref 0–0.2)
BASOPHILS NFR BLD AUTO: 0.2 % (ref 0–1.5)
BILIRUB SERPL-MCNC: 1.3 MG/DL (ref 0.2–1.2)
BUN BLD-MCNC: 9 MG/DL (ref 8–23)
BUN/CREAT SERPL: 7.8 (ref 7–25)
CALCIUM SPEC-SCNC: 9.8 MG/DL (ref 8.6–10.5)
CHLORIDE SERPL-SCNC: 107 MMOL/L (ref 98–107)
CO2 SERPL-SCNC: 20.1 MMOL/L (ref 22–29)
CREAT BLD-MCNC: 1.15 MG/DL (ref 0.76–1.27)
DEPRECATED RDW RBC AUTO: 65 FL (ref 37–54)
EOSINOPHIL # BLD AUTO: 0.17 10*3/MM3 (ref 0–0.4)
EOSINOPHIL NFR BLD AUTO: 2.8 % (ref 0.3–6.2)
ERYTHROCYTE [DISTWIDTH] IN BLOOD BY AUTOMATED COUNT: 17.5 % (ref 12.3–15.4)
FERRITIN SERPL-MCNC: 573.2 NG/ML (ref 30–400)
GFR SERPL CREATININE-BSD FRML MDRD: 63 ML/MIN/1.73
GLOBULIN UR ELPH-MCNC: 3.6 GM/DL
GLUCOSE BLD-MCNC: 129 MG/DL (ref 65–99)
HCT VFR BLD AUTO: 37.1 % (ref 37.5–51)
HGB BLD-MCNC: 12.8 G/DL (ref 13–17.7)
IMM GRANULOCYTES # BLD AUTO: 0.02 10*3/MM3 (ref 0–0.05)
IMM GRANULOCYTES NFR BLD AUTO: 0.3 % (ref 0–0.5)
IRON 24H UR-MRATE: 100 MCG/DL (ref 59–158)
IRON SATN MFR SERPL: 19 % (ref 20–50)
LYMPHOCYTES # BLD AUTO: 1.56 10*3/MM3 (ref 0.7–3.1)
LYMPHOCYTES NFR BLD AUTO: 25.7 % (ref 19.6–45.3)
MCH RBC QN AUTO: 36.1 PG (ref 26.6–33)
MCHC RBC AUTO-ENTMCNC: 34.5 G/DL (ref 31.5–35.7)
MCV RBC AUTO: 104.5 FL (ref 79–97)
MONOCYTES # BLD AUTO: 0.69 10*3/MM3 (ref 0.1–0.9)
MONOCYTES NFR BLD AUTO: 11.3 % (ref 5–12)
NEUTROPHILS # BLD AUTO: 3.63 10*3/MM3 (ref 1.7–7)
NEUTROPHILS NFR BLD AUTO: 59.7 % (ref 42.7–76)
PLATELET # BLD AUTO: 128 10*3/MM3 (ref 140–450)
PMV BLD AUTO: 9.3 FL (ref 6–12)
POTASSIUM BLD-SCNC: 4.4 MMOL/L (ref 3.5–5.2)
PROT SERPL-MCNC: 8 G/DL (ref 6–8.5)
RBC # BLD AUTO: 3.55 10*6/MM3 (ref 4.14–5.8)
SODIUM BLD-SCNC: 142 MMOL/L (ref 136–145)
TIBC SERPL-MCNC: 522 MCG/DL (ref 298–536)
TRANSFERRIN SERPL-MCNC: 350 MG/DL (ref 200–360)
WBC NRBC COR # BLD: 6.08 10*3/MM3 (ref 3.4–10.8)

## 2019-08-28 PROCEDURE — 99214 OFFICE O/P EST MOD 30 MIN: CPT | Performed by: INTERNAL MEDICINE

## 2019-08-28 PROCEDURE — 84466 ASSAY OF TRANSFERRIN: CPT | Performed by: INTERNAL MEDICINE

## 2019-08-28 PROCEDURE — 96415 CHEMO IV INFUSION ADDL HR: CPT

## 2019-08-28 PROCEDURE — 83540 ASSAY OF IRON: CPT | Performed by: INTERNAL MEDICINE

## 2019-08-28 PROCEDURE — 80053 COMPREHEN METABOLIC PANEL: CPT | Performed by: INTERNAL MEDICINE

## 2019-08-28 PROCEDURE — 25010000002 DEXAMETHASONE SODIUM PHOSPHATE 20 MG/5ML SOLUTION 5 ML VIAL: Performed by: INTERNAL MEDICINE

## 2019-08-28 PROCEDURE — 25010000002 OXALIPLATIN PER 0.5 MG: Performed by: INTERNAL MEDICINE

## 2019-08-28 PROCEDURE — 96375 TX/PRO/DX INJ NEW DRUG ADDON: CPT

## 2019-08-28 PROCEDURE — 96413 CHEMO IV INFUSION 1 HR: CPT

## 2019-08-28 PROCEDURE — 82728 ASSAY OF FERRITIN: CPT | Performed by: INTERNAL MEDICINE

## 2019-08-28 PROCEDURE — 85025 COMPLETE CBC W/AUTO DIFF WBC: CPT | Performed by: INTERNAL MEDICINE

## 2019-08-28 PROCEDURE — 25010000002 PALONOSETRON PER 25 MCG: Performed by: INTERNAL MEDICINE

## 2019-08-28 RX ORDER — SODIUM CHLORIDE 0.9 % (FLUSH) 0.9 %
10 SYRINGE (ML) INJECTION AS NEEDED
Status: DISCONTINUED | OUTPATIENT
Start: 2019-08-28 | End: 2019-08-28 | Stop reason: HOSPADM

## 2019-08-28 RX ORDER — DEXTROSE MONOHYDRATE 50 MG/ML
250 INJECTION, SOLUTION INTRAVENOUS ONCE
Status: COMPLETED | OUTPATIENT
Start: 2019-08-28 | End: 2019-08-28

## 2019-08-28 RX ORDER — PALONOSETRON 0.05 MG/ML
0.25 INJECTION, SOLUTION INTRAVENOUS ONCE
Status: COMPLETED | OUTPATIENT
Start: 2019-08-28 | End: 2019-08-28

## 2019-08-28 RX ORDER — SODIUM CHLORIDE 0.9 % (FLUSH) 0.9 %
10 SYRINGE (ML) INJECTION AS NEEDED
Status: CANCELLED | OUTPATIENT
Start: 2019-09-18

## 2019-08-28 RX ADMIN — OXALIPLATIN 315 MG: 5 INJECTION, SOLUTION, CONCENTRATE INTRAVENOUS at 12:08

## 2019-08-28 RX ADMIN — SODIUM CHLORIDE, PRESERVATIVE FREE 10 ML: 5 INJECTION INTRAVENOUS at 14:33

## 2019-08-28 RX ADMIN — PALONOSETRON HYDROCHLORIDE 0.25 MG: 0.25 INJECTION INTRAVENOUS at 11:27

## 2019-08-28 RX ADMIN — DEXAMETHASONE SODIUM PHOSPHATE 12 MG: 4 INJECTION, SOLUTION INTRAMUSCULAR; INTRAVENOUS at 11:31

## 2019-08-28 RX ADMIN — DEXTROSE MONOHYDRATE 250 ML: 50 INJECTION, SOLUTION INTRAVENOUS at 11:26

## 2019-08-28 RX ADMIN — SODIUM CHLORIDE, PRESERVATIVE FREE 500 UNITS: 5 INJECTION INTRAVENOUS at 14:34

## 2019-08-28 NOTE — PROGRESS NOTES
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  Combined chemoradiation with Xeloda 825 mg/m2 BID D1-5 or M-F during the course of radiation.  His dose is 500 mg x 4 or 2000 mg BID  Treatment finished 19.    2.  Dr. Yadav performed LAR with coloanal anstomosis and loop ileostomy 19.  Pathology showed  Residual invasive adneocarcinoma.  Tumor invaded the muscularis propria.  Surgical margins clear.  014 resected LNs involved.  ypT2N0.      3.       CHIEF COMPLAINT:  Follow up of Rectal cancer/toxicity check    HISTORY OF PRESENT ILLNESS:   David Mandujano is a very pleasant 69 y.o. male who was referred by Dr. Barnhart for evaluation and treatment of colorectal cancer. He began to notice rectal bleeding in January or 2018, and he also began to notice fatigue in approximately July. He assumed he was out of shape when he started to become short of breath and started trying to exercise more frequently, which only exacerbated the shortness of breath. He was evaluated by his PCP, Raj Wang MD, who after testing, found him to be severely iron deficient, hyperglycemic, and he also had an elevated PSA. He was on vacation at the time, and his PCP asked him to return home for further evaluation. He was started on oral iron therapy and later received IV iron infusions. He was also scheduled for a colonoscopy with Dr. Barnhart, during which a suspicious rectal mass was biopsied and pathology was positive for an ulcerated invasive, moderately differentiated rectal adenocarcinoma.     INTERVAL HISTORY:  Mr. Mandujano is here today for follow up of rectal cancer. He has been receiving CapeOX. He reports diarrhea and having to empty his colostomy bag every 30 min-1 hr over the last 2 weeks. He reports adequate water intake (2 glasses of water every 30 min). He feels that  "he is dehydrated. He says when he gets up \"that first step is woozy.\"  He reports gout has resolved after he quit eating shrimp.  He is overall tolerating his treatment well in spite of his problems.  He says he has been compliant with Xeloda.  He very occasionally forgets his evening dose and makes up for that on his off week.  He has an appt to see Dr. aYdav in early October.  HE has questions about follow up care.      PAST MEDICAL HISTORY:  Past Medical History:   Diagnosis Date   • Anemia    • Arthritis    • Colon cancer (CMS/HCC)     s/p chemo therapy   • Heart murmur    • Hypertension    • Wrist fracture     surgically repaired       PAST SURGICAL HISTORY:  Past Surgical History:   Procedure Laterality Date   • ABDOMINAL SURGERY     • COLON SURGERY     • COLONOSCOPY     • FRACTURE SURGERY Left    • VENOUS ACCESS DEVICE (PORT) INSERTION N/A 5/20/2019    Procedure: INSERTION VENOUS ACCESS DEVICE;  Surgeon: Cindy Corrales MD;  Location: [facility];  Service: General   • WRIST SURGERY         FAMILY HISTORY:  Family History   Problem Relation Age of Onset   • Colon cancer Maternal Grandfather    • Other Sister    • Heart disease Sister    No other family history of malignancy.    SOCIAL HISTORY:  Social History     Socioeconomic History   • Marital status: Single     Spouse name: Not on file   • Number of children: Not on file   • Years of education: Not on file   • Highest education level: Not on file   Tobacco Use   • Smoking status: Never Smoker   • Smokeless tobacco: Current User     Types: Chew   Substance and Sexual Activity   • Alcohol use: Yes     Comment: occasional   • Drug use: No   • Sexual activity: Defer   Social History Narrative    He is , and is self employed/semi retired. He is a non smoker but chews tobacco. He used to drink daily, but says he has cut it in half.      REVIEW OF SYSTEMS:   A comprehensive 14 point review of systems was performed.  Significant findings as mentioned " above.  All other systems reviewed and are negative.      MEDICATIONS:  The current medication list was reviewed in the EMR    Current Outpatient Medications:   •  amLODIPine (NORVASC) 10 MG tablet, Take 5 mg by mouth Daily., Disp: , Rfl:   •  capecitabine (XELODA) 150 MG chemo tablet, Take 1 tab by mouth in the AM and 1 tab by mouth in the PM on days 1-14, then off for 7 days. Take with 500 mg tabs for total dose of 2150mg, Disp: 28 tablet, Rfl: 5  •  capecitabine (XELODA) 500 MG chemo tablet, Take 4 tabs by mouth in the AM and 4 tabs by mouth in the PM on days 1-14, then off 7 days. Take with 150mg tabs for total dose of 2150mg., Disp: 112 tablet, Rfl: 5  •  ferrous sulfate 325 (65 FE) MG tablet, Take 325 mg by mouth 2 (Two) Times a Day., Disp: , Rfl:   •  HYDROcodone-acetaminophen (NORCO) 7.5-325 MG per tablet, Take 1 tablet by mouth 4 (Four) Times a Day As Needed for Moderate Pain ., Disp: 12 tablet, Rfl: 0  •  metoprolol succinate XL (TOPROL-XL) 50 MG 24 hr tablet, Take 1 tablet by mouth Daily., Disp: 30 tablet, Rfl: 11  •  ondansetron ODT (ZOFRAN-ODT) 8 MG disintegrating tablet, Dissolve 1 tablet on the tongue 3 (Three) Times a Day As Needed for Nausea or Vomiting., Disp: 30 tablet, Rfl: 5  •  prochlorperazine (COMPAZINE) 5 MG tablet, Take 2 tablets by mouth Every 6 (Six) Hours As Needed for Nausea or Vomiting., Disp: 60 tablet, Rfl: 5    ALLERGIES:  No Known Allergies    PHYSICAL EXAM:  Vitals:    08/28/19 0941   BP: 166/80   Pulse: 85   Resp: 22   Temp: 98.4 °F (36.9 °C)   SpO2: 98%     Pain Score    08/28/19 0941   PainSc: 0-No pain   General:  Awake, alert and oriented, in no distress.   HEENT:  Pupils are equal, round and reactive to light and accommodation, Extra-ocular movements full, Oropharyx clear, mucous membranes dry  Neck:  No JVD, thyromegaly or lymphadenopathy  CV:  Regular rate and rhythm, III/IV systolic murmur, no rubs or gallops  Resp:  Lungs are clear to auscultation bilaterally  Abd:   Soft, non-tender, sl distended, bowel sounds present, no organomegaly or masses.  Surgical incisions well-healed.  Stoma functioning well.  Ext:  No clubbing, cyanosis or edema.  + Dry, tenting of his skin.  Lymph:  No cervical, supraclavicular, axillary, adenopathy  Neuro:  grossly non-focal exam    PATHOLOGY:  10-02-18      04-01-19:        04-09-19:        ENDOSCOPY:  10-02-18:        IMAGING:  10-04-18 CT Abdomen Pelvis With Contrast   Findings  - LOWER THORAX: Patchy nonspecific subpleural nodularity in the left lung base that could represent sequelae of chronic airspace disease or early fibrosis.     ABDOMEN:  - LIVER: Homogeneous. No focal hepatic mass or ductal dilatation.  - GALLBLADDER: GALLSTONES WITHIN THE GALLBLADDER.  - PANCREAS: Unremarkable. No mass or ductal dilatation.  - SPLEEN: Homogeneous. No splenomegaly.  - ADRENALS: No mass.  - KIDNEYS: RIGHT RENAL CYST MEASURING 3.6 CM.  - GI TRACT: NONSPECIFIC DISTAL SIGMOID COLONIC WALL THICKENING VERSUS  NONDISTENTION.  - PERITONEUM: No free air. No free fluid or loculated fluid collections.  - MESENTERY: Unremarkable.  - LYMPH NODES: No lymphadenopathy.  - VASCULATURE: ATHEROSCLEROTIC VASCULAR CALCIFICATION.  - ABDOMINAL WALL: SMALL BILATERAL HUMERAL HERNIAS CONTAINING ONLY FAT.  - OTHER: None.     PELVIS:  - BLADDER: No focal mass or significant wall thickening  - REPRODUCTIVE: Unremarkable as visualized.  - APPENDIX: Nondistended. No surrounding inflammation.  - BONES: No acute bony abnormality.     IMPRESSION:  1. Gallstones within the gallbladder.  2.Nonspecific distal sigmoid colonic wall thickening versus nondistention.    PET/CT 10-29-18    11-13-18 MRI Pelvis Without Contrast  FINDINGS:  1.) TUMOR LOCATION AND CHARACTERISTICS        i) Distance of Inferior margin of Tumor from the dentate line: 9.5 CM    ii) Tumor at or below the puborectalis sling:  NO   iii) Relationship to the anterior peritoneal reflection: BELOW   iv) Craniocaudal length of  the tumor: 6cm   v) Clock face of tumor: 5o'clock to 2o'clock  vi) Polypoid/ Annular/ Semi-annular: SEMI-ANNULAR  vii) Mucinous: YES     2.) EXTRAMURAL DEPTH OF INVASION AND MR T-CATEGORY         i) Extramural depth of invasion (Use 0mm for T1 or T2 tumor):  APPROXIMATELY 5 MM    ii) T category: T3 B              *Please indicate structures with possible invasion.  Specify laterality,  sequence and slice#: (see list below)     *  Anterior peritoneal reflection (T4a tumor): NO  *  Puborectalis: NO  *  Bladder: NO  *  Vascular Involvement of Iliac Vessels: NO  *  Levator ani: NO  *  Ureters: NO  *  Obturator: NO  *  Prostate: MARGINAL/TETHERED  *  Piriformis: NO  *  Uterus: NOT APPLICABLE  *  Pelvic Bone: NO  *  Vagina: NOT APPLICABLE  *  Sacrum: NO  *  Urethra: NO  *  Other:           iii) For low rectal tumors (maximum tumor depth at or below the  puborectalis sling):     *  Not applicable (tumor above the puborectalis sling: NOT APPLICABLE  *    *  Level 1 (submucosa only, no involvement of internal anal sphincter):       *  Level 2 (confined to the internal anal sphincter; no involvement of  intersphincteric fat):      *  Level 3 (intersphincteric fat involved):      *  Level 4 (involves external sphincter or beyond):         3. RELATIONSHIP OF THE TUMOR TO MESORECTAL FASCIA (MRF)       i) Shortest distance 0mm of the definitive tumour border to the MRF  is: AT 12:00   ii) Are there any tumour spiculations closer to the MRF? NO     iii) Location of Tumor margin to MRF: 12:00, AT THE LEVEL PROSTATE     4. EXTRAMURAL VENOUS INVASION       i) Extramural Venous Invasion (EMVI): ABSENT     5. MESORECTAL LYMPH NODES AND TUMOUR DEPOSITS       i) Any suspicious mesorectal lymph nodes/tumor deposits: YES      *If yes, the most suspicious node/tumor deposit is: 15 MM IN  DIAMETER, ALONG THE UPPER MARGIN OF the tumor with minimum distance 7  MMmm from the MRF at 7o'clock     6. EXTRAMESORECTAL LYMPH NODES        i) Any  suspicious extramesorectal lymph nodes: NO      *If yes, location and laterality of suspicious nodes:             Int. Iliac:                Ext. Iliac:                Common Iliac:                Obturator:                Inguinal:                Other:           ii) Is the BETH node station in the field of view: NO        *If Yes, are these nodes suspicious:         7. OTHER FINDINGS (COMPLICATIONS, METASTASES, LIMITATIONS)         NOTE: THERE IS SOME UNCERTAINTY WHETHER THE APPARENT SMALL FOCUS  OF TUMOR EXTENDING TO THE PROSTATE AT THE 12:00 POSITION COULD ACTUALLY  BE PROSTATE NODULE EXTENDING TOWARD THE TUMOR. RECTAL TUMOR IS FAVORED;  ALTHOUGH THIS DETERMINATION CANNOT BE MADE WITH COMPLETE CERTAINTY,  TUMOR IS CONSIDERED T3 B.         IMPRESSIONS:  MRI rectal cancer T category is: T3 B  Maximum EMD of invasion is: 5  Minimum tumor to MRF distance is: 0  Low rectal tumor component: NO  Mesorectal nodes/tumor deposits: SUSPICIOUS  EMVI: ABSENT  Extramesorectal nodes: NEGATIVE    RECENT LABS:  Lab Results   Component Value Date    WBC 6.08 08/28/2019    HGB 12.8 (L) 08/28/2019    HCT 37.1 (L) 08/28/2019    .5 (H) 08/28/2019    RDW 17.5 (H) 08/28/2019     (L) 08/28/2019    NEUTRORELPCT 59.7 08/28/2019    LYMPHORELPCT 25.7 08/28/2019    MONORELPCT 11.3 08/28/2019    EOSRELPCT 2.8 08/28/2019    BASORELPCT 0.2 08/28/2019    NEUTROABS 3.63 08/28/2019    LYMPHSABS 1.56 08/28/2019       Lab Results   Component Value Date     08/28/2019    K 4.4 08/28/2019    CO2 20.1 (L) 08/28/2019     08/28/2019    BUN 9 08/28/2019    CREATININE 1.15 08/28/2019    EGFRIFNONA 63 08/28/2019    GLUCOSE 129 (H) 08/28/2019    CALCIUM 9.8 08/28/2019    ALKPHOS 108 08/28/2019    AST 22 08/28/2019    ALT 10 08/28/2019    BILITOT 1.3 (H) 08/28/2019    ALBUMIN 4.37 08/28/2019    PROTEINTOT 8.0 08/28/2019       Lab Results   Component Value Date/Time    URICACID 8.9 (H) 07/17/2019 10:16 AM     Lab Results   Component  "Value Date    CEA 2.28 06/26/2019    CEA 3.40 03/20/2019    CEA 17.80 (H) 01/11/2019    CEA 18.80 (H) 10/19/2018     Lab Results   Component Value Date    PSA 4.930 (H) 10/19/2018       Lab Results   Component Value Date    FERRITIN 573.20 (H) 08/28/2019    IRON 100 08/28/2019    TIBC 522 08/28/2019    LABIRON 19 (L) 08/28/2019    IYMMDNAZ77 401 10/19/2018    FOLATE 7.48 10/19/2018     ASSESSMENT & PLAN:  David Mandujano is a very pleasant 69 y.o. male with newly diagnosed rectal cancer. Clinically stage IIIC ([id number]).    1.  Rectal cancer:  -  He received standard neoadjuvant chemoradiation as above given locally advanced tumor with suspicious pelvic lymph node.    - Pre-treatment MRI showed a locally advanced tumor and suspicious pelvic LN.  PET-CT was negative except in the local tumor.   -  There was a non-specific sub-pleural nodular opacity in the L lung base which was PET negative.  Perhaps this was inflammatory in nature. This area will require f/u on future imaging.  - Recommended neoadjuvant chemoradiation with Xeloda 825 mg/m2 BID D1-5 or M-F during the course of radiaton.  His dose was 500 mg x 4 or 2000 mg BID. Overall, he tolerated this well and completed treatment 1-21-19.    -  He saw Dr. Marilynn Yadav and underwent successful surgical resection as above.    -  He is now taking CapeOx treatment to prevent recurrence and has completed 4 cycles to date. Overall, he says he is tolerating the treatment well. Patient says he has been taking Xeloda but will occasionally \"forget to take it\"in the evening (he says this happens very infrequently) but says he makes it up later on his off week.   -  Encouraged him to use Imodium to slow down the diarrhea to help with hydration.  Encouraged continued aggressive oral hydration.  Will give 2L NS today also.    -  After we complete his adjuvant therapy will repeat CT imaging and CEA.  He has an appt to see Dr. Yadav in early October.    2.  Diarrhea:  -This is pretty " significant.  He has been advised to take imodium prn on several occasions but he says he has not tried this yet because he doesn't like to take pills.  He says he understands now why this is important and tells me he will start.     3.  Iron deficiency anemia:  - He is taking oral iron, ferrous sulfate BID and tolerating well. Iron improved. Will stop oral iron.    4.  Depressed mood / Anxiety and Alcohol Abuse:  - Previously given trial of Lexapro but he did not find this helpful and discontinued this. Currently, he says he is doing well in this regard. He did quit drinking completely around the time of his surgery but has recently been drinking again. However, he says he has cut this down. He says Dr. Yadav did mention to him that his liver didn't look good during his operation.  -  We discussed potential involvement in support groups and /or referral for alcohol abuse counseling, but he declined.     5.  Prominent systolic murmur:   -   He was evaluated by Dr. Joe and then by Dr. Barrera.  He underwent echocardiogram 1-30-19 which showed EF 70%, grade 1 diastolic dysfunction, mild to mod septal asymmetric hypertrophy, AV calcification with functionally bicuspid valve, mild AI and moderate AS, moderate MAC with mild MR, mild MS  -  Thought is that at some point in the future he will require AVR.     6. Gout:  -  Resolved with dietary change.     7.  Prophylaxis:  He took 2018 influenza vaccine 10-19-18. He took Prevnar 13 on 11-20-18.    8. ACO / KAVITHA/Other  Quality measures  - David Mandujano received 2018 flu vaccine.  - David Mandujano reports a pain score of 0.    - Current outpatient and discharge medications have been reconciled for the patient.  Reviewed by: Jyoti Larios MD     9. Follow up:   - will have him return with C6 for toxicity check and I will see him back for follow up with C7.     This note was scribed for Jyoti Larios MD by Lynn Pinto RN.    I, Jyoti Larios MD, personally  performed the services described in this documentation as scribed by the above named individual in my presence, and it is both accurate and complete.  08/28/2019     I spent 25 minutes with David Mandujano today.  In the office today, more than 50% of this time was spent face-to-face with him  in counseling / coordination of care, reviewing his interim medical history and counseling on the current treatment plan.  All questions were answered to his satisfaction.         Electronically Signed by: Jyoti Larios MD       CC:   MD Kathleen Coppola Emmanuel C, MD Shawn Michael Acino, MD William Richards, MD Sandra Beck, MD

## 2019-09-17 DIAGNOSIS — C20 RECTAL CANCER (HCC): ICD-10-CM

## 2019-09-17 RX ORDER — DIPHENHYDRAMINE HYDROCHLORIDE 50 MG/ML
50 INJECTION INTRAMUSCULAR; INTRAVENOUS AS NEEDED
Status: CANCELLED | OUTPATIENT
Start: 2019-09-18

## 2019-09-17 RX ORDER — FAMOTIDINE 10 MG/ML
20 INJECTION, SOLUTION INTRAVENOUS AS NEEDED
Status: CANCELLED | OUTPATIENT
Start: 2019-09-18

## 2019-09-17 RX ORDER — PALONOSETRON 0.05 MG/ML
0.25 INJECTION, SOLUTION INTRAVENOUS ONCE
Status: CANCELLED | OUTPATIENT
Start: 2019-09-18

## 2019-09-17 RX ORDER — DEXTROSE MONOHYDRATE 50 MG/ML
250 INJECTION, SOLUTION INTRAVENOUS ONCE
Status: CANCELLED | OUTPATIENT
Start: 2019-09-18

## 2019-09-18 ENCOUNTER — LAB (OUTPATIENT)
Dept: ONCOLOGY | Facility: CLINIC | Age: 69
End: 2019-09-18

## 2019-09-18 ENCOUNTER — OFFICE VISIT (OUTPATIENT)
Dept: ONCOLOGY | Facility: CLINIC | Age: 69
End: 2019-09-18

## 2019-09-18 ENCOUNTER — INFUSION (OUTPATIENT)
Dept: ONCOLOGY | Facility: HOSPITAL | Age: 69
End: 2019-09-18

## 2019-09-18 VITALS
RESPIRATION RATE: 20 BRPM | TEMPERATURE: 98.9 F | DIASTOLIC BLOOD PRESSURE: 75 MMHG | BODY MASS INDEX: 36.12 KG/M2 | HEART RATE: 77 BPM | SYSTOLIC BLOOD PRESSURE: 152 MMHG | OXYGEN SATURATION: 98 % | WEIGHT: 266.3 LBS

## 2019-09-18 VITALS
WEIGHT: 266.3 LBS | SYSTOLIC BLOOD PRESSURE: 152 MMHG | RESPIRATION RATE: 20 BRPM | TEMPERATURE: 98.9 F | BODY MASS INDEX: 36.12 KG/M2 | OXYGEN SATURATION: 98 % | HEART RATE: 77 BPM | DIASTOLIC BLOOD PRESSURE: 75 MMHG

## 2019-09-18 DIAGNOSIS — D50.9 IRON DEFICIENCY ANEMIA, UNSPECIFIED IRON DEFICIENCY ANEMIA TYPE: ICD-10-CM

## 2019-09-18 DIAGNOSIS — F10.10 ALCOHOL ABUSE: ICD-10-CM

## 2019-09-18 DIAGNOSIS — R01.1 SYSTOLIC MURMUR: ICD-10-CM

## 2019-09-18 DIAGNOSIS — Z95.828 PORT-A-CATH IN PLACE: Primary | ICD-10-CM

## 2019-09-18 DIAGNOSIS — C20 RECTAL CANCER (HCC): ICD-10-CM

## 2019-09-18 DIAGNOSIS — C20 RECTAL CANCER (HCC): Primary | ICD-10-CM

## 2019-09-18 DIAGNOSIS — R19.7 DIARRHEA, UNSPECIFIED TYPE: ICD-10-CM

## 2019-09-18 DIAGNOSIS — F32.A DEPRESSION, UNSPECIFIED DEPRESSION TYPE: ICD-10-CM

## 2019-09-18 DIAGNOSIS — F41.9 ANXIETY: ICD-10-CM

## 2019-09-18 LAB
ALBUMIN SERPL-MCNC: 3.93 G/DL (ref 3.5–5.2)
ALBUMIN/GLOB SERPL: 1.1 G/DL
ALP SERPL-CCNC: 108 U/L (ref 39–117)
ALT SERPL W P-5'-P-CCNC: 8 U/L (ref 1–41)
ANION GAP SERPL CALCULATED.3IONS-SCNC: 14.7 MMOL/L (ref 5–15)
ANISOCYTOSIS BLD QL: NORMAL
AST SERPL-CCNC: 25 U/L (ref 1–40)
BASOPHILS # BLD AUTO: 0.01 10*3/MM3 (ref 0–0.2)
BASOPHILS NFR BLD AUTO: 0.2 % (ref 0–1.5)
BILIRUB SERPL-MCNC: 1.2 MG/DL (ref 0.2–1.2)
BUN BLD-MCNC: 14 MG/DL (ref 8–23)
BUN/CREAT SERPL: 13.3 (ref 7–25)
CALCIUM SPEC-SCNC: 9.8 MG/DL (ref 8.6–10.5)
CHLORIDE SERPL-SCNC: 104 MMOL/L (ref 98–107)
CO2 SERPL-SCNC: 22.3 MMOL/L (ref 22–29)
CREAT BLD-MCNC: 1.05 MG/DL (ref 0.76–1.27)
DEPRECATED RDW RBC AUTO: 67.3 FL (ref 37–54)
EOSINOPHIL # BLD AUTO: 0.16 10*3/MM3 (ref 0–0.4)
EOSINOPHIL NFR BLD AUTO: 2.6 % (ref 0.3–6.2)
ERYTHROCYTE [DISTWIDTH] IN BLOOD BY AUTOMATED COUNT: 17.2 % (ref 12.3–15.4)
GFR SERPL CREATININE-BSD FRML MDRD: 70 ML/MIN/1.73
GLOBULIN UR ELPH-MCNC: 3.5 GM/DL
GLUCOSE BLD-MCNC: 137 MG/DL (ref 65–99)
HCT VFR BLD AUTO: 34.6 % (ref 37.5–51)
HGB BLD-MCNC: 11.6 G/DL (ref 13–17.7)
IMM GRANULOCYTES # BLD AUTO: 0.04 10*3/MM3 (ref 0–0.05)
IMM GRANULOCYTES NFR BLD AUTO: 0.6 % (ref 0–0.5)
LYMPHOCYTES # BLD AUTO: 1.43 10*3/MM3 (ref 0.7–3.1)
LYMPHOCYTES NFR BLD AUTO: 22.8 % (ref 19.6–45.3)
MACROCYTES BLD QL SMEAR: NORMAL
MCH RBC QN AUTO: 37.4 PG (ref 26.6–33)
MCHC RBC AUTO-ENTMCNC: 33.5 G/DL (ref 31.5–35.7)
MCV RBC AUTO: 111.6 FL (ref 79–97)
MONOCYTES # BLD AUTO: 0.77 10*3/MM3 (ref 0.1–0.9)
MONOCYTES NFR BLD AUTO: 12.3 % (ref 5–12)
NEUTROPHILS # BLD AUTO: 3.86 10*3/MM3 (ref 1.7–7)
NEUTROPHILS NFR BLD AUTO: 61.5 % (ref 42.7–76)
PLATELET # BLD AUTO: 109 10*3/MM3 (ref 140–450)
PMV BLD AUTO: 9.5 FL (ref 6–12)
POTASSIUM BLD-SCNC: 3.9 MMOL/L (ref 3.5–5.2)
PROT SERPL-MCNC: 7.4 G/DL (ref 6–8.5)
RBC # BLD AUTO: 3.1 10*6/MM3 (ref 4.14–5.8)
SMALL PLATELETS BLD QL SMEAR: NORMAL
SODIUM BLD-SCNC: 141 MMOL/L (ref 136–145)
WBC NRBC COR # BLD: 6.27 10*3/MM3 (ref 3.4–10.8)

## 2019-09-18 PROCEDURE — 99214 OFFICE O/P EST MOD 30 MIN: CPT | Performed by: NURSE PRACTITIONER

## 2019-09-18 PROCEDURE — 85025 COMPLETE CBC W/AUTO DIFF WBC: CPT | Performed by: INTERNAL MEDICINE

## 2019-09-18 PROCEDURE — 96361 HYDRATE IV INFUSION ADD-ON: CPT

## 2019-09-18 PROCEDURE — 25010000002 PALONOSETRON PER 25 MCG: Performed by: INTERNAL MEDICINE

## 2019-09-18 PROCEDURE — 85007 BL SMEAR W/DIFF WBC COUNT: CPT | Performed by: INTERNAL MEDICINE

## 2019-09-18 PROCEDURE — 96415 CHEMO IV INFUSION ADDL HR: CPT

## 2019-09-18 PROCEDURE — 96413 CHEMO IV INFUSION 1 HR: CPT

## 2019-09-18 PROCEDURE — 25010000002 OXALIPLATIN PER 0.5 MG: Performed by: INTERNAL MEDICINE

## 2019-09-18 PROCEDURE — 80053 COMPREHEN METABOLIC PANEL: CPT | Performed by: INTERNAL MEDICINE

## 2019-09-18 PROCEDURE — 25010000002 DEXAMETHASONE SODIUM PHOSPHATE 20 MG/5ML SOLUTION 5 ML VIAL: Performed by: INTERNAL MEDICINE

## 2019-09-18 PROCEDURE — 96375 TX/PRO/DX INJ NEW DRUG ADDON: CPT

## 2019-09-18 RX ORDER — PALONOSETRON 0.05 MG/ML
0.25 INJECTION, SOLUTION INTRAVENOUS ONCE
Status: COMPLETED | OUTPATIENT
Start: 2019-09-18 | End: 2019-09-18

## 2019-09-18 RX ORDER — SODIUM CHLORIDE 0.9 % (FLUSH) 0.9 %
10 SYRINGE (ML) INJECTION AS NEEDED
Status: CANCELLED | OUTPATIENT
Start: 2019-10-09

## 2019-09-18 RX ORDER — DEXTROSE MONOHYDRATE 50 MG/ML
250 INJECTION, SOLUTION INTRAVENOUS ONCE
Status: COMPLETED | OUTPATIENT
Start: 2019-09-18 | End: 2019-09-18

## 2019-09-18 RX ADMIN — PALONSETRON HYDROCHLORIDE 0.25 MG: 0.25 INJECTION, SOLUTION INTRAVENOUS at 10:30

## 2019-09-18 RX ADMIN — DEXTROSE MONOHYDRATE 250 ML: 50 INJECTION, SOLUTION INTRAVENOUS at 10:30

## 2019-09-18 RX ADMIN — SODIUM CHLORIDE, PRESERVATIVE FREE 500 UNITS: 5 INJECTION INTRAVENOUS at 13:35

## 2019-09-18 RX ADMIN — OXALIPLATIN 315 MG: 5 INJECTION, SOLUTION, CONCENTRATE INTRAVENOUS at 11:20

## 2019-09-18 RX ADMIN — DEXAMETHASONE SODIUM PHOSPHATE 12 MG: 4 INJECTION, SOLUTION INTRAMUSCULAR; INTRAVENOUS at 10:30

## 2019-09-18 NOTE — PROGRESS NOTES
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  Combined chemoradiation with Xeloda 825 mg/m2 BID D1-5 or M-F during the course of radiation.  His dose is 500 mg x 4 or 2000 mg BID  Treatment finished 19.    2.  Dr. Yadav performed LAR with coloanal anstomosis and loop ileostomy 19.  Pathology showed  Residual invasive adneocarcinoma.  Tumor invaded the muscularis propria.  Surgical margins clear.  014 resected LNs involved.  ypT2N0.      3.       CHIEF COMPLAINT:  Follow up of Rectal cancer/toxicity check    HISTORY OF PRESENT ILLNESS:   David Mandujano is a very pleasant 69 y.o. male who was referred by Dr. Barnhart for evaluation and treatment of colorectal cancer. He began to notice rectal bleeding in January or 2018, and he also began to notice fatigue in approximately July. He assumed he was out of shape when he started to become short of breath and started trying to exercise more frequently, which only exacerbated the shortness of breath. He was evaluated by his PCP, Raj Wang MD, who after testing, found him to be severely iron deficient, hyperglycemic, and he also had an elevated PSA. He was on vacation at the time, and his PCP asked him to return home for further evaluation. He was started on oral iron therapy and later received IV iron infusions. He was also scheduled for a colonoscopy with Dr. Barnhart, during which a suspicious rectal mass was biopsied and pathology was positive for an ulcerated invasive, moderately differentiated rectal adenocarcinoma.     INTERVAL HISTORY:  Mr. Mandujano is here today for follow up of rectal cancer and toxicity check. He is receiving CapeOx treatment and has completed five cycles to date. Since his last visit, he reports he has been doing well. Overall, he is tolerating his treatment well aside from  "fatigue which remains tolerable and diarrhea. He finally tried taking imodium as advised but he says this made his stool \"muddy\" and he didn't like dealing with it. He knows to take imodium if he were to having worsening diarrhea. He says he is now emptying his bag every 2-3 hours which is better. He says he is hydrating well with water and gatorade. He says he has been compliant with Xeloda.  He very occasionally forgets his evening dose and makes up for that on his off week.  He has an appt to see Dr. Yadav on October 4th.  He denies any other complaints today.     PAST MEDICAL HISTORY:  Past Medical History:   Diagnosis Date   • Anemia    • Arthritis    • Colon cancer (CMS/HCC)     s/p chemo therapy   • Heart murmur    • Hypertension    • Wrist fracture     surgically repaired       PAST SURGICAL HISTORY:  Past Surgical History:   Procedure Laterality Date   • ABDOMINAL SURGERY     • COLON SURGERY     • COLONOSCOPY     • FRACTURE SURGERY Left    • VENOUS ACCESS DEVICE (PORT) INSERTION N/A 5/20/2019    Procedure: INSERTION VENOUS ACCESS DEVICE;  Surgeon: Cindy Corrales MD;  Location: [facility];  Service: General   • WRIST SURGERY         FAMILY HISTORY:  Family History   Problem Relation Age of Onset   • Colon cancer Maternal Grandfather    • Other Sister    • Heart disease Sister    No other family history of malignancy.    SOCIAL HISTORY:  Social History     Socioeconomic History   • Marital status: Single     Spouse name: Not on file   • Number of children: Not on file   • Years of education: Not on file   • Highest education level: Not on file   Tobacco Use   • Smoking status: Never Smoker   • Smokeless tobacco: Current User     Types: Chew   Substance and Sexual Activity   • Alcohol use: Yes     Comment: occasional   • Drug use: No   • Sexual activity: Defer   Social History Narrative    He is , and is self employed/semi retired. He is a non smoker but chews tobacco. He used to drink daily, but says " he has cut it in half.      REVIEW OF SYSTEMS:   A comprehensive 14 point review of systems was performed.  Significant findings as mentioned above.  All other systems reviewed and are negative.      MEDICATIONS:  The current medication list was reviewed in the EMR    Current Outpatient Medications:   •  amLODIPine (NORVASC) 10 MG tablet, Take 5 mg by mouth Daily., Disp: , Rfl:   •  capecitabine (XELODA) 150 MG chemo tablet, Take 1 tab by mouth in the AM and 1 tab by mouth in the PM on days 1-14, then off for 7 days. Take with 500 mg tabs for total dose of 2150mg, Disp: 28 tablet, Rfl: 5  •  capecitabine (XELODA) 500 MG chemo tablet, Take 4 tabs by mouth in the AM and 4 tabs by mouth in the PM on days 1-14, then off 7 days. Take with 150mg tabs for total dose of 2150mg., Disp: 112 tablet, Rfl: 5  •  ferrous sulfate 325 (65 FE) MG tablet, Take 325 mg by mouth 2 (Two) Times a Day., Disp: , Rfl:   •  HYDROcodone-acetaminophen (NORCO) 7.5-325 MG per tablet, Take 1 tablet by mouth 4 (Four) Times a Day As Needed for Moderate Pain ., Disp: 12 tablet, Rfl: 0  •  metoprolol succinate XL (TOPROL-XL) 50 MG 24 hr tablet, Take 1 tablet by mouth Daily., Disp: 30 tablet, Rfl: 11  •  ondansetron ODT (ZOFRAN-ODT) 8 MG disintegrating tablet, Dissolve 1 tablet on the tongue 3 (Three) Times a Day As Needed for Nausea or Vomiting., Disp: 30 tablet, Rfl: 5  •  prochlorperazine (COMPAZINE) 5 MG tablet, Take 2 tablets by mouth Every 6 (Six) Hours As Needed for Nausea or Vomiting., Disp: 60 tablet, Rfl: 5    ALLERGIES:  No Known Allergies    PHYSICAL EXAM:  Vitals:    09/18/19 0931   BP: 152/75   Pulse: 77   Resp: 20   Temp: 98.9 °F (37.2 °C)   SpO2: 98%     Pain Score    09/18/19 0931   PainSc: 0-No pain   General:  Awake, alert and oriented, in no distress.   HEENT:  Pupils are equal, round and reactive to light and accommodation, Extra-ocular movements full, Oropharyx clear, mucous membranes moist  Neck:  No JVD, thyromegaly or  lymphadenopathy  CV:  Regular rate and rhythm, III/IV systolic murmur, no rubs or gallops  Resp:  Lungs are clear to auscultation bilaterally  Abd:  Soft, non-tender, sl distended, bowel sounds present, no organomegaly or masses.  Surgical incisions well-healed.  Stoma functioning well.  Ext:  No clubbing, cyanosis or edema.   Lymph:  No cervical, supraclavicular, axillary, adenopathy  Neuro:  grossly non-focal exam    PATHOLOGY:  10-02-18      04-01-19:        04-09-19:        ENDOSCOPY:  10-02-18:        IMAGING:  10-04-18 CT Abdomen Pelvis With Contrast   Findings  - LOWER THORAX: Patchy nonspecific subpleural nodularity in the left lung base that could represent sequelae of chronic airspace disease or early fibrosis.     ABDOMEN:  - LIVER: Homogeneous. No focal hepatic mass or ductal dilatation.  - GALLBLADDER: GALLSTONES WITHIN THE GALLBLADDER.  - PANCREAS: Unremarkable. No mass or ductal dilatation.  - SPLEEN: Homogeneous. No splenomegaly.  - ADRENALS: No mass.  - KIDNEYS: RIGHT RENAL CYST MEASURING 3.6 CM.  - GI TRACT: NONSPECIFIC DISTAL SIGMOID COLONIC WALL THICKENING VERSUS  NONDISTENTION.  - PERITONEUM: No free air. No free fluid or loculated fluid collections.  - MESENTERY: Unremarkable.  - LYMPH NODES: No lymphadenopathy.  - VASCULATURE: ATHEROSCLEROTIC VASCULAR CALCIFICATION.  - ABDOMINAL WALL: SMALL BILATERAL HUMERAL HERNIAS CONTAINING ONLY FAT.  - OTHER: None.     PELVIS:  - BLADDER: No focal mass or significant wall thickening  - REPRODUCTIVE: Unremarkable as visualized.  - APPENDIX: Nondistended. No surrounding inflammation.  - BONES: No acute bony abnormality.     IMPRESSION:  1. Gallstones within the gallbladder.  2.Nonspecific distal sigmoid colonic wall thickening versus nondistention.    PET/CT 10-29-18    11-13-18 MRI Pelvis Without Contrast  FINDINGS:  1.) TUMOR LOCATION AND CHARACTERISTICS        i) Distance of Inferior margin of Tumor from the dentate line: 9.5 CM    ii) Tumor at or below  the puborectalis sling:  NO   iii) Relationship to the anterior peritoneal reflection: BELOW   iv) Craniocaudal length of the tumor: 6cm   v) Clock face of tumor: 5o'clock to 2o'clock  vi) Polypoid/ Annular/ Semi-annular: SEMI-ANNULAR  vii) Mucinous: YES     2.) EXTRAMURAL DEPTH OF INVASION AND MR T-CATEGORY         i) Extramural depth of invasion (Use 0mm for T1 or T2 tumor):  APPROXIMATELY 5 MM    ii) T category: T3 B              *Please indicate structures with possible invasion.  Specify laterality,  sequence and slice#: (see list below)     *  Anterior peritoneal reflection (T4a tumor): NO  *  Puborectalis: NO  *  Bladder: NO  *  Vascular Involvement of Iliac Vessels: NO  *  Levator ani: NO  *  Ureters: NO  *  Obturator: NO  *  Prostate: MARGINAL/TETHERED  *  Piriformis: NO  *  Uterus: NOT APPLICABLE  *  Pelvic Bone: NO  *  Vagina: NOT APPLICABLE  *  Sacrum: NO  *  Urethra: NO  *  Other:           iii) For low rectal tumors (maximum tumor depth at or below the  puborectalis sling):     *  Not applicable (tumor above the puborectalis sling: NOT APPLICABLE  *    *  Level 1 (submucosa only, no involvement of internal anal sphincter):       *  Level 2 (confined to the internal anal sphincter; no involvement of  intersphincteric fat):      *  Level 3 (intersphincteric fat involved):      *  Level 4 (involves external sphincter or beyond):         3. RELATIONSHIP OF THE TUMOR TO MESORECTAL FASCIA (MRF)       i) Shortest distance 0mm of the definitive tumour border to the MRF  is: AT 12:00   ii) Are there any tumour spiculations closer to the MRF? NO     iii) Location of Tumor margin to MRF: 12:00, AT THE LEVEL PROSTATE     4. EXTRAMURAL VENOUS INVASION       i) Extramural Venous Invasion (EMVI): ABSENT     5. MESORECTAL LYMPH NODES AND TUMOUR DEPOSITS       i) Any suspicious mesorectal lymph nodes/tumor deposits: YES      *If yes, the most suspicious node/tumor deposit is: 15 MM IN  DIAMETER, ALONG THE UPPER MARGIN  OF the tumor with minimum distance 7  MMmm from the MRF at 7o'clock     6. EXTRAMESORECTAL LYMPH NODES        i) Any suspicious extramesorectal lymph nodes: NO      *If yes, location and laterality of suspicious nodes:             Int. Iliac:                Ext. Iliac:                Common Iliac:                Obturator:                Inguinal:                Other:           ii) Is the BETH node station in the field of view: NO        *If Yes, are these nodes suspicious:         7. OTHER FINDINGS (COMPLICATIONS, METASTASES, LIMITATIONS)         NOTE: THERE IS SOME UNCERTAINTY WHETHER THE APPARENT SMALL FOCUS  OF TUMOR EXTENDING TO THE PROSTATE AT THE 12:00 POSITION COULD ACTUALLY  BE PROSTATE NODULE EXTENDING TOWARD THE TUMOR. RECTAL TUMOR IS FAVORED;  ALTHOUGH THIS DETERMINATION CANNOT BE MADE WITH COMPLETE CERTAINTY,  TUMOR IS CONSIDERED T3 B.         IMPRESSIONS:  MRI rectal cancer T category is: T3 B  Maximum EMD of invasion is: 5  Minimum tumor to MRF distance is: 0  Low rectal tumor component: NO  Mesorectal nodes/tumor deposits: SUSPICIOUS  EMVI: ABSENT  Extramesorectal nodes: NEGATIVE    RECENT LABS:  Lab Results   Component Value Date    WBC 6.27 09/18/2019    HGB 11.6 (L) 09/18/2019    HCT 34.6 (L) 09/18/2019    .6 (H) 09/18/2019    RDW 17.2 (H) 09/18/2019     (L) 09/18/2019    NEUTRORELPCT 61.5 09/18/2019    LYMPHORELPCT 22.8 09/18/2019    MONORELPCT 12.3 (H) 09/18/2019    EOSRELPCT 2.6 09/18/2019    BASORELPCT 0.2 09/18/2019    NEUTROABS 3.86 09/18/2019    LYMPHSABS 1.43 09/18/2019       Lab Results   Component Value Date     09/18/2019    K 3.9 09/18/2019    CO2 22.3 09/18/2019     09/18/2019    BUN 14 09/18/2019    CREATININE 1.05 09/18/2019    EGFRIFNONA 70 09/18/2019    GLUCOSE 137 (H) 09/18/2019    CALCIUM 9.8 09/18/2019    ALKPHOS 108 09/18/2019    AST 25 09/18/2019    ALT 8 09/18/2019    BILITOT 1.2 09/18/2019    ALBUMIN 3.93 09/18/2019    PROTEINTOT 7.4 09/18/2019  "      Lab Results   Component Value Date/Time    URICACID 8.9 (H) 07/17/2019 10:16 AM     Lab Results   Component Value Date    CEA 2.28 06/26/2019    CEA 3.40 03/20/2019    CEA 17.80 (H) 01/11/2019    CEA 18.80 (H) 10/19/2018     Lab Results   Component Value Date    PSA 4.930 (H) 10/19/2018       Lab Results   Component Value Date    FERRITIN 573.20 (H) 08/28/2019    IRON 100 08/28/2019    TIBC 522 08/28/2019    LABIRON 19 (L) 08/28/2019    IUCSGEEK19 401 10/19/2018    FOLATE 7.48 10/19/2018     ASSESSMENT & PLAN:  David Mandujano is a very pleasant 69 y.o. male with newly diagnosed rectal cancer. Clinically stage IIIC ([id number]).    1.  Rectal cancer:  -  He received standard neoadjuvant chemoradiation as above given locally advanced tumor with suspicious pelvic lymph node.    - Pre-treatment MRI showed a locally advanced tumor and suspicious pelvic LN.  PET-CT was negative except in the local tumor.   -  There was a non-specific sub-pleural nodular opacity in the L lung base which was PET negative.  Perhaps this was inflammatory in nature. This area will require f/u on future imaging.  - Recommended neoadjuvant chemoradiation with Xeloda 825 mg/m2 BID D1-5 or M-F during the course of radiaton.  His dose was 500 mg x 4 or 2000 mg BID. Overall, he tolerated this well and completed treatment 1-21-19.    -  He saw Dr. Marilynn Yadav and underwent successful surgical resection as above.    -  He is now taking CapeOx treatment to prevent recurrence and has completed 5 cycles to date. Overall, he says he is tolerating the treatment well aside from fatigue and diarrhea (see below). Patient says he has been taking Xeloda but will occasionally \"forget to take it\" but says he makes it up later on his off week. Again discussed the importance of medical compliance with the recommended treatment.   -Exam/labs from today without cause for concern. Will proceed with cycle 6 today. He will follow up with Dr. Larios for another " "symptom/toxicity check on day of cycle 7.   -  After we complete his adjuvant therapy will repeat CT imaging and CEA.  He has an appt to see Dr. Yadav on October 4th.    2.  Diarrhea:  -Currently improved. He finally tried taking imodium as advised and says this made his stool \"muddy\" and he didn't like dealing with it. He knows to take imodium if he were to having worsening diarrhea. He says he is now emptying his bag every 2-3 hours which is better. He says he is hydrating well with water and gatorade. Will monitor.     3.  Iron deficiency anemia:  - He tolerated ferrous sulfate BID well. Iron now replete and oral iron has been discontinued. Will monitor.     4.  Depressed mood / Anxiety and Alcohol Abuse:  - Previously given trial of Lexapro but he did not find this helpful and discontinued this. Currently, he says he is doing well in this regard. He did quit drinking completely around the time of his surgery but has recently been drinking again. However, he says he has cut this down. He says Dr. Yadav did mention to him that his liver didn't look good during his operation.  -  We discussed potential involvement in support groups and /or referral for alcohol abuse counseling, but he declined.     5.  Prominent systolic murmur:   -   He was evaluated by Dr. Joe and then by Dr. Barrera.  He underwent echocardiogram 1-30-19 which showed EF 70%, grade 1 diastolic dysfunction, mild to mod septal asymmetric hypertrophy, AV calcification with functionally bicuspid valve, mild AI and moderate AS, moderate MAC with mild MR, mild MS  -  Thought is that at some point in the future he will require AVR.      6.  Prophylaxis:  He took 2018 influenza vaccine 10-19-18. He took Prevnar 13 on 11-20-18.    7. ACO / KAVITHA/Other  Quality measures  - David Mandujano received 2018 flu vaccine.  - David POLLARD Delbert reports a pain score of 0.    - Current outpatient and discharge medications have been reconciled for the patient.  Reviewed by: " JOSE CARLOS Damon     I spent 25 minutes with David Mandujano today.  In the office today, more than 50% of this time was spent face-to-face with him  in counseling / coordination of care, reviewing his interim medical history and counseling on the current treatment plan.  All questions were answered to his satisfaction.         Electronically Signed by: JOSE CARLOS Damon      CC:   MD Kathleen Coppola Emmanuel C, MD Shawn Michael Acino, MD William Richards, MD Sandra Beck, MD

## 2019-10-06 DIAGNOSIS — C20 RECTAL CANCER (HCC): ICD-10-CM

## 2019-10-06 RX ORDER — PALONOSETRON 0.05 MG/ML
0.25 INJECTION, SOLUTION INTRAVENOUS ONCE
Status: CANCELLED | OUTPATIENT
Start: 2019-10-17

## 2019-10-06 RX ORDER — FAMOTIDINE 10 MG/ML
20 INJECTION, SOLUTION INTRAVENOUS AS NEEDED
Status: CANCELLED | OUTPATIENT
Start: 2019-10-17

## 2019-10-06 RX ORDER — DEXTROSE MONOHYDRATE 50 MG/ML
250 INJECTION, SOLUTION INTRAVENOUS ONCE
Status: CANCELLED | OUTPATIENT
Start: 2019-10-17

## 2019-10-06 RX ORDER — DIPHENHYDRAMINE HYDROCHLORIDE 50 MG/ML
50 INJECTION INTRAMUSCULAR; INTRAVENOUS AS NEEDED
Status: CANCELLED | OUTPATIENT
Start: 2019-10-17

## 2019-10-07 ENCOUNTER — TELEPHONE (OUTPATIENT)
Dept: ONCOLOGY | Facility: HOSPITAL | Age: 69
End: 2019-10-07

## 2019-10-07 NOTE — TELEPHONE ENCOUNTER
Patient was unsure if treatment was needed since scans are scheduled.  I explained to patient that we do need him to come on Wednesday for treatment, that he has 8 planned cycles and Wednesday will be Day 1 of Cycle 7.  Patient states he will be here on Wednesday 10/09/19.

## 2019-10-07 NOTE — TELEPHONE ENCOUNTER
----- Message from Keya Mosquera sent at 10/7/2019 12:36 PM EDT -----  Regarding: PATIENT CALL  Franky wants to know if he really needs to come on Weds for tx.  He was saying something about scans and Dr. Yadav . . . . You may want to give him a call to find out the logic behind his request.

## 2019-10-09 ENCOUNTER — INFUSION (OUTPATIENT)
Dept: ONCOLOGY | Facility: HOSPITAL | Age: 69
End: 2019-10-09

## 2019-10-09 ENCOUNTER — LAB (OUTPATIENT)
Dept: ONCOLOGY | Facility: CLINIC | Age: 69
End: 2019-10-09

## 2019-10-09 ENCOUNTER — OFFICE VISIT (OUTPATIENT)
Dept: ONCOLOGY | Facility: CLINIC | Age: 69
End: 2019-10-09

## 2019-10-09 VITALS
TEMPERATURE: 98.1 F | TEMPERATURE: 98.1 F | WEIGHT: 269 LBS | RESPIRATION RATE: 18 BRPM | OXYGEN SATURATION: 96 % | SYSTOLIC BLOOD PRESSURE: 150 MMHG | HEART RATE: 80 BPM | OXYGEN SATURATION: 96 % | BODY MASS INDEX: 36.48 KG/M2 | DIASTOLIC BLOOD PRESSURE: 60 MMHG | DIASTOLIC BLOOD PRESSURE: 60 MMHG | WEIGHT: 269 LBS | RESPIRATION RATE: 18 BRPM | BODY MASS INDEX: 36.48 KG/M2 | HEART RATE: 80 BPM | SYSTOLIC BLOOD PRESSURE: 150 MMHG

## 2019-10-09 DIAGNOSIS — R19.7 DIARRHEA, UNSPECIFIED TYPE: ICD-10-CM

## 2019-10-09 DIAGNOSIS — D69.6 THROMBOCYTOPENIA (HCC): ICD-10-CM

## 2019-10-09 DIAGNOSIS — C20 RECTAL CANCER (HCC): ICD-10-CM

## 2019-10-09 DIAGNOSIS — Z95.828 PORT-A-CATH IN PLACE: Primary | ICD-10-CM

## 2019-10-09 DIAGNOSIS — F10.10 ALCOHOL ABUSE: ICD-10-CM

## 2019-10-09 DIAGNOSIS — D50.9 IRON DEFICIENCY ANEMIA, UNSPECIFIED IRON DEFICIENCY ANEMIA TYPE: Primary | ICD-10-CM

## 2019-10-09 LAB
ALBUMIN SERPL-MCNC: 3.83 G/DL (ref 3.5–5.2)
ALBUMIN/GLOB SERPL: 1.1 G/DL
ALP SERPL-CCNC: 112 U/L (ref 39–117)
ALT SERPL W P-5'-P-CCNC: 8 U/L (ref 1–41)
ANION GAP SERPL CALCULATED.3IONS-SCNC: 13.2 MMOL/L (ref 5–15)
ANISOCYTOSIS BLD QL: NORMAL
AST SERPL-CCNC: 20 U/L (ref 1–40)
BASOPHILS # BLD AUTO: 0.01 10*3/MM3 (ref 0–0.2)
BASOPHILS NFR BLD AUTO: 0.2 % (ref 0–1.5)
BILIRUB SERPL-MCNC: 0.9 MG/DL (ref 0.2–1.2)
BUN BLD-MCNC: 8 MG/DL (ref 8–23)
BUN/CREAT SERPL: 8.4 (ref 7–25)
CALCIUM SPEC-SCNC: 9.4 MG/DL (ref 8.6–10.5)
CHLORIDE SERPL-SCNC: 105 MMOL/L (ref 98–107)
CO2 SERPL-SCNC: 22.8 MMOL/L (ref 22–29)
CREAT BLD-MCNC: 0.95 MG/DL (ref 0.76–1.27)
DEPRECATED RDW RBC AUTO: 69.3 FL (ref 37–54)
EOSINOPHIL # BLD AUTO: 0.17 10*3/MM3 (ref 0–0.4)
EOSINOPHIL NFR BLD AUTO: 3.6 % (ref 0.3–6.2)
ERYTHROCYTE [DISTWIDTH] IN BLOOD BY AUTOMATED COUNT: 16.8 % (ref 12.3–15.4)
FERRITIN SERPL-MCNC: 284.7 NG/ML (ref 30–400)
GFR SERPL CREATININE-BSD FRML MDRD: 79 ML/MIN/1.73
GLOBULIN UR ELPH-MCNC: 3.4 GM/DL
GLUCOSE BLD-MCNC: 135 MG/DL (ref 65–99)
HCT VFR BLD AUTO: 32.4 % (ref 37.5–51)
HGB BLD-MCNC: 11 G/DL (ref 13–17.7)
IMM GRANULOCYTES # BLD AUTO: 0.01 10*3/MM3 (ref 0–0.05)
IMM GRANULOCYTES NFR BLD AUTO: 0.2 % (ref 0–0.5)
IRON 24H UR-MRATE: 56 MCG/DL (ref 59–158)
IRON SATN MFR SERPL: 12 % (ref 20–50)
LYMPHOCYTES # BLD AUTO: 1.12 10*3/MM3 (ref 0.7–3.1)
LYMPHOCYTES NFR BLD AUTO: 23.7 % (ref 19.6–45.3)
MACROCYTES BLD QL SMEAR: NORMAL
MCH RBC QN AUTO: 38.3 PG (ref 26.6–33)
MCHC RBC AUTO-ENTMCNC: 34 G/DL (ref 31.5–35.7)
MCV RBC AUTO: 112.9 FL (ref 79–97)
MONOCYTES # BLD AUTO: 0.64 10*3/MM3 (ref 0.1–0.9)
MONOCYTES NFR BLD AUTO: 13.6 % (ref 5–12)
NEUTROPHILS # BLD AUTO: 2.77 10*3/MM3 (ref 1.7–7)
NEUTROPHILS NFR BLD AUTO: 58.7 % (ref 42.7–76)
PLATELET # BLD AUTO: 85 10*3/MM3 (ref 140–450)
PMV BLD AUTO: 10.1 FL (ref 6–12)
POTASSIUM BLD-SCNC: 3.8 MMOL/L (ref 3.5–5.2)
PROT SERPL-MCNC: 7.2 G/DL (ref 6–8.5)
RBC # BLD AUTO: 2.87 10*6/MM3 (ref 4.14–5.8)
SMALL PLATELETS BLD QL SMEAR: NORMAL
SODIUM BLD-SCNC: 141 MMOL/L (ref 136–145)
TIBC SERPL-MCNC: 465 MCG/DL (ref 298–536)
TRANSFERRIN SERPL-MCNC: 312 MG/DL (ref 200–360)
WBC NRBC COR # BLD: 4.72 10*3/MM3 (ref 3.4–10.8)

## 2019-10-09 PROCEDURE — G0008 ADMIN INFLUENZA VIRUS VAC: HCPCS | Performed by: INTERNAL MEDICINE

## 2019-10-09 PROCEDURE — 99214 OFFICE O/P EST MOD 30 MIN: CPT | Performed by: INTERNAL MEDICINE

## 2019-10-09 PROCEDURE — 82728 ASSAY OF FERRITIN: CPT | Performed by: INTERNAL MEDICINE

## 2019-10-09 PROCEDURE — 36591 DRAW BLOOD OFF VENOUS DEVICE: CPT

## 2019-10-09 PROCEDURE — 90674 CCIIV4 VAC NO PRSV 0.5 ML IM: CPT | Performed by: INTERNAL MEDICINE

## 2019-10-09 PROCEDURE — 85025 COMPLETE CBC W/AUTO DIFF WBC: CPT | Performed by: INTERNAL MEDICINE

## 2019-10-09 PROCEDURE — 85007 BL SMEAR W/DIFF WBC COUNT: CPT | Performed by: INTERNAL MEDICINE

## 2019-10-09 PROCEDURE — 84466 ASSAY OF TRANSFERRIN: CPT | Performed by: INTERNAL MEDICINE

## 2019-10-09 PROCEDURE — 80053 COMPREHEN METABOLIC PANEL: CPT | Performed by: INTERNAL MEDICINE

## 2019-10-09 PROCEDURE — 83540 ASSAY OF IRON: CPT | Performed by: INTERNAL MEDICINE

## 2019-10-09 RX ORDER — SODIUM CHLORIDE 0.9 % (FLUSH) 0.9 %
10 SYRINGE (ML) INJECTION AS NEEDED
Status: DISCONTINUED | OUTPATIENT
Start: 2019-10-09 | End: 2019-10-09 | Stop reason: HOSPADM

## 2019-10-09 RX ORDER — SODIUM CHLORIDE 0.9 % (FLUSH) 0.9 %
10 SYRINGE (ML) INJECTION AS NEEDED
Status: CANCELLED | OUTPATIENT
Start: 2019-10-17

## 2019-10-09 RX ADMIN — SODIUM CHLORIDE, PRESERVATIVE FREE 10 ML: 5 INJECTION INTRAVENOUS at 12:07

## 2019-10-09 RX ADMIN — HEPARIN SODIUM (PORCINE) LOCK FLUSH IV SOLN 100 UNIT/ML 500 UNITS: 100 SOLUTION at 12:07

## 2019-10-09 NOTE — PROGRESS NOTES
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  Combined chemoradiation with Xeloda 825 mg/m2 BID D1-5 or M-F during the course of radiation.  His dose is 500 mg x 4 or 2000 mg BID  Treatment finished 19.    2.  Dr. Yadav performed LAR with coloanal anstomosis and loop ileostomy 19.  Pathology showed  Residual invasive adneocarcinoma.  Tumor invaded the muscularis propria.  Surgical margins clear.  014 resected LNs involved.  ypT2N0.      3.         CHIEF COMPLAINT:  Follow up of Rectal cancer    HISTORY OF PRESENT ILLNESS:   David Mandujano is a very pleasant 69 y.o. male who was referred by Dr. Barnhart for evaluation and treatment of colorectal cancer. He began to notice rectal bleeding in January or 2018, and he also began to notice fatigue in approximately July. He assumed he was out of shape when he started to become short of breath and started trying to exercise more frequently, which only exacerbated the shortness of breath. He was evaluated by his PCP, Raj Wang MD, who after testing, found him to be severely iron deficient, hyperglycemic, and he also had an elevated PSA. He was on vacation at the time, and his PCP asked him to return home for further evaluation. He was started on oral iron therapy and later received IV iron infusions. He was also scheduled for a colonoscopy with Dr. Barnhart, during which a suspicious rectal mass was biopsied and pathology was positive for an ulcerated invasive, moderately differentiated rectal adenocarcinoma.     INTERVAL HISTORY:  Mr. Mandujano is here today for follow up of rectal cancer. He is scheduled to start with C7 of CapeOX. He is tolerating his treatment fairly well, but complains of diarrhea and fatigue/generalized weakness that improve during his week off. He reports compliance with Xeloda  generally but sometimes misses morning dose if he gets up late and forgets.  He is anxious to have his ostomy reversed and has PET-CT ordered by Dr. Yadav on 10-25.  He says he isn't sure he will take his last cycle of treatment but will think about it. He hasn't had flu vaccine yet.  He says he has been drinking more alcohol this week because he has been afraid to use marijuana vape because of recent reports of lung injury.  He asks how his liver enzymes look.          PAST MEDICAL HISTORY:  Past Medical History:   Diagnosis Date   • Anemia    • Arthritis    • Colon cancer (CMS/HCC)     s/p chemo therapy   • Heart murmur    • Hypertension    • Wrist fracture     surgically repaired       PAST SURGICAL HISTORY:  Past Surgical History:   Procedure Laterality Date   • ABDOMINAL SURGERY     • COLON SURGERY     • COLONOSCOPY     • FRACTURE SURGERY Left    • VENOUS ACCESS DEVICE (PORT) INSERTION N/A 5/20/2019    Procedure: INSERTION VENOUS ACCESS DEVICE;  Surgeon: Cindy Corrales MD;  Location: [facility];  Service: General   • WRIST SURGERY         FAMILY HISTORY:  Family History   Problem Relation Age of Onset   • Colon cancer Maternal Grandfather    • Other Sister    • Heart disease Sister    No other family history of malignancy.    SOCIAL HISTORY:  Social History     Socioeconomic History   • Marital status: Single     Spouse name: Not on file   • Number of children: Not on file   • Years of education: Not on file   • Highest education level: Not on file   Tobacco Use   • Smoking status: Never Smoker   • Smokeless tobacco: Current User     Types: Chew   Substance and Sexual Activity   • Alcohol use: Yes     Comment: occasional   • Drug use: No   • Sexual activity: Defer   Social History Narrative    He is , and is self employed/semi retired. He is a non smoker but chews tobacco. He used to drink daily, but says he has cut it in half.      REVIEW OF SYSTEMS:   A comprehensive 14 point review of systems was  performed.  Significant findings as mentioned above.  All other systems reviewed and are negative.      MEDICATIONS:  The current medication list was reviewed in the EMR    Current Outpatient Medications:   •  amLODIPine (NORVASC) 10 MG tablet, Take 5 mg by mouth Daily., Disp: , Rfl:   •  capecitabine (XELODA) 150 MG chemo tablet, Take 1 tab by mouth in the AM and 1 tab by mouth in the PM on days 1-14, then off for 7 days. Take with 500 mg tabs for total dose of 2150mg, Disp: 28 tablet, Rfl: 5  •  capecitabine (XELODA) 500 MG chemo tablet, Take 4 tabs by mouth in the AM and 4 tabs by mouth in the PM on days 1-14, then off 7 days. Take with 150mg tabs for total dose of 2150mg., Disp: 112 tablet, Rfl: 5  •  ferrous sulfate 325 (65 FE) MG tablet, Take 325 mg by mouth 2 (Two) Times a Day., Disp: , Rfl:   •  HYDROcodone-acetaminophen (NORCO) 7.5-325 MG per tablet, Take 1 tablet by mouth 4 (Four) Times a Day As Needed for Moderate Pain ., Disp: 12 tablet, Rfl: 0  •  metoprolol succinate XL (TOPROL-XL) 50 MG 24 hr tablet, Take 1 tablet by mouth Daily., Disp: 30 tablet, Rfl: 11  •  ondansetron ODT (ZOFRAN-ODT) 8 MG disintegrating tablet, Dissolve 1 tablet on the tongue 3 (Three) Times a Day As Needed for Nausea or Vomiting., Disp: 30 tablet, Rfl: 5  •  prochlorperazine (COMPAZINE) 5 MG tablet, Take 2 tablets by mouth Every 6 (Six) Hours As Needed for Nausea or Vomiting., Disp: 60 tablet, Rfl: 5  No current facility-administered medications for this visit.     Facility-Administered Medications Ordered in Other Visits:   •  heparin flush (porcine) 100 UNIT/ML injection 500 Units, 500 Units, Intravenous, PRN, Jyoti Larios MD  •  sodium chloride 0.9 % flush 10 mL, 10 mL, Intravenous, PRN, Jyoti Larios MD    ALLERGIES:  No Known Allergies    PHYSICAL EXAM:  Vitals:    10/09/19 0944   BP: 150/60   Pulse: 80   Resp: 18   Temp: 98.1 °F (36.7 °C)   SpO2: 96%     Pain Score    10/09/19 0944   PainSc: 0-No pain   General:   Awake, alert and oriented, in no distress.   HEENT:  Pupils are equal, round and reactive to light and accommodation, Extra-ocular movements full, Oropharyx clear, mucous membranes moist  Neck:  No JVD, thyromegaly or lymphadenopathy  CV:  Regular rate and rhythm, III/IV systolic murmur, no rubs or gallops  Resp:  Lungs are clear to auscultation bilaterally  Abd:  Soft, non-tender, sl distended, bowel sounds present, no organomegaly or masses.  Surgical incisions well-healed.  Stoma functioning well.  Ext:  No clubbing, cyanosis or edema.   Lymph:  No cervical, supraclavicular, axillary, adenopathy  Neuro:  grossly non-focal exam    PATHOLOGY:  10-02-18      04-01-19:        04-09-19:        ENDOSCOPY:  10-02-18:        IMAGING:  10-04-18 CT Abdomen Pelvis With Contrast   Findings  - LOWER THORAX: Patchy nonspecific subpleural nodularity in the left lung base that could represent sequelae of chronic airspace disease or early fibrosis.     ABDOMEN:  - LIVER: Homogeneous. No focal hepatic mass or ductal dilatation.  - GALLBLADDER: GALLSTONES WITHIN THE GALLBLADDER.  - PANCREAS: Unremarkable. No mass or ductal dilatation.  - SPLEEN: Homogeneous. No splenomegaly.  - ADRENALS: No mass.  - KIDNEYS: RIGHT RENAL CYST MEASURING 3.6 CM.  - GI TRACT: NONSPECIFIC DISTAL SIGMOID COLONIC WALL THICKENING VERSUS NONDISTENTION.  - PERITONEUM: No free air. No free fluid or loculated fluid collections.  - MESENTERY: Unremarkable.  - LYMPH NODES: No lymphadenopathy.  - VASCULATURE: ATHEROSCLEROTIC VASCULAR CALCIFICATION.  - ABDOMINAL WALL: SMALL BILATERAL HUMERAL HERNIAS CONTAINING ONLY FAT.  - OTHER: None.     PELVIS:  - BLADDER: No focal mass or significant wall thickening  - REPRODUCTIVE: Unremarkable as visualized.  - APPENDIX: Nondistended. No surrounding inflammation.  - BONES: No acute bony abnormality.     IMPRESSION:  1. Gallstones within the gallbladder.  2.Nonspecific distal sigmoid colonic wall thickening versus  nondistention.    PET/CT 10-29-18    11-13-18 MRI Pelvis Without Contrast  FINDINGS:  1.) TUMOR LOCATION AND CHARACTERISTICS     i) Distance of Inferior margin of Tumor from the dentate line: 9.5 CM  ii) Tumor at or below the puborectalis sling:  NO  iii) Relationship to the anterior peritoneal reflection: BELOW  iv) Craniocaudal length of the tumor: 6cm  v) Clock face of tumor: 5o'clock to 2o'clock  vi) Polypoid/ Annular/ Semi-annular: SEMI-ANNULAR  vii) Mucinous: YES     2.) EXTRAMURAL DEPTH OF INVASION AND MR T-CATEGORY       i) Extramural depth of invasion (Use 0mm for T1 or T2 tumor):  APPROXIMATELY 5 MM   ii) T category: T3 B              *Please indicate structures with possible invasion.  Specify laterality,  sequence and slice#: (see list below)     *  Anterior peritoneal reflection (T4a tumor): NO  *  Puborectalis: NO  *  Bladder: NO  *  Vascular Involvement of Iliac Vessels: NO  *  Levator ani: NO  *  Ureters: NO  *  Obturator: NO  *  Prostate: MARGINAL/TETHERED  *  Piriformis: NO  *  Uterus: NOT APPLICABLE  *  Pelvic Bone: NO  *  Vagina: NOT APPLICABLE  *  Sacrum: NO  *  Urethra: NO  *  Other:          iii) For low rectal tumors (maximum tumor depth at or below the  puborectalis sling):     *  Not applicable (tumor above the puborectalis sling: NOT APPLICABLE  *    *  Level 1 (submucosa only, no involvement of internal anal sphincter):       *  Level 2 (confined to the internal anal sphincter; no involvement of  intersphincteric fat):      *  Level 3 (intersphincteric fat involved):      *  Level 4 (involves external sphincter or beyond):         3. RELATIONSHIP OF THE TUMOR TO MESORECTAL FASCIA (MRF)       i) Shortest distance 0mm of the definitive tumour border to the MRF  is: AT 12:00   ii) Are there any tumour spiculations closer to the MRF? NO     iii) Location of Tumor margin to MRF: 12:00, AT THE LEVEL PROSTATE     4. EXTRAMURAL VENOUS INVASION       i) Extramural Venous Invasion (EMVI): ABSENT      5. MESORECTAL LYMPH NODES AND TUMOUR DEPOSITS       i) Any suspicious mesorectal lymph nodes/tumor deposits: YES      *If yes, the most suspicious node/tumor deposit is: 15 MM IN  DIAMETER, ALONG THE UPPER MARGIN OF the tumor with minimum distance 7  MMmm from the MRF at 7o'clock     6. EXTRAMESORECTAL LYMPH NODES        i) Any suspicious extramesorectal lymph nodes: NO      *If yes, location and laterality of suspicious nodes:             Int. Iliac:                Ext. Iliac:                Common Iliac:                Obturator:                Inguinal:                Other:           ii) Is the BETH node station in the field of view: NO        *If Yes, are these nodes suspicious:         7. OTHER FINDINGS (COMPLICATIONS, METASTASES, LIMITATIONS)         NOTE: THERE IS SOME UNCERTAINTY WHETHER THE APPARENT SMALL FOCUS OF TUMOR EXTENDING TO THE PROSTATE AT THE 12:00 POSITION COULD ACTUALLY BE PROSTATE NODULE EXTENDING TOWARD THE TUMOR. RECTAL TUMOR IS FAVORED;  ALTHOUGH THIS DETERMINATION CANNOT BE MADE WITH COMPLETE CERTAINTY, TUMOR IS CONSIDERED T3 B.        IMPRESSIONS:  MRI rectal cancer T category is: T3 B  Maximum EMD of invasion is: 5  Minimum tumor to MRF distance is: 0  Low rectal tumor component: NO  Mesorectal nodes/tumor deposits: SUSPICIOUS  EMVI: ABSENT  Extramesorectal nodes: NEGATIVE    RECENT LABS:  Lab Results   Component Value Date    WBC 4.72 10/09/2019    HGB 11.0 (L) 10/09/2019    HCT 32.4 (L) 10/09/2019    .9 (H) 10/09/2019    RDW 16.8 (H) 10/09/2019    PLT 85 (L) 10/09/2019    NEUTRORELPCT 58.7 10/09/2019    LYMPHORELPCT 23.7 10/09/2019    MONORELPCT 13.6 (H) 10/09/2019    EOSRELPCT 3.6 10/09/2019    BASORELPCT 0.2 10/09/2019    NEUTROABS 2.77 10/09/2019    LYMPHSABS 1.12 10/09/2019       Lab Results   Component Value Date     10/09/2019    K 3.8 10/09/2019    CO2 22.8 10/09/2019     10/09/2019    BUN 8 10/09/2019    CREATININE 0.95 10/09/2019    EGFRIFNONA 79 10/09/2019  "   GLUCOSE 135 (H) 10/09/2019    CALCIUM 9.4 10/09/2019    ALKPHOS 112 10/09/2019    AST 20 10/09/2019    ALT 8 10/09/2019    BILITOT 0.9 10/09/2019    ALBUMIN 3.83 10/09/2019    PROTEINTOT 7.2 10/09/2019       Lab Results   Component Value Date/Time    URICACID 8.9 (H) 07/17/2019 10:16 AM     Lab Results   Component Value Date    CEA 2.28 06/26/2019    CEA 3.40 03/20/2019    CEA 17.80 (H) 01/11/2019    CEA 18.80 (H) 10/19/2018     Lab Results   Component Value Date    PSA 4.930 (H) 10/19/2018       Lab Results   Component Value Date    FERRITIN 573.20 (H) 08/28/2019    IRON 100 08/28/2019    TIBC 522 08/28/2019    LABIRON 19 (L) 08/28/2019    MRDNRHFY21 401 10/19/2018    FOLATE 7.48 10/19/2018     ASSESSMENT & PLAN:  David Mandujano is a very pleasant 69 y.o. male with newly diagnosed rectal cancer. Clinically stage IIIC ([id number]).    1.  Rectal cancer:  -  He received standard neoadjuvant chemoradiation as above given locally advanced tumor with suspicious pelvic lymph node.    - Pre-treatment MRI showed a locally advanced tumor and suspicious pelvic LN.  PET-CT was negative except in the local tumor.   -  There was a non-specific sub-pleural nodular opacity in the L lung base which was PET negative.  Perhaps this was inflammatory in nature. This area will require f/u on future imaging.  - Recommended neoadjuvant chemoradiation with Xeloda 825 mg/m2 BID D1-5 or M-F during the course of radiaton.  His dose was 500 mg x 4 or 2000 mg BID. Overall, he tolerated this well and completed treatment 1-21-19.    -  He saw Dr. Marilynn Yadav and underwent successful surgical resection as above.    -  He is now taking CapeOx treatment to prevent recurrence and has completed 6 cycles to date. Overall, he says he is tolerating the treatment well aside from fatigue and diarrhea (see below). Patient says he has been taking Xeloda but will occasionally \"forget to take it\" but says he makes it up later on his off week. Again discussed " the importance of medical compliance with the recommended treatment.   He is due for C7 today, but will delay a week for thrombocytopenia.  Will plan to see him back with last planned cycle in 4 weeks.  He said he will probably agree to take it.  He has f/u with Dr. Yadav and PET-CT on 10-25-19.  He is anxious to have ostomy reversed.    2.  Diarrhea:  -Currently improved (off week). He says he is hydrating well with water and gatorade. He has immodium to take when he has diarrhea but doesn't always use this.  Will monitor.     3.  Iron deficiency anemia:  - He tolerated ferrous sulfate BID well. Iron now replete and oral iron has been discontinued. Will repeat testing when he returns.     4.  Depressed mood / Anxiety and Alcohol Abuse:  - Previously given trial of Lexapro but he did not find this helpful and discontinued this. Currently, he says he is doing well in this regard. He did quit drinking completely around the time of his surgery but has recently been drinking again. He says Dr. Yadav did mention to him that his liver didn't look good during his operation.  -  We discussed potential involvement in support groups and /or referral for alcohol abuse counseling, but he declined.  He asked about his LFTs today but understands this doesn't tell the whole story.    5.  Prominent systolic murmur:   -   He was evaluated by Dr. Joe and then by Dr. Barrera.  He underwent echocardiogram 1-30-19 which showed EF 70%, grade 1 diastolic dysfunction, mild to mod septal asymmetric hypertrophy, AV calcification with functionally bicuspid valve, mild AI and moderate AS, moderate MAC with mild MR, mild MS  -  Thought is that at some point in the future he will require AVR.      6.  Prophylaxis:  He took 2018 influenza vaccine 10-19-18. He took Prevnar 13 on 11-20-18.  Will give 2019 flu vaccine today.    7. ACO / KAVITHA/Other  Quality measures  - David Mandujano did not receive 2019 flu vaccine.  Will give this today.  - David POLLARD  Delbert reports a pain score of 0.    - Current outpatient and discharge medications have been reconciled for the patient.  Reviewed by: Jyoti Larios MD     8. Follow up: Will see Mr. Mandujano back with his last cycle of CAPOX in 4 weeks.  Will get copy of [facility] records from his visit there on the 25th.    This note was scribed for Jyoti Larios MD by Lynn Pinto RN.    I, Jyoti Larios MD, personally performed the services described in this documentation as scribed by the above named individual in my presence, and it is both accurate and complete.  10/09/2019          I spent 25 minutes with David Mandujano today.  In the office today, more than 50% of this time was spent face-to-face with him  in counseling / coordination of care, reviewing his interim medical history and counseling on the current treatment plan.  All questions were answered to his satisfaction.         Electronically Signed by: Jyoti Larios MD         CC:   MD Kathleen Coppola Emmanuel C, MD Shawn Michael Acino, MD William Richards, MD Sandra Beck, MD

## 2019-10-17 ENCOUNTER — INFUSION (OUTPATIENT)
Dept: ONCOLOGY | Facility: HOSPITAL | Age: 69
End: 2019-10-17

## 2019-10-17 ENCOUNTER — LAB (OUTPATIENT)
Dept: ONCOLOGY | Facility: CLINIC | Age: 69
End: 2019-10-17

## 2019-10-17 VITALS
DIASTOLIC BLOOD PRESSURE: 71 MMHG | RESPIRATION RATE: 18 BRPM | HEART RATE: 78 BPM | OXYGEN SATURATION: 98 % | TEMPERATURE: 97.9 F | WEIGHT: 266.1 LBS | BODY MASS INDEX: 36.09 KG/M2 | SYSTOLIC BLOOD PRESSURE: 138 MMHG

## 2019-10-17 DIAGNOSIS — C20 RECTAL CANCER (HCC): ICD-10-CM

## 2019-10-17 DIAGNOSIS — C20 RECTAL CANCER (HCC): Primary | ICD-10-CM

## 2019-10-17 DIAGNOSIS — Z95.828 PORT-A-CATH IN PLACE: ICD-10-CM

## 2019-10-17 LAB
ALBUMIN SERPL-MCNC: 3.83 G/DL (ref 3.5–5.2)
ALBUMIN/GLOB SERPL: 1.2 G/DL
ALP SERPL-CCNC: 106 U/L (ref 39–117)
ALT SERPL W P-5'-P-CCNC: 6 U/L (ref 1–41)
ANION GAP SERPL CALCULATED.3IONS-SCNC: 11.9 MMOL/L (ref 5–15)
AST SERPL-CCNC: 18 U/L (ref 1–40)
BASOPHILS # BLD AUTO: 0.02 10*3/MM3 (ref 0–0.2)
BASOPHILS NFR BLD AUTO: 0.4 % (ref 0–1.5)
BILIRUB SERPL-MCNC: 0.7 MG/DL (ref 0.2–1.2)
BUN BLD-MCNC: 10 MG/DL (ref 8–23)
BUN/CREAT SERPL: 10.3 (ref 7–25)
CALCIUM SPEC-SCNC: 9.2 MG/DL (ref 8.6–10.5)
CHLORIDE SERPL-SCNC: 108 MMOL/L (ref 98–107)
CO2 SERPL-SCNC: 22.1 MMOL/L (ref 22–29)
CREAT BLD-MCNC: 0.97 MG/DL (ref 0.76–1.27)
DEPRECATED RDW RBC AUTO: 57.8 FL (ref 37–54)
EOSINOPHIL # BLD AUTO: 0.15 10*3/MM3 (ref 0–0.4)
EOSINOPHIL NFR BLD AUTO: 3.2 % (ref 0.3–6.2)
ERYTHROCYTE [DISTWIDTH] IN BLOOD BY AUTOMATED COUNT: 14.6 % (ref 12.3–15.4)
GFR SERPL CREATININE-BSD FRML MDRD: 77 ML/MIN/1.73
GLOBULIN UR ELPH-MCNC: 3.3 GM/DL
GLUCOSE BLD-MCNC: 137 MG/DL (ref 65–99)
HCT VFR BLD AUTO: 35.3 % (ref 37.5–51)
HGB BLD-MCNC: 11.5 G/DL (ref 13–17.7)
IMM GRANULOCYTES # BLD AUTO: 0.01 10*3/MM3 (ref 0–0.05)
IMM GRANULOCYTES NFR BLD AUTO: 0.2 % (ref 0–0.5)
LYMPHOCYTES # BLD AUTO: 1.13 10*3/MM3 (ref 0.7–3.1)
LYMPHOCYTES NFR BLD AUTO: 23.8 % (ref 19.6–45.3)
MACROCYTES BLD QL SMEAR: NORMAL
MCH RBC QN AUTO: 37.2 PG (ref 26.6–33)
MCHC RBC AUTO-ENTMCNC: 32.6 G/DL (ref 31.5–35.7)
MCV RBC AUTO: 114.2 FL (ref 79–97)
MONOCYTES # BLD AUTO: 0.5 10*3/MM3 (ref 0.1–0.9)
MONOCYTES NFR BLD AUTO: 10.5 % (ref 5–12)
NEUTROPHILS # BLD AUTO: 2.94 10*3/MM3 (ref 1.7–7)
NEUTROPHILS NFR BLD AUTO: 61.9 % (ref 42.7–76)
PLAT MORPH BLD: NORMAL
PLATELET # BLD AUTO: 126 10*3/MM3 (ref 140–450)
PMV BLD AUTO: 9.5 FL (ref 6–12)
POIKILOCYTOSIS BLD QL SMEAR: NORMAL
POTASSIUM BLD-SCNC: 4.1 MMOL/L (ref 3.5–5.2)
PROT SERPL-MCNC: 7.1 G/DL (ref 6–8.5)
RBC # BLD AUTO: 3.09 10*6/MM3 (ref 4.14–5.8)
SODIUM BLD-SCNC: 142 MMOL/L (ref 136–145)
WBC NRBC COR # BLD: 4.75 10*3/MM3 (ref 3.4–10.8)

## 2019-10-17 PROCEDURE — 80053 COMPREHEN METABOLIC PANEL: CPT | Performed by: INTERNAL MEDICINE

## 2019-10-17 PROCEDURE — 85025 COMPLETE CBC W/AUTO DIFF WBC: CPT | Performed by: INTERNAL MEDICINE

## 2019-10-17 PROCEDURE — 25010000002 PALONOSETRON PER 25 MCG: Performed by: INTERNAL MEDICINE

## 2019-10-17 PROCEDURE — 85007 BL SMEAR W/DIFF WBC COUNT: CPT | Performed by: INTERNAL MEDICINE

## 2019-10-17 PROCEDURE — 25010000002 DEXAMETHASONE SODIUM PHOSPHATE 20 MG/5ML SOLUTION 5 ML VIAL: Performed by: INTERNAL MEDICINE

## 2019-10-17 PROCEDURE — 96415 CHEMO IV INFUSION ADDL HR: CPT

## 2019-10-17 PROCEDURE — 96375 TX/PRO/DX INJ NEW DRUG ADDON: CPT

## 2019-10-17 PROCEDURE — 25010000002 OXALIPLATIN PER 0.5 MG: Performed by: INTERNAL MEDICINE

## 2019-10-17 PROCEDURE — 96413 CHEMO IV INFUSION 1 HR: CPT

## 2019-10-17 RX ORDER — DEXTROSE MONOHYDRATE 50 MG/ML
250 INJECTION, SOLUTION INTRAVENOUS ONCE
Status: COMPLETED | OUTPATIENT
Start: 2019-10-17 | End: 2019-10-17

## 2019-10-17 RX ORDER — SODIUM CHLORIDE 0.9 % (FLUSH) 0.9 %
10 SYRINGE (ML) INJECTION AS NEEDED
Status: CANCELLED | OUTPATIENT
Start: 2019-11-06

## 2019-10-17 RX ORDER — SODIUM CHLORIDE 0.9 % (FLUSH) 0.9 %
10 SYRINGE (ML) INJECTION AS NEEDED
Status: DISCONTINUED | OUTPATIENT
Start: 2019-10-17 | End: 2019-10-17 | Stop reason: HOSPADM

## 2019-10-17 RX ORDER — PALONOSETRON 0.05 MG/ML
0.25 INJECTION, SOLUTION INTRAVENOUS ONCE
Status: COMPLETED | OUTPATIENT
Start: 2019-10-17 | End: 2019-10-17

## 2019-10-17 RX ADMIN — OXALIPLATIN 300 MG: 5 INJECTION, SOLUTION, CONCENTRATE INTRAVENOUS at 12:20

## 2019-10-17 RX ADMIN — PALONOSETRON HYDROCHLORIDE 0.25 MG: 0.25 INJECTION INTRAVENOUS at 11:43

## 2019-10-17 RX ADMIN — DEXAMETHASONE SODIUM PHOSPHATE 12 MG: 4 INJECTION, SOLUTION INTRAMUSCULAR; INTRAVENOUS at 11:45

## 2019-10-17 RX ADMIN — SODIUM CHLORIDE, PRESERVATIVE FREE 500 UNITS: 5 INJECTION INTRAVENOUS at 14:45

## 2019-10-17 RX ADMIN — DEXTROSE MONOHYDRATE 250 ML: 50 INJECTION, SOLUTION INTRAVENOUS at 11:43

## 2019-10-17 RX ADMIN — SODIUM CHLORIDE, PRESERVATIVE FREE 10 ML: 5 INJECTION INTRAVENOUS at 14:44

## 2019-10-30 ENCOUNTER — APPOINTMENT (OUTPATIENT)
Dept: ONCOLOGY | Facility: HOSPITAL | Age: 69
End: 2019-10-30

## 2019-11-04 RX ORDER — DIPHENHYDRAMINE HYDROCHLORIDE 50 MG/ML
50 INJECTION INTRAMUSCULAR; INTRAVENOUS AS NEEDED
OUTPATIENT
Start: 2021-12-15

## 2019-11-04 RX ORDER — DEXTROSE MONOHYDRATE 50 MG/ML
250 INJECTION, SOLUTION INTRAVENOUS ONCE
OUTPATIENT
Start: 2021-12-15

## 2019-11-04 RX ORDER — FAMOTIDINE 10 MG/ML
20 INJECTION, SOLUTION INTRAVENOUS AS NEEDED
OUTPATIENT
Start: 2021-12-15

## 2019-11-04 RX ORDER — PALONOSETRON 0.05 MG/ML
0.25 INJECTION, SOLUTION INTRAVENOUS ONCE
OUTPATIENT
Start: 2021-12-15

## 2019-11-06 ENCOUNTER — INFUSION (OUTPATIENT)
Dept: ONCOLOGY | Facility: HOSPITAL | Age: 69
End: 2019-11-06

## 2019-11-06 ENCOUNTER — OFFICE VISIT (OUTPATIENT)
Dept: ONCOLOGY | Facility: CLINIC | Age: 69
End: 2019-11-06

## 2019-11-06 VITALS
TEMPERATURE: 99.1 F | DIASTOLIC BLOOD PRESSURE: 70 MMHG | BODY MASS INDEX: 36.02 KG/M2 | WEIGHT: 265.6 LBS | SYSTOLIC BLOOD PRESSURE: 163 MMHG | RESPIRATION RATE: 18 BRPM | OXYGEN SATURATION: 97 % | HEART RATE: 83 BPM

## 2019-11-06 VITALS
WEIGHT: 265.6 LBS | DIASTOLIC BLOOD PRESSURE: 70 MMHG | OXYGEN SATURATION: 97 % | HEART RATE: 83 BPM | SYSTOLIC BLOOD PRESSURE: 163 MMHG | TEMPERATURE: 99.1 F | BODY MASS INDEX: 36.02 KG/M2 | RESPIRATION RATE: 18 BRPM

## 2019-11-06 DIAGNOSIS — C20 RECTAL CANCER (HCC): Primary | ICD-10-CM

## 2019-11-06 DIAGNOSIS — M10.472 OTHER SECONDARY ACUTE GOUT OF LEFT ANKLE: ICD-10-CM

## 2019-11-06 DIAGNOSIS — E86.0 DEHYDRATION: ICD-10-CM

## 2019-11-06 DIAGNOSIS — Z95.828 PORT-A-CATH IN PLACE: ICD-10-CM

## 2019-11-06 DIAGNOSIS — R19.7 DIARRHEA, UNSPECIFIED TYPE: ICD-10-CM

## 2019-11-06 LAB
ALBUMIN SERPL-MCNC: 3.67 G/DL (ref 3.5–5.2)
ALBUMIN/GLOB SERPL: 1 G/DL
ALP SERPL-CCNC: 111 U/L (ref 39–117)
ALT SERPL W P-5'-P-CCNC: 9 U/L (ref 1–41)
ANION GAP SERPL CALCULATED.3IONS-SCNC: 13.5 MMOL/L (ref 5–15)
ANISOCYTOSIS BLD QL: NORMAL
AST SERPL-CCNC: 18 U/L (ref 1–40)
BASOPHILS # BLD AUTO: 0.02 10*3/MM3 (ref 0–0.2)
BASOPHILS NFR BLD AUTO: 0.3 % (ref 0–1.5)
BILIRUB SERPL-MCNC: 1 MG/DL (ref 0.2–1.2)
BUN BLD-MCNC: 13 MG/DL (ref 8–23)
BUN/CREAT SERPL: 12.9 (ref 7–25)
CALCIUM SPEC-SCNC: 9.3 MG/DL (ref 8.6–10.5)
CHLORIDE SERPL-SCNC: 104 MMOL/L (ref 98–107)
CO2 SERPL-SCNC: 22.5 MMOL/L (ref 22–29)
CREAT BLD-MCNC: 1.01 MG/DL (ref 0.76–1.27)
DEPRECATED RDW RBC AUTO: 63.8 FL (ref 37–54)
EOSINOPHIL # BLD AUTO: 0.08 10*3/MM3 (ref 0–0.4)
EOSINOPHIL NFR BLD AUTO: 1.3 % (ref 0.3–6.2)
ERYTHROCYTE [DISTWIDTH] IN BLOOD BY AUTOMATED COUNT: 15.4 % (ref 12.3–15.4)
GFR SERPL CREATININE-BSD FRML MDRD: 73 ML/MIN/1.73
GLOBULIN UR ELPH-MCNC: 3.7 GM/DL
GLUCOSE BLD-MCNC: 125 MG/DL (ref 65–99)
HCT VFR BLD AUTO: 33.9 % (ref 37.5–51)
HGB BLD-MCNC: 11.5 G/DL (ref 13–17.7)
IMM GRANULOCYTES # BLD AUTO: 0.01 10*3/MM3 (ref 0–0.05)
IMM GRANULOCYTES NFR BLD AUTO: 0.2 % (ref 0–0.5)
LARGE PLATELETS: NORMAL
LYMPHOCYTES # BLD AUTO: 1.37 10*3/MM3 (ref 0.7–3.1)
LYMPHOCYTES NFR BLD AUTO: 22.6 % (ref 19.6–45.3)
MACROCYTES BLD QL SMEAR: NORMAL
MCH RBC QN AUTO: 38.1 PG (ref 26.6–33)
MCHC RBC AUTO-ENTMCNC: 33.9 G/DL (ref 31.5–35.7)
MCV RBC AUTO: 112.3 FL (ref 79–97)
MONOCYTES # BLD AUTO: 0.81 10*3/MM3 (ref 0.1–0.9)
MONOCYTES NFR BLD AUTO: 13.3 % (ref 5–12)
NEUTROPHILS # BLD AUTO: 3.78 10*3/MM3 (ref 1.7–7)
NEUTROPHILS NFR BLD AUTO: 62.3 % (ref 42.7–76)
NRBC BLD AUTO-RTO: 0 /100 WBC (ref 0–0.2)
PLATELET # BLD AUTO: 91 10*3/MM3 (ref 140–450)
PMV BLD AUTO: 9.9 FL (ref 6–12)
POTASSIUM BLD-SCNC: 4 MMOL/L (ref 3.5–5.2)
PROT SERPL-MCNC: 7.4 G/DL (ref 6–8.5)
RBC # BLD AUTO: 3.02 10*6/MM3 (ref 4.14–5.8)
SMALL PLATELETS BLD QL SMEAR: NORMAL
SODIUM BLD-SCNC: 140 MMOL/L (ref 136–145)
WBC NRBC COR # BLD: 6.07 10*3/MM3 (ref 3.4–10.8)

## 2019-11-06 PROCEDURE — 85007 BL SMEAR W/DIFF WBC COUNT: CPT

## 2019-11-06 PROCEDURE — 99214 OFFICE O/P EST MOD 30 MIN: CPT | Performed by: INTERNAL MEDICINE

## 2019-11-06 PROCEDURE — 96360 HYDRATION IV INFUSION INIT: CPT

## 2019-11-06 PROCEDURE — 80053 COMPREHEN METABOLIC PANEL: CPT

## 2019-11-06 PROCEDURE — 85025 COMPLETE CBC W/AUTO DIFF WBC: CPT

## 2019-11-06 PROCEDURE — 36415 COLL VENOUS BLD VENIPUNCTURE: CPT

## 2019-11-06 RX ORDER — SODIUM CHLORIDE 0.9 % (FLUSH) 0.9 %
10 SYRINGE (ML) INJECTION AS NEEDED
Status: CANCELLED | OUTPATIENT
Start: 2019-11-06

## 2019-11-06 RX ORDER — HEPARIN SODIUM (PORCINE) LOCK FLUSH IV SOLN 100 UNIT/ML 100 UNIT/ML
500 SOLUTION INTRAVENOUS AS NEEDED
Status: CANCELLED | OUTPATIENT
Start: 2019-11-06

## 2019-11-06 RX ORDER — PREDNISONE 10 MG/1
TABLET ORAL
Qty: 13 TABLET | Refills: 0 | Status: SHIPPED | OUTPATIENT
Start: 2019-11-06 | End: 2019-11-21 | Stop reason: SDUPTHER

## 2019-11-06 RX ADMIN — SODIUM CHLORIDE 1000 ML: 9 INJECTION, SOLUTION INTRAVENOUS at 11:06

## 2019-11-06 RX ADMIN — SODIUM CHLORIDE, PRESERVATIVE FREE 500 UNITS: 5 INJECTION INTRAVENOUS at 12:10

## 2019-11-06 NOTE — PROGRESS NOTES
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  Combined chemoradiation with Xeloda 825 mg/m2 BID D1-5 or M-F during the course of radiation.  His dose is 500 mg x 4 or 2000 mg BID  Treatment finished 19.    2.  Dr. Yadav performed LAR with coloanal anstomosis and loop ileostomy 19.  Pathology showed  Residual invasive adneocarcinoma.  Tumor invaded the muscularis propria.  Surgical margins clear.  0/14 resected LNs involved.  ypT2N0.      3.         CHIEF COMPLAINT:  Follow up of Rectal cancer    HISTORY OF PRESENT ILLNESS:   David Mandujano is a very pleasant 69 y.o. male who was referred by Dr. Barnhart for evaluation and treatment of colorectal cancer. He began to notice rectal bleeding in January or 2018, and he also began to notice fatigue in approximately July. He assumed he was out of shape when he started to become short of breath and started trying to exercise more frequently, which only exacerbated the shortness of breath. He was evaluated by his PCP, Raj Wang MD, who after testing, found him to be severely iron deficient, hyperglycemic, and he also had an elevated PSA. He was on vacation at the time, and his PCP asked him to return home for further evaluation. He was started on oral iron therapy and later received IV iron infusions. He was also scheduled for a colonoscopy with Dr. aBrnhart, during which a suspicious rectal mass was biopsied and pathology was positive for an ulcerated invasive, moderately differentiated rectal adenocarcinoma.     INTERVAL HISTORY:  Mr. Mandujano is here today for follow up of rectal cancer and C8 of CapOX. He says he feels \"horrible\". He reports his L foot has been painful and he wonders if it is a gout flareup. He does have idania numbness/tingling in his feet related to chemotherapy, which resolved, but then he " developed severe pain in the L ankle joint.. The pain started 5 days ago.It hurts to move but then seems to improve the more he moves around on it.  It swelled a little.  He denies fever or chills.  No injury to the joint.   He continues to struggle with diarrhea.  His f/u appt and PET-CT with Dr. Yadav have been moved to next week.        PAST MEDICAL HISTORY:  Past Medical History:   Diagnosis Date   • Anemia    • Arthritis    • Colon cancer (CMS/HCC)     s/p chemo therapy   • Heart murmur    • Hypertension    • Wrist fracture     surgically repaired       PAST SURGICAL HISTORY:  Past Surgical History:   Procedure Laterality Date   • ABDOMINAL SURGERY     • COLON SURGERY     • COLONOSCOPY     • FRACTURE SURGERY Left    • VENOUS ACCESS DEVICE (PORT) INSERTION N/A 5/20/2019    Procedure: INSERTION VENOUS ACCESS DEVICE;  Surgeon: Cindy Corrales MD;  Location: [facility];  Service: General   • WRIST SURGERY         FAMILY HISTORY:  Family History   Problem Relation Age of Onset   • Colon cancer Maternal Grandfather    • Other Sister    • Heart disease Sister    No other family history of malignancy.    SOCIAL HISTORY:  Social History     Socioeconomic History   • Marital status: Single     Spouse name: Not on file   • Number of children: Not on file   • Years of education: Not on file   • Highest education level: Not on file   Tobacco Use   • Smoking status: Never Smoker   • Smokeless tobacco: Current User     Types: Chew   Substance and Sexual Activity   • Alcohol use: Yes     Comment: occasional   • Drug use: No   • Sexual activity: Defer   Social History Narrative    He is , and is self employed/semi retired. He is a non smoker but chews tobacco. He used to drink daily, but says he has cut it in half.      REVIEW OF SYSTEMS:   A comprehensive 14 point review of systems was performed.  Significant findings as mentioned above.  All other systems reviewed and are negative.      MEDICATIONS:  The current  medication list was reviewed in the EMR    Current Outpatient Medications:   •  amLODIPine (NORVASC) 10 MG tablet, Take 5 mg by mouth Daily., Disp: , Rfl:   •  capecitabine (XELODA) 150 MG chemo tablet, Take 1 tab by mouth in the AM and 1 tab by mouth in the PM on days 1-14, then off for 7 days. Take with 500 mg tabs for total dose of 2150mg, Disp: 28 tablet, Rfl: 5  •  capecitabine (XELODA) 500 MG chemo tablet, Take 4 tabs by mouth in the AM and 4 tabs by mouth in the PM on days 1-14, then off 7 days. Take with 150mg tabs for total dose of 2150mg., Disp: 112 tablet, Rfl: 5  •  ferrous sulfate 325 (65 FE) MG tablet, Take 325 mg by mouth 2 (Two) Times a Day., Disp: , Rfl:   •  HYDROcodone-acetaminophen (NORCO) 7.5-325 MG per tablet, Take 1 tablet by mouth 4 (Four) Times a Day As Needed for Moderate Pain ., Disp: 12 tablet, Rfl: 0  •  metoprolol succinate XL (TOPROL-XL) 50 MG 24 hr tablet, Take 1 tablet by mouth Daily., Disp: 30 tablet, Rfl: 11  •  ondansetron ODT (ZOFRAN-ODT) 8 MG disintegrating tablet, Dissolve 1 tablet on the tongue 3 (Three) Times a Day As Needed for Nausea or Vomiting., Disp: 30 tablet, Rfl: 5  •  prochlorperazine (COMPAZINE) 5 MG tablet, Take 2 tablets by mouth Every 6 (Six) Hours As Needed for Nausea or Vomiting., Disp: 60 tablet, Rfl: 5    ALLERGIES:  No Known Allergies    PHYSICAL EXAM:  Vitals:    11/06/19 0947   BP: 163/70   Pulse: 83   Resp: 18   Temp: 99.1 °F (37.3 °C)   SpO2: 97%     Pain Score    11/06/19 0947   PainSc:   8   PainLoc: Ankle   General:  Awake, alert and oriented, in no distress.   HEENT:  Pupils are equal, round and reactive to light and accommodation, Extra-ocular movements full, Oropharyx clear, mucous membranes moist  Neck:  No JVD, thyromegaly or lymphadenopathy  CV:  Regular rate and rhythm, III/IV systolic murmur, no rubs or gallops  Resp:  Lungs are clear to auscultation bilaterally  Abd:  Soft, non-tender, sl distended, bowel sounds present, no organomegaly or  masses.  Surgical incisions well-healed.  Stoma functioning well.  Ext:  No clubbing, cyanosis or edema in the legs.  His L ankle joint is sl swollen.  No redness or heat.  Lymph:  No cervical, supraclavicular, axillary, adenopathy  Neuro:  grossly non-focal exam    PATHOLOGY:  10-02-18      04-01-19:        04-09-19:        ENDOSCOPY:  10-02-18:        IMAGING:  10-04-18 CT Abdomen Pelvis With Contrast   Findings  - LOWER THORAX: Patchy nonspecific subpleural nodularity in the left lung base that could represent sequelae of chronic airspace disease or early fibrosis.     ABDOMEN:  - LIVER: Homogeneous. No focal hepatic mass or ductal dilatation.  - GALLBLADDER: GALLSTONES WITHIN THE GALLBLADDER.  - PANCREAS: Unremarkable. No mass or ductal dilatation.  - SPLEEN: Homogeneous. No splenomegaly.  - ADRENALS: No mass.  - KIDNEYS: RIGHT RENAL CYST MEASURING 3.6 CM.  - GI TRACT: NONSPECIFIC DISTAL SIGMOID COLONIC WALL THICKENING VERSUS NONDISTENTION.  - PERITONEUM: No free air. No free fluid or loculated fluid collections.  - MESENTERY: Unremarkable.  - LYMPH NODES: No lymphadenopathy.  - VASCULATURE: ATHEROSCLEROTIC VASCULAR CALCIFICATION.  - ABDOMINAL WALL: SMALL BILATERAL HUMERAL HERNIAS CONTAINING ONLY FAT.  - OTHER: None.     PELVIS:  - BLADDER: No focal mass or significant wall thickening  - REPRODUCTIVE: Unremarkable as visualized.  - APPENDIX: Nondistended. No surrounding inflammation.  - BONES: No acute bony abnormality.     IMPRESSION:  1. Gallstones within the gallbladder.  2.Nonspecific distal sigmoid colonic wall thickening versus nondistention.    PET/CT 10-29-18    11-13-18 MRI Pelvis Without Contrast  FINDINGS:  1.) TUMOR LOCATION AND CHARACTERISTICS     i) Distance of Inferior margin of Tumor from the dentate line: 9.5 CM  ii) Tumor at or below the puborectalis sling:  NO  iii) Relationship to the anterior peritoneal reflection: BELOW  iv) Craniocaudal length of the tumor: 6cm  v) Clock face of tumor:  5o'clock to 2o'clock  vi) Polypoid/ Annular/ Semi-annular: SEMI-ANNULAR  vii) Mucinous: YES     2.) EXTRAMURAL DEPTH OF INVASION AND MR T-CATEGORY       i) Extramural depth of invasion (Use 0mm for T1 or T2 tumor):  APPROXIMATELY 5 MM   ii) T category: T3 B              *Please indicate structures with possible invasion.  Specify laterality,  sequence and slice#: (see list below)     *  Anterior peritoneal reflection (T4a tumor): NO  *  Puborectalis: NO  *  Bladder: NO  *  Vascular Involvement of Iliac Vessels: NO  *  Levator ani: NO  *  Ureters: NO  *  Obturator: NO  *  Prostate: MARGINAL/TETHERED  *  Piriformis: NO  *  Uterus: NOT APPLICABLE  *  Pelvic Bone: NO  *  Vagina: NOT APPLICABLE  *  Sacrum: NO  *  Urethra: NO  *  Other:          iii) For low rectal tumors (maximum tumor depth at or below the  puborectalis sling):     *  Not applicable (tumor above the puborectalis sling: NOT APPLICABLE  *    *  Level 1 (submucosa only, no involvement of internal anal sphincter):       *  Level 2 (confined to the internal anal sphincter; no involvement of  intersphincteric fat):      *  Level 3 (intersphincteric fat involved):      *  Level 4 (involves external sphincter or beyond):         3. RELATIONSHIP OF THE TUMOR TO MESORECTAL FASCIA (MRF)       i) Shortest distance 0mm of the definitive tumour border to the MRF  is: AT 12:00   ii) Are there any tumour spiculations closer to the MRF? NO     iii) Location of Tumor margin to MRF: 12:00, AT THE LEVEL PROSTATE     4. EXTRAMURAL VENOUS INVASION       i) Extramural Venous Invasion (EMVI): ABSENT     5. MESORECTAL LYMPH NODES AND TUMOUR DEPOSITS       i) Any suspicious mesorectal lymph nodes/tumor deposits: YES      *If yes, the most suspicious node/tumor deposit is: 15 MM IN  DIAMETER, ALONG THE UPPER MARGIN OF the tumor with minimum distance 7  MMmm from the MRF at 7o'clock     6. EXTRAMESORECTAL LYMPH NODES        i) Any suspicious extramesorectal lymph nodes: NO       *If yes, location and laterality of suspicious nodes:             Int. Iliac:                Ext. Iliac:                Common Iliac:                Obturator:                Inguinal:                Other:           ii) Is the BETH node station in the field of view: NO        *If Yes, are these nodes suspicious:         7. OTHER FINDINGS (COMPLICATIONS, METASTASES, LIMITATIONS)         NOTE: THERE IS SOME UNCERTAINTY WHETHER THE APPARENT SMALL FOCUS OF TUMOR EXTENDING TO THE PROSTATE AT THE 12:00 POSITION COULD ACTUALLY BE PROSTATE NODULE EXTENDING TOWARD THE TUMOR. RECTAL TUMOR IS FAVORED; ALTHOUGH THIS DETERMINATION CANNOT BE MADE WITH COMPLETE CERTAINTY, TUMOR IS CONSIDERED T3 B.        IMPRESSIONS:  MRI rectal cancer T category is: T3 B  Maximum EMD of invasion is: 5  Minimum tumor to MRF distance is: 0  Low rectal tumor component: NO  Mesorectal nodes/tumor deposits: SUSPICIOUS  EMVI: ABSENT  Extramesorectal nodes: NEGATIVE    RECENT LABS:  Lab Results   Component Value Date    WBC 6.07 11/06/2019    HGB 11.5 (L) 11/06/2019    HCT 33.9 (L) 11/06/2019    .3 (H) 11/06/2019    RDW 15.4 11/06/2019    PLT 91 (L) 11/06/2019    NEUTRORELPCT 62.3 11/06/2019    LYMPHORELPCT 22.6 11/06/2019    MONORELPCT 13.3 (H) 11/06/2019    EOSRELPCT 1.3 11/06/2019    BASORELPCT 0.3 11/06/2019    NEUTROABS 3.78 11/06/2019    LYMPHSABS 1.37 11/06/2019       Lab Results   Component Value Date     10/17/2019    K 4.1 10/17/2019    CO2 22.1 10/17/2019     (H) 10/17/2019    BUN 10 10/17/2019    CREATININE 0.97 10/17/2019    EGFRIFNONA 77 10/17/2019    GLUCOSE 137 (H) 10/17/2019    CALCIUM 9.2 10/17/2019    ALKPHOS 106 10/17/2019    AST 18 10/17/2019    ALT 6 10/17/2019    BILITOT 0.7 10/17/2019    ALBUMIN 3.83 10/17/2019    PROTEINTOT 7.1 10/17/2019       Lab Results   Component Value Date/Time    URICACID 8.9 (H) 07/17/2019 10:16 AM     Lab Results   Component Value Date    CEA 2.28 06/26/2019    CEA 3.40 03/20/2019     CEA 17.80 (H) 01/11/2019    CEA 18.80 (H) 10/19/2018     Lab Results   Component Value Date    PSA 4.930 (H) 10/19/2018       Lab Results   Component Value Date    FERRITIN 284.70 10/09/2019    IRON 56 (L) 10/09/2019    TIBC 465 10/09/2019    LABIRON 12 (L) 10/09/2019    EVQLICHC65 401 10/19/2018    FOLATE 7.48 10/19/2018     ASSESSMENT & PLAN:  David Mandujano is a very pleasant 69 y.o. male with newly diagnosed rectal cancer. Clinically stage IIIC (oL4lH1PM).    1.  Rectal cancer:  -  He received standard neoadjuvant chemoradiation as above given locally advanced tumor with suspicious pelvic lymph node.    - Pre-treatment MRI showed a locally advanced tumor and suspicious pelvic LN.  PET-CT was negative except in the local tumor.   -  There was a non-specific sub-pleural nodular opacity in the L lung base which was PET negative.  Perhaps this was inflammatory in nature. This area will require f/u on future imaging.  - Recommended neoadjuvant chemoradiation with Xeloda 825 mg/m2 BID D1-5 or M-F during the course of radiaton.  His dose was 500 mg x 4 or 2000 mg BID. Overall, he tolerated this well and completed treatment 1-21-19.    -  He saw Dr. Marilynn Yadav and underwent successful surgical resection as above.    -  He is now taking CapeOx treatment to prevent recurrence and has completed 7 cycles to date. Overall, he says he is tolerating the treatment well aside from fatigue and diarrhea (see below). He is starting to struggle with neuropathy (though I think this is unrelated to L ankle pain) and his plts are a little low today.  AFter discussion, will hold Oxaliplatin with his last treatment today.  It is important to him to stay on schedule with treatments because he is in a rush to get his ostomy reversed.  He has f/u with Dr. Yadav and PET-CT on  11-15-19.   Will see him back after PET.    2.  Diarrhea and dehydration with Gout flare:  -  He knows to push oral hydration.  Will give 1L NS today.  Will also hold  Oxaliplatin with his last cycle of treatment.  Continue Immodium.  -  Will give  Prednisone 30 mg x 2d, 20 mg x 2d, 10 mg x 2d, 5 mg x 2d and then stop for gouty flare.    3.  Iron deficiency anemia:  - He tolerated ferrous sulfate BID well. Iron now replete and oral iron has been discontinued.     4.  Depressed mood / Anxiety and Alcohol Abuse:  - Previously given trial of Lexapro but he did not find this helpful and discontinued this. Currently, he says he is doing well in this regard. He did quit drinking completely around the time of his surgery but has recently been drinking again. He says Dr. Yadav did mention to him that his liver didn't look good during his operation.  -  We discussed potential involvement in support groups and /or referral for alcohol abuse counseling, but he declined.      5.  Prominent systolic murmur:   -   He was evaluated by Dr. Joe and then by Dr. Barrera.  He underwent echocardiogram 1-30-19 which showed EF 70%, grade 1 diastolic dysfunction, mild to mod septal asymmetric hypertrophy, AV calcification with functionally bicuspid valve, mild AI and moderate AS, moderate MAC with mild MR, mild MS  -  Thought is that at some point in the future he will require AVR.      6.  Prophylaxis:  He took 2019 influenza vaccine. He took Prevnar 13 on 11-20-18.       7. ACO / KAVITHA/Other  Quality measures  - David Mandujano received 2019 flu vaccine.  - David Mandujano reports a pain score of 8.    - Current outpatient and discharge medications have been reconciled for the patient.  Reviewed by: Jyoti Larios MD     8. Follow up: Will see Mr. Mandujano back after PET-CT and visit with Dr. Yadav on 11-15.    This note was scribed for Jyoti Larios MD by Lynn Pinto RN.    I, Jyoti Larios MD, personally performed the services described in this documentation as scribed by the above named individual in my presence, and it is both accurate and complete.  11/06/2019          I spent 25 minutes  with David Mandujano today.  In the office today, more than 50% of this time was spent face-to-face with him  in counseling / coordination of care, reviewing his interim medical history and counseling on the current treatment plan.  All questions were answered to his satisfaction.         Electronically Signed by: Jyoti Larios MD         CC:   MD Kathleen Coppola Emmanuel C, MD Shawn Michael Acino, MD William Richards, MD Sandra Beck, MD

## 2019-11-21 ENCOUNTER — OFFICE VISIT (OUTPATIENT)
Dept: ONCOLOGY | Facility: CLINIC | Age: 69
End: 2019-11-21

## 2019-11-21 VITALS
TEMPERATURE: 98.5 F | RESPIRATION RATE: 18 BRPM | HEART RATE: 78 BPM | OXYGEN SATURATION: 98 % | WEIGHT: 266.4 LBS | BODY MASS INDEX: 36.13 KG/M2 | SYSTOLIC BLOOD PRESSURE: 132 MMHG | DIASTOLIC BLOOD PRESSURE: 69 MMHG

## 2019-11-21 DIAGNOSIS — M25.572 LEFT ANKLE PAIN, UNSPECIFIED CHRONICITY: ICD-10-CM

## 2019-11-21 DIAGNOSIS — Z87.39 HISTORY OF GOUT: ICD-10-CM

## 2019-11-21 DIAGNOSIS — E86.0 DEHYDRATION: ICD-10-CM

## 2019-11-21 DIAGNOSIS — F10.10 ALCOHOL ABUSE: ICD-10-CM

## 2019-11-21 DIAGNOSIS — C20 RECTAL CANCER (HCC): Primary | ICD-10-CM

## 2019-11-21 PROCEDURE — 99214 OFFICE O/P EST MOD 30 MIN: CPT | Performed by: INTERNAL MEDICINE

## 2019-11-21 RX ORDER — PREDNISONE 10 MG/1
TABLET ORAL
Qty: 8 TABLET | Refills: 0 | Status: SHIPPED | OUTPATIENT
Start: 2019-11-21 | End: 2021-04-01

## 2020-01-28 ENCOUNTER — OFFICE VISIT (OUTPATIENT)
Dept: ONCOLOGY | Facility: CLINIC | Age: 70
End: 2020-01-28

## 2020-01-28 ENCOUNTER — INFUSION (OUTPATIENT)
Dept: ONCOLOGY | Facility: HOSPITAL | Age: 70
End: 2020-01-28

## 2020-01-28 VITALS
SYSTOLIC BLOOD PRESSURE: 189 MMHG | WEIGHT: 267 LBS | BODY MASS INDEX: 36.21 KG/M2 | RESPIRATION RATE: 18 BRPM | OXYGEN SATURATION: 96 % | TEMPERATURE: 98.6 F | HEART RATE: 94 BPM | DIASTOLIC BLOOD PRESSURE: 84 MMHG

## 2020-01-28 VITALS
RESPIRATION RATE: 18 BRPM | OXYGEN SATURATION: 96 % | TEMPERATURE: 98.6 F | WEIGHT: 267.3 LBS | BODY MASS INDEX: 36.25 KG/M2 | SYSTOLIC BLOOD PRESSURE: 189 MMHG | DIASTOLIC BLOOD PRESSURE: 84 MMHG | HEART RATE: 94 BPM

## 2020-01-28 DIAGNOSIS — R79.9 ABNORMAL FINDING OF BLOOD CHEMISTRY, UNSPECIFIED: ICD-10-CM

## 2020-01-28 DIAGNOSIS — C20 RECTAL CANCER (HCC): Primary | ICD-10-CM

## 2020-01-28 DIAGNOSIS — R93.89 ABNORMAL FINDINGS ON DIAGNOSTIC IMAGING OF OTHER SPECIFIED BODY STRUCTURES: ICD-10-CM

## 2020-01-28 DIAGNOSIS — Z95.828 PORT-A-CATH IN PLACE: Primary | ICD-10-CM

## 2020-01-28 DIAGNOSIS — D50.9 IRON DEFICIENCY ANEMIA, UNSPECIFIED IRON DEFICIENCY ANEMIA TYPE: ICD-10-CM

## 2020-01-28 LAB
ALBUMIN SERPL-MCNC: 4.05 G/DL (ref 3.5–5.2)
ALBUMIN/GLOB SERPL: 1.1 G/DL
ALP SERPL-CCNC: 92 U/L (ref 39–117)
ALT SERPL W P-5'-P-CCNC: 12 U/L (ref 1–41)
ANION GAP SERPL CALCULATED.3IONS-SCNC: 14 MMOL/L (ref 5–15)
AST SERPL-CCNC: 18 U/L (ref 1–40)
BASOPHILS # BLD AUTO: 0.03 10*3/MM3 (ref 0–0.2)
BASOPHILS NFR BLD AUTO: 0.4 % (ref 0–1.5)
BILIRUB SERPL-MCNC: 0.6 MG/DL (ref 0.2–1.2)
BUN BLD-MCNC: 15 MG/DL (ref 8–23)
BUN/CREAT SERPL: 13.6 (ref 7–25)
CALCIUM SPEC-SCNC: 9.5 MG/DL (ref 8.6–10.5)
CHLORIDE SERPL-SCNC: 103 MMOL/L (ref 98–107)
CO2 SERPL-SCNC: 26 MMOL/L (ref 22–29)
CREAT BLD-MCNC: 1.1 MG/DL (ref 0.76–1.27)
DEPRECATED RDW RBC AUTO: 49.6 FL (ref 37–54)
EOSINOPHIL # BLD AUTO: 0.1 10*3/MM3 (ref 0–0.4)
EOSINOPHIL NFR BLD AUTO: 1.3 % (ref 0.3–6.2)
ERYTHROCYTE [DISTWIDTH] IN BLOOD BY AUTOMATED COUNT: 14 % (ref 12.3–15.4)
FERRITIN SERPL-MCNC: 62.1 NG/ML (ref 30–400)
GFR SERPL CREATININE-BSD FRML MDRD: 66 ML/MIN/1.73
GLOBULIN UR ELPH-MCNC: 3.6 GM/DL
GLUCOSE BLD-MCNC: 113 MG/DL (ref 65–99)
HBA1C MFR BLD: 5.5 % (ref 4.8–5.6)
HCT VFR BLD AUTO: 38.9 % (ref 37.5–51)
HGB BLD-MCNC: 12.5 G/DL (ref 13–17.7)
IMM GRANULOCYTES # BLD AUTO: 0.03 10*3/MM3 (ref 0–0.05)
IMM GRANULOCYTES NFR BLD AUTO: 0.4 % (ref 0–0.5)
IRON 24H UR-MRATE: 46 MCG/DL (ref 59–158)
IRON SATN MFR SERPL: 10 % (ref 20–50)
LYMPHOCYTES # BLD AUTO: 1.07 10*3/MM3 (ref 0.7–3.1)
LYMPHOCYTES NFR BLD AUTO: 14.1 % (ref 19.6–45.3)
MCH RBC QN AUTO: 30.8 PG (ref 26.6–33)
MCHC RBC AUTO-ENTMCNC: 32.1 G/DL (ref 31.5–35.7)
MCV RBC AUTO: 95.8 FL (ref 79–97)
MONOCYTES # BLD AUTO: 0.6 10*3/MM3 (ref 0.1–0.9)
MONOCYTES NFR BLD AUTO: 7.9 % (ref 5–12)
NEUTROPHILS # BLD AUTO: 5.75 10*3/MM3 (ref 1.7–7)
NEUTROPHILS NFR BLD AUTO: 75.9 % (ref 42.7–76)
NRBC BLD AUTO-RTO: 0 /100 WBC (ref 0–0.2)
PLATELET # BLD AUTO: 159 10*3/MM3 (ref 140–450)
PMV BLD AUTO: 9.5 FL (ref 6–12)
POTASSIUM BLD-SCNC: 3.9 MMOL/L (ref 3.5–5.2)
PROT SERPL-MCNC: 7.6 G/DL (ref 6–8.5)
RBC # BLD AUTO: 4.06 10*6/MM3 (ref 4.14–5.8)
SODIUM BLD-SCNC: 143 MMOL/L (ref 136–145)
TIBC SERPL-MCNC: 441 MCG/DL (ref 298–536)
TRANSFERRIN SERPL-MCNC: 296 MG/DL (ref 200–360)
TSH SERPL DL<=0.05 MIU/L-ACNC: 1.91 UIU/ML (ref 0.27–4.2)
WBC NRBC COR # BLD: 7.58 10*3/MM3 (ref 3.4–10.8)

## 2020-01-28 PROCEDURE — 85025 COMPLETE CBC W/AUTO DIFF WBC: CPT | Performed by: INTERNAL MEDICINE

## 2020-01-28 PROCEDURE — 83540 ASSAY OF IRON: CPT | Performed by: INTERNAL MEDICINE

## 2020-01-28 PROCEDURE — 36591 DRAW BLOOD OFF VENOUS DEVICE: CPT

## 2020-01-28 PROCEDURE — 80053 COMPREHEN METABOLIC PANEL: CPT | Performed by: INTERNAL MEDICINE

## 2020-01-28 PROCEDURE — 25010000003 HEPARIN LOCK FLUCH PER 10 UNITS: Performed by: INTERNAL MEDICINE

## 2020-01-28 PROCEDURE — 82746 ASSAY OF FOLIC ACID SERUM: CPT | Performed by: INTERNAL MEDICINE

## 2020-01-28 PROCEDURE — 82607 VITAMIN B-12: CPT | Performed by: INTERNAL MEDICINE

## 2020-01-28 PROCEDURE — 99214 OFFICE O/P EST MOD 30 MIN: CPT | Performed by: INTERNAL MEDICINE

## 2020-01-28 PROCEDURE — 82728 ASSAY OF FERRITIN: CPT | Performed by: INTERNAL MEDICINE

## 2020-01-28 PROCEDURE — 83036 HEMOGLOBIN GLYCOSYLATED A1C: CPT | Performed by: INTERNAL MEDICINE

## 2020-01-28 PROCEDURE — 82378 CARCINOEMBRYONIC ANTIGEN: CPT | Performed by: INTERNAL MEDICINE

## 2020-01-28 PROCEDURE — 84466 ASSAY OF TRANSFERRIN: CPT | Performed by: INTERNAL MEDICINE

## 2020-01-28 PROCEDURE — 84443 ASSAY THYROID STIM HORMONE: CPT | Performed by: INTERNAL MEDICINE

## 2020-01-28 RX ORDER — SODIUM CHLORIDE 0.9 % (FLUSH) 0.9 %
10 SYRINGE (ML) INJECTION AS NEEDED
Status: DISCONTINUED | OUTPATIENT
Start: 2020-01-28 | End: 2020-01-28 | Stop reason: HOSPADM

## 2020-01-28 RX ORDER — SODIUM CHLORIDE 0.9 % (FLUSH) 0.9 %
10 SYRINGE (ML) INJECTION AS NEEDED
Status: CANCELLED | OUTPATIENT
Start: 2020-03-10

## 2020-01-28 RX ORDER — ALLOPURINOL 100 MG/1
100 TABLET ORAL DAILY
COMMUNITY
End: 2021-07-08 | Stop reason: SDUPTHER

## 2020-01-28 RX ORDER — HEPARIN SODIUM (PORCINE) LOCK FLUSH IV SOLN 100 UNIT/ML 100 UNIT/ML
500 SOLUTION INTRAVENOUS AS NEEDED
Status: DISCONTINUED | OUTPATIENT
Start: 2020-01-28 | End: 2020-01-28 | Stop reason: HOSPADM

## 2020-01-28 RX ORDER — HEPARIN SODIUM (PORCINE) LOCK FLUSH IV SOLN 100 UNIT/ML 100 UNIT/ML
500 SOLUTION INTRAVENOUS AS NEEDED
Status: CANCELLED | OUTPATIENT
Start: 2020-03-10

## 2020-01-28 RX ADMIN — Medication 500 UNITS: at 17:16

## 2020-01-28 RX ADMIN — SODIUM CHLORIDE, PRESERVATIVE FREE 10 ML: 5 INJECTION INTRAVENOUS at 17:15

## 2020-01-28 NOTE — PROGRESS NOTES
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  Combined chemoradiation with Xeloda 825 mg/m2 BID D1-5 or M-F during the course of radiation.  His dose is 500 mg x 4 or 2000 mg BID  Treatment finished 19.    2.  Dr. Yadav performed LAR with coloanal anstomosis and loop ileostomy 19.  Pathology showed  Residual invasive adneocarcinoma.  Tumor invaded the muscularis propria.  Surgical margins clear.  0 resected LNs involved.  ypT2N0.      3.         CHIEF COMPLAINT:  Follow up of Rectal cancer    HISTORY OF PRESENT ILLNESS:   David Mandujano is a very pleasant 69 y.o. male who was referred by Dr. Barnhart for evaluation and treatment of colorectal cancer. He began to notice rectal bleeding in January or 2018, and he also began to notice fatigue in approximately July. He assumed he was out of shape when he started to become short of breath and started trying to exercise more frequently, which only exacerbated the shortness of breath. He was evaluated by his PCP, Raj Wang MD, who after testing, found him to be severely iron deficient, hyperglycemic, and he also had an elevated PSA. He was on vacation at the time, and his PCP asked him to return home for further evaluation. He was started on oral iron therapy and later received IV iron infusions. He was also scheduled for a colonoscopy with Dr. Barnhart, during which a suspicious rectal mass was biopsied and pathology was positive for an ulcerated invasive, moderately differentiated rectal adenocarcinoma.     INTERVAL HISTORY:  Mr. Mandujano is here today for follow up of rectal cancer. Since he was here last, he had ileostomy takedown. He has recovered well and says he is only struggling with trying to normalize his bowel movements. He had abdominal pain/discomfort which has gotten better after his bowels  moved. He is eating and drinking well. He reports numbness/tingling in the bottom of his feet which he says was never there when he took treatment but which has recently started. He also notes numbness in his 4th and 5th digits in his hands bilaterally.    PAST MEDICAL HISTORY:  Past Medical History:   Diagnosis Date   • Anemia    • Arthritis    • Colon cancer (CMS/HCC)     s/p chemo therapy   • Heart murmur    • Hypertension    • Wrist fracture     surgically repaired       PAST SURGICAL HISTORY:  Past Surgical History:   Procedure Laterality Date   • ABDOMINAL SURGERY     • COLON SURGERY     • COLONOSCOPY     • FRACTURE SURGERY Left    • VENOUS ACCESS DEVICE (PORT) INSERTION N/A 5/20/2019    Procedure: INSERTION VENOUS ACCESS DEVICE;  Surgeon: Cindy Corrales MD;  Location: [facility];  Service: General   • WRIST SURGERY         FAMILY HISTORY:  Family History   Problem Relation Age of Onset   • Colon cancer Maternal Grandfather    • Other Sister    • Heart disease Sister    No other family history of malignancy.    SOCIAL HISTORY:  Social History     Socioeconomic History   • Marital status: Single     Spouse name: Not on file   • Number of children: Not on file   • Years of education: Not on file   • Highest education level: Not on file   Tobacco Use   • Smoking status: Never Smoker   • Smokeless tobacco: Current User     Types: Chew   Substance and Sexual Activity   • Alcohol use: Yes     Comment: occasional   • Drug use: No   • Sexual activity: Defer   Social History Narrative    He is , and is self employed/semi retired. He is a non smoker but chews tobacco. He used to drink daily, but says he has cut it in half.      REVIEW OF SYSTEMS:   A comprehensive 14 point review of systems was performed.  Significant findings as mentioned above.  All other systems reviewed and are negative.      MEDICATIONS:  The current medication list was reviewed in the EMR    Current Outpatient Medications:   •   amLODIPine (NORVASC) 10 MG tablet, Take 5 mg by mouth Daily., Disp: , Rfl:   •  capecitabine (XELODA) 150 MG chemo tablet, Take 1 tab by mouth in the AM and 1 tab by mouth in the PM on days 1-14, then off for 7 days. Take with 500 mg tabs for total dose of 2150mg, Disp: 28 tablet, Rfl: 5  •  capecitabine (XELODA) 500 MG chemo tablet, Take 4 tabs by mouth in the AM and 4 tabs by mouth in the PM on days 1-14, then off 7 days. Take with 150mg tabs for total dose of 2150mg., Disp: 112 tablet, Rfl: 5  •  ferrous sulfate 325 (65 FE) MG tablet, Take 325 mg by mouth 2 (Two) Times a Day., Disp: , Rfl:   •  HYDROcodone-acetaminophen (NORCO) 7.5-325 MG per tablet, Take 1 tablet by mouth 4 (Four) Times a Day As Needed for Moderate Pain ., Disp: 12 tablet, Rfl: 0  •  metoprolol succinate XL (TOPROL-XL) 50 MG 24 hr tablet, Take 1 tablet by mouth Daily., Disp: 30 tablet, Rfl: 11  •  ondansetron ODT (ZOFRAN-ODT) 8 MG disintegrating tablet, Dissolve 1 tablet on the tongue 3 (Three) Times a Day As Needed for Nausea or Vomiting., Disp: 30 tablet, Rfl: 5  •  predniSONE (DELTASONE) 10 MG tablet, Take 3 tabs daily x 1 day, 2 tabs daily x 2 days, then 1 tab daily x 1 day, Disp: 8 tablet, Rfl: 0  •  prochlorperazine (COMPAZINE) 5 MG tablet, Take 2 tablets by mouth Every 6 (Six) Hours As Needed for Nausea or Vomiting., Disp: 60 tablet, Rfl: 5    ALLERGIES:  No Known Allergies    PHYSICAL EXAM:  Vitals:    01/28/20 1620   BP: (!) 189/84   Pulse: 94   Resp: 18   Temp: 98.6 °F (37 °C)   SpO2: 96%     Pain Score    01/28/20 1620   PainSc: 0-No pain     General:  Awake, alert and oriented, in no distress.   HEENT:  Pupils are equal, round and reactive to light and accommodation, Extra-ocular movements full, Oropharyx clear, mucous membranes moist  Neck:  No JVD, thyromegaly or lymphadenopathy  CV:  Regular rate and rhythm, III/IV systolic murmur, no rubs or gallops  Resp:  Lungs are clear to auscultation bilaterally  Abd:  Soft, non-tender, sl  distended, bowel sounds present, no organomegaly or masses.  Surgical incisions well-healed.  Stoma has been reversed and he is healed well.  Ext:  No clubbing, cyanosis or edema in the legs.    Lymph:  No cervical, supraclavicular, axillary, adenopathy  Neuro:  grossly non-focal exam    PATHOLOGY:  10-02-18      04-01-19:        04-09-19:        ENDOSCOPY:  10-02-18:        IMAGING:  10-04-18 CT Abdomen Pelvis With Contrast   Findings  - LOWER THORAX: Patchy nonspecific subpleural nodularity in the left lung base that could represent sequelae of chronic airspace disease or early fibrosis.     ABDOMEN:  - LIVER: Homogeneous. No focal hepatic mass or ductal dilatation.  - GALLBLADDER: GALLSTONES WITHIN THE GALLBLADDER.  - PANCREAS: Unremarkable. No mass or ductal dilatation.  - SPLEEN: Homogeneous. No splenomegaly.  - ADRENALS: No mass.  - KIDNEYS: RIGHT RENAL CYST MEASURING 3.6 CM.  - GI TRACT: NONSPECIFIC DISTAL SIGMOID COLONIC WALL THICKENING VERSUS NONDISTENTION.  - PERITONEUM: No free air. No free fluid or loculated fluid collections.  - MESENTERY: Unremarkable.  - LYMPH NODES: No lymphadenopathy.  - VASCULATURE: ATHEROSCLEROTIC VASCULAR CALCIFICATION.  - ABDOMINAL WALL: SMALL BILATERAL HUMERAL HERNIAS CONTAINING ONLY FAT.  - OTHER: None.     PELVIS:  - BLADDER: No focal mass or significant wall thickening  - REPRODUCTIVE: Unremarkable as visualized.  - APPENDIX: Nondistended. No surrounding inflammation.  - BONES: No acute bony abnormality.     IMPRESSION:  1. Gallstones within the gallbladder.  2.Nonspecific distal sigmoid colonic wall thickening versus nondistention.    PET/CT 10-29-18    11-13-18 MRI Pelvis Without Contrast  FINDINGS:  1.) TUMOR LOCATION AND CHARACTERISTICS     i) Distance of Inferior margin of Tumor from the dentate line: 9.5 CM  ii) Tumor at or below the puborectalis sling:  NO  iii) Relationship to the anterior peritoneal reflection: BELOW  iv) Craniocaudal length of the tumor: 6cm  v)  Clock face of tumor: 5o'clock to 2o'clock  vi) Polypoid/ Annular/ Semi-annular: SEMI-ANNULAR  vii) Mucinous: YES     2.) EXTRAMURAL DEPTH OF INVASION AND MR T-CATEGORY       i) Extramural depth of invasion (Use 0mm for T1 or T2 tumor):  APPROXIMATELY 5 MM   ii) T category: T3 B              *Please indicate structures with possible invasion.  Specify laterality,  sequence and slice#: (see list below)     *  Anterior peritoneal reflection (T4a tumor): NO  *  Puborectalis: NO  *  Bladder: NO  *  Vascular Involvement of Iliac Vessels: NO  *  Levator ani: NO  *  Ureters: NO  *  Obturator: NO  *  Prostate: MARGINAL/TETHERED  *  Piriformis: NO  *  Uterus: NOT APPLICABLE  *  Pelvic Bone: NO  *  Vagina: NOT APPLICABLE  *  Sacrum: NO  *  Urethra: NO  *  Other:          iii) For low rectal tumors (maximum tumor depth at or below the  puborectalis sling):     *  Not applicable (tumor above the puborectalis sling: NOT APPLICABLE  *    *  Level 1 (submucosa only, no involvement of internal anal sphincter):       *  Level 2 (confined to the internal anal sphincter; no involvement of  intersphincteric fat):      *  Level 3 (intersphincteric fat involved):      *  Level 4 (involves external sphincter or beyond):         3. RELATIONSHIP OF THE TUMOR TO MESORECTAL FASCIA (MRF)       i) Shortest distance 0mm of the definitive tumour border to the MRF  is: AT 12:00   ii) Are there any tumour spiculations closer to the MRF? NO     iii) Location of Tumor margin to MRF: 12:00, AT THE LEVEL PROSTATE     4. EXTRAMURAL VENOUS INVASION       i) Extramural Venous Invasion (EMVI): ABSENT     5. MESORECTAL LYMPH NODES AND TUMOUR DEPOSITS       i) Any suspicious mesorectal lymph nodes/tumor deposits: YES      *If yes, the most suspicious node/tumor deposit is: 15 MM IN  DIAMETER, ALONG THE UPPER MARGIN OF the tumor with minimum distance 7  MMmm from the MRF at 7o'clock     6. EXTRAMESORECTAL LYMPH NODES        i) Any suspicious extramesorectal  lymph nodes: NO      *If yes, location and laterality of suspicious nodes:             Int. Iliac:                Ext. Iliac:                Common Iliac:                Obturator:                Inguinal:                Other:           ii) Is the BETH node station in the field of view: NO        *If Yes, are these nodes suspicious:         7. OTHER FINDINGS (COMPLICATIONS, METASTASES, LIMITATIONS)         NOTE: THERE IS SOME UNCERTAINTY WHETHER THE APPARENT SMALL FOCUS OF TUMOR EXTENDING TO THE PROSTATE AT THE 12:00 POSITION COULD ACTUALLY BE PROSTATE NODULE EXTENDING TOWARD THE TUMOR. RECTAL TUMOR IS FAVORED; ALTHOUGH THIS DETERMINATION CANNOT BE MADE WITH COMPLETE CERTAINTY, TUMOR IS CONSIDERED T3 B.        IMPRESSIONS:  MRI rectal cancer T category is: T3 B  Maximum EMD of invasion is: 5  Minimum tumor to MRF distance is: 0  Low rectal tumor component: NO  Mesorectal nodes/tumor deposits: SUSPICIOUS  EMVI: ABSENT  Extramesorectal nodes: NEGATIVE    RECENT LABS:  Lab Results   Component Value Date    WBC 7.58 01/28/2020    HGB 12.5 (L) 01/28/2020    HCT 38.9 01/28/2020    MCV 95.8 01/28/2020    RDW 14.0 01/28/2020     01/28/2020    NEUTRORELPCT 75.9 01/28/2020    LYMPHORELPCT 14.1 (L) 01/28/2020    MONORELPCT 7.9 01/28/2020    EOSRELPCT 1.3 01/28/2020    BASORELPCT 0.4 01/28/2020    NEUTROABS 5.75 01/28/2020    LYMPHSABS 1.07 01/28/2020       Lab Results   Component Value Date     01/28/2020    K 3.9 01/28/2020    CO2 26.0 01/28/2020     01/28/2020    BUN 15 01/28/2020    CREATININE 1.10 01/28/2020    EGFRIFNONA 66 01/28/2020    GLUCOSE 113 (H) 01/28/2020    CALCIUM 9.5 01/28/2020    ALKPHOS 92 01/28/2020    AST 18 01/28/2020    ALT 12 01/28/2020    BILITOT 0.6 01/28/2020    ALBUMIN 4.05 01/28/2020    PROTEINTOT 7.6 01/28/2020       Lab Results   Component Value Date/Time    URICACID 8.9 (H) 07/17/2019 10:16 AM     Lab Results   Component Value Date    CEA 2.28 06/26/2019    CEA 3.40 03/20/2019     CEA 17.80 (H) 01/11/2019    CEA 18.80 (H) 10/19/2018     Lab Results   Component Value Date    PSA 4.930 (H) 10/19/2018       Lab Results   Component Value Date    FERRITIN 62.10 01/28/2020    IRON 46 (L) 01/28/2020    TIBC 441 01/28/2020    LABIRON 10 (L) 01/28/2020    EDFRMBZR39 401 10/19/2018    FOLATE 7.48 10/19/2018     ASSESSMENT & PLAN:  David Mandujano is a very pleasant 69 y.o. male with newly diagnosed rectal cancer. Clinically stage IIIC ([id number]).    1.  Rectal cancer:  -  He received standard neoadjuvant chemoradiation as above given locally advanced tumor with suspicious pelvic lymph node.    - Pre-treatment MRI showed a locally advanced tumor and suspicious pelvic LN.  PET-CT was negative except in the local tumor.   -  There was a non-specific sub-pleural nodular opacity in the L lung base which was PET negative.  Perhaps this was inflammatory in nature. This area will require f/u on future imaging.  - Recommended neoadjuvant chemoradiation with Xeloda 825 mg/m2 BID D1-5 or M-F during the course of radiaton.  His dose was 500 mg x 4 or 2000 mg BID. Overall, he tolerated this well and completed treatment 1-21-19.    -  He saw Dr. Marilynn Yadav and underwent successful surgical resection as above.    -  He took adjuvant CapeOx treatment to prevent recurrence and has completed 8 cycles. Overall, he tolerated treatment well aside from fatigue and diarrhea (see below). He started to struggle with thrombocytopenia and possibly with neuropathy so gave his last cycle without Oxaliplatin.  -  He did well with takedown of his ileostomy.  - Will check labwork today including CEA.  Will order CT CAP prior to his return.    2.  Anemia;  -  Improving away from chemotherapy.  Iron is a little low, so will give trial of Niferex.    3.  Neuropathy:  -  Etiology is uncertain.  He did complain of some vague symptoms toward the end of his treatment (though he thinks they happened later), so this could be related to  Oxaliplatin.  Will check B!2, Folate, TSH, HBA1C.  Alcohol intake could also contribute.  As for the symptoms in his hands, these symtpoms seem to be more focal / localized and are more likely to be related to local compression, etc.    4.   Depressed mood / Anxiety and Alcohol Abuse:  - Previously given trial of Lexapro but he did not find this helpful and discontinued this. Currently, he says he is doing well in this regard. He did quit drinking completely around the time of his surgery but has recently been drinking again. He says Dr. Yadav did mention to him that his liver didn't look good during his operation.  -  We discussed potential involvement in support groups and /or referral for alcohol abuse counseling, but he declined.  Will monitor.    5.  Prominent systolic murmur:   -   He was evaluated by Dr. Joe and then by Dr. Barrera.  He underwent echocardiogram 1-30-19 which showed EF 70%, grade 1 diastolic dysfunction, mild to mod septal asymmetric hypertrophy, AV calcification with functionally bicuspid valve, mild AI and moderate AS, moderate MAC with mild MR, mild MS  -  Thought is that at some point in the future he will require AVR.   -  We discussed this today and I recommended he return to see his cardiologist.  He said he wants to wait 3 more months but that I can send him back when he next follows up.     6.  Prophylaxis:  He took 2019 influenza vaccine. He took Prevnar 13 on 11-20-18.       7. ACO / KAVITHA/Other  Quality measures  - David Mandujano received 2019 flu vaccine.  - David Mandujano reports a pain score of 0.  No intervention needed.  - Current outpatient and discharge medications have been reconciled for the patient.  Reviewed by: Jyoti Larios MD       8. Follow up: PAC flush in 6 weeks.  RTC in 3 months with CBCD, CMP, CEA, Ferritin,  Iron panel and CT CAP prior to his visit.    This note was scribed for Jyoti Larios MD by Lynn Pinto RN.    I, Jyoti Larios MD,  personally performed the services described in this documentation as scribed by the above named individual in my presence, and it is both accurate and complete.  01/28/2020        I spent 35 minutes with David Mandujano today.  In the office today, more than 50% of this time was spent face-to-face with him  in counseling / coordination of care, reviewing his interim medical history and counseling on the current treatment plan.  All questions were answered to his satisfaction.         Electronically Signed by: Jyoti Larios MD         CC:   MD Kathleen Coppola Emmanuel C, MD Shawn Michael Acino, MD William Richards, MD Sandra Beck, MD

## 2020-01-29 LAB
CEA SERPL-MCNC: 2.28 NG/ML
FOLATE SERPL-MCNC: 3.83 NG/ML (ref 4.78–24.2)
VIT B12 BLD-MCNC: 236 PG/ML (ref 211–946)

## 2020-01-29 RX ORDER — IRON POLYSACCHARIDE COMPLEX 150 MG
150 CAPSULE ORAL DAILY
Qty: 30 CAPSULE | Refills: 3 | Status: SHIPPED | OUTPATIENT
Start: 2020-01-29 | End: 2020-07-21

## 2020-01-29 NOTE — TELEPHONE ENCOUNTER
Attempted to call patient with MD instructions to take oral iron. No answer. Medication sent to pharmacy

## 2020-01-30 RX ORDER — FOLIC ACID 1 MG/1
1 TABLET ORAL DAILY
Qty: 30 TABLET | Refills: 6 | Status: SHIPPED | OUTPATIENT
Start: 2020-01-30 | End: 2020-06-25 | Stop reason: SDUPTHER

## 2020-01-30 NOTE — TELEPHONE ENCOUNTER
Attempted to call patient again regarding labs and medication recommendations. NA no way to leave a message.

## 2020-02-17 RX ORDER — METOPROLOL SUCCINATE 50 MG/1
TABLET, EXTENDED RELEASE ORAL
Qty: 30 TABLET | Refills: 11 | OUTPATIENT
Start: 2020-02-17

## 2020-03-10 ENCOUNTER — TELEPHONE (OUTPATIENT)
Dept: ONCOLOGY | Facility: HOSPITAL | Age: 70
End: 2020-03-10

## 2020-03-10 NOTE — TELEPHONE ENCOUNTER
Telephoned pt regarding appointment today.  Pt did not show port flush.  Port flush re-scheduled for 3/18/20 @ 1:00, pt aware.

## 2020-03-17 ENCOUNTER — TELEPHONE (OUTPATIENT)
Dept: ONCOLOGY | Facility: HOSPITAL | Age: 70
End: 2020-03-17

## 2020-03-18 ENCOUNTER — TELEPHONE (OUTPATIENT)
Dept: ONCOLOGY | Facility: CLINIC | Age: 70
End: 2020-03-18

## 2020-03-18 ENCOUNTER — APPOINTMENT (OUTPATIENT)
Dept: ONCOLOGY | Facility: HOSPITAL | Age: 70
End: 2020-03-18

## 2020-03-18 NOTE — TELEPHONE ENCOUNTER
Pt has an appt for a vad flush on 3/18/20 pt wants to cancel this appt he did not want to reschedule at this time . Tried calling office while pt was on the phone with no answer sending encounter     Pt contact # [phone]

## 2020-04-20 ENCOUNTER — TELEPHONE (OUTPATIENT)
Dept: ONCOLOGY | Facility: HOSPITAL | Age: 70
End: 2020-04-20

## 2020-04-20 NOTE — TELEPHONE ENCOUNTER
Pt called to to complete covid/travel screening.  Pt denies cough, shortness of air, fever.  Denies travel or exposure to virus.

## 2020-04-21 ENCOUNTER — INFUSION (OUTPATIENT)
Dept: ONCOLOGY | Facility: HOSPITAL | Age: 70
End: 2020-04-21

## 2020-04-21 ENCOUNTER — LAB (OUTPATIENT)
Dept: ONCOLOGY | Facility: CLINIC | Age: 70
End: 2020-04-21

## 2020-04-21 VITALS
TEMPERATURE: 97.6 F | DIASTOLIC BLOOD PRESSURE: 88 MMHG | HEART RATE: 82 BPM | WEIGHT: 279.7 LBS | SYSTOLIC BLOOD PRESSURE: 174 MMHG | OXYGEN SATURATION: 97 % | RESPIRATION RATE: 18 BRPM | BODY MASS INDEX: 37.93 KG/M2

## 2020-04-21 DIAGNOSIS — C20 RECTAL CANCER (HCC): ICD-10-CM

## 2020-04-21 DIAGNOSIS — D50.9 IRON DEFICIENCY ANEMIA, UNSPECIFIED IRON DEFICIENCY ANEMIA TYPE: ICD-10-CM

## 2020-04-21 DIAGNOSIS — Z95.828 PORT-A-CATH IN PLACE: Primary | ICD-10-CM

## 2020-04-21 LAB
ALBUMIN SERPL-MCNC: 4.2 G/DL (ref 3.5–5.2)
ALBUMIN/GLOB SERPL: 1.2 G/DL
ALP SERPL-CCNC: 91 U/L (ref 39–117)
ALT SERPL W P-5'-P-CCNC: 21 U/L (ref 1–41)
ANION GAP SERPL CALCULATED.3IONS-SCNC: 12.8 MMOL/L (ref 5–15)
AST SERPL-CCNC: 25 U/L (ref 1–40)
BASOPHILS # BLD AUTO: 0.03 10*3/MM3 (ref 0–0.2)
BASOPHILS NFR BLD AUTO: 0.5 % (ref 0–1.5)
BILIRUB SERPL-MCNC: 0.5 MG/DL (ref 0.2–1.2)
BUN BLD-MCNC: 21 MG/DL (ref 8–23)
BUN/CREAT SERPL: 15.4 (ref 7–25)
CALCIUM SPEC-SCNC: 9.6 MG/DL (ref 8.6–10.5)
CHLORIDE SERPL-SCNC: 105 MMOL/L (ref 98–107)
CO2 SERPL-SCNC: 25.2 MMOL/L (ref 22–29)
CREAT BLD-MCNC: 1.36 MG/DL (ref 0.76–1.27)
DEPRECATED RDW RBC AUTO: 47.5 FL (ref 37–54)
EOSINOPHIL # BLD AUTO: 0.12 10*3/MM3 (ref 0–0.4)
EOSINOPHIL NFR BLD AUTO: 1.9 % (ref 0.3–6.2)
ERYTHROCYTE [DISTWIDTH] IN BLOOD BY AUTOMATED COUNT: 14.1 % (ref 12.3–15.4)
FERRITIN SERPL-MCNC: 41.41 NG/ML (ref 30–400)
GFR SERPL CREATININE-BSD FRML MDRD: 52 ML/MIN/1.73
GLOBULIN UR ELPH-MCNC: 3.6 GM/DL
GLUCOSE BLD-MCNC: 105 MG/DL (ref 65–99)
HCT VFR BLD AUTO: 41.6 % (ref 37.5–51)
HGB BLD-MCNC: 13.1 G/DL (ref 13–17.7)
IMM GRANULOCYTES # BLD AUTO: 0.02 10*3/MM3 (ref 0–0.05)
IMM GRANULOCYTES NFR BLD AUTO: 0.3 % (ref 0–0.5)
IRON 24H UR-MRATE: 55 MCG/DL (ref 59–158)
IRON SATN MFR SERPL: 11 % (ref 20–50)
LYMPHOCYTES # BLD AUTO: 1.32 10*3/MM3 (ref 0.7–3.1)
LYMPHOCYTES NFR BLD AUTO: 20.6 % (ref 19.6–45.3)
MCH RBC QN AUTO: 28.9 PG (ref 26.6–33)
MCHC RBC AUTO-ENTMCNC: 31.5 G/DL (ref 31.5–35.7)
MCV RBC AUTO: 91.8 FL (ref 79–97)
MONOCYTES # BLD AUTO: 0.59 10*3/MM3 (ref 0.1–0.9)
MONOCYTES NFR BLD AUTO: 9.2 % (ref 5–12)
NEUTROPHILS # BLD AUTO: 4.33 10*3/MM3 (ref 1.7–7)
NEUTROPHILS NFR BLD AUTO: 67.5 % (ref 42.7–76)
NRBC BLD AUTO-RTO: 0 /100 WBC (ref 0–0.2)
PLATELET # BLD AUTO: 131 10*3/MM3 (ref 140–450)
PMV BLD AUTO: 9.5 FL (ref 6–12)
POTASSIUM BLD-SCNC: 4.4 MMOL/L (ref 3.5–5.2)
PROT SERPL-MCNC: 7.8 G/DL (ref 6–8.5)
RBC # BLD AUTO: 4.53 10*6/MM3 (ref 4.14–5.8)
SODIUM BLD-SCNC: 143 MMOL/L (ref 136–145)
TIBC SERPL-MCNC: 499 MCG/DL (ref 298–536)
TRANSFERRIN SERPL-MCNC: 335 MG/DL (ref 200–360)
WBC NRBC COR # BLD: 6.41 10*3/MM3 (ref 3.4–10.8)

## 2020-04-21 PROCEDURE — 80053 COMPREHEN METABOLIC PANEL: CPT | Performed by: INTERNAL MEDICINE

## 2020-04-21 PROCEDURE — 25010000003 HEPARIN LOCK FLUCH PER 10 UNITS: Performed by: INTERNAL MEDICINE

## 2020-04-21 PROCEDURE — 85025 COMPLETE CBC W/AUTO DIFF WBC: CPT | Performed by: INTERNAL MEDICINE

## 2020-04-21 PROCEDURE — 82728 ASSAY OF FERRITIN: CPT | Performed by: INTERNAL MEDICINE

## 2020-04-21 PROCEDURE — 36591 DRAW BLOOD OFF VENOUS DEVICE: CPT

## 2020-04-21 PROCEDURE — 84466 ASSAY OF TRANSFERRIN: CPT | Performed by: INTERNAL MEDICINE

## 2020-04-21 PROCEDURE — 82378 CARCINOEMBRYONIC ANTIGEN: CPT | Performed by: INTERNAL MEDICINE

## 2020-04-21 PROCEDURE — 83540 ASSAY OF IRON: CPT | Performed by: INTERNAL MEDICINE

## 2020-04-21 RX ORDER — SODIUM CHLORIDE 0.9 % (FLUSH) 0.9 %
10 SYRINGE (ML) INJECTION AS NEEDED
Status: DISCONTINUED | OUTPATIENT
Start: 2020-04-21 | End: 2020-04-21 | Stop reason: HOSPADM

## 2020-04-21 RX ORDER — HEPARIN SODIUM (PORCINE) LOCK FLUSH IV SOLN 100 UNIT/ML 100 UNIT/ML
500 SOLUTION INTRAVENOUS AS NEEDED
Status: CANCELLED | OUTPATIENT
Start: 2020-05-29

## 2020-04-21 RX ORDER — SODIUM CHLORIDE 0.9 % (FLUSH) 0.9 %
10 SYRINGE (ML) INJECTION AS NEEDED
Status: CANCELLED | OUTPATIENT
Start: 2020-05-29

## 2020-04-21 RX ORDER — HEPARIN SODIUM (PORCINE) LOCK FLUSH IV SOLN 100 UNIT/ML 100 UNIT/ML
500 SOLUTION INTRAVENOUS AS NEEDED
Status: DISCONTINUED | OUTPATIENT
Start: 2020-04-21 | End: 2020-04-21 | Stop reason: HOSPADM

## 2020-04-21 RX ADMIN — Medication 500 UNITS: at 13:38

## 2020-04-21 RX ADMIN — SODIUM CHLORIDE, PRESERVATIVE FREE 10 ML: 5 INJECTION INTRAVENOUS at 13:37

## 2020-04-22 LAB — CEA SERPL-MCNC: 2.52 NG/ML

## 2020-05-21 ENCOUNTER — TELEPHONE (OUTPATIENT)
Dept: ONCOLOGY | Facility: HOSPITAL | Age: 70
End: 2020-05-21

## 2020-05-21 ENCOUNTER — HOSPITAL ENCOUNTER (OUTPATIENT)
Dept: CT IMAGING | Facility: HOSPITAL | Age: 70
Discharge: HOME | End: 2020-05-21
Admitting: INTERNAL MEDICINE

## 2020-05-21 ENCOUNTER — HOSPITAL ENCOUNTER (OUTPATIENT)
Dept: CT IMAGING | Facility: HOSPITAL | Age: 70
Discharge: HOME | End: 2020-05-21

## 2020-05-21 DIAGNOSIS — D50.9 IRON DEFICIENCY ANEMIA, UNSPECIFIED IRON DEFICIENCY ANEMIA TYPE: ICD-10-CM

## 2020-05-21 DIAGNOSIS — C20 RECTAL CANCER (HCC): ICD-10-CM

## 2020-05-21 PROCEDURE — 0 IOVERSOL 68 % SOLUTION: Performed by: INTERNAL MEDICINE

## 2020-05-21 PROCEDURE — 82565 ASSAY OF CREATININE: CPT

## 2020-05-21 PROCEDURE — 71260 CT THORAX DX C+: CPT

## 2020-05-21 PROCEDURE — 74177 CT ABD & PELVIS W/CONTRAST: CPT

## 2020-05-21 PROCEDURE — 74177 CT ABD & PELVIS W/CONTRAST: CPT | Performed by: RADIOLOGY

## 2020-05-21 PROCEDURE — 71260 CT THORAX DX C+: CPT | Performed by: RADIOLOGY

## 2020-05-21 RX ADMIN — IOVERSOL 100 ML: 678 INJECTION INTRA-ARTERIAL; INTRAVENOUS at 12:34

## 2020-05-21 NOTE — TELEPHONE ENCOUNTER
Pt has an office visit and wants to re-schedule port flush to that day as well.  Pt's appointment moved to 5/29/20 and pt aware.

## 2020-05-22 ENCOUNTER — APPOINTMENT (OUTPATIENT)
Dept: ONCOLOGY | Facility: HOSPITAL | Age: 70
End: 2020-05-22

## 2020-05-28 ENCOUNTER — TELEPHONE (OUTPATIENT)
Dept: ONCOLOGY | Facility: HOSPITAL | Age: 70
End: 2020-05-28

## 2020-05-29 ENCOUNTER — INFUSION (OUTPATIENT)
Dept: ONCOLOGY | Facility: HOSPITAL | Age: 70
End: 2020-05-29

## 2020-05-29 VITALS
WEIGHT: 285.6 LBS | SYSTOLIC BLOOD PRESSURE: 176 MMHG | HEART RATE: 86 BPM | TEMPERATURE: 98.2 F | DIASTOLIC BLOOD PRESSURE: 80 MMHG | RESPIRATION RATE: 20 BRPM | OXYGEN SATURATION: 98 % | BODY MASS INDEX: 38.73 KG/M2

## 2020-05-29 DIAGNOSIS — Z95.828 PORT-A-CATH IN PLACE: Primary | ICD-10-CM

## 2020-05-29 PROCEDURE — 25010000003 HEPARIN LOCK FLUSH PER 10 UNITS: Performed by: INTERNAL MEDICINE

## 2020-05-29 PROCEDURE — 96523 IRRIG DRUG DELIVERY DEVICE: CPT

## 2020-05-29 RX ORDER — SODIUM CHLORIDE 0.9 % (FLUSH) 0.9 %
10 SYRINGE (ML) INJECTION AS NEEDED
Status: CANCELLED | OUTPATIENT
Start: 2020-06-25

## 2020-05-29 RX ORDER — HEPARIN SODIUM (PORCINE) LOCK FLUSH IV SOLN 100 UNIT/ML 100 UNIT/ML
500 SOLUTION INTRAVENOUS AS NEEDED
Status: CANCELLED | OUTPATIENT
Start: 2020-06-25

## 2020-05-29 RX ORDER — SODIUM CHLORIDE 0.9 % (FLUSH) 0.9 %
10 SYRINGE (ML) INJECTION AS NEEDED
Status: DISCONTINUED | OUTPATIENT
Start: 2020-05-29 | End: 2020-05-29 | Stop reason: HOSPADM

## 2020-05-29 RX ORDER — HEPARIN SODIUM (PORCINE) LOCK FLUSH IV SOLN 100 UNIT/ML 100 UNIT/ML
500 SOLUTION INTRAVENOUS AS NEEDED
Status: DISCONTINUED | OUTPATIENT
Start: 2020-05-29 | End: 2020-05-29 | Stop reason: HOSPADM

## 2020-05-29 RX ADMIN — SODIUM CHLORIDE, PRESERVATIVE FREE 10 ML: 5 INJECTION INTRAVENOUS at 14:07

## 2020-05-29 RX ADMIN — Medication 500 UNITS: at 14:07

## 2020-06-05 LAB — CREAT BLDA-MCNC: 1.1 MG/DL (ref 0.6–1.3)

## 2020-06-23 NOTE — PROGRESS NOTES
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  Combined chemoradiation with Xeloda 825 mg/m2 BID D1-5 or M-F during the course of radiation.  His dose is 500 mg x 4 or 2000 mg BID  Treatment finished 19.    2.  Dr. Yadav performed LAR with coloanal anstomosis and loop ileostomy 19.  Pathology showed  Residual invasive adneocarcinoma.  Tumor invaded the muscularis propria.  Surgical margins clear.  014 resected LNs involved.  ypT2N0.      3.       4.  Ileostomy reversal 19 (Dr. Yadav)    CHIEF COMPLAINT:  Follow up of Rectal cancer    HISTORY OF PRESENT ILLNESS:   David Mandujano is a very pleasant 69 y.o. male who was referred by Dr. Barnhart for evaluation and treatment of colorectal cancer. He began to notice rectal bleeding in January or 2018, and he also began to notice fatigue in approximately July. He assumed he was out of shape when he started to become short of breath and started trying to exercise more frequently, which only exacerbated the shortness of breath. He was evaluated by his PCP, Raj Wang MD, who after testing, found him to be severely iron deficient, hyperglycemic, and he also had an elevated PSA. He was on vacation at the time, and his PCP asked him to return home for further evaluation. He was started on oral iron therapy and later received IV iron infusions. He was also scheduled for a colonoscopy with Dr. Barnhart, during which a suspicious rectal mass was biopsied and pathology was positive for an ulcerated invasive, moderately differentiated rectal adenocarcinoma.     INTERVAL HISTORY:  Mr. Mandujano is here today for follow up of rectal cancer. He is overall doing better.  His bowels are under good control (aside from when he has to drink oral contrast).  He isn't having pain with bowel movements.  He is eating and  drinking well.  He complains of worsening neuropathy in his feet.  He says he never had this during his treatment, but it started after treatment was done.  He is otherwise doing well. He says he hasn't been taking folic acid and somehow didn't remember this.      PAST MEDICAL HISTORY:  Past Medical History:   Diagnosis Date   • Anemia    • Arthritis    • Colon cancer (CMS/HCC)     s/p chemo therapy   • Heart murmur    • Hypertension    • Wrist fracture     surgically repaired       PAST SURGICAL HISTORY:  Past Surgical History:   Procedure Laterality Date   • ABDOMINAL SURGERY     • COLON SURGERY     • COLONOSCOPY     • FRACTURE SURGERY Left    • VENOUS ACCESS DEVICE (PORT) INSERTION N/A 5/20/2019    Procedure: INSERTION VENOUS ACCESS DEVICE;  Surgeon: Cindy Corrales MD;  Location: [facility];  Service: General   • WRIST SURGERY         FAMILY HISTORY:  Family History   Problem Relation Age of Onset   • Colon cancer Maternal Grandfather    • Other Sister    • Heart disease Sister    No other family history of malignancy.    SOCIAL HISTORY:  Social History     Socioeconomic History   • Marital status: Single     Spouse name: Not on file   • Number of children: Not on file   • Years of education: Not on file   • Highest education level: Not on file   Tobacco Use   • Smoking status: Never Smoker   • Smokeless tobacco: Current User     Types: Chew   Substance and Sexual Activity   • Alcohol use: Yes     Comment: occasional   • Drug use: No   • Sexual activity: Defer   Social History Narrative    He is , and is self employed/semi retired. He is a non smoker but chews tobacco. He used to drink daily, but says he has cut it in half.      REVIEW OF SYSTEMS:   A comprehensive 14 point review of systems was performed.  Significant findings as mentioned above.  All other systems reviewed and are negative.      MEDICATIONS:  The current medication list was reviewed in the EMR    Current Outpatient Medications:   •   allopurinol (ZYLOPRIM) 100 MG tablet, Take 100 mg by mouth Daily., Disp: , Rfl:   •  amLODIPine (NORVASC) 10 MG tablet, Take 5 mg by mouth Daily., Disp: , Rfl:   •  capecitabine (XELODA) 150 MG chemo tablet, Take 1 tab by mouth in the AM and 1 tab by mouth in the PM on days 1-14, then off for 7 days. Take with 500 mg tabs for total dose of 2150mg, Disp: 28 tablet, Rfl: 5  •  ferrous sulfate 325 (65 FE) MG tablet, Take 325 mg by mouth 2 (Two) Times a Day., Disp: , Rfl:   •  folic acid (FOLVITE) 1 MG tablet, Take 1 tablet by mouth Daily., Disp: 30 tablet, Rfl: 6  •  HYDROcodone-acetaminophen (NORCO) 7.5-325 MG per tablet, Take 1 tablet by mouth 4 (Four) Times a Day As Needed for Moderate Pain ., Disp: 12 tablet, Rfl: 0  •  iron polysaccharides (NIFEREX) 150 MG capsule, Take 1 capsule by mouth Daily., Disp: 30 capsule, Rfl: 3  •  metoprolol succinate XL (TOPROL-XL) 50 MG 24 hr tablet, Take 1 tablet by mouth Daily., Disp: 30 tablet, Rfl: 11  •  ondansetron ODT (ZOFRAN-ODT) 8 MG disintegrating tablet, Dissolve 1 tablet on the tongue 3 (Three) Times a Day As Needed for Nausea or Vomiting., Disp: 30 tablet, Rfl: 5  •  predniSONE (DELTASONE) 10 MG tablet, Take 3 tabs daily x 1 day, 2 tabs daily x 2 days, then 1 tab daily x 1 day, Disp: 8 tablet, Rfl: 0  •  prochlorperazine (COMPAZINE) 5 MG tablet, Take 2 tablets by mouth Every 6 (Six) Hours As Needed for Nausea or Vomiting., Disp: 60 tablet, Rfl: 5    ALLERGIES:  No Known Allergies    PHYSICAL EXAM:  Vitals:    06/25/20 1507   BP: 159/84   Pulse: 84   Resp: 18   Temp: 97.9 °F (36.6 °C)   SpO2: 97%     Pain Score    06/25/20 1507   PainSc: 0-No pain     General:  Awake, alert and oriented, appears well  HEENT:  Pupils are equal, round and reactive to light and accommodation, Extra-ocular movements full, Oropharyx clear, mucous membranes moist  Neck:  No JVD, thyromegaly or lymphadenopathy  CV:  Regular rate and rhythm, III/IV systolic murmur, no rubs or gallops  Resp:  Lungs  are clear to auscultation bilaterally, no crackles  Abd:  Soft, non-tender, sl distended, bowel sounds present, no organomegaly or masses.  Surgical scars present.   Ext:  No clubbing, cyanosis or edema     Lymph:  No cervical, supraclavicular, axillary, adenopathy  Neuro:  grossly non-focal exam    PATHOLOGY:  10-02-18      04-01-19:        04-09-19:        ENDOSCOPY:  10-02-18:        IMAGING:  10-04-18 CT Abdomen Pelvis With Contrast   Findings  - LOWER THORAX: Patchy nonspecific subpleural nodularity in the left lung base that could represent sequelae of chronic airspace disease or early fibrosis.     ABDOMEN:  - LIVER: Homogeneous. No focal hepatic mass or ductal dilatation.  - GALLBLADDER: GALLSTONES WITHIN THE GALLBLADDER.  - PANCREAS: Unremarkable. No mass or ductal dilatation.  - SPLEEN: Homogeneous. No splenomegaly.  - ADRENALS: No mass.  - KIDNEYS: RIGHT RENAL CYST MEASURING 3.6 CM.  - GI TRACT: NONSPECIFIC DISTAL SIGMOID COLONIC WALL THICKENING VERSUS NONDISTENTION.  - PERITONEUM: No free air. No free fluid or loculated fluid collections.  - MESENTERY: Unremarkable.  - LYMPH NODES: No lymphadenopathy.  - VASCULATURE: ATHEROSCLEROTIC VASCULAR CALCIFICATION.  - ABDOMINAL WALL: SMALL BILATERAL HUMERAL HERNIAS CONTAINING ONLY FAT.  - OTHER: None.     PELVIS:  - BLADDER: No focal mass or significant wall thickening  - REPRODUCTIVE: Unremarkable as visualized.  - APPENDIX: Nondistended. No surrounding inflammation.  - BONES: No acute bony abnormality.     IMPRESSION:  1. Gallstones within the gallbladder.  2.Nonspecific distal sigmoid colonic wall thickening versus nondistention.    PET/CT 10-29-18    11-13-18 MRI Pelvis Without Contrast  FINDINGS:  1.) TUMOR LOCATION AND CHARACTERISTICS     i) Distance of Inferior margin of Tumor from the dentate line: 9.5 CM  ii) Tumor at or below the puborectalis sling:  NO  iii) Relationship to the anterior peritoneal reflection: BELOW  iv) Craniocaudal length of the  tumor: 6cm  v) Clock face of tumor: 5o'clock to 2o'clock  vi) Polypoid/ Annular/ Semi-annular: SEMI-ANNULAR  vii) Mucinous: YES     2.) EXTRAMURAL DEPTH OF INVASION AND MR T-CATEGORY       i) Extramural depth of invasion (Use 0mm for T1 or T2 tumor):  APPROXIMATELY 5 MM   ii) T category: T3 B              *Please indicate structures with possible invasion.  Specify laterality,  sequence and slice#: (see list below)     *  Anterior peritoneal reflection (T4a tumor): NO  *  Puborectalis: NO  *  Bladder: NO  *  Vascular Involvement of Iliac Vessels: NO  *  Levator ani: NO  *  Ureters: NO  *  Obturator: NO  *  Prostate: MARGINAL/TETHERED  *  Piriformis: NO  *  Uterus: NOT APPLICABLE  *  Pelvic Bone: NO  *  Vagina: NOT APPLICABLE  *  Sacrum: NO  *  Urethra: NO  *  Other:          iii) For low rectal tumors (maximum tumor depth at or below the  puborectalis sling):     *  Not applicable (tumor above the puborectalis sling: NOT APPLICABLE  *    *  Level 1 (submucosa only, no involvement of internal anal sphincter):       *  Level 2 (confined to the internal anal sphincter; no involvement of  intersphincteric fat):      *  Level 3 (intersphincteric fat involved):      *  Level 4 (involves external sphincter or beyond):         3. RELATIONSHIP OF THE TUMOR TO MESORECTAL FASCIA (MRF)       i) Shortest distance 0mm of the definitive tumour border to the MRF  is: AT 12:00   ii) Are there any tumour spiculations closer to the MRF? NO     iii) Location of Tumor margin to MRF: 12:00, AT THE LEVEL PROSTATE     4. EXTRAMURAL VENOUS INVASION       i) Extramural Venous Invasion (EMVI): ABSENT     5. MESORECTAL LYMPH NODES AND TUMOUR DEPOSITS       i) Any suspicious mesorectal lymph nodes/tumor deposits: YES      *If yes, the most suspicious node/tumor deposit is: 15 MM IN  DIAMETER, ALONG THE UPPER MARGIN OF the tumor with minimum distance 7  MMmm from the MRF at 7o'clock     6. EXTRAMESORECTAL LYMPH NODES        i) Any suspicious  extramesorectal lymph nodes: NO      *If yes, location and laterality of suspicious nodes:             Int. Iliac:                Ext. Iliac:                Common Iliac:                Obturator:                Inguinal:                Other:           ii) Is the BETH node station in the field of view: NO        *If Yes, are these nodes suspicious:         7. OTHER FINDINGS (COMPLICATIONS, METASTASES, LIMITATIONS)         NOTE: THERE IS SOME UNCERTAINTY WHETHER THE APPARENT SMALL FOCUS OF TUMOR EXTENDING TO THE PROSTATE AT THE 12:00 POSITION COULD ACTUALLY BE PROSTATE NODULE EXTENDING TOWARD THE TUMOR. RECTAL TUMOR IS FAVORED; ALTHOUGH THIS DETERMINATION CANNOT BE MADE WITH COMPLETE CERTAINTY, TUMOR IS CONSIDERED T3 B.        IMPRESSIONS:  MRI rectal cancer T category is: T3 B  Maximum EMD of invasion is: 5  Minimum tumor to MRF distance is: 0  Low rectal tumor component: NO  Mesorectal nodes/tumor deposits: SUSPICIOUS  EMVI: ABSENT  Extramesorectal nodes: NEGATIVE    CTCAP 05-21-20     FINDINGS:  Adenopathy:No evidence of mediastinal or hilar lymph node enlargement.  No axillary lymph node enlargement.     Fluid:There are no pericardial or pleural effusions.     LUNGS:No parenchymal soft tissue nodules or masses are present.     Skeletal:Arthritic change in the thoracic spine.      Upper abdomen shows cholelithiasis.      IMPRESSION:  1. No parenchymal soft tissue nodules or masses.  2. No pericardial or pleural effusions.  3. No adenopathy.   4. Coronary artery calcifications.   5. Cholelithiasis.     FINDINGS:   The lung bases are clear. There are no pleural effusions.     The liver is homogeneous.     There is cholelithiasis.     The spleen is homogeneous.      There is no peripancreatic stranding or pancreatic head mass.      There is no adrenal enlargement.     Large right renal cyst is present. There is no solid renal mass or  hydronephrosis..      Otherwise I do not see any free fluid or walled off fluid  collections.     The rectal wall appears to be thickened.     IMPRESSION:  1. Rectal wall thickening.  2. Cholelithiasis.     RECENT LABS:  Lab Results   Component Value Date    WBC 7.78 06/25/2020    HGB 12.7 (L) 06/25/2020    HCT 39.4 06/25/2020    MCV 92.1 06/25/2020    RDW 13.8 06/25/2020     06/25/2020    NEUTRORELPCT 71.2 06/25/2020    LYMPHORELPCT 19.3 (L) 06/25/2020    MONORELPCT 6.7 06/25/2020    EOSRELPCT 1.9 06/25/2020    BASORELPCT 0.3 06/25/2020    NEUTROABS 5.54 06/25/2020    LYMPHSABS 1.50 06/25/2020       Lab Results   Component Value Date     06/25/2020    K 4.2 06/25/2020    CO2 26.2 06/25/2020     06/25/2020    BUN 17 06/25/2020    CREATININE 1.15 06/25/2020    EGFRIFNONA 63 06/25/2020    GLUCOSE 103 (H) 06/25/2020    CALCIUM 9.8 06/25/2020    ALKPHOS 82 06/25/2020    AST 26 06/25/2020    ALT 24 06/25/2020    BILITOT 0.4 06/25/2020    ALBUMIN 4.31 06/25/2020    PROTEINTOT 7.7 06/25/2020       Lab Results   Component Value Date/Time    URICACID 8.9 (H) 07/17/2019 10:16 AM     Lab Results   Component Value Date    CEA 2.52 04/21/2020    CEA 2.28 01/28/2020    CEA 2.28 06/26/2019    CEA 3.40 03/20/2019    CEA 17.80 (H) 01/11/2019    CEA 18.80 (H) 10/19/2018     Lab Results   Component Value Date    PSA 4.930 (H) 10/19/2018     Lab Results   Component Value Date    TSH 1.560 06/25/2020     Lab Results   Component Value Date    HGBA1C 5.80 (H) 06/25/2020    HGBA1C 5.50 01/28/2020    HGBA1C 5.40 03/20/2019       Lab Results   Component Value Date    FERRITIN 45.13 06/25/2020    IRON 72 06/25/2020    TIBC 463 06/25/2020    LABIRON 16 (L) 06/25/2020    DLYMUAFS33 236 01/28/2020    FOLATE 3.83 (L) 01/28/2020     ASSESSMENT & PLAN:  David Mandujano is a very pleasant 69 y.o. male with newly diagnosed rectal cancer. Clinically stage IIIC ([id number]).    1.  Rectal cancer:  -  He received standard neoadjuvant chemoradiation as above given locally advanced tumor with suspicious pelvic lymph  node.    - Pre-treatment MRI showed a locally advanced tumor and suspicious pelvic LN.  PET-CT was negative except in the local tumor.   -  There was a non-specific sub-pleural nodular opacity in the L lung base which was PET negative.  Perhaps this was inflammatory in nature. This area will require f/u on future imaging.  - Recommended neoadjuvant chemoradiation with Xeloda 825 mg/m2 BID D1-5 or M-F during the course of radiaton.  His dose was 500 mg x 4 or 2000 mg BID. Overall, he tolerated this well and completed treatment 1-21-19.    -  He saw Dr. Marilynn Yadav and underwent successful surgical resection as above.    -  He took adjuvant CapeOx treatment to prevent recurrence and has completed 8 cycles. Overall, he tolerated treatment well aside from fatigue and diarrhea (see below). He started to struggle with thrombocytopenia and possibly with neuropathy so gave his last cycle without Oxaliplatin.  -  He did well with takedown of his ileostomy.  -  CT CAP without evidence of recurrent disease.  CEA from today is pending.  -  Will likely need endoscopic exam in December.    2.  Anemia;  -  Improving away from chemotherapy.  Iron is replete.  He previously had borderline B12 so will recheck this.  He had low folate level and was supposed to be on replacement but apparently forgot and isn't taking this.  Will repeat folate level and restart replacement.    3.  Neuropathy:  -  Etiology is uncertain.  He did complain of some vague symptoms toward the end of his treatment (though he thinks they happened later), so this could be related to Oxaliplatin.    -  Thyroid function is wnl.  HbA1C has been normal.  It is sl elevated today.  -  Will repeat B12, Folate testing and restart folic acid.  -  I also advised him again that alcohol can cause or worsen peripheral neuropathy.    4.   Depressed mood / Anxiety and Alcohol Abuse:  - Previously given trial of Lexapro but he did not find this helpful and discontinued this.  Currently, he says he is doing well in this regard. He did quit drinking completely around the time of his surgery but has recently been drinking again. He says Dr. Yadav did mention to him that his liver didn't look good during his operation.  -  We previously discussed potential involvement in support groups and /or referral for alcohol abuse counseling, but he declined.  Will monitor.    5.  Prominent systolic murmur:   -   He was evaluated by Dr. Joe and then by Dr. Barrera.  He underwent echocardiogram 1-30-19 which showed EF 70%, grade 1 diastolic dysfunction, mild to mod septal asymmetric hypertrophy, AV calcification with functionally bicuspid valve, mild AI and moderate AS, moderate MAC with mild MR, mild MS  -  Thought is that at some point in the future he will require AVR.   -  We discussed this today and I recommended he return to see his cardiologist.  He said he plans to do that soon.  He has just been waiting for the Coronavirus pandemic to settle a bit.     6.  Prophylaxis:  He took 2019 influenza vaccine. He took Prevnar 13 on 11-20-18.       7. ACO / KAVITHA/Other  Quality measures  - David Mandujano received 2019 flu vaccine.  - David Mandujano reports a pain score of 0.  No intervention needed.  - Current outpatient and discharge medications have been reconciled for the patient.  Reviewed by: Jyoti Larios MD       8. Follow up:   -  PAC flush in 6 weeks.    -  RTC in 3 months with labwork and CT CAP prior to his visit.     This note was scribed for Jyoti Larios MD by Lynn Pinto RN.    I, Jyoti Larios MD, personally performed the services described in this documentation as scribed by the above named individual in my presence, and it is both accurate and complete.  06/25/2020        I spent 30 minutes with David Mandujano today.  In the office today, more than 50% of this time was spent face-to-face with him  in counseling / coordination of care, reviewing his interim medical  history and counseling on the current treatment plan.  All questions were answered to his satisfaction.         Electronically Signed by: Jyoti Larios MD         CC:   MD Kathleen Coppola Emmanuel C, MD Shawn Michael Acino, MD William Richards, MD Sandra Beck, MD

## 2020-06-24 ENCOUNTER — TELEPHONE (OUTPATIENT)
Dept: ONCOLOGY | Facility: HOSPITAL | Age: 70
End: 2020-06-24

## 2020-06-25 ENCOUNTER — OFFICE VISIT (OUTPATIENT)
Dept: ONCOLOGY | Facility: CLINIC | Age: 70
End: 2020-06-25

## 2020-06-25 ENCOUNTER — INFUSION (OUTPATIENT)
Dept: ONCOLOGY | Facility: HOSPITAL | Age: 70
End: 2020-06-25

## 2020-06-25 VITALS
OXYGEN SATURATION: 97 % | RESPIRATION RATE: 18 BRPM | TEMPERATURE: 97.9 F | SYSTOLIC BLOOD PRESSURE: 159 MMHG | BODY MASS INDEX: 38.44 KG/M2 | DIASTOLIC BLOOD PRESSURE: 84 MMHG | WEIGHT: 283.4 LBS | HEART RATE: 84 BPM

## 2020-06-25 VITALS
TEMPERATURE: 97.9 F | WEIGHT: 283.4 LBS | HEART RATE: 84 BPM | BODY MASS INDEX: 38.44 KG/M2 | DIASTOLIC BLOOD PRESSURE: 84 MMHG | RESPIRATION RATE: 18 BRPM | SYSTOLIC BLOOD PRESSURE: 159 MMHG | OXYGEN SATURATION: 97 %

## 2020-06-25 DIAGNOSIS — G62.9 NEUROPATHY: ICD-10-CM

## 2020-06-25 DIAGNOSIS — R53.83 FATIGUE, UNSPECIFIED TYPE: ICD-10-CM

## 2020-06-25 DIAGNOSIS — D64.9 ANEMIA, UNSPECIFIED TYPE: ICD-10-CM

## 2020-06-25 DIAGNOSIS — C20 RECTAL CANCER (HCC): Primary | ICD-10-CM

## 2020-06-25 DIAGNOSIS — D50.9 IRON DEFICIENCY ANEMIA, UNSPECIFIED IRON DEFICIENCY ANEMIA TYPE: ICD-10-CM

## 2020-06-25 DIAGNOSIS — Z95.828 PORT-A-CATH IN PLACE: Primary | ICD-10-CM

## 2020-06-25 DIAGNOSIS — R97.20 ELEVATED PSA: ICD-10-CM

## 2020-06-25 DIAGNOSIS — R79.9 ABNORMAL FINDING OF BLOOD CHEMISTRY, UNSPECIFIED: ICD-10-CM

## 2020-06-25 LAB
ALBUMIN SERPL-MCNC: 4.31 G/DL (ref 3.5–5.2)
ALBUMIN/GLOB SERPL: 1.3 G/DL
ALP SERPL-CCNC: 82 U/L (ref 39–117)
ALT SERPL W P-5'-P-CCNC: 24 U/L (ref 1–41)
ANION GAP SERPL CALCULATED.3IONS-SCNC: 12.8 MMOL/L (ref 5–15)
AST SERPL-CCNC: 26 U/L (ref 1–40)
BASOPHILS # BLD AUTO: 0.02 10*3/MM3 (ref 0–0.2)
BASOPHILS NFR BLD AUTO: 0.3 % (ref 0–1.5)
BILIRUB SERPL-MCNC: 0.4 MG/DL (ref 0.2–1.2)
BUN BLD-MCNC: 17 MG/DL (ref 8–23)
BUN/CREAT SERPL: 14.8 (ref 7–25)
CALCIUM SPEC-SCNC: 9.8 MG/DL (ref 8.6–10.5)
CHLORIDE SERPL-SCNC: 104 MMOL/L (ref 98–107)
CO2 SERPL-SCNC: 26.2 MMOL/L (ref 22–29)
CREAT BLD-MCNC: 1.15 MG/DL (ref 0.76–1.27)
DEPRECATED RDW RBC AUTO: 46.2 FL (ref 37–54)
EOSINOPHIL # BLD AUTO: 0.15 10*3/MM3 (ref 0–0.4)
EOSINOPHIL NFR BLD AUTO: 1.9 % (ref 0.3–6.2)
ERYTHROCYTE [DISTWIDTH] IN BLOOD BY AUTOMATED COUNT: 13.8 % (ref 12.3–15.4)
FERRITIN SERPL-MCNC: 45.13 NG/ML (ref 30–400)
GFR SERPL CREATININE-BSD FRML MDRD: 63 ML/MIN/1.73
GLOBULIN UR ELPH-MCNC: 3.4 GM/DL
GLUCOSE BLD-MCNC: 103 MG/DL (ref 65–99)
HBA1C MFR BLD: 5.8 % (ref 4.8–5.6)
HCT VFR BLD AUTO: 39.4 % (ref 37.5–51)
HGB BLD-MCNC: 12.7 G/DL (ref 13–17.7)
IMM GRANULOCYTES # BLD AUTO: 0.05 10*3/MM3 (ref 0–0.05)
IMM GRANULOCYTES NFR BLD AUTO: 0.6 % (ref 0–0.5)
IRON 24H UR-MRATE: 72 MCG/DL (ref 59–158)
IRON SATN MFR SERPL: 16 % (ref 20–50)
LYMPHOCYTES # BLD AUTO: 1.5 10*3/MM3 (ref 0.7–3.1)
LYMPHOCYTES NFR BLD AUTO: 19.3 % (ref 19.6–45.3)
MCH RBC QN AUTO: 29.7 PG (ref 26.6–33)
MCHC RBC AUTO-ENTMCNC: 32.2 G/DL (ref 31.5–35.7)
MCV RBC AUTO: 92.1 FL (ref 79–97)
MONOCYTES # BLD AUTO: 0.52 10*3/MM3 (ref 0.1–0.9)
MONOCYTES NFR BLD AUTO: 6.7 % (ref 5–12)
NEUTROPHILS # BLD AUTO: 5.54 10*3/MM3 (ref 1.7–7)
NEUTROPHILS NFR BLD AUTO: 71.2 % (ref 42.7–76)
NRBC BLD AUTO-RTO: 0 /100 WBC (ref 0–0.2)
PLATELET # BLD AUTO: 151 10*3/MM3 (ref 140–450)
PMV BLD AUTO: 9.4 FL (ref 6–12)
POTASSIUM BLD-SCNC: 4.2 MMOL/L (ref 3.5–5.2)
PROT SERPL-MCNC: 7.7 G/DL (ref 6–8.5)
RBC # BLD AUTO: 4.28 10*6/MM3 (ref 4.14–5.8)
SODIUM BLD-SCNC: 143 MMOL/L (ref 136–145)
TIBC SERPL-MCNC: 463 MCG/DL (ref 298–536)
TRANSFERRIN SERPL-MCNC: 311 MG/DL (ref 200–360)
TSH SERPL DL<=0.05 MIU/L-ACNC: 1.56 UIU/ML (ref 0.27–4.2)
WBC NRBC COR # BLD: 7.78 10*3/MM3 (ref 3.4–10.8)

## 2020-06-25 PROCEDURE — 82378 CARCINOEMBRYONIC ANTIGEN: CPT | Performed by: INTERNAL MEDICINE

## 2020-06-25 PROCEDURE — 84443 ASSAY THYROID STIM HORMONE: CPT | Performed by: INTERNAL MEDICINE

## 2020-06-25 PROCEDURE — 84153 ASSAY OF PSA TOTAL: CPT | Performed by: INTERNAL MEDICINE

## 2020-06-25 PROCEDURE — 99214 OFFICE O/P EST MOD 30 MIN: CPT | Performed by: INTERNAL MEDICINE

## 2020-06-25 PROCEDURE — 80053 COMPREHEN METABOLIC PANEL: CPT | Performed by: INTERNAL MEDICINE

## 2020-06-25 PROCEDURE — 83036 HEMOGLOBIN GLYCOSYLATED A1C: CPT | Performed by: INTERNAL MEDICINE

## 2020-06-25 PROCEDURE — 82607 VITAMIN B-12: CPT | Performed by: INTERNAL MEDICINE

## 2020-06-25 PROCEDURE — 84466 ASSAY OF TRANSFERRIN: CPT | Performed by: INTERNAL MEDICINE

## 2020-06-25 PROCEDURE — 83540 ASSAY OF IRON: CPT | Performed by: INTERNAL MEDICINE

## 2020-06-25 PROCEDURE — 82746 ASSAY OF FOLIC ACID SERUM: CPT | Performed by: INTERNAL MEDICINE

## 2020-06-25 PROCEDURE — 36591 DRAW BLOOD OFF VENOUS DEVICE: CPT

## 2020-06-25 PROCEDURE — 25010000003 HEPARIN LOCK FLUSH PER 10 UNITS: Performed by: INTERNAL MEDICINE

## 2020-06-25 PROCEDURE — 85025 COMPLETE CBC W/AUTO DIFF WBC: CPT | Performed by: INTERNAL MEDICINE

## 2020-06-25 PROCEDURE — 82728 ASSAY OF FERRITIN: CPT | Performed by: INTERNAL MEDICINE

## 2020-06-25 RX ORDER — SODIUM CHLORIDE 0.9 % (FLUSH) 0.9 %
10 SYRINGE (ML) INJECTION AS NEEDED
Status: DISCONTINUED | OUTPATIENT
Start: 2020-06-25 | End: 2020-06-25 | Stop reason: HOSPADM

## 2020-06-25 RX ORDER — HEPARIN SODIUM (PORCINE) LOCK FLUSH IV SOLN 100 UNIT/ML 100 UNIT/ML
500 SOLUTION INTRAVENOUS AS NEEDED
Status: DISCONTINUED | OUTPATIENT
Start: 2020-06-25 | End: 2020-06-25 | Stop reason: HOSPADM

## 2020-06-25 RX ORDER — HEPARIN SODIUM (PORCINE) LOCK FLUSH IV SOLN 100 UNIT/ML 100 UNIT/ML
500 SOLUTION INTRAVENOUS AS NEEDED
Status: CANCELLED | OUTPATIENT
Start: 2020-06-25

## 2020-06-25 RX ORDER — FOLIC ACID 1 MG/1
1 TABLET ORAL DAILY
Qty: 30 TABLET | Refills: 6 | Status: SHIPPED | OUTPATIENT
Start: 2020-06-25 | End: 2021-09-10

## 2020-06-25 RX ORDER — SODIUM CHLORIDE 0.9 % (FLUSH) 0.9 %
10 SYRINGE (ML) INJECTION AS NEEDED
Status: CANCELLED | OUTPATIENT
Start: 2020-06-25

## 2020-06-25 RX ADMIN — Medication 500 UNITS: at 15:21

## 2020-06-25 RX ADMIN — SODIUM CHLORIDE, PRESERVATIVE FREE 10 ML: 5 INJECTION INTRAVENOUS at 15:21

## 2020-06-26 LAB
CEA SERPL-MCNC: 2 NG/ML
FOLATE SERPL-MCNC: 4.3 NG/ML (ref 4.78–24.2)
PSA SERPL-MCNC: 1.11 NG/ML (ref 0–4)
VIT B12 BLD-MCNC: 232 PG/ML (ref 211–946)

## 2020-07-20 ENCOUNTER — TELEPHONE (OUTPATIENT)
Dept: ONCOLOGY | Facility: HOSPITAL | Age: 70
End: 2020-07-20

## 2020-07-21 ENCOUNTER — INFUSION (OUTPATIENT)
Dept: ONCOLOGY | Facility: HOSPITAL | Age: 70
End: 2020-07-21

## 2020-07-21 ENCOUNTER — OFFICE VISIT (OUTPATIENT)
Dept: ONCOLOGY | Facility: CLINIC | Age: 70
End: 2020-07-21

## 2020-07-21 VITALS
BODY MASS INDEX: 38.3 KG/M2 | HEART RATE: 86 BPM | OXYGEN SATURATION: 98 % | WEIGHT: 282.4 LBS | TEMPERATURE: 97.8 F | RESPIRATION RATE: 18 BRPM | SYSTOLIC BLOOD PRESSURE: 149 MMHG | DIASTOLIC BLOOD PRESSURE: 75 MMHG

## 2020-07-21 VITALS
HEART RATE: 86 BPM | TEMPERATURE: 97.8 F | WEIGHT: 282.4 LBS | SYSTOLIC BLOOD PRESSURE: 149 MMHG | DIASTOLIC BLOOD PRESSURE: 75 MMHG | OXYGEN SATURATION: 98 % | BODY MASS INDEX: 38.3 KG/M2 | RESPIRATION RATE: 18 BRPM

## 2020-07-21 DIAGNOSIS — C20 RECTAL CANCER (HCC): ICD-10-CM

## 2020-07-21 DIAGNOSIS — Z95.828 PORT-A-CATH IN PLACE: Primary | ICD-10-CM

## 2020-07-21 DIAGNOSIS — D64.9 ANEMIA, UNSPECIFIED TYPE: ICD-10-CM

## 2020-07-21 DIAGNOSIS — E53.8 B12 DEFICIENCY: Primary | ICD-10-CM

## 2020-07-21 DIAGNOSIS — D50.9 IRON DEFICIENCY ANEMIA, UNSPECIFIED IRON DEFICIENCY ANEMIA TYPE: ICD-10-CM

## 2020-07-21 DIAGNOSIS — R53.83 FATIGUE, UNSPECIFIED TYPE: ICD-10-CM

## 2020-07-21 DIAGNOSIS — G62.9 NEUROPATHY: ICD-10-CM

## 2020-07-21 DIAGNOSIS — E53.8 FOLIC ACID DEFICIENCY: ICD-10-CM

## 2020-07-21 LAB
ALBUMIN SERPL-MCNC: 4.18 G/DL (ref 3.5–5.2)
ALBUMIN/GLOB SERPL: 1.3 G/DL
ALP SERPL-CCNC: 80 U/L (ref 39–117)
ALT SERPL W P-5'-P-CCNC: 30 U/L (ref 1–41)
ANION GAP SERPL CALCULATED.3IONS-SCNC: 15.9 MMOL/L (ref 5–15)
AST SERPL-CCNC: 28 U/L (ref 1–40)
BASOPHILS # BLD AUTO: 0.03 10*3/MM3 (ref 0–0.2)
BASOPHILS NFR BLD AUTO: 0.6 % (ref 0–1.5)
BILIRUB SERPL-MCNC: 0.5 MG/DL (ref 0–1.2)
BUN SERPL-MCNC: 18 MG/DL (ref 8–23)
BUN/CREAT SERPL: 15.3 (ref 7–25)
CALCIUM SPEC-SCNC: 9.4 MG/DL (ref 8.6–10.5)
CHLORIDE SERPL-SCNC: 102 MMOL/L (ref 98–107)
CO2 SERPL-SCNC: 24.1 MMOL/L (ref 22–29)
CREAT SERPL-MCNC: 1.18 MG/DL (ref 0.76–1.27)
DEPRECATED RDW RBC AUTO: 45.4 FL (ref 37–54)
EOSINOPHIL # BLD AUTO: 0.06 10*3/MM3 (ref 0–0.4)
EOSINOPHIL NFR BLD AUTO: 1.2 % (ref 0.3–6.2)
ERYTHROCYTE [DISTWIDTH] IN BLOOD BY AUTOMATED COUNT: 13.7 % (ref 12.3–15.4)
FERRITIN SERPL-MCNC: 28.44 NG/ML (ref 30–400)
GFR SERPL CREATININE-BSD FRML MDRD: 61 ML/MIN/1.73
GLOBULIN UR ELPH-MCNC: 3.2 GM/DL
GLUCOSE SERPL-MCNC: 125 MG/DL (ref 65–99)
HCT VFR BLD AUTO: 34.4 % (ref 37.5–51)
HGB BLD-MCNC: 11.1 G/DL (ref 13–17.7)
IMM GRANULOCYTES # BLD AUTO: 0.04 10*3/MM3 (ref 0–0.05)
IMM GRANULOCYTES NFR BLD AUTO: 0.8 % (ref 0–0.5)
IRON 24H UR-MRATE: 130 MCG/DL (ref 59–158)
IRON SATN MFR SERPL: 27 % (ref 20–50)
LYMPHOCYTES # BLD AUTO: 0.88 10*3/MM3 (ref 0.7–3.1)
LYMPHOCYTES NFR BLD AUTO: 17 % (ref 19.6–45.3)
MCH RBC QN AUTO: 29.5 PG (ref 26.6–33)
MCHC RBC AUTO-ENTMCNC: 32.3 G/DL (ref 31.5–35.7)
MCV RBC AUTO: 91.5 FL (ref 79–97)
MONOCYTES # BLD AUTO: 0.45 10*3/MM3 (ref 0.1–0.9)
MONOCYTES NFR BLD AUTO: 8.7 % (ref 5–12)
NEUTROPHILS NFR BLD AUTO: 3.71 10*3/MM3 (ref 1.7–7)
NEUTROPHILS NFR BLD AUTO: 71.7 % (ref 42.7–76)
NRBC BLD AUTO-RTO: 0 /100 WBC (ref 0–0.2)
PLATELET # BLD AUTO: 142 10*3/MM3 (ref 140–450)
PMV BLD AUTO: 9.6 FL (ref 6–12)
POTASSIUM SERPL-SCNC: 4 MMOL/L (ref 3.5–5.2)
PROT SERPL-MCNC: 7.4 G/DL (ref 6–8.5)
RBC # BLD AUTO: 3.76 10*6/MM3 (ref 4.14–5.8)
RETICS # AUTO: 0.12 10*6/MM3 (ref 0.02–0.13)
RETICS/RBC NFR AUTO: 3.12 % (ref 0.7–1.9)
SODIUM SERPL-SCNC: 142 MMOL/L (ref 136–145)
TIBC SERPL-MCNC: 481 MCG/DL (ref 298–536)
TRANSFERRIN SERPL-MCNC: 323 MG/DL (ref 200–360)
TSH SERPL DL<=0.05 MIU/L-ACNC: 1.47 UIU/ML (ref 0.27–4.2)
WBC # BLD AUTO: 5.17 10*3/MM3 (ref 3.4–10.8)

## 2020-07-21 PROCEDURE — 84466 ASSAY OF TRANSFERRIN: CPT | Performed by: INTERNAL MEDICINE

## 2020-07-21 PROCEDURE — 25010000003 HEPARIN LOCK FLUSH PER 10 UNITS: Performed by: INTERNAL MEDICINE

## 2020-07-21 PROCEDURE — 99214 OFFICE O/P EST MOD 30 MIN: CPT | Performed by: NURSE PRACTITIONER

## 2020-07-21 PROCEDURE — 84153 ASSAY OF PSA TOTAL: CPT | Performed by: INTERNAL MEDICINE

## 2020-07-21 PROCEDURE — 82607 VITAMIN B-12: CPT | Performed by: INTERNAL MEDICINE

## 2020-07-21 PROCEDURE — 85045 AUTOMATED RETICULOCYTE COUNT: CPT | Performed by: INTERNAL MEDICINE

## 2020-07-21 PROCEDURE — 82378 CARCINOEMBRYONIC ANTIGEN: CPT | Performed by: INTERNAL MEDICINE

## 2020-07-21 PROCEDURE — 85025 COMPLETE CBC W/AUTO DIFF WBC: CPT | Performed by: INTERNAL MEDICINE

## 2020-07-21 PROCEDURE — 84443 ASSAY THYROID STIM HORMONE: CPT | Performed by: INTERNAL MEDICINE

## 2020-07-21 PROCEDURE — 36591 DRAW BLOOD OFF VENOUS DEVICE: CPT

## 2020-07-21 PROCEDURE — 82728 ASSAY OF FERRITIN: CPT | Performed by: INTERNAL MEDICINE

## 2020-07-21 PROCEDURE — 82746 ASSAY OF FOLIC ACID SERUM: CPT | Performed by: INTERNAL MEDICINE

## 2020-07-21 PROCEDURE — 80053 COMPREHEN METABOLIC PANEL: CPT | Performed by: INTERNAL MEDICINE

## 2020-07-21 PROCEDURE — 83540 ASSAY OF IRON: CPT | Performed by: INTERNAL MEDICINE

## 2020-07-21 RX ORDER — HEPARIN SODIUM (PORCINE) LOCK FLUSH IV SOLN 100 UNIT/ML 100 UNIT/ML
500 SOLUTION INTRAVENOUS AS NEEDED
Status: DISCONTINUED | OUTPATIENT
Start: 2020-07-21 | End: 2020-07-21 | Stop reason: HOSPADM

## 2020-07-21 RX ORDER — SODIUM CHLORIDE 0.9 % (FLUSH) 0.9 %
10 SYRINGE (ML) INJECTION AS NEEDED
Status: CANCELLED | OUTPATIENT
Start: 2020-08-31

## 2020-07-21 RX ORDER — SODIUM CHLORIDE 0.9 % (FLUSH) 0.9 %
10 SYRINGE (ML) INJECTION AS NEEDED
Status: DISCONTINUED | OUTPATIENT
Start: 2020-07-21 | End: 2020-07-21 | Stop reason: HOSPADM

## 2020-07-21 RX ORDER — IRON POLYSACCHARIDE COMPLEX 150 MG
150 CAPSULE ORAL DAILY
Qty: 30 CAPSULE | Refills: 3 | Status: SHIPPED | OUTPATIENT
Start: 2020-07-21 | End: 2021-04-01

## 2020-07-21 RX ORDER — HEPARIN SODIUM (PORCINE) LOCK FLUSH IV SOLN 100 UNIT/ML 100 UNIT/ML
500 SOLUTION INTRAVENOUS AS NEEDED
Status: CANCELLED | OUTPATIENT
Start: 2020-08-31

## 2020-07-21 RX ADMIN — SODIUM CHLORIDE, PRESERVATIVE FREE 10 ML: 5 INJECTION INTRAVENOUS at 14:03

## 2020-07-21 RX ADMIN — Medication 500 UNITS: at 14:03

## 2020-07-22 LAB
CEA SERPL-MCNC: 1.83 NG/ML
FOLATE SERPL-MCNC: >20 NG/ML (ref 4.78–24.2)
PSA SERPL-MCNC: 1.05 NG/ML (ref 0–4)
VIT B12 BLD-MCNC: 523 PG/ML (ref 211–946)

## 2020-08-06 ENCOUNTER — APPOINTMENT (OUTPATIENT)
Dept: ONCOLOGY | Facility: HOSPITAL | Age: 70
End: 2020-08-06

## 2020-08-14 ENCOUNTER — TELEPHONE (OUTPATIENT)
Dept: ONCOLOGY | Facility: CLINIC | Age: 70
End: 2020-08-14

## 2020-08-14 NOTE — TELEPHONE ENCOUNTER
"Returned patient's call and he states he is fatigued and thinks he is bleeding because his stool is dark. He asks for his iron results from July, and I gave him the results and they were normal. He states \"someone called in some iron pills and I've been taking them. I have a hard time believing my iron was normal if they called me in iron pills\". Patient also stated he would like a copy of his labs sent to him via fax. Labs sent per patient request. Patient stated he would call me back once he got the fax.   "

## 2020-08-14 NOTE — TELEPHONE ENCOUNTER
----- Message from Jyoti Larios MD sent at 8/14/2020 11:09 AM EDT -----  Regarding: RE: LABS  Contact: [phone]  On 7-21, Iron actually looked pretty good.  130 with Iron sat 27%.  Planning to recheck when he comes back.  Maybe have him come get his blood drawn when he has his CTs done so we can have results back when we see him.  I don't think it is likely that his fatigue is related to iron deficiency though.  ----- Message -----  From: Lynn Pinto RN  Sent: 8/14/2020  10:22 AM EDT  To: Jyoti Larios MD  Subject: FW: LABS                                             ----- Message -----  From: Keisha Damon  Sent: 8/14/2020  10:15 AM EDT  To: Lynn Pinto RN  Subject: LABS                                             Frakny called asking if he needs to get his labs rechecked. Stated last time his iron was a little low and he is still feeling tired.

## 2020-08-28 ENCOUNTER — TELEPHONE (OUTPATIENT)
Dept: ONCOLOGY | Facility: HOSPITAL | Age: 70
End: 2020-08-28

## 2020-08-31 ENCOUNTER — INFUSION (OUTPATIENT)
Dept: ONCOLOGY | Facility: HOSPITAL | Age: 70
End: 2020-08-31

## 2020-08-31 DIAGNOSIS — D50.9 IRON DEFICIENCY ANEMIA, UNSPECIFIED IRON DEFICIENCY ANEMIA TYPE: ICD-10-CM

## 2020-08-31 DIAGNOSIS — C20 RECTAL CANCER (HCC): Primary | ICD-10-CM

## 2020-08-31 DIAGNOSIS — E53.8 B12 DEFICIENCY: ICD-10-CM

## 2020-08-31 DIAGNOSIS — Z95.828 PORT-A-CATH IN PLACE: ICD-10-CM

## 2020-08-31 DIAGNOSIS — R53.83 FATIGUE, UNSPECIFIED TYPE: ICD-10-CM

## 2020-08-31 LAB
ALBUMIN SERPL-MCNC: 4.43 G/DL (ref 3.5–5.2)
ALBUMIN/GLOB SERPL: 1.4 G/DL
ALP SERPL-CCNC: 89 U/L (ref 39–117)
ALT SERPL W P-5'-P-CCNC: 33 U/L (ref 1–41)
ANION GAP SERPL CALCULATED.3IONS-SCNC: 13.4 MMOL/L (ref 5–15)
AST SERPL-CCNC: 35 U/L (ref 1–40)
BASOPHILS # BLD AUTO: 0.03 10*3/MM3 (ref 0–0.2)
BASOPHILS NFR BLD AUTO: 0.4 % (ref 0–1.5)
BILIRUB SERPL-MCNC: 0.3 MG/DL (ref 0–1.2)
BUN SERPL-MCNC: 21 MG/DL (ref 8–23)
BUN/CREAT SERPL: 15.9 (ref 7–25)
CALCIUM SPEC-SCNC: 9.2 MG/DL (ref 8.6–10.5)
CHLORIDE SERPL-SCNC: 104 MMOL/L (ref 98–107)
CO2 SERPL-SCNC: 23.6 MMOL/L (ref 22–29)
CREAT SERPL-MCNC: 1.32 MG/DL (ref 0.76–1.27)
DEPRECATED RDW RBC AUTO: 44.1 FL (ref 37–54)
EOSINOPHIL # BLD AUTO: 0.1 10*3/MM3 (ref 0–0.4)
EOSINOPHIL NFR BLD AUTO: 1.4 % (ref 0.3–6.2)
ERYTHROCYTE [DISTWIDTH] IN BLOOD BY AUTOMATED COUNT: 13.5 % (ref 12.3–15.4)
FERRITIN SERPL-MCNC: 33.87 NG/ML (ref 30–400)
GFR SERPL CREATININE-BSD FRML MDRD: 54 ML/MIN/1.73
GLOBULIN UR ELPH-MCNC: 3.1 GM/DL
GLUCOSE SERPL-MCNC: 140 MG/DL (ref 65–99)
HCT VFR BLD AUTO: 33.9 % (ref 37.5–51)
HGB BLD-MCNC: 10.1 G/DL (ref 13–17.7)
IMM GRANULOCYTES # BLD AUTO: 0.09 10*3/MM3 (ref 0–0.05)
IMM GRANULOCYTES NFR BLD AUTO: 1.3 % (ref 0–0.5)
IRON 24H UR-MRATE: 44 MCG/DL (ref 59–158)
IRON SATN MFR SERPL: 8 % (ref 20–50)
LYMPHOCYTES # BLD AUTO: 0.96 10*3/MM3 (ref 0.7–3.1)
LYMPHOCYTES NFR BLD AUTO: 13.7 % (ref 19.6–45.3)
MCH RBC QN AUTO: 26.9 PG (ref 26.6–33)
MCHC RBC AUTO-ENTMCNC: 29.8 G/DL (ref 31.5–35.7)
MCV RBC AUTO: 90.4 FL (ref 79–97)
MONOCYTES # BLD AUTO: 0.54 10*3/MM3 (ref 0.1–0.9)
MONOCYTES NFR BLD AUTO: 7.7 % (ref 5–12)
NEUTROPHILS NFR BLD AUTO: 5.29 10*3/MM3 (ref 1.7–7)
NEUTROPHILS NFR BLD AUTO: 75.5 % (ref 42.7–76)
NRBC BLD AUTO-RTO: 0 /100 WBC (ref 0–0.2)
PLATELET # BLD AUTO: 157 10*3/MM3 (ref 140–450)
PMV BLD AUTO: 9.5 FL (ref 6–12)
POTASSIUM SERPL-SCNC: 4.1 MMOL/L (ref 3.5–5.2)
PROT SERPL-MCNC: 7.5 G/DL (ref 6–8.5)
RBC # BLD AUTO: 3.75 10*6/MM3 (ref 4.14–5.8)
SODIUM SERPL-SCNC: 141 MMOL/L (ref 136–145)
TIBC SERPL-MCNC: 532 MCG/DL (ref 298–536)
TRANSFERRIN SERPL-MCNC: 357 MG/DL (ref 200–360)
WBC # BLD AUTO: 7.01 10*3/MM3 (ref 3.4–10.8)

## 2020-08-31 PROCEDURE — 82607 VITAMIN B-12: CPT

## 2020-08-31 PROCEDURE — 84466 ASSAY OF TRANSFERRIN: CPT

## 2020-08-31 PROCEDURE — 82378 CARCINOEMBRYONIC ANTIGEN: CPT

## 2020-08-31 PROCEDURE — 85025 COMPLETE CBC W/AUTO DIFF WBC: CPT

## 2020-08-31 PROCEDURE — 80053 COMPREHEN METABOLIC PANEL: CPT

## 2020-08-31 PROCEDURE — 83540 ASSAY OF IRON: CPT

## 2020-08-31 PROCEDURE — 82746 ASSAY OF FOLIC ACID SERUM: CPT

## 2020-08-31 PROCEDURE — 25010000003 HEPARIN LOCK FLUSH PER 10 UNITS: Performed by: INTERNAL MEDICINE

## 2020-08-31 PROCEDURE — 82728 ASSAY OF FERRITIN: CPT

## 2020-08-31 PROCEDURE — 36591 DRAW BLOOD OFF VENOUS DEVICE: CPT

## 2020-08-31 RX ORDER — SODIUM CHLORIDE 0.9 % (FLUSH) 0.9 %
10 SYRINGE (ML) INJECTION AS NEEDED
Status: CANCELLED | OUTPATIENT
Start: 2020-09-18

## 2020-08-31 RX ORDER — HEPARIN SODIUM (PORCINE) LOCK FLUSH IV SOLN 100 UNIT/ML 100 UNIT/ML
500 SOLUTION INTRAVENOUS AS NEEDED
Status: CANCELLED | OUTPATIENT
Start: 2020-09-18

## 2020-08-31 RX ORDER — SODIUM CHLORIDE 0.9 % (FLUSH) 0.9 %
10 SYRINGE (ML) INJECTION AS NEEDED
Status: DISCONTINUED | OUTPATIENT
Start: 2020-08-31 | End: 2020-08-31 | Stop reason: HOSPADM

## 2020-08-31 RX ORDER — HEPARIN SODIUM (PORCINE) LOCK FLUSH IV SOLN 100 UNIT/ML 100 UNIT/ML
500 SOLUTION INTRAVENOUS AS NEEDED
Status: DISCONTINUED | OUTPATIENT
Start: 2020-08-31 | End: 2020-08-31 | Stop reason: HOSPADM

## 2020-08-31 RX ADMIN — SODIUM CHLORIDE, PRESERVATIVE FREE 10 ML: 5 INJECTION INTRAVENOUS at 14:45

## 2020-08-31 RX ADMIN — Medication 500 UNITS: at 14:45

## 2020-09-01 LAB
CEA SERPL-MCNC: 1.82 NG/ML
FOLATE SERPL-MCNC: >20 NG/ML (ref 4.78–24.2)
VIT B12 BLD-MCNC: 781 PG/ML (ref 211–946)

## 2020-09-08 DIAGNOSIS — C20 RECTAL CANCER (HCC): ICD-10-CM

## 2020-09-08 DIAGNOSIS — K90.9 MALABSORPTION OF IRON: ICD-10-CM

## 2020-09-08 DIAGNOSIS — D50.0 IRON DEFICIENCY ANEMIA DUE TO CHRONIC BLOOD LOSS: ICD-10-CM

## 2020-09-08 DIAGNOSIS — K92.1 MELENA: Primary | ICD-10-CM

## 2020-09-08 RX ORDER — SODIUM CHLORIDE 9 MG/ML
250 INJECTION, SOLUTION INTRAVENOUS ONCE
Status: CANCELLED | OUTPATIENT
Start: 2020-09-18

## 2020-09-08 RX ORDER — SODIUM CHLORIDE 9 MG/ML
250 INJECTION, SOLUTION INTRAVENOUS ONCE
Status: CANCELLED | OUTPATIENT
Start: 2020-09-25

## 2020-09-10 ENCOUNTER — TELEPHONE (OUTPATIENT)
Dept: ONCOLOGY | Facility: HOSPITAL | Age: 70
End: 2020-09-10

## 2020-09-15 ENCOUNTER — HOSPITAL ENCOUNTER (OUTPATIENT)
Dept: CT IMAGING | Facility: HOSPITAL | Age: 70
Discharge: HOME | End: 2020-09-15

## 2020-09-15 DIAGNOSIS — D64.9 ANEMIA, UNSPECIFIED TYPE: ICD-10-CM

## 2020-09-15 DIAGNOSIS — G62.9 NEUROPATHY: ICD-10-CM

## 2020-09-15 DIAGNOSIS — C20 RECTAL CANCER (HCC): ICD-10-CM

## 2020-09-15 DIAGNOSIS — R79.9 ABNORMAL FINDING OF BLOOD CHEMISTRY, UNSPECIFIED: ICD-10-CM

## 2020-09-15 DIAGNOSIS — R53.83 FATIGUE, UNSPECIFIED TYPE: ICD-10-CM

## 2020-09-15 DIAGNOSIS — D50.9 IRON DEFICIENCY ANEMIA, UNSPECIFIED IRON DEFICIENCY ANEMIA TYPE: ICD-10-CM

## 2020-09-15 DIAGNOSIS — R97.20 ELEVATED PSA: ICD-10-CM

## 2020-09-15 PROCEDURE — 74177 CT ABD & PELVIS W/CONTRAST: CPT | Performed by: RADIOLOGY

## 2020-09-15 PROCEDURE — 71260 CT THORAX DX C+: CPT | Performed by: RADIOLOGY

## 2020-09-15 PROCEDURE — 0 IOVERSOL 68 % SOLUTION: Performed by: INTERNAL MEDICINE

## 2020-09-15 PROCEDURE — 74177 CT ABD & PELVIS W/CONTRAST: CPT

## 2020-09-15 PROCEDURE — 71260 CT THORAX DX C+: CPT

## 2020-09-15 RX ADMIN — IOVERSOL 80 ML: 678 INJECTION INTRA-ARTERIAL; INTRAVENOUS at 11:14

## 2020-09-18 ENCOUNTER — INFUSION (OUTPATIENT)
Dept: ONCOLOGY | Facility: HOSPITAL | Age: 70
End: 2020-09-18

## 2020-09-18 VITALS
RESPIRATION RATE: 20 BRPM | TEMPERATURE: 97.7 F | DIASTOLIC BLOOD PRESSURE: 76 MMHG | BODY MASS INDEX: 38.52 KG/M2 | OXYGEN SATURATION: 98 % | HEART RATE: 89 BPM | SYSTOLIC BLOOD PRESSURE: 146 MMHG | WEIGHT: 284 LBS

## 2020-09-18 DIAGNOSIS — Z95.828 PORT-A-CATH IN PLACE: ICD-10-CM

## 2020-09-18 DIAGNOSIS — K90.9 MALABSORPTION OF IRON: Primary | ICD-10-CM

## 2020-09-18 DIAGNOSIS — D50.0 IRON DEFICIENCY ANEMIA DUE TO CHRONIC BLOOD LOSS: ICD-10-CM

## 2020-09-18 PROCEDURE — 25010000002 FERUMOXYTOL 510 MG/17ML SOLUTION 510 MG VIAL: Performed by: INTERNAL MEDICINE

## 2020-09-18 PROCEDURE — 96374 THER/PROPH/DIAG INJ IV PUSH: CPT

## 2020-09-18 PROCEDURE — 25010000003 HEPARIN LOCK FLUSH PER 10 UNITS: Performed by: INTERNAL MEDICINE

## 2020-09-18 RX ORDER — SODIUM CHLORIDE 9 MG/ML
250 INJECTION, SOLUTION INTRAVENOUS ONCE
Status: COMPLETED | OUTPATIENT
Start: 2020-09-18 | End: 2020-09-18

## 2020-09-18 RX ORDER — HEPARIN SODIUM (PORCINE) LOCK FLUSH IV SOLN 100 UNIT/ML 100 UNIT/ML
500 SOLUTION INTRAVENOUS AS NEEDED
Status: CANCELLED | OUTPATIENT
Start: 2020-09-25

## 2020-09-18 RX ORDER — HEPARIN SODIUM (PORCINE) LOCK FLUSH IV SOLN 100 UNIT/ML 100 UNIT/ML
500 SOLUTION INTRAVENOUS AS NEEDED
Status: DISCONTINUED | OUTPATIENT
Start: 2020-09-18 | End: 2020-09-18 | Stop reason: HOSPADM

## 2020-09-18 RX ORDER — SODIUM CHLORIDE 0.9 % (FLUSH) 0.9 %
10 SYRINGE (ML) INJECTION AS NEEDED
Status: DISCONTINUED | OUTPATIENT
Start: 2020-09-18 | End: 2020-09-18 | Stop reason: HOSPADM

## 2020-09-18 RX ORDER — SODIUM CHLORIDE 0.9 % (FLUSH) 0.9 %
10 SYRINGE (ML) INJECTION AS NEEDED
Status: CANCELLED | OUTPATIENT
Start: 2020-09-25

## 2020-09-18 RX ADMIN — SODIUM CHLORIDE 250 ML: 9 INJECTION, SOLUTION INTRAVENOUS at 13:57

## 2020-09-18 RX ADMIN — SODIUM CHLORIDE, PRESERVATIVE FREE 10 ML: 5 INJECTION INTRAVENOUS at 14:46

## 2020-09-18 RX ADMIN — Medication 500 UNITS: at 14:46

## 2020-09-18 RX ADMIN — FERUMOXYTOL 510 MG: 510 INJECTION INTRAVENOUS at 13:59

## 2020-09-25 ENCOUNTER — INFUSION (OUTPATIENT)
Dept: ONCOLOGY | Facility: HOSPITAL | Age: 70
End: 2020-09-25

## 2020-09-25 ENCOUNTER — OFFICE VISIT (OUTPATIENT)
Dept: ONCOLOGY | Facility: CLINIC | Age: 70
End: 2020-09-25

## 2020-09-25 VITALS
TEMPERATURE: 97.8 F | DIASTOLIC BLOOD PRESSURE: 80 MMHG | RESPIRATION RATE: 20 BRPM | DIASTOLIC BLOOD PRESSURE: 80 MMHG | SYSTOLIC BLOOD PRESSURE: 133 MMHG | OXYGEN SATURATION: 98 % | OXYGEN SATURATION: 98 % | BODY MASS INDEX: 37.92 KG/M2 | SYSTOLIC BLOOD PRESSURE: 133 MMHG | HEART RATE: 95 BPM | BODY MASS INDEX: 37.92 KG/M2 | RESPIRATION RATE: 20 BRPM | TEMPERATURE: 97.8 F | WEIGHT: 279.6 LBS | HEART RATE: 95 BPM | WEIGHT: 279.6 LBS

## 2020-09-25 DIAGNOSIS — E53.8 B12 DEFICIENCY: ICD-10-CM

## 2020-09-25 DIAGNOSIS — K90.9 MALABSORPTION OF IRON: ICD-10-CM

## 2020-09-25 DIAGNOSIS — Z95.828 PORT-A-CATH IN PLACE: ICD-10-CM

## 2020-09-25 DIAGNOSIS — C20 MALIGNANT NEOPLASM OF RECTUM (HCC): ICD-10-CM

## 2020-09-25 DIAGNOSIS — D50.0 IRON DEFICIENCY ANEMIA DUE TO CHRONIC BLOOD LOSS: Primary | ICD-10-CM

## 2020-09-25 PROCEDURE — 85025 COMPLETE CBC W/AUTO DIFF WBC: CPT | Performed by: NURSE PRACTITIONER

## 2020-09-25 PROCEDURE — 96374 THER/PROPH/DIAG INJ IV PUSH: CPT

## 2020-09-25 PROCEDURE — 25010000002 FERUMOXYTOL 510 MG/17ML SOLUTION 510 MG VIAL: Performed by: INTERNAL MEDICINE

## 2020-09-25 PROCEDURE — 99214 OFFICE O/P EST MOD 30 MIN: CPT | Performed by: NURSE PRACTITIONER

## 2020-09-25 PROCEDURE — 25010000003 HEPARIN LOCK FLUSH PER 10 UNITS: Performed by: INTERNAL MEDICINE

## 2020-09-25 RX ORDER — HEPARIN SODIUM (PORCINE) LOCK FLUSH IV SOLN 100 UNIT/ML 100 UNIT/ML
500 SOLUTION INTRAVENOUS AS NEEDED
Status: DISCONTINUED | OUTPATIENT
Start: 2020-09-25 | End: 2020-09-25 | Stop reason: HOSPADM

## 2020-09-25 RX ORDER — HEPARIN SODIUM (PORCINE) LOCK FLUSH IV SOLN 100 UNIT/ML 100 UNIT/ML
500 SOLUTION INTRAVENOUS AS NEEDED
Status: CANCELLED | OUTPATIENT
Start: 2020-09-25

## 2020-09-25 RX ORDER — SODIUM CHLORIDE 0.9 % (FLUSH) 0.9 %
10 SYRINGE (ML) INJECTION AS NEEDED
Status: CANCELLED | OUTPATIENT
Start: 2020-09-25

## 2020-09-25 RX ORDER — SODIUM CHLORIDE 9 MG/ML
250 INJECTION, SOLUTION INTRAVENOUS ONCE
Status: COMPLETED | OUTPATIENT
Start: 2020-09-25 | End: 2020-09-25

## 2020-09-25 RX ORDER — SODIUM CHLORIDE 0.9 % (FLUSH) 0.9 %
10 SYRINGE (ML) INJECTION AS NEEDED
Status: DISCONTINUED | OUTPATIENT
Start: 2020-09-25 | End: 2020-09-25 | Stop reason: HOSPADM

## 2020-09-25 RX ADMIN — Medication 500 UNITS: at 14:37

## 2020-09-25 RX ADMIN — FERUMOXYTOL 510 MG: 510 INJECTION INTRAVENOUS at 13:51

## 2020-09-25 RX ADMIN — SODIUM CHLORIDE 250 ML: 9 INJECTION, SOLUTION INTRAVENOUS at 13:50

## 2020-09-25 RX ADMIN — SODIUM CHLORIDE, PRESERVATIVE FREE 10 ML: 5 INJECTION INTRAVENOUS at 14:37

## 2020-09-25 NOTE — PROGRESS NOTES
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  Combined chemoradiation with Xeloda 825 mg/m2 BID D1-5 or M-F during the course of radiation.  His dose is 500 mg x 4 or 2000 mg BID  Treatment finished 19.    2.  Dr. Yadav performed LAR with coloanal anstomosis and loop ileostomy 19.  Pathology showed  Residual invasive adneocarcinoma.  Tumor invaded the muscularis propria.  Surgical margins clear.  0 resected LNs involved.  ypT2N0.      3.       4.  Ileostomy reversal 19 (Dr. Yadav)    5.    CHIEF COMPLAINT:  Follow up of Rectal cancer    HISTORY OF PRESENT ILLNESS:   David Mandujano is a very pleasant 70 y.o. male who was referred by Dr. Barnhart for evaluation and treatment of colorectal cancer. He began to notice rectal bleeding in January or 2018, and he also began to notice fatigue in approximately July. He assumed he was out of shape when he started to become short of breath and started trying to exercise more frequently, which only exacerbated the shortness of breath. He was evaluated by his PCP, Raj Wang MD, who after testing, found him to be severely iron deficient, hyperglycemic, and he also had an elevated PSA. He was on vacation at the time, and his PCP asked him to return home for further evaluation. He was started on oral iron therapy and later received IV iron infusions. He was also scheduled for a colonoscopy with Dr. Barnhart, during which a suspicious rectal mass was biopsied and pathology was positive for an ulcerated invasive, moderately differentiated rectal adenocarcinoma.     INTERVAL HISTORY:  Mr. Mandujano presents today for follow up of rectal cancer. Overall, he reports that he has been doing well. He is completing IV Feraheme today and reports that fatigue has improved. He continues to take folic acid and B12 as  prescribed. He reports a good appetite, stable weight. Denies constipation/diarrhea or abdominal pain. He occasionally reports that he feels some pressure when he has a bowel movement but has noticed it occurs when he doesn't get enough fiber in his diet. He is scheduled to have colonoscopy in November. He is otherwise well today and denies any specific complaints.    PAST MEDICAL HISTORY:  Past Medical History:   Diagnosis Date   • Anemia    • Arthritis    • Colon cancer (CMS/HCC)     s/p chemo therapy   • Heart murmur    • Hypertension    • Wrist fracture     surgically repaired       PAST SURGICAL HISTORY:  Past Surgical History:   Procedure Laterality Date   • ABDOMINAL SURGERY     • COLON SURGERY     • COLONOSCOPY     • FRACTURE SURGERY Left    • VENOUS ACCESS DEVICE (PORT) INSERTION N/A 5/20/2019    Procedure: INSERTION VENOUS ACCESS DEVICE;  Surgeon: Cindy Corrales MD;  Location: [facility];  Service: General   • WRIST SURGERY         FAMILY HISTORY:  Family History   Problem Relation Age of Onset   • Colon cancer Maternal Grandfather    • Other Sister    • Heart disease Sister    No other family history of malignancy.    SOCIAL HISTORY:  Social History     Socioeconomic History   • Marital status: Single     Spouse name: Not on file   • Number of children: Not on file   • Years of education: Not on file   • Highest education level: Not on file   Tobacco Use   • Smoking status: Never Smoker   • Smokeless tobacco: Current User     Types: Chew   Substance and Sexual Activity   • Alcohol use: Yes     Comment: occasional   • Drug use: No   • Sexual activity: Defer   Social History Narrative    He is , and is self employed/semi retired. He is a non smoker but chews tobacco. He used to drink daily, but says he has cut it in half.      REVIEW OF SYSTEMS:   A comprehensive 14 point review of systems was performed.  Significant findings as mentioned above.  All other systems reviewed and are negative.       MEDICATIONS:  The current medication list was reviewed in the EMR    Current Outpatient Medications:   •  allopurinol (ZYLOPRIM) 100 MG tablet, Take 100 mg by mouth Daily., Disp: , Rfl:   •  amLODIPine (NORVASC) 10 MG tablet, Take 5 mg by mouth Daily., Disp: , Rfl:   •  capecitabine (XELODA) 150 MG chemo tablet, Take 1 tab by mouth in the AM and 1 tab by mouth in the PM on days 1-14, then off for 7 days. Take with 500 mg tabs for total dose of 2150mg, Disp: 28 tablet, Rfl: 5  •  folic acid (FOLVITE) 1 MG tablet, Take 1 tablet by mouth Daily., Disp: 30 tablet, Rfl: 6  •  HYDROcodone-acetaminophen (NORCO) 7.5-325 MG per tablet, Take 1 tablet by mouth 4 (Four) Times a Day As Needed for Moderate Pain ., Disp: 12 tablet, Rfl: 0  •  iron polysaccharides (NIFEREX) 150 MG capsule, Take 1 capsule by mouth Daily., Disp: 30 capsule, Rfl: 3  •  metoprolol succinate XL (TOPROL-XL) 50 MG 24 hr tablet, Take 1 tablet by mouth Daily., Disp: 30 tablet, Rfl: 11  •  ondansetron ODT (ZOFRAN-ODT) 8 MG disintegrating tablet, Dissolve 1 tablet on the tongue 3 (Three) Times a Day As Needed for Nausea or Vomiting., Disp: 30 tablet, Rfl: 5  •  predniSONE (DELTASONE) 10 MG tablet, Take 3 tabs daily x 1 day, 2 tabs daily x 2 days, then 1 tab daily x 1 day, Disp: 8 tablet, Rfl: 0  •  prochlorperazine (COMPAZINE) 5 MG tablet, Take 2 tablets by mouth Every 6 (Six) Hours As Needed for Nausea or Vomiting., Disp: 60 tablet, Rfl: 5  No current facility-administered medications for this visit.     Facility-Administered Medications Ordered in Other Visits:   •  heparin injection 500 Units, 500 Units, Intravenous, PRN, Jyoti Larios MD, 500 Units at 09/25/20 1437  •  sodium chloride 0.9 % flush 10 mL, 10 mL, Intravenous, PRN, Jyoti Larios MD, 10 mL at 09/25/20 1437    ALLERGIES:  No Known Allergies    PHYSICAL EXAM:  Vitals:    09/25/20 1326   BP: 133/80   Pulse: 95   Resp: 20   Temp: 97.8 °F (36.6 °C)   SpO2: 98%     Pain Score     09/25/20 1326   PainSc: 0-No pain     General:  Awake, alert and oriented, appears well, in no acute distress  HEENT:  Pupils are equal, round and reactive to light and accommodation, Extra-ocular movements full, Oropharyx clear, mucous membranes moist  Neck:  No JVD, thyromegaly or lymphadenopathy  CV:  Regular rate and rhythm, III/IV systolic murmur, no rubs or gallops  Resp:  Lungs are clear to auscultation bilaterally, no crackles  Abd:  Soft, non-tender, sl distended, bowel sounds present, no organomegaly or masses.  Surgical scars present.   Ext:  No clubbing, cyanosis or edema     Lymph:  No cervical, supraclavicular, axillary, adenopathy  Neuro:  Grossly non-focal exam    PATHOLOGY:  10-02-18      04-01-19:        04-09-19:        ENDOSCOPY:  10-02-18:        IMAGING:  10-04-18 CT Abdomen Pelvis With Contrast   Findings  - LOWER THORAX: Patchy nonspecific subpleural nodularity in the left lung base that could represent sequelae of chronic airspace disease or early fibrosis.     ABDOMEN:  - LIVER: Homogeneous. No focal hepatic mass or ductal dilatation.  - GALLBLADDER: GALLSTONES WITHIN THE GALLBLADDER.  - PANCREAS: Unremarkable. No mass or ductal dilatation.  - SPLEEN: Homogeneous. No splenomegaly.  - ADRENALS: No mass.  - KIDNEYS: RIGHT RENAL CYST MEASURING 3.6 CM.  - GI TRACT: NONSPECIFIC DISTAL SIGMOID COLONIC WALL THICKENING VERSUS NONDISTENTION.  - PERITONEUM: No free air. No free fluid or loculated fluid collections.  - MESENTERY: Unremarkable.  - LYMPH NODES: No lymphadenopathy.  - VASCULATURE: ATHEROSCLEROTIC VASCULAR CALCIFICATION.  - ABDOMINAL WALL: SMALL BILATERAL HUMERAL HERNIAS CONTAINING ONLY FAT.  - OTHER: None.     PELVIS:  - BLADDER: No focal mass or significant wall thickening  - REPRODUCTIVE: Unremarkable as visualized.  - APPENDIX: Nondistended. No surrounding inflammation.  - BONES: No acute bony abnormality.     IMPRESSION:  1. Gallstones within the gallbladder.  2.Nonspecific distal  sigmoid colonic wall thickening versus nondistention.    PET/CT 10-29-18    11-13-18 MRI Pelvis Without Contrast  FINDINGS:  1.) TUMOR LOCATION AND CHARACTERISTICS     i) Distance of Inferior margin of Tumor from the dentate line: 9.5 CM  ii) Tumor at or below the puborectalis sling:  NO  iii) Relationship to the anterior peritoneal reflection: BELOW  iv) Craniocaudal length of the tumor: 6cm  v) Clock face of tumor: 5o'clock to 2o'clock  vi) Polypoid/ Annular/ Semi-annular: SEMI-ANNULAR  vii) Mucinous: YES     2.) EXTRAMURAL DEPTH OF INVASION AND MR T-CATEGORY       i) Extramural depth of invasion (Use 0mm for T1 or T2 tumor):  APPROXIMATELY 5 MM   ii) T category: T3 B              *Please indicate structures with possible invasion.  Specify laterality,  sequence and slice#: (see list below)     *  Anterior peritoneal reflection (T4a tumor): NO  *  Puborectalis: NO  *  Bladder: NO  *  Vascular Involvement of Iliac Vessels: NO  *  Levator ani: NO  *  Ureters: NO  *  Obturator: NO  *  Prostate: MARGINAL/TETHERED  *  Piriformis: NO  *  Uterus: NOT APPLICABLE  *  Pelvic Bone: NO  *  Vagina: NOT APPLICABLE  *  Sacrum: NO  *  Urethra: NO  *  Other:          iii) For low rectal tumors (maximum tumor depth at or below the  puborectalis sling):     *  Not applicable (tumor above the puborectalis sling: NOT APPLICABLE  *    *  Level 1 (submucosa only, no involvement of internal anal sphincter):       *  Level 2 (confined to the internal anal sphincter; no involvement of  intersphincteric fat):      *  Level 3 (intersphincteric fat involved):      *  Level 4 (involves external sphincter or beyond):         3. RELATIONSHIP OF THE TUMOR TO MESORECTAL FASCIA (MRF)       i) Shortest distance 0mm of the definitive tumour border to the MRF  is: AT 12:00   ii) Are there any tumour spiculations closer to the MRF? NO     iii) Location of Tumor margin to MRF: 12:00, AT THE LEVEL PROSTATE     4. EXTRAMURAL VENOUS INVASION       i)  Extramural Venous Invasion (EMVI): ABSENT     5. MESORECTAL LYMPH NODES AND TUMOUR DEPOSITS       i) Any suspicious mesorectal lymph nodes/tumor deposits: YES      *If yes, the most suspicious node/tumor deposit is: 15 MM IN  DIAMETER, ALONG THE UPPER MARGIN OF the tumor with minimum distance 7  MMmm from the MRF at 7o'clock     6. EXTRAMESORECTAL LYMPH NODES        i) Any suspicious extramesorectal lymph nodes: NO      *If yes, location and laterality of suspicious nodes:             Int. Iliac:                Ext. Iliac:                Common Iliac:                Obturator:                Inguinal:                Other:           ii) Is the BETH node station in the field of view: NO        *If Yes, are these nodes suspicious:         7. OTHER FINDINGS (COMPLICATIONS, METASTASES, LIMITATIONS)         NOTE: THERE IS SOME UNCERTAINTY WHETHER THE APPARENT SMALL FOCUS OF TUMOR EXTENDING TO THE PROSTATE AT THE 12:00 POSITION COULD ACTUALLY BE PROSTATE NODULE EXTENDING TOWARD THE TUMOR. RECTAL TUMOR IS FAVORED; ALTHOUGH THIS DETERMINATION CANNOT BE MADE WITH COMPLETE CERTAINTY, TUMOR IS CONSIDERED T3 B.        IMPRESSIONS:  MRI rectal cancer T category is: T3 B  Maximum EMD of invasion is: 5  Minimum tumor to MRF distance is: 0  Low rectal tumor component: NO  Mesorectal nodes/tumor deposits: SUSPICIOUS  EMVI: ABSENT  Extramesorectal nodes: NEGATIVE    CTCAP 05-21-20     FINDINGS:  Adenopathy:No evidence of mediastinal or hilar lymph node enlargement.  No axillary lymph node enlargement.     Fluid:There are no pericardial or pleural effusions.     LUNGS:No parenchymal soft tissue nodules or masses are present.     Skeletal:Arthritic change in the thoracic spine.      Upper abdomen shows cholelithiasis.      IMPRESSION:  1. No parenchymal soft tissue nodules or masses.  2. No pericardial or pleural effusions.  3. No adenopathy.   4. Coronary artery calcifications.   5. Cholelithiasis.     FINDINGS:   The lung bases are  clear. There are no pleural effusions.     The liver is homogeneous.     There is cholelithiasis.     The spleen is homogeneous.      There is no peripancreatic stranding or pancreatic head mass.      There is no adrenal enlargement.     Large right renal cyst is present. There is no solid renal mass or  hydronephrosis..      Otherwise I do not see any free fluid or walled off fluid collections.     The rectal wall appears to be thickened.     IMPRESSION:  1. Rectal wall thickening.  2. Cholelithiasis.     CT CAP 09/15/2020  Findings  LUNGS: Unremarkable. No parenchymal soft tissue nodules.  No focal air  space disease.  HEART: CORONARY ARTERY CALCIFICATIONS ARE NOTED.  PERICARDIUM: No effusion.  MEDIASTINUM: No masses. No enlarged lymph nodes.  No fluid collections.  PLEURA: No pleural effusion. No pleural mass or abnormal calcification.  MAJOR AIRWAYS: Clear. No intrinsic mass.  VASCULATURE: No evidence of aneurysm.  VISUALIZED UPPER ABDOMEN:  LIVER: Homogeneous. No focal hepatic mass or ductal dilatation.  SPLEEN: Homogeneous. No splenomegaly.  ADRENALS: No mass.  KIDNEYS: RIGHT RENAL CYST MEASURING 6.7 CM.  GI TRACT: Non-dilated. No definite wall thickening.  PERITONEUM: No free air. No free fluid or loculated fluid  collections.  ABDOMINAL WALL: No focal hernia or mass.  OTHER: None.  BONES: No acute bony abnormality.     IMPRESSION:  Impression:  1. Unremarkable exam.  No source for the patient symptoms.  2. Other incidental/non-acute findings as above.    Findings  LOWER THORAX: Clear. No effusions.  ABDOMEN:  LIVER: Homogeneous. No focal hepatic mass or ductal dilatation.GALLBLADDER: GALLSTONES IN THE GALLBLADDER.  PANCREAS: Unremarkable. No mass or ductal dilatation.  SPLEEN: Homogeneous. No splenomegaly.  ADRENALS: No mass.  KIDNEYS: RIGHT RENAL CYST MEASURING 6.4 CM.  GI TRACT: Possibly some rectal wall thickening.  PERITONEUM: No free air. No free fluid or loculated fluid  collections.  MESENTERY:  Unremarkable.  LYMPH NODES: No lymphadenopathy.  VASCULATURE: No evidence of aneurysm.  ABDOMINAL WALL: No focal hernia or mass.  OTHER: None.   PELVIS:   BLADDER: No focal mass or significant wall thickening   REPRODUCTIVE: Unremarkable as visualized.   APPENDIX: Nondistended. No surrounding inflammation.  BONES: No acute bony abnormality.     IMPRESSION:  Impression:  Rectal wall thickening noted.    RECENT LABS:  Lab Results   Component Value Date    WBC 6.20 09/25/2020    HGB 10.8 (L) 09/25/2020    HCT 36.9 (L) 09/25/2020    MCV 92.7 09/25/2020    RDW 16.4 (H) 09/25/2020     09/25/2020    NEUTRORELPCT 74.5 09/25/2020    LYMPHORELPCT 15.5 (L) 09/25/2020    MONORELPCT 6.9 09/25/2020    EOSRELPCT 1.5 09/25/2020    BASORELPCT 0.6 09/25/2020    NEUTROABS 4.62 09/25/2020    LYMPHSABS 0.96 09/25/2020       Lab Results   Component Value Date     08/31/2020    K 4.1 08/31/2020    CO2 23.6 08/31/2020     08/31/2020    BUN 21 08/31/2020    CREATININE 1.32 (H) 08/31/2020    EGFRIFNONA 54 (L) 08/31/2020    GLUCOSE 140 (H) 08/31/2020    CALCIUM 9.2 08/31/2020    ALKPHOS 89 08/31/2020    AST 35 08/31/2020    ALT 33 08/31/2020    BILITOT 0.3 08/31/2020    ALBUMIN 4.43 08/31/2020    PROTEINTOT 7.5 08/31/2020       Lab Results   Component Value Date/Time    URICACID 8.9 (H) 07/17/2019 10:16 AM     Lab Results   Component Value Date    CEA 1.82 08/31/2020    CEA 1.83 07/21/2020    CEA 2.00 06/25/2020    CEA 2.52 04/21/2020    CEA 2.28 01/28/2020    CEA 2.28 06/26/2019    CEA 3.40 03/20/2019    CEA 17.80 (H) 01/11/2019    CEA 18.80 (H) 10/19/2018     Lab Results   Component Value Date    PSA 1.050 07/21/2020    PSA 1.110 06/25/2020    PSA 4.930 (H) 10/19/2018     Lab Results   Component Value Date    TSH 1.470 07/21/2020     Lab Results   Component Value Date    HGBA1C 5.80 (H) 06/25/2020    HGBA1C 5.50 01/28/2020    HGBA1C 5.40 03/20/2019       Lab Results   Component Value Date    FERRITIN 33.87 08/31/2020     IRON 44 (L) 08/31/2020    TIBC 532 08/31/2020    LABIRON 8 (L) 08/31/2020    UIEVQMRO22 781 08/31/2020    FOLATE >20.00 08/31/2020     ASSESSMENT & PLAN:  David Mandujano is a very pleasant 70 y.o. male with newly diagnosed rectal cancer. Clinically stage IIIC ([id number]).    1.  Rectal cancer:  -  He received standard neoadjuvant chemoradiation as above given locally advanced tumor with suspicious pelvic lymph node.    - Pre-treatment MRI showed a locally advanced tumor and suspicious pelvic LN.  PET-CT was negative except in the local tumor.   -  There was a non-specific sub-pleural nodular opacity in the L lung base which was PET negative.  Perhaps this was inflammatory in nature. This area will require f/u on future imaging.  - Recommended neoadjuvant chemoradiation with Xeloda 825 mg/m2 BID D1-5 or M-F during the course of radiaton.  His dose was 500 mg x 4 or 2000 mg BID. Overall, he tolerated this well and completed treatment 1-21-19.    -  He saw Dr. Marilynn Yadav and underwent successful surgical resection as above.    -  He took adjuvant CapeOx treatment to prevent recurrence and has completed 8 cycles. Overall, he tolerated treatment well aside from fatigue and diarrhea (see below). He started to struggle with thrombocytopenia and possibly with neuropathy so gave his last cycle without Oxaliplatin.  -  He did well with take down of his ileostomy.  -  CT CAP remains without evidence of recurrent disease.  CEA from 08/31/2020 is normal.  -  He is scheduled for endoscopy in November.    2.  Anemia:  - Iron profile was low and ferritin borderline, despite taking Niferex 150 mg daily. Therefore, he was replaced with IV Feraheme (completing 2nd dose today).  CBC today with Hg ~10.8, which has improved.  - B12 and folate are replete. He is taking B12 SL and folate as prescribed. TSH is normal.  - Will recheck CBC, Iron studies, ferritin in 6 weeks and with follow up in 3 months. In the interim, advised patient to  call if he develops worsening fatigue/weakness, shortness of breath on exertion, chest pain or dizziness and we will check labs sooner.    3.  Neuropathy:  -  Etiology is uncertain.  He did complain of some vague symptoms toward the end of his treatment (though he thinks they happened later), so this could be related to Oxaliplatin.    - Thyroid function is wnl.  HbA1C has been normal.    - B12 and folate are replete. He continues to take take SL B12 and folate as prescribed. At present, he feels neuropathy is stable/improved.  - He has been advised that alcohol can cause or worsen peripheral neuropathy.    4.   Depressed mood / Anxiety and Alcohol Abuse:  - Previously given trial of Lexapro but he did not find this helpful and discontinued this. Currently, he says he is doing well in this regard. He did quit drinking completely around the time of his surgery but has recently been drinking again. He says Dr. Yadav did mention to him that his liver didn't look good during his operation.  -  We previously discussed potential involvement in support groups and /or referral for alcohol abuse counseling, but he declined.  Will monitor.    5.  Prominent systolic murmur:   -   He was evaluated by Dr. Joe and then by Dr. Barrera.  He underwent echocardiogram 1-30-19 which showed EF 70%, grade 1 diastolic dysfunction, mild to mod septal asymmetric hypertrophy, AV calcification with functionally bicuspid valve, mild AI and moderate AS, moderate MAC with mild MR, mild MS  -  Thought is that at some point in the future he will require AVR.   - He has not followed up with cardiology recently due to Coronavirus pandemic. Again advised patient to schedule follow up and he states that he has plans to arrange this soon.    6.  Prophylaxis:  He took 2019 influenza vaccine. He took Prevnar 13 on 11-20-18. Recommended 2020 flu vaccine, he prefers to wait a few more weeks.      7. Follow up:   -  PAC flush in 6 weeks with CBC, CMP, Iron  panel and Ferritin.    -  With Dr. Larios in 3 months with repeat imaging and labs prior.    ACO / KAVITHA/Other  Quality measures  -  David Mandujano did not receive 2020 flu vaccine. This was recommended.  -  David Mandujano reports a pain score of 0.    -  Current outpatient and discharge medications have been reconciled for the patient.  Reviewed by: JOSE CARLOS Diez      I spent 25 minutes with David Mandujano today.  In the office today, more than 50% of this time was spent face-to-face with him  in counseling / coordination of care, reviewing his interim medical history and counseling on the current treatment plan.  All questions were answered to his satisfaction.         Electronically Signed by: JOSE CARLOS Diez        CC:   MD Kathleen Coppola Emmanuel C, MD Shawn Michael Acino, MD William Richards, MD Sandra Beck, MD

## 2020-11-04 ENCOUNTER — TRANSCRIBE ORDERS (OUTPATIENT)
Dept: ADMINISTRATIVE | Facility: HOSPITAL | Age: 70
End: 2020-11-04

## 2020-11-04 DIAGNOSIS — Z01.818 PREOP EXAMINATION: Primary | ICD-10-CM

## 2020-11-06 ENCOUNTER — LAB (OUTPATIENT)
Dept: LAB | Facility: HOSPITAL | Age: 70
End: 2020-11-06

## 2020-11-06 ENCOUNTER — LAB (OUTPATIENT)
Dept: ONCOLOGY | Facility: CLINIC | Age: 70
End: 2020-11-06

## 2020-11-06 ENCOUNTER — TELEPHONE (OUTPATIENT)
Dept: ONCOLOGY | Facility: CLINIC | Age: 70
End: 2020-11-06

## 2020-11-06 VITALS
HEART RATE: 118 BPM | TEMPERATURE: 97.1 F | SYSTOLIC BLOOD PRESSURE: 152 MMHG | DIASTOLIC BLOOD PRESSURE: 84 MMHG | OXYGEN SATURATION: 97 % | RESPIRATION RATE: 18 BRPM

## 2020-11-06 DIAGNOSIS — D50.0 IRON DEFICIENCY ANEMIA DUE TO CHRONIC BLOOD LOSS: ICD-10-CM

## 2020-11-06 DIAGNOSIS — Z01.818 PREOP EXAMINATION: ICD-10-CM

## 2020-11-06 DIAGNOSIS — K90.9 MALABSORPTION OF IRON: ICD-10-CM

## 2020-11-06 LAB
ALBUMIN SERPL-MCNC: 4.49 G/DL (ref 3.5–5.2)
ALBUMIN/GLOB SERPL: 1.4 G/DL
ALP SERPL-CCNC: 98 U/L (ref 39–117)
ALT SERPL W P-5'-P-CCNC: 48 U/L (ref 1–41)
ANION GAP SERPL CALCULATED.3IONS-SCNC: 16.3 MMOL/L (ref 5–15)
AST SERPL-CCNC: 50 U/L (ref 1–40)
BASOPHILS # BLD AUTO: 0.05 10*3/MM3 (ref 0–0.2)
BASOPHILS NFR BLD AUTO: 0.4 % (ref 0–1.5)
BILIRUB SERPL-MCNC: 0.4 MG/DL (ref 0–1.2)
BUN SERPL-MCNC: 16 MG/DL (ref 8–23)
BUN/CREAT SERPL: 11.1 (ref 7–25)
CALCIUM SPEC-SCNC: 10.2 MG/DL (ref 8.6–10.5)
CHLORIDE SERPL-SCNC: 104 MMOL/L (ref 98–107)
CO2 SERPL-SCNC: 22.7 MMOL/L (ref 22–29)
CREAT SERPL-MCNC: 1.44 MG/DL (ref 0.76–1.27)
DEPRECATED RDW RBC AUTO: 49.9 FL (ref 37–54)
EOSINOPHIL # BLD AUTO: 0.12 10*3/MM3 (ref 0–0.4)
EOSINOPHIL NFR BLD AUTO: 1 % (ref 0.3–6.2)
ERYTHROCYTE [DISTWIDTH] IN BLOOD BY AUTOMATED COUNT: 15.1 % (ref 12.3–15.4)
FERRITIN SERPL-MCNC: 40.29 NG/ML (ref 30–400)
GFR SERPL CREATININE-BSD FRML MDRD: 48 ML/MIN/1.73
GLOBULIN UR ELPH-MCNC: 3.3 GM/DL
GLUCOSE SERPL-MCNC: 134 MG/DL (ref 65–99)
HCT VFR BLD AUTO: 39.6 % (ref 37.5–51)
HGB BLD-MCNC: 12.1 G/DL (ref 13–17.7)
IMM GRANULOCYTES # BLD AUTO: 0.07 10*3/MM3 (ref 0–0.05)
IMM GRANULOCYTES NFR BLD AUTO: 0.6 % (ref 0–0.5)
IRON 24H UR-MRATE: 53 MCG/DL (ref 59–158)
IRON SATN MFR SERPL: 10 % (ref 20–50)
LYMPHOCYTES # BLD AUTO: 2.39 10*3/MM3 (ref 0.7–3.1)
LYMPHOCYTES NFR BLD AUTO: 19 % (ref 19.6–45.3)
MCH RBC QN AUTO: 27.7 PG (ref 26.6–33)
MCHC RBC AUTO-ENTMCNC: 30.6 G/DL (ref 31.5–35.7)
MCV RBC AUTO: 90.6 FL (ref 79–97)
MONOCYTES # BLD AUTO: 0.62 10*3/MM3 (ref 0.1–0.9)
MONOCYTES NFR BLD AUTO: 4.9 % (ref 5–12)
NEUTROPHILS NFR BLD AUTO: 74.1 % (ref 42.7–76)
NEUTROPHILS NFR BLD AUTO: 9.35 10*3/MM3 (ref 1.7–7)
NRBC BLD AUTO-RTO: 0 /100 WBC (ref 0–0.2)
PLATELET # BLD AUTO: 235 10*3/MM3 (ref 140–450)
PMV BLD AUTO: 9.7 FL (ref 6–12)
POTASSIUM SERPL-SCNC: 4.1 MMOL/L (ref 3.5–5.2)
PROT SERPL-MCNC: 7.8 G/DL (ref 6–8.5)
RBC # BLD AUTO: 4.37 10*6/MM3 (ref 4.14–5.8)
SODIUM SERPL-SCNC: 143 MMOL/L (ref 136–145)
TIBC SERPL-MCNC: 511 MCG/DL (ref 298–536)
TRANSFERRIN SERPL-MCNC: 343 MG/DL (ref 200–360)
WBC # BLD AUTO: 12.6 10*3/MM3 (ref 3.4–10.8)

## 2020-11-06 PROCEDURE — C9803 HOPD COVID-19 SPEC COLLECT: HCPCS

## 2020-11-06 PROCEDURE — 80053 COMPREHEN METABOLIC PANEL: CPT | Performed by: NURSE PRACTITIONER

## 2020-11-06 PROCEDURE — 84466 ASSAY OF TRANSFERRIN: CPT | Performed by: NURSE PRACTITIONER

## 2020-11-06 PROCEDURE — 82728 ASSAY OF FERRITIN: CPT | Performed by: NURSE PRACTITIONER

## 2020-11-06 PROCEDURE — U0004 COV-19 TEST NON-CDC HGH THRU: HCPCS | Performed by: INTERNAL MEDICINE

## 2020-11-06 PROCEDURE — 85025 COMPLETE CBC W/AUTO DIFF WBC: CPT | Performed by: NURSE PRACTITIONER

## 2020-11-06 PROCEDURE — 83540 ASSAY OF IRON: CPT | Performed by: NURSE PRACTITIONER

## 2020-11-06 NOTE — TELEPHONE ENCOUNTER
Caller: LESLEE DIANA    Relationship to patient: SELF    Best call back number: [phone]     PT IS CALLING TO GET THE RESULTS OF HIS RECENT LAB WORK

## 2020-11-07 LAB — SARS-COV-2 RNA RESP QL NAA+PROBE: NOT DETECTED

## 2020-11-09 DIAGNOSIS — K90.9 MALABSORPTION OF IRON: Primary | ICD-10-CM

## 2020-11-09 DIAGNOSIS — D50.0 IRON DEFICIENCY ANEMIA DUE TO CHRONIC BLOOD LOSS: ICD-10-CM

## 2020-11-09 RX ORDER — SODIUM CHLORIDE 0.9 % (FLUSH) 0.9 %
10 SYRINGE (ML) INJECTION AS NEEDED
Status: CANCELLED | OUTPATIENT
Start: 2020-11-09

## 2020-11-09 RX ORDER — HEPARIN SODIUM (PORCINE) LOCK FLUSH IV SOLN 100 UNIT/ML 100 UNIT/ML
500 SOLUTION INTRAVENOUS AS NEEDED
Status: CANCELLED | OUTPATIENT
Start: 2020-11-09

## 2020-11-09 RX ORDER — SODIUM CHLORIDE 9 MG/ML
250 INJECTION, SOLUTION INTRAVENOUS ONCE
Status: CANCELLED | OUTPATIENT
Start: 2020-11-23

## 2020-11-09 RX ORDER — SODIUM CHLORIDE 9 MG/ML
250 INJECTION, SOLUTION INTRAVENOUS ONCE
Status: CANCELLED | OUTPATIENT
Start: 2020-11-20

## 2020-11-17 DIAGNOSIS — E53.8 B12 DEFICIENCY: ICD-10-CM

## 2020-11-17 DIAGNOSIS — D50.0 IRON DEFICIENCY ANEMIA DUE TO CHRONIC BLOOD LOSS: ICD-10-CM

## 2020-11-17 DIAGNOSIS — C20 MALIGNANT NEOPLASM OF RECTUM (HCC): ICD-10-CM

## 2020-11-17 DIAGNOSIS — K90.9 MALABSORPTION OF IRON: Primary | ICD-10-CM

## 2020-11-17 DIAGNOSIS — Z08 ENCOUNTER FOR FOLLOW-UP EXAMINATION AFTER COMPLETED TREATMENT FOR MALIGNANT NEOPLASM: ICD-10-CM

## 2020-11-20 ENCOUNTER — INFUSION (OUTPATIENT)
Dept: ONCOLOGY | Facility: HOSPITAL | Age: 70
End: 2020-11-20

## 2020-11-20 VITALS
HEART RATE: 108 BPM | OXYGEN SATURATION: 98 % | DIASTOLIC BLOOD PRESSURE: 76 MMHG | BODY MASS INDEX: 36.81 KG/M2 | TEMPERATURE: 98 F | WEIGHT: 271.4 LBS | SYSTOLIC BLOOD PRESSURE: 118 MMHG | RESPIRATION RATE: 18 BRPM

## 2020-11-20 DIAGNOSIS — K90.9 MALABSORPTION OF IRON: ICD-10-CM

## 2020-11-20 DIAGNOSIS — Z95.828 PORT-A-CATH IN PLACE: ICD-10-CM

## 2020-11-20 DIAGNOSIS — D50.0 IRON DEFICIENCY ANEMIA DUE TO CHRONIC BLOOD LOSS: Primary | ICD-10-CM

## 2020-11-20 PROCEDURE — 25010000003 HEPARIN LOCK FLUSH PER 10 UNITS: Performed by: NURSE PRACTITIONER

## 2020-11-20 PROCEDURE — 96375 TX/PRO/DX INJ NEW DRUG ADDON: CPT

## 2020-11-20 PROCEDURE — 25010000002 FERUMOXYTOL 510 MG/17ML SOLUTION 510 MG VIAL: Performed by: NURSE PRACTITIONER

## 2020-11-20 PROCEDURE — 96374 THER/PROPH/DIAG INJ IV PUSH: CPT

## 2020-11-20 RX ORDER — HEPARIN SODIUM (PORCINE) LOCK FLUSH IV SOLN 100 UNIT/ML 100 UNIT/ML
500 SOLUTION INTRAVENOUS AS NEEDED
Status: DISCONTINUED | OUTPATIENT
Start: 2020-11-20 | End: 2020-11-20 | Stop reason: HOSPADM

## 2020-11-20 RX ORDER — SODIUM CHLORIDE 9 MG/ML
250 INJECTION, SOLUTION INTRAVENOUS ONCE
Status: COMPLETED | OUTPATIENT
Start: 2020-11-20 | End: 2020-11-20

## 2020-11-20 RX ORDER — HEPARIN SODIUM (PORCINE) LOCK FLUSH IV SOLN 100 UNIT/ML 100 UNIT/ML
500 SOLUTION INTRAVENOUS AS NEEDED
Status: CANCELLED | OUTPATIENT
Start: 2020-11-23

## 2020-11-20 RX ORDER — MULTIVITAMIN WITH IRON
100 TABLET ORAL DAILY
Qty: 30 TABLET | Refills: 1 | Status: SHIPPED | OUTPATIENT
Start: 2020-11-20 | End: 2021-05-28

## 2020-11-20 RX ORDER — SODIUM CHLORIDE 0.9 % (FLUSH) 0.9 %
10 SYRINGE (ML) INJECTION AS NEEDED
Status: CANCELLED | OUTPATIENT
Start: 2020-11-23

## 2020-11-20 RX ORDER — SODIUM CHLORIDE 0.9 % (FLUSH) 0.9 %
10 SYRINGE (ML) INJECTION AS NEEDED
Status: DISCONTINUED | OUTPATIENT
Start: 2020-11-20 | End: 2020-11-20 | Stop reason: HOSPADM

## 2020-11-20 RX ADMIN — SODIUM CHLORIDE, PRESERVATIVE FREE 10 ML: 5 INJECTION INTRAVENOUS at 14:41

## 2020-11-20 RX ADMIN — FERUMOXYTOL 510 MG: 510 INJECTION INTRAVENOUS at 13:54

## 2020-11-20 RX ADMIN — SODIUM CHLORIDE 250 ML: 9 INJECTION, SOLUTION INTRAVENOUS at 13:54

## 2020-11-20 RX ADMIN — Medication 500 UNITS: at 14:41

## 2020-11-23 ENCOUNTER — INFUSION (OUTPATIENT)
Dept: ONCOLOGY | Facility: HOSPITAL | Age: 70
End: 2020-11-23

## 2020-11-23 ENCOUNTER — OFFICE VISIT (OUTPATIENT)
Dept: ONCOLOGY | Facility: CLINIC | Age: 70
End: 2020-11-23

## 2020-11-23 VITALS
OXYGEN SATURATION: 95 % | SYSTOLIC BLOOD PRESSURE: 126 MMHG | HEART RATE: 102 BPM | WEIGHT: 269.8 LBS | DIASTOLIC BLOOD PRESSURE: 64 MMHG | TEMPERATURE: 97.5 F | RESPIRATION RATE: 18 BRPM | BODY MASS INDEX: 36.59 KG/M2

## 2020-11-23 DIAGNOSIS — Z95.828 PORT-A-CATH IN PLACE: ICD-10-CM

## 2020-11-23 DIAGNOSIS — K90.9 MALABSORPTION OF IRON: ICD-10-CM

## 2020-11-23 DIAGNOSIS — D50.0 IRON DEFICIENCY ANEMIA DUE TO CHRONIC BLOOD LOSS: ICD-10-CM

## 2020-11-23 DIAGNOSIS — D50.0 IRON DEFICIENCY ANEMIA DUE TO CHRONIC BLOOD LOSS: Primary | ICD-10-CM

## 2020-11-23 DIAGNOSIS — C20 MALIGNANT NEOPLASM OF RECTUM (HCC): Primary | ICD-10-CM

## 2020-11-23 PROCEDURE — 90694 VACC AIIV4 NO PRSRV 0.5ML IM: CPT | Performed by: NURSE PRACTITIONER

## 2020-11-23 PROCEDURE — 96374 THER/PROPH/DIAG INJ IV PUSH: CPT

## 2020-11-23 PROCEDURE — G0008 ADMIN INFLUENZA VIRUS VAC: HCPCS | Performed by: NURSE PRACTITIONER

## 2020-11-23 PROCEDURE — 99213 OFFICE O/P EST LOW 20 MIN: CPT | Performed by: NURSE PRACTITIONER

## 2020-11-23 PROCEDURE — 25010000002 FERUMOXYTOL 510 MG/17ML SOLUTION 510 MG VIAL: Performed by: NURSE PRACTITIONER

## 2020-11-23 PROCEDURE — 25010000003 HEPARIN LOCK FLUSH PER 10 UNITS: Performed by: NURSE PRACTITIONER

## 2020-11-23 RX ORDER — HEPARIN SODIUM (PORCINE) LOCK FLUSH IV SOLN 100 UNIT/ML 100 UNIT/ML
500 SOLUTION INTRAVENOUS AS NEEDED
Status: CANCELLED | OUTPATIENT
Start: 2021-01-29

## 2020-11-23 RX ORDER — HEPARIN SODIUM (PORCINE) LOCK FLUSH IV SOLN 100 UNIT/ML 100 UNIT/ML
500 SOLUTION INTRAVENOUS AS NEEDED
Status: DISCONTINUED | OUTPATIENT
Start: 2020-11-23 | End: 2020-11-23 | Stop reason: HOSPADM

## 2020-11-23 RX ORDER — SODIUM CHLORIDE 0.9 % (FLUSH) 0.9 %
10 SYRINGE (ML) INJECTION AS NEEDED
Status: CANCELLED | OUTPATIENT
Start: 2021-01-29

## 2020-11-23 RX ORDER — SODIUM CHLORIDE 0.9 % (FLUSH) 0.9 %
10 SYRINGE (ML) INJECTION AS NEEDED
Status: DISCONTINUED | OUTPATIENT
Start: 2020-11-23 | End: 2020-11-23 | Stop reason: HOSPADM

## 2020-11-23 RX ORDER — SODIUM CHLORIDE 9 MG/ML
250 INJECTION, SOLUTION INTRAVENOUS ONCE
Status: COMPLETED | OUTPATIENT
Start: 2020-11-23 | End: 2020-11-23

## 2020-11-23 RX ADMIN — FERUMOXYTOL 510 MG: 510 INJECTION INTRAVENOUS at 10:25

## 2020-11-23 RX ADMIN — SODIUM CHLORIDE, PRESERVATIVE FREE 10 ML: 5 INJECTION INTRAVENOUS at 11:12

## 2020-11-23 RX ADMIN — SODIUM CHLORIDE 250 ML: 9 INJECTION, SOLUTION INTRAVENOUS at 10:23

## 2020-11-23 RX ADMIN — Medication 500 UNITS: at 11:12

## 2020-11-23 NOTE — PROGRESS NOTES
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  Combined chemoradiation with Xeloda 825 mg/m2 BID D1-5 or M-F during the course of radiation.  His dose is 500 mg x 4 or 2000 mg BID  Treatment finished 19.    2.  Dr. Yadav performed LAR with coloanal anstomosis and loop ileostomy 19.  Pathology showed  Residual invasive adneocarcinoma.  Tumor invaded the muscularis propria.  Surgical margins clear.  014 resected LNs involved.  ypT2N0.      3.       4.  Ileostomy reversal 19 (Dr. Yadav)    5.      CHIEF COMPLAINT:  None.    HISTORY OF PRESENT ILLNESS:   David Mandujano is a very pleasant 70 y.o. male who was referred by Dr. Barnhart for evaluation and treatment of colorectal cancer. He began to notice rectal bleeding in January or 2018, and he also began to notice fatigue in approximately July. He assumed he was out of shape when he started to become short of breath and started trying to exercise more frequently, which only exacerbated the shortness of breath. He was evaluated by his PCP, Raj Wang MD, who after testing, found him to be severely iron deficient, hyperglycemic, and he also had an elevated PSA. He was on vacation at the time, and his PCP asked him to return home for further evaluation. He was started on oral iron therapy and later received IV iron infusions. He was also scheduled for a colonoscopy with Dr. Barnhart, during which a suspicious rectal mass was biopsied and pathology was positive for an ulcerated invasive, moderately differentiated rectal adenocarcinoma.     INTERVAL HISTORY:  Mr. Mandujano presents today for follow up. Overall, he reports that he has been doing well. He is completing IV Feraheme today and reports that fatigue has improved. He continues to take folic acid and B12 as prescribed. He reports a good appetite,  stable weight. Denies constipation/diarrhea or abdominal pain. He occasionally reports that he feels some pressure when he has a bowel movement but has noticed it occurs when he doesn't get enough fiber in his diet.  He is otherwise well today and denies any specific complaints.    PAST MEDICAL HISTORY:  Past Medical History:   Diagnosis Date   • Anemia    • Arthritis    • Colon cancer (CMS/HCC)     s/p chemo therapy   • Heart murmur    • Hypertension    • Wrist fracture     surgically repaired       PAST SURGICAL HISTORY:  Past Surgical History:   Procedure Laterality Date   • ABDOMINAL SURGERY     • COLON SURGERY     • COLONOSCOPY     • FRACTURE SURGERY Left    • VENOUS ACCESS DEVICE (PORT) INSERTION N/A 5/20/2019    Procedure: INSERTION VENOUS ACCESS DEVICE;  Surgeon: Cindy Corrales MD;  Location: [facility];  Service: General   • WRIST SURGERY         FAMILY HISTORY:  Family History   Problem Relation Age of Onset   • Colon cancer Maternal Grandfather    • Other Sister    • Heart disease Sister    No other family history of malignancy.    SOCIAL HISTORY:  Social History     Socioeconomic History   • Marital status: Single     Spouse name: Not on file   • Number of children: Not on file   • Years of education: Not on file   • Highest education level: Not on file   Tobacco Use   • Smoking status: Never Smoker   • Smokeless tobacco: Current User     Types: Chew   Substance and Sexual Activity   • Alcohol use: Yes     Comment: occasional   • Drug use: No   • Sexual activity: Defer   Social History Narrative    He is , and is self employed/semi retired. He is a non smoker but chews tobacco. He used to drink daily, but says he has cut it in half.      REVIEW OF SYSTEMS:   A comprehensive 14 point review of systems was performed.  Significant findings as mentioned above.  All other systems reviewed and are negative.      MEDICATIONS:  The current medication list was reviewed in the EMR    Current Outpatient  Medications:   •  allopurinol (ZYLOPRIM) 100 MG tablet, Take 100 mg by mouth Daily., Disp: , Rfl:   •  amLODIPine (NORVASC) 10 MG tablet, Take 5 mg by mouth Daily., Disp: , Rfl:   •  capecitabine (XELODA) 150 MG chemo tablet, Take 1 tab by mouth in the AM and 1 tab by mouth in the PM on days 1-14, then off for 7 days. Take with 500 mg tabs for total dose of 2150mg, Disp: 28 tablet, Rfl: 5  •  folic acid (FOLVITE) 1 MG tablet, Take 1 tablet by mouth Daily., Disp: 30 tablet, Rfl: 6  •  HYDROcodone-acetaminophen (NORCO) 7.5-325 MG per tablet, Take 1 tablet by mouth 4 (Four) Times a Day As Needed for Moderate Pain ., Disp: 12 tablet, Rfl: 0  •  iron polysaccharides (NIFEREX) 150 MG capsule, Take 1 capsule by mouth Daily., Disp: 30 capsule, Rfl: 3  •  metoprolol succinate XL (TOPROL-XL) 50 MG 24 hr tablet, Take 1 tablet by mouth Daily., Disp: 30 tablet, Rfl: 11  •  ondansetron ODT (ZOFRAN-ODT) 8 MG disintegrating tablet, Dissolve 1 tablet on the tongue 3 (Three) Times a Day As Needed for Nausea or Vomiting., Disp: 30 tablet, Rfl: 5  •  predniSONE (DELTASONE) 10 MG tablet, Take 3 tabs daily x 1 day, 2 tabs daily x 2 days, then 1 tab daily x 1 day, Disp: 8 tablet, Rfl: 0  •  prochlorperazine (COMPAZINE) 5 MG tablet, Take 2 tablets by mouth Every 6 (Six) Hours As Needed for Nausea or Vomiting., Disp: 60 tablet, Rfl: 5  •  vitamin B-6 (PYRIDOXINE) 100 MG tablet, Take 1 tablet by mouth Daily., Disp: 30 tablet, Rfl: 1  No current facility-administered medications for this visit.     ALLERGIES:  No Known Allergies    PHYSICAL EXAM:  There were no vitals filed for this visit.  There were no vitals filed for this visit.  General:  Awake, alert and oriented, appears well, in no acute distress  HEENT:  Pupils are equal, round and reactive to light and accommodation, Extra-ocular movements full, Oropharyx clear, mucous membranes moist  Neck:  No JVD, thyromegaly or lymphadenopathy  CV:  Regular rate and rhythm, III/IV systolic  murmur, no rubs or gallops  Resp:  Lungs are clear to auscultation bilaterally, no crackles  Abd:  Soft, non-tender, sl distended, bowel sounds present, no organomegaly or masses.  Surgical scars present.   Ext:  No clubbing, cyanosis or edema     Lymph:  No cervical, supraclavicular, axillary, adenopathy  Neuro:  Grossly non-focal exam    PATHOLOGY:  10-02-18      04-01-19:        04-09-19:        ENDOSCOPY:  10-02-18:        IMAGING:  10-04-18 CT Abdomen Pelvis With Contrast   Findings  - LOWER THORAX: Patchy nonspecific subpleural nodularity in the left lung base that could represent sequelae of chronic airspace disease or early fibrosis.     ABDOMEN:  - LIVER: Homogeneous. No focal hepatic mass or ductal dilatation.  - GALLBLADDER: GALLSTONES WITHIN THE GALLBLADDER.  - PANCREAS: Unremarkable. No mass or ductal dilatation.  - SPLEEN: Homogeneous. No splenomegaly.  - ADRENALS: No mass.  - KIDNEYS: RIGHT RENAL CYST MEASURING 3.6 CM.  - GI TRACT: NONSPECIFIC DISTAL SIGMOID COLONIC WALL THICKENING VERSUS NONDISTENTION.  - PERITONEUM: No free air. No free fluid or loculated fluid collections.  - MESENTERY: Unremarkable.  - LYMPH NODES: No lymphadenopathy.  - VASCULATURE: ATHEROSCLEROTIC VASCULAR CALCIFICATION.  - ABDOMINAL WALL: SMALL BILATERAL HUMERAL HERNIAS CONTAINING ONLY FAT.  - OTHER: None.     PELVIS:  - BLADDER: No focal mass or significant wall thickening  - REPRODUCTIVE: Unremarkable as visualized.  - APPENDIX: Nondistended. No surrounding inflammation.  - BONES: No acute bony abnormality.     IMPRESSION:  1. Gallstones within the gallbladder.  2.Nonspecific distal sigmoid colonic wall thickening versus nondistention.    PET/CT 10-29-18    11-13-18 MRI Pelvis Without Contrast  FINDINGS:  1.) TUMOR LOCATION AND CHARACTERISTICS     i) Distance of Inferior margin of Tumor from the dentate line: 9.5 CM  ii) Tumor at or below the puborectalis sling:  NO  iii) Relationship to the anterior peritoneal reflection:  BELOW  iv) Craniocaudal length of the tumor: 6cm  v) Clock face of tumor: 5o'clock to 2o'clock  vi) Polypoid/ Annular/ Semi-annular: SEMI-ANNULAR  vii) Mucinous: YES     2.) EXTRAMURAL DEPTH OF INVASION AND MR T-CATEGORY       i) Extramural depth of invasion (Use 0mm for T1 or T2 tumor):  APPROXIMATELY 5 MM   ii) T category: T3 B              *Please indicate structures with possible invasion.  Specify laterality,  sequence and slice#: (see list below)     *  Anterior peritoneal reflection (T4a tumor): NO  *  Puborectalis: NO  *  Bladder: NO  *  Vascular Involvement of Iliac Vessels: NO  *  Levator ani: NO  *  Ureters: NO  *  Obturator: NO  *  Prostate: MARGINAL/TETHERED  *  Piriformis: NO  *  Uterus: NOT APPLICABLE  *  Pelvic Bone: NO  *  Vagina: NOT APPLICABLE  *  Sacrum: NO  *  Urethra: NO  *  Other:          iii) For low rectal tumors (maximum tumor depth at or below the  puborectalis sling):     *  Not applicable (tumor above the puborectalis sling: NOT APPLICABLE  *    *  Level 1 (submucosa only, no involvement of internal anal sphincter):       *  Level 2 (confined to the internal anal sphincter; no involvement of  intersphincteric fat):      *  Level 3 (intersphincteric fat involved):      *  Level 4 (involves external sphincter or beyond):         3. RELATIONSHIP OF THE TUMOR TO MESORECTAL FASCIA (MRF)       i) Shortest distance 0mm of the definitive tumour border to the MRF  is: AT 12:00   ii) Are there any tumour spiculations closer to the MRF? NO     iii) Location of Tumor margin to MRF: 12:00, AT THE LEVEL PROSTATE     4. EXTRAMURAL VENOUS INVASION       i) Extramural Venous Invasion (EMVI): ABSENT     5. MESORECTAL LYMPH NODES AND TUMOUR DEPOSITS       i) Any suspicious mesorectal lymph nodes/tumor deposits: YES      *If yes, the most suspicious node/tumor deposit is: 15 MM IN  DIAMETER, ALONG THE UPPER MARGIN OF the tumor with minimum distance 7  MMmm from the MRF at 7o'clock     6. EXTRAMESORECTAL  LYMPH NODES        i) Any suspicious extramesorectal lymph nodes: NO      *If yes, location and laterality of suspicious nodes:             Int. Iliac:                Ext. Iliac:                Common Iliac:                Obturator:                Inguinal:                Other:           ii) Is the BETH node station in the field of view: NO        *If Yes, are these nodes suspicious:         7. OTHER FINDINGS (COMPLICATIONS, METASTASES, LIMITATIONS)         NOTE: THERE IS SOME UNCERTAINTY WHETHER THE APPARENT SMALL FOCUS OF TUMOR EXTENDING TO THE PROSTATE AT THE 12:00 POSITION COULD ACTUALLY BE PROSTATE NODULE EXTENDING TOWARD THE TUMOR. RECTAL TUMOR IS FAVORED; ALTHOUGH THIS DETERMINATION CANNOT BE MADE WITH COMPLETE CERTAINTY, TUMOR IS CONSIDERED T3 B.        IMPRESSIONS:  MRI rectal cancer T category is: T3 B  Maximum EMD of invasion is: 5  Minimum tumor to MRF distance is: 0  Low rectal tumor component: NO  Mesorectal nodes/tumor deposits: SUSPICIOUS  EMVI: ABSENT  Extramesorectal nodes: NEGATIVE    CTCAP 05-21-20     FINDINGS:  Adenopathy:No evidence of mediastinal or hilar lymph node enlargement.  No axillary lymph node enlargement.     Fluid:There are no pericardial or pleural effusions.     LUNGS:No parenchymal soft tissue nodules or masses are present.     Skeletal:Arthritic change in the thoracic spine.      Upper abdomen shows cholelithiasis.      IMPRESSION:  1. No parenchymal soft tissue nodules or masses.  2. No pericardial or pleural effusions.  3. No adenopathy.   4. Coronary artery calcifications.   5. Cholelithiasis.     FINDINGS:   The lung bases are clear. There are no pleural effusions.     The liver is homogeneous.     There is cholelithiasis.     The spleen is homogeneous.      There is no peripancreatic stranding or pancreatic head mass.      There is no adrenal enlargement.     Large right renal cyst is present. There is no solid renal mass or  hydronephrosis..      Otherwise I do not see  any free fluid or walled off fluid collections.     The rectal wall appears to be thickened.     IMPRESSION:  1. Rectal wall thickening.  2. Cholelithiasis.     CT CAP 09/15/2020  Findings  LUNGS: Unremarkable. No parenchymal soft tissue nodules.  No focal air  space disease.  HEART: CORONARY ARTERY CALCIFICATIONS ARE NOTED.  PERICARDIUM: No effusion.  MEDIASTINUM: No masses. No enlarged lymph nodes.  No fluid collections.  PLEURA: No pleural effusion. No pleural mass or abnormal calcification.  MAJOR AIRWAYS: Clear. No intrinsic mass.  VASCULATURE: No evidence of aneurysm.  VISUALIZED UPPER ABDOMEN:  LIVER: Homogeneous. No focal hepatic mass or ductal dilatation.  SPLEEN: Homogeneous. No splenomegaly.  ADRENALS: No mass.  KIDNEYS: RIGHT RENAL CYST MEASURING 6.7 CM.  GI TRACT: Non-dilated. No definite wall thickening.  PERITONEUM: No free air. No free fluid or loculated fluid  collections.  ABDOMINAL WALL: No focal hernia or mass.  OTHER: None.  BONES: No acute bony abnormality.     IMPRESSION:  Impression:  1. Unremarkable exam.  No source for the patient symptoms.  2. Other incidental/non-acute findings as above.    Findings  LOWER THORAX: Clear. No effusions.  ABDOMEN:  LIVER: Homogeneous. No focal hepatic mass or ductal dilatation.GALLBLADDER: GALLSTONES IN THE GALLBLADDER.  PANCREAS: Unremarkable. No mass or ductal dilatation.  SPLEEN: Homogeneous. No splenomegaly.  ADRENALS: No mass.  KIDNEYS: RIGHT RENAL CYST MEASURING 6.4 CM.  GI TRACT: Possibly some rectal wall thickening.  PERITONEUM: No free air. No free fluid or loculated fluid  collections.  MESENTERY: Unremarkable.  LYMPH NODES: No lymphadenopathy.  VASCULATURE: No evidence of aneurysm.  ABDOMINAL WALL: No focal hernia or mass.  OTHER: None.   PELVIS:   BLADDER: No focal mass or significant wall thickening   REPRODUCTIVE: Unremarkable as visualized.   APPENDIX: Nondistended. No surrounding inflammation.  BONES: No acute bony abnormality.      IMPRESSION:  Impression:  Rectal wall thickening noted.    RECENT LABS:  Lab Results   Component Value Date    WBC 12.60 (H) 11/06/2020    HGB 12.1 (L) 11/06/2020    HCT 39.6 11/06/2020    MCV 90.6 11/06/2020    RDW 15.1 11/06/2020     11/06/2020    NEUTRORELPCT 74.1 11/06/2020    LYMPHORELPCT 19.0 (L) 11/06/2020    MONORELPCT 4.9 (L) 11/06/2020    EOSRELPCT 1.0 11/06/2020    BASORELPCT 0.4 11/06/2020    NEUTROABS 9.35 (H) 11/06/2020    LYMPHSABS 2.39 11/06/2020       Lab Results   Component Value Date     11/06/2020    K 4.1 11/06/2020    CO2 22.7 11/06/2020     11/06/2020    BUN 16 11/06/2020    CREATININE 1.44 (H) 11/06/2020    EGFRIFNONA 48 (L) 11/06/2020    GLUCOSE 134 (H) 11/06/2020    CALCIUM 10.2 11/06/2020    ALKPHOS 98 11/06/2020    AST 50 (H) 11/06/2020    ALT 48 (H) 11/06/2020    BILITOT 0.4 11/06/2020    ALBUMIN 4.49 11/06/2020    PROTEINTOT 7.8 11/06/2020       Lab Results   Component Value Date/Time    URICACID 8.9 (H) 07/17/2019 10:16 AM     Lab Results   Component Value Date    CEA 1.82 08/31/2020    CEA 1.83 07/21/2020    CEA 2.00 06/25/2020    CEA 2.52 04/21/2020    CEA 2.28 01/28/2020    CEA 2.28 06/26/2019    CEA 3.40 03/20/2019    CEA 17.80 (H) 01/11/2019    CEA 18.80 (H) 10/19/2018     Lab Results   Component Value Date    PSA 1.050 07/21/2020    PSA 1.110 06/25/2020    PSA 4.930 (H) 10/19/2018     Lab Results   Component Value Date    TSH 1.470 07/21/2020     Lab Results   Component Value Date    HGBA1C 5.80 (H) 06/25/2020    HGBA1C 5.50 01/28/2020    HGBA1C 5.40 03/20/2019       Lab Results   Component Value Date    FERRITIN 40.29 11/06/2020    IRON 53 (L) 11/06/2020    TIBC 511 11/06/2020    LABIRON 10 (L) 11/06/2020    FMMPXUPR29 781 08/31/2020    FOLATE >20.00 08/31/2020     ASSESSMENT & PLAN:  David Mandujano is a very pleasant 70 y.o. male with newly diagnosed rectal cancer. Clinically stage IIIC ([id number]).    1.  Rectal cancer:  -  He received standard neoadjuvant  chemoradiation as above given locally advanced tumor with suspicious pelvic lymph node.    - Pre-treatment MRI showed a locally advanced tumor and suspicious pelvic LN.  PET-CT was negative except in the local tumor.   -  There was a non-specific sub-pleural nodular opacity in the L lung base which was PET negative.  Perhaps this was inflammatory in nature. This area will require f/u on future imaging.  - Recommended neoadjuvant chemoradiation with Xeloda 825 mg/m2 BID D1-5 or M-F during the course of radiaton.  His dose was 500 mg x 4 or 2000 mg BID. Overall, he tolerated this well and completed treatment 1-21-19.    -  He saw Dr. Marilynn Yadav and underwent successful surgical resection as above.    -  He took adjuvant CapeOx treatment to prevent recurrence and has completed 8 cycles. Overall, he tolerated treatment well aside from fatigue and diarrhea (see below). He started to struggle with thrombocytopenia and possibly with neuropathy so gave his last cycle without Oxaliplatin.  -  He did well with take down of his ileostomy.  -  CT CAP remains without evidence of recurrent disease.  CEA from 08/31/2020 is normal.  -  He is scheduled for endoscopy in November.    2.  Anemia:  - Iron profile was low and ferritin borderline, despite taking Niferex 150 mg daily. Therefore, he was replaced with IV Feraheme (completing 2nd dose today).  CBC from 11/06/2020 with Hg ~12.1, which has improved.  - B12 and folate are replete. He is taking B12 SL and folate as prescribed. TSH is normal.  - Will follow up with Dr. Larios as scheduled with repeat labs. In the interim, advised patient to call if he develops worsening fatigue/weakness, shortness of breath on exertion, chest pain or dizziness and we will check labs sooner.    3.  Neuropathy:  -  Etiology is uncertain.  He did complain of some vague symptoms toward the end of his treatment (though he thinks they happened later), so this could be related to Oxaliplatin.    -  Thyroid function is wnl.  HbA1C has been normal.    - B12 and folate are replete. He continues to take take SL B12 and folate as prescribed. At present, he feels neuropathy is stable/improved.  - He has been advised that alcohol can cause or worsen peripheral neuropathy.    4.   Depressed mood / Anxiety and Alcohol Abuse:  - Previously given trial of Lexapro but he did not find this helpful and discontinued this. Currently, he says he is doing well in this regard. He did quit drinking completely around the time of his surgery but has recently been drinking again. He says Dr. Yadav did mention to him that his liver didn't look good during his operation.  -  We previously discussed potential involvement in support groups and /or referral for alcohol abuse counseling, but he declined.  Will monitor.    5.  Prominent systolic murmur:   -   He was evaluated by Dr. Joe and then by Dr. Barrera.  He underwent echocardiogram 1-30-19 which showed EF 70%, grade 1 diastolic dysfunction, mild to mod septal asymmetric hypertrophy, AV calcification with functionally bicuspid valve, mild AI and moderate AS, moderate MAC with mild MR, mild MS  -  Thought is that at some point in the future he will require AVR.   - He has not followed up with cardiology recently due to Coronavirus pandemic. Again advised patient to schedule follow up and he states that he has plans to arrange this soon.    6.  Prophylaxis:  He took 2019 influenza vaccine. He took Prevnar 13 on 11-20-18. Recommended 2020 flu vaccine, he prefers to wait a few more weeks.      7. Follow up:   - With Dr. Larios as scheduled with repeat imaging and labs prior.      ACO / KAVITHA/Other  Quality measures  -  David Mandujano did not receive 2020 flu vaccine. This was given in clinic today.  -  David Mandujano reports a pain score of 0.    -  Current outpatient and discharge medications have been reconciled for the patient.  Reviewed by: Caren Lam, JOSE CARLOS      I spent 15  minutes with David Mandujano today.  In the office today, more than 50% of this time was spent face-to-face with him  in counseling / coordination of care, reviewing his interim medical history and counseling on the current treatment plan.  All questions were answered to his satisfaction.         Electronically Signed by: JOSE CARLOS Diez        CC:   MD Kathleen Coppola Emmanuel C, MD Shawn Michael Acino, MD William Richards, MD Sandra Beck, MD

## 2020-12-30 ENCOUNTER — APPOINTMENT (OUTPATIENT)
Dept: CT IMAGING | Facility: HOSPITAL | Age: 70
End: 2020-12-30

## 2021-01-04 ENCOUNTER — APPOINTMENT (OUTPATIENT)
Dept: CT IMAGING | Facility: HOSPITAL | Age: 71
End: 2021-01-04

## 2021-01-14 ENCOUNTER — HOSPITAL ENCOUNTER (OUTPATIENT)
Dept: CT IMAGING | Facility: HOSPITAL | Age: 71
Discharge: HOME | End: 2021-01-14

## 2021-01-14 DIAGNOSIS — C20 MALIGNANT NEOPLASM OF RECTUM (HCC): ICD-10-CM

## 2021-01-14 LAB — CREAT BLDA-MCNC: 1.5 MG/DL (ref 0.6–1.3)

## 2021-01-14 PROCEDURE — 74177 CT ABD & PELVIS W/CONTRAST: CPT

## 2021-01-14 PROCEDURE — 71260 CT THORAX DX C+: CPT

## 2021-01-14 PROCEDURE — 74177 CT ABD & PELVIS W/CONTRAST: CPT | Performed by: RADIOLOGY

## 2021-01-14 PROCEDURE — 25010000002 IOPAMIDOL 61 % SOLUTION: Performed by: NURSE PRACTITIONER

## 2021-01-14 PROCEDURE — 71260 CT THORAX DX C+: CPT | Performed by: RADIOLOGY

## 2021-01-14 PROCEDURE — 82565 ASSAY OF CREATININE: CPT

## 2021-01-14 RX ADMIN — IOPAMIDOL 50 ML: 612 INJECTION, SOLUTION INTRAVENOUS at 10:27

## 2021-01-19 ENCOUNTER — OFFICE VISIT (OUTPATIENT)
Dept: ONCOLOGY | Facility: CLINIC | Age: 71
End: 2021-01-19

## 2021-01-19 VITALS
RESPIRATION RATE: 18 BRPM | OXYGEN SATURATION: 97 % | TEMPERATURE: 97.3 F | SYSTOLIC BLOOD PRESSURE: 137 MMHG | BODY MASS INDEX: 36.75 KG/M2 | WEIGHT: 271 LBS | DIASTOLIC BLOOD PRESSURE: 70 MMHG | HEART RATE: 91 BPM

## 2021-01-19 DIAGNOSIS — C20 MALIGNANT NEOPLASM OF RECTUM (HCC): ICD-10-CM

## 2021-01-19 DIAGNOSIS — K90.9 MALABSORPTION OF IRON: ICD-10-CM

## 2021-01-19 DIAGNOSIS — N18.30 STAGE 3 CHRONIC KIDNEY DISEASE, UNSPECIFIED WHETHER STAGE 3A OR 3B CKD (HCC): ICD-10-CM

## 2021-01-19 DIAGNOSIS — Q23.0 AORTIC STENOSIS DUE TO BICUSPID AORTIC VALVE: ICD-10-CM

## 2021-01-19 DIAGNOSIS — G62.9 NEUROPATHY: ICD-10-CM

## 2021-01-19 DIAGNOSIS — R19.7 DIARRHEA, UNSPECIFIED TYPE: ICD-10-CM

## 2021-01-19 DIAGNOSIS — K92.1 MELENA: ICD-10-CM

## 2021-01-19 DIAGNOSIS — D50.0 IRON DEFICIENCY ANEMIA DUE TO CHRONIC BLOOD LOSS: Primary | ICD-10-CM

## 2021-01-19 DIAGNOSIS — Q23.1 AORTIC STENOSIS DUE TO BICUSPID AORTIC VALVE: ICD-10-CM

## 2021-01-19 DIAGNOSIS — E53.8 B12 DEFICIENCY: ICD-10-CM

## 2021-01-19 DIAGNOSIS — R53.83 FATIGUE, UNSPECIFIED TYPE: ICD-10-CM

## 2021-01-19 PROCEDURE — 84443 ASSAY THYROID STIM HORMONE: CPT | Performed by: INTERNAL MEDICINE

## 2021-01-19 PROCEDURE — 99214 OFFICE O/P EST MOD 30 MIN: CPT | Performed by: INTERNAL MEDICINE

## 2021-01-19 PROCEDURE — 82746 ASSAY OF FOLIC ACID SERUM: CPT | Performed by: INTERNAL MEDICINE

## 2021-01-19 PROCEDURE — 84466 ASSAY OF TRANSFERRIN: CPT | Performed by: INTERNAL MEDICINE

## 2021-01-19 PROCEDURE — 83540 ASSAY OF IRON: CPT | Performed by: INTERNAL MEDICINE

## 2021-01-19 PROCEDURE — 82728 ASSAY OF FERRITIN: CPT | Performed by: INTERNAL MEDICINE

## 2021-01-19 PROCEDURE — 82607 VITAMIN B-12: CPT | Performed by: INTERNAL MEDICINE

## 2021-01-19 PROCEDURE — 85025 COMPLETE CBC W/AUTO DIFF WBC: CPT | Performed by: INTERNAL MEDICINE

## 2021-01-19 PROCEDURE — 80053 COMPREHEN METABOLIC PANEL: CPT | Performed by: INTERNAL MEDICINE

## 2021-01-19 RX ORDER — SODIUM CHLORIDE 9 MG/ML
250 INJECTION, SOLUTION INTRAVENOUS ONCE
Status: CANCELLED | OUTPATIENT
Start: 2021-02-01

## 2021-01-19 RX ORDER — SODIUM CHLORIDE 9 MG/ML
250 INJECTION, SOLUTION INTRAVENOUS ONCE
Status: CANCELLED | OUTPATIENT
Start: 2021-01-29

## 2021-01-19 NOTE — PROGRESS NOTES
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  Combined chemoradiation with Xeloda 825 mg/m2 BID D1-5 or M-F during the course of radiation.  His dose is 500 mg x 4 or 2000 mg BID  Treatment finished 19.    2.  Dr. Yadav performed LAR with coloanal anstomosis and loop ileostomy 19.  Pathology showed  Residual invasive adneocarcinoma.  Tumor invaded the muscularis propria.  Surgical margins clear.  014 resected LNs involved.  ypT2N0.      3.       4.  Ileostomy reversal 19 (Dr. Yadav)    5.      CHIEF COMPLAINT:  None.    HISTORY OF PRESENT ILLNESS:   David Mandujano is a very pleasant 70 y.o. male who was referred by Dr. Barnhart for evaluation and treatment of colorectal cancer. He began to notice rectal bleeding in January or 2018, and he also began to notice fatigue in approximately July. He assumed he was out of shape when he started to become short of breath and started trying to exercise more frequently, which only exacerbated the shortness of breath. He was evaluated by his PCP, Raj Wang MD, who after testing, found him to be severely iron deficient, hyperglycemic, and he also had an elevated PSA. He was on vacation at the time, and his PCP asked him to return home for further evaluation. He was started on oral iron therapy and later received IV iron infusions. He was also scheduled for a colonoscopy with Dr. Barnhart, during which a suspicious rectal mass was biopsied and pathology was positive for an ulcerated invasive, moderately differentiated rectal adenocarcinoma.     INTERVAL HISTORY:  Mr. Mandujano is here today for follow up of rectal cancer. He says he hasn't been feeling well.  After his reversal surgery, he says it took him a few months to recover, but says in May/ he felt well, was swimming, etc.  He says his bowels were  "moving normally, about once/day and he felt like himself.  He says going back to July, things seemed to backslide.  He has been quite fatigued.  He again started having more irregular bowel movements.  Often he has diarrhea \"like you do if you have a stomach virus,\" going multiple times in one day.  Some days he experiences constipation. In the recent past, 3-4x/week, he has been having dark black, sticky/tarry bowel movements.  He was scheduled for c-scope in November, but this was cancelled b/c he ate a hamburger.  He said he didn't think that would be a big deal.  He is now rescheduled for c-scope with Dr. Barnhart in February. In addition to the loose, dark stools, he says he has also been having some \"discomfort down there.\"  It is hard for him to pinpoint where he is uncomfortable (says it isn't the anus or perirectal area and denies hemorrhoid flare) but says it is lower down.  He says it can be painful all day and if he has a glass of bourbon at 5 PM that will make the pain go away.  He says his weight is steady but says his appetite has been poor.  It seems he often eats fruit during the day and then a meal like chicken pot pie at night.  He denies new medication.  Denies NSAID use.  He does complain of poor endurance / easy fatigue, but denies chest pain or shortness of breath.  He says he is considering returning to see cardiology later this year.     PAST MEDICAL HISTORY:  Past Medical History:   Diagnosis Date   • Anemia    • Arthritis    • Colon cancer (CMS/HCC)     s/p chemo therapy   • Heart murmur    • Hypertension    • Wrist fracture     surgically repaired       PAST SURGICAL HISTORY:  Past Surgical History:   Procedure Laterality Date   • ABDOMINAL SURGERY     • COLON SURGERY     • COLONOSCOPY     • FRACTURE SURGERY Left    • VENOUS ACCESS DEVICE (PORT) INSERTION N/A 5/20/2019    Procedure: INSERTION VENOUS ACCESS DEVICE;  Surgeon: Cindy Corrales MD;  Location: [facility];  Service: General   • " WRIST SURGERY         FAMILY HISTORY:  Family History   Problem Relation Age of Onset   • Colon cancer Maternal Grandfather    • Other Sister    • Heart disease Sister    No other family history of malignancy.    SOCIAL HISTORY:  Social History     Socioeconomic History   • Marital status: Single     Spouse name: Not on file   • Number of children: Not on file   • Years of education: Not on file   • Highest education level: Not on file   Tobacco Use   • Smoking status: Never Smoker   • Smokeless tobacco: Current User     Types: Chew   Substance and Sexual Activity   • Alcohol use: Yes     Comment: occasional   • Drug use: No   • Sexual activity: Defer   Social History Narrative    He is , and is self employed/semi retired. He is a non smoker but chews tobacco. He used to drink daily, but says he has cut it in half.      REVIEW OF SYSTEMS:   A comprehensive 14 point review of systems was performed.  Significant findings as mentioned above.  All other systems reviewed and are negative.      MEDICATIONS:  The current medication list was reviewed in the EMR    Current Outpatient Medications:   •  allopurinol (ZYLOPRIM) 100 MG tablet, Take 100 mg by mouth Daily., Disp: , Rfl:   •  amLODIPine (NORVASC) 10 MG tablet, Take 5 mg by mouth Daily., Disp: , Rfl:   •  capecitabine (XELODA) 150 MG chemo tablet, Take 1 tab by mouth in the AM and 1 tab by mouth in the PM on days 1-14, then off for 7 days. Take with 500 mg tabs for total dose of 2150mg, Disp: 28 tablet, Rfl: 5  •  folic acid (FOLVITE) 1 MG tablet, Take 1 tablet by mouth Daily., Disp: 30 tablet, Rfl: 6  •  HYDROcodone-acetaminophen (NORCO) 7.5-325 MG per tablet, Take 1 tablet by mouth 4 (Four) Times a Day As Needed for Moderate Pain ., Disp: 12 tablet, Rfl: 0  •  iron polysaccharides (NIFEREX) 150 MG capsule, Take 1 capsule by mouth Daily., Disp: 30 capsule, Rfl: 3  •  metoprolol succinate XL (TOPROL-XL) 50 MG 24 hr tablet, Take 1 tablet by mouth Daily., Disp:  30 tablet, Rfl: 11  •  ondansetron ODT (ZOFRAN-ODT) 8 MG disintegrating tablet, Dissolve 1 tablet on the tongue 3 (Three) Times a Day As Needed for Nausea or Vomiting., Disp: 30 tablet, Rfl: 5  •  predniSONE (DELTASONE) 10 MG tablet, Take 3 tabs daily x 1 day, 2 tabs daily x 2 days, then 1 tab daily x 1 day, Disp: 8 tablet, Rfl: 0  •  prochlorperazine (COMPAZINE) 5 MG tablet, Take 2 tablets by mouth Every 6 (Six) Hours As Needed for Nausea or Vomiting., Disp: 60 tablet, Rfl: 5  •  vitamin B-6 (PYRIDOXINE) 100 MG tablet, Take 1 tablet by mouth Daily., Disp: 30 tablet, Rfl: 1    ALLERGIES:  No Known Allergies    PHYSICAL EXAM:  Vitals:    01/19/21 1418   BP: 137/70   Pulse: 91   Resp: 18   Temp: 97.3 °F (36.3 °C)   SpO2: 97%     Pain Score    01/19/21 1418   PainSc: 0-No pain     General:  Awake, alert and oriented, appears well, in no acute distress  HEENT:  Pupils are equal, round and reactive to light and accommodation, Extra-ocular movements full, Oropharyx clear, mucous membranes moist  Neck:  No JVD, thyromegaly or lymphadenopathy  CV:  Regular rate and rhythm, III/IV systolic murmur, no rubs or gallops  Resp:  Lungs are clear to auscultation bilaterally, no crackles or wheezes  Abd:  Soft, non-tender, sl distended, bowel sounds present, no organomegaly or masses.  Surgical scars present.   Ext:  No clubbing, cyanosis or edema     Lymph:  No cervical, supraclavicular, axillary, adenopathy  Neuro:  Grossly non-focal exam    PATHOLOGY:  10-02-18      04-01-19:        04-09-19:        ENDOSCOPY:  10-02-18:        IMAGING:  10-04-18 CT Abdomen Pelvis With Contrast   Findings  - LOWER THORAX: Patchy nonspecific subpleural nodularity in the left lung base that could represent sequelae of chronic airspace disease or early fibrosis.     ABDOMEN:  - LIVER: Homogeneous. No focal hepatic mass or ductal dilatation.  - GALLBLADDER: GALLSTONES WITHIN THE GALLBLADDER.  - PANCREAS: Unremarkable. No mass or ductal dilatation.  -  SPLEEN: Homogeneous. No splenomegaly.  - ADRENALS: No mass.  - KIDNEYS: RIGHT RENAL CYST MEASURING 3.6 CM.  - GI TRACT: NONSPECIFIC DISTAL SIGMOID COLONIC WALL THICKENING VERSUS NONDISTENTION.  - PERITONEUM: No free air. No free fluid or loculated fluid collections.  - MESENTERY: Unremarkable.  - LYMPH NODES: No lymphadenopathy.  - VASCULATURE: ATHEROSCLEROTIC VASCULAR CALCIFICATION.  - ABDOMINAL WALL: SMALL BILATERAL HUMERAL HERNIAS CONTAINING ONLY FAT.  - OTHER: None.     PELVIS:  - BLADDER: No focal mass or significant wall thickening  - REPRODUCTIVE: Unremarkable as visualized.  - APPENDIX: Nondistended. No surrounding inflammation.  - BONES: No acute bony abnormality.     IMPRESSION:  1. Gallstones within the gallbladder.  2.Nonspecific distal sigmoid colonic wall thickening versus nondistention.    PET/CT 10-29-18    11-13-18 MRI Pelvis Without Contrast  FINDINGS:  1.) TUMOR LOCATION AND CHARACTERISTICS     i) Distance of Inferior margin of Tumor from the dentate line: 9.5 CM  ii) Tumor at or below the puborectalis sling:  NO  iii) Relationship to the anterior peritoneal reflection: BELOW  iv) Craniocaudal length of the tumor: 6cm  v) Clock face of tumor: 5o'clock to 2o'clock  vi) Polypoid/ Annular/ Semi-annular: SEMI-ANNULAR  vii) Mucinous: YES     2.) EXTRAMURAL DEPTH OF INVASION AND MR T-CATEGORY       i) Extramural depth of invasion (Use 0mm for T1 or T2 tumor):  APPROXIMATELY 5 MM   ii) T category: T3 B              *Please indicate structures with possible invasion.  Specify laterality,  sequence and slice#: (see list below)     *  Anterior peritoneal reflection (T4a tumor): NO  *  Puborectalis: NO  *  Bladder: NO  *  Vascular Involvement of Iliac Vessels: NO  *  Levator ani: NO  *  Ureters: NO  *  Obturator: NO  *  Prostate: MARGINAL/TETHERED  *  Piriformis: NO  *  Uterus: NOT APPLICABLE  *  Pelvic Bone: NO  *  Vagina: NOT APPLICABLE  *  Sacrum: NO  *  Urethra: NO  *  Other:          iii) For low rectal  tumors (maximum tumor depth at or below the  puborectalis sling):     *  Not applicable (tumor above the puborectalis sling: NOT APPLICABLE  *    *  Level 1 (submucosa only, no involvement of internal anal sphincter):       *  Level 2 (confined to the internal anal sphincter; no involvement of  intersphincteric fat):      *  Level 3 (intersphincteric fat involved):      *  Level 4 (involves external sphincter or beyond):         3. RELATIONSHIP OF THE TUMOR TO MESORECTAL FASCIA (MRF)       i) Shortest distance 0mm of the definitive tumour border to the MRF  is: AT 12:00   ii) Are there any tumour spiculations closer to the MRF? NO     iii) Location of Tumor margin to MRF: 12:00, AT THE LEVEL PROSTATE     4. EXTRAMURAL VENOUS INVASION       i) Extramural Venous Invasion (EMVI): ABSENT     5. MESORECTAL LYMPH NODES AND TUMOUR DEPOSITS       i) Any suspicious mesorectal lymph nodes/tumor deposits: YES      *If yes, the most suspicious node/tumor deposit is: 15 MM IN  DIAMETER, ALONG THE UPPER MARGIN OF the tumor with minimum distance 7  MMmm from the MRF at 7o'clock     6. EXTRAMESORECTAL LYMPH NODES        i) Any suspicious extramesorectal lymph nodes: NO      *If yes, location and laterality of suspicious nodes:             Int. Iliac:                Ext. Iliac:                Common Iliac:                Obturator:                Inguinal:                Other:           ii) Is the BETH node station in the field of view: NO        *If Yes, are these nodes suspicious:         7. OTHER FINDINGS (COMPLICATIONS, METASTASES, LIMITATIONS)         NOTE: THERE IS SOME UNCERTAINTY WHETHER THE APPARENT SMALL FOCUS OF TUMOR EXTENDING TO THE PROSTATE AT THE 12:00 POSITION COULD ACTUALLY BE PROSTATE NODULE EXTENDING TOWARD THE TUMOR. RECTAL TUMOR IS FAVORED; ALTHOUGH THIS DETERMINATION CANNOT BE MADE WITH COMPLETE CERTAINTY, TUMOR IS CONSIDERED T3 B.        IMPRESSIONS:  MRI rectal cancer T category is: T3 B  Maximum EMD of  invasion is: 5  Minimum tumor to MRF distance is: 0  Low rectal tumor component: NO  Mesorectal nodes/tumor deposits: SUSPICIOUS  EMVI: ABSENT  Extramesorectal nodes: NEGATIVE    CTCAP 05-21-20     FINDINGS:  Adenopathy:No evidence of mediastinal or hilar lymph node enlargement.  No axillary lymph node enlargement.     Fluid:There are no pericardial or pleural effusions.     LUNGS:No parenchymal soft tissue nodules or masses are present.     Skeletal:Arthritic change in the thoracic spine.      Upper abdomen shows cholelithiasis.      IMPRESSION:  1. No parenchymal soft tissue nodules or masses.  2. No pericardial or pleural effusions.  3. No adenopathy.   4. Coronary artery calcifications.   5. Cholelithiasis.     FINDINGS:   The lung bases are clear. There are no pleural effusions.     The liver is homogeneous.     There is cholelithiasis.     The spleen is homogeneous.      There is no peripancreatic stranding or pancreatic head mass.      There is no adrenal enlargement.     Large right renal cyst is present. There is no solid renal mass or  hydronephrosis..      Otherwise I do not see any free fluid or walled off fluid collections.     The rectal wall appears to be thickened.     IMPRESSION:  1. Rectal wall thickening.  2. Cholelithiasis.         CT CAP 09/15/2020  Findings  LUNGS: Unremarkable. No parenchymal soft tissue nodules.  No focal air  space disease.  HEART: CORONARY ARTERY CALCIFICATIONS ARE NOTED.  PERICARDIUM: No effusion.  MEDIASTINUM: No masses. No enlarged lymph nodes.  No fluid collections.  PLEURA: No pleural effusion. No pleural mass or abnormal calcification.  MAJOR AIRWAYS: Clear. No intrinsic mass.  VASCULATURE: No evidence of aneurysm.  VISUALIZED UPPER ABDOMEN:  LIVER: Homogeneous. No focal hepatic mass or ductal dilatation.  SPLEEN: Homogeneous. No splenomegaly.  ADRENALS: No mass.  KIDNEYS: RIGHT RENAL CYST MEASURING 6.7 CM.  GI TRACT: Non-dilated. No definite wall  thickening.  PERITONEUM: No free air. No free fluid or loculated fluid  collections.  ABDOMINAL WALL: No focal hernia or mass.  OTHER: None.  BONES: No acute bony abnormality.     IMPRESSION:  Impression:  1. Unremarkable exam.  No source for the patient symptoms.  2. Other incidental/non-acute findings as above.    Findings  LOWER THORAX: Clear. No effusions.  ABDOMEN:  LIVER: Homogeneous. No focal hepatic mass or ductal dilatation.GALLBLADDER: GALLSTONES IN THE GALLBLADDER.  PANCREAS: Unremarkable. No mass or ductal dilatation.  SPLEEN: Homogeneous. No splenomegaly.  ADRENALS: No mass.  KIDNEYS: RIGHT RENAL CYST MEASURING 6.4 CM.  GI TRACT: Possibly some rectal wall thickening.  PERITONEUM: No free air. No free fluid or loculated fluid  collections.  MESENTERY: Unremarkable.  LYMPH NODES: No lymphadenopathy.  VASCULATURE: No evidence of aneurysm.  ABDOMINAL WALL: No focal hernia or mass.  OTHER: None.   PELVIS:   BLADDER: No focal mass or significant wall thickening   REPRODUCTIVE: Unremarkable as visualized.   APPENDIX: Nondistended. No surrounding inflammation.  BONES: No acute bony abnormality.     IMPRESSION:  Impression:  Rectal wall thickening noted.      CTCAP 01-14-21  CT FINDINGS: On the lung windows, the lungs are both well aerated and  clear. No pulmonary parenchymal lung nodules to suggest metastatic  disease were seen. There were no inflammatory infiltrates or pleural  effusions. On the soft tissue windows, there was no evidence of  supraclavicular or axillary lymphadenopathy. No enlarged mediastinal or  hilar lymph nodes were demonstrated. There was no evidence of  pericardial effusion.     IMPRESSION:  No CT findings of metastatic disease in the chest.     CT FINDINGS: The liver and spleen are stable in size and shape; no focal  lesions have developed within the liver. There is a calcified stone in  the dependent portion of the gallbladder. The pancreas showed no  evidence of mass or inflammation.  No adrenal lesions were demonstrated.  There is some thinning of the cortical tissues in the kidneys with a 6.5  cm cyst in the right kidney. There was no ascites. The aorta is normal  in caliber. No enlarged lymph nodes were seen in the retroperitoneum or  mesentery. In the pelvis, postoperative changes are noted near the  rectum where there is a line of  GI staples. No evidence of residual or recurrent tumor is demonstrated.  On today's study there was no significant rectal wall thickening.     IMPRESSION:  No CT findings to suggest metastatic disease in the abdomen  or pelvis. There is less thickening of the rectal wall than seen on the  previous CT. The patient does have cholelithiasis and a large cyst in  the right kidney.     RECENT LABS:  Lab Results   Component Value Date    WBC 10.31 01/19/2021    HGB 11.9 (L) 01/19/2021    HCT 39.0 01/19/2021    MCV 89.4 01/19/2021    RDW 15.0 01/19/2021     01/19/2021    NEUTRORELPCT 81.7 (H) 01/19/2021    LYMPHORELPCT 10.8 (L) 01/19/2021    MONORELPCT 5.7 01/19/2021    EOSRELPCT 0.7 01/19/2021    BASORELPCT 0.5 01/19/2021    NEUTROABS 8.43 (H) 01/19/2021    LYMPHSABS 1.11 01/19/2021       Lab Results   Component Value Date     01/19/2021    K 4.1 01/19/2021    CO2 24.0 01/19/2021     01/19/2021    BUN 18 01/19/2021    CREATININE 1.60 (H) 01/19/2021    EGFRIFNONA 43 (L) 01/19/2021    GLUCOSE 148 (H) 01/19/2021    CALCIUM 9.6 01/19/2021    ALKPHOS 98 01/19/2021    AST 40 01/19/2021    ALT 36 01/19/2021    BILITOT 0.4 01/19/2021    ALBUMIN 4.08 01/19/2021    PROTEINTOT 7.7 01/19/2021       Lab Results   Component Value Date/Time    URICACID 8.9 (H) 07/17/2019 10:16 AM     Lab Results   Component Value Date    CEA 1.82 08/31/2020    CEA 1.83 07/21/2020    CEA 2.00 06/25/2020    CEA 2.52 04/21/2020    CEA 2.28 01/28/2020    CEA 2.28 06/26/2019    CEA 3.40 03/20/2019    CEA 17.80 (H) 01/11/2019    CEA 18.80 (H) 10/19/2018     Lab Results   Component Value  Date    PSA 1.050 07/21/2020    PSA 1.110 06/25/2020    PSA 4.930 (H) 10/19/2018     Lab Results   Component Value Date    TSH 1.470 07/21/2020     Lab Results   Component Value Date    HGBA1C 5.80 (H) 06/25/2020    HGBA1C 5.50 01/28/2020    HGBA1C 5.40 03/20/2019       Lab Results   Component Value Date    FERRITIN 29.61 (L) 01/19/2021    IRON 42 (L) 01/19/2021    TIBC 541 (H) 01/19/2021    LABIRON 8 (L) 01/19/2021    [id number] 781 08/31/2020    FOLATE >20.00 08/31/2020     ASSESSMENT & PLAN:  David Mandujano is a very pleasant 70 y.o. male with newly diagnosed rectal cancer. Clinically stage IIIC ([id number]).    1.  Rectal cancer:  -  He received standard neoadjuvant chemoradiation as above given locally advanced tumor with suspicious pelvic lymph node.    - Pre-treatment MRI showed a locally advanced tumor and suspicious pelvic LN.  PET-CT was negative except in the local tumor.   -  There was a non-specific sub-pleural nodular opacity in the L lung base which was PET negative.  Perhaps this was inflammatory in nature. This area will require f/u on future imaging.  - Recommended neoadjuvant chemoradiation with Xeloda 825 mg/m2 BID D1-5 or M-F during the course of radiaton.  His dose was 500 mg x 4 or 2000 mg BID. Overall, he tolerated this well and completed treatment 1-21-19.    -  He saw Dr. Marilynn Yadav and underwent successful surgical resection as above.    -  He took adjuvant CapeOx treatment to prevent recurrence and has completed 8 cycles. Overall, he tolerated treatment well aside from fatigue and diarrhea (see below). He started to struggle with thrombocytopenia and possibly with neuropathy so gave his last cycle without Oxaliplatin.  -  He did well with take down of his ileostomy.  -  CT CAP remains without evidence of recurrent disease.  CEA from 08/31/2020 is normal.  Value from today is pending.  -  He was scheduled for c-scope in November, but this has been rescheduled to February. This will be  important to evaluate anemia as well as rectal discomfort, bowel habit changes.    2.  Iron deficiency Anemia:  -  He previously took Niferex but did not appear to absorb it and required EVANGELINA iron for repletion. He had this in September and again in November.  His iron is again low and he describes some melenic stool.  -  Will order Feraheme for replacement.  -  I called and spoke to Dr. Barnhart today and we discussed Mr. Mandujano's symptoms.  He said he will add EGD to Mr. Mandujano's planned C-scope in February.  Mr. Mandujano denies taking NSAIDS, but he does have a h/o alcohol abuse.  He denies any hematochezia, n/v/ or hematemesis.    3.  Elevated Creatinine:  -  Mr. Mandujano has had difficulty with elevated Cr at times in the past but nothing consistent.  His last several metabolic profiles show persistent elevation of his Cr.  He tells me he doesn't take NSAIDS, denies new medications and says he drinks lots of water.  I encouraged him to continue to push oral fluids.  I recommended referral to nephrology for further evaluation, but he declines at this time.  He said he wants to get the anemia / bleeding sorted out first and would then re-consider.    4.  Prominent systolic murmur:   -   He was evaluated by Dr. Joe and then by Dr. Barrera.  He underwent echocardiogram 1-30-19 which showed EF 70%, grade 1 diastolic dysfunction, mild to mod septal asymmetric hypertrophy, AV calcification with functionally bicuspid valve, mild AI and moderate AS, moderate MAC with mild MR, mild MS  -  Thought is that at some point in the future he will require AVR.   - He has not followed up with cardiology.  I told him that his easy fatiguability / poor exercise tolerance, etc may well be related to valvular heart disease and encouraged him to schedule f/u. He said he would consider this after evaluation of his anemia / endoscopy complete.    5. .  Neuropathy:  -  Etiology is uncertain.  He did complain of some vague symptoms toward the  end of his treatment (though he thinks they happened later), so this could be related to Oxaliplatin.    - Thyroid function is wnl.  HbA1C has been normal.    - B12 and folate are replete. He continues to take take SL B12 and folate as prescribed. At present, he feels neuropathy is stable/improved.  - He has been advised that alcohol can cause or worsen peripheral neuropathy.    6.   Depressed mood / Anxiety and Alcohol Abuse:  - Previously given trial of Lexapro but he did not find this helpful and discontinued this. He said he did quit drinking completely around the time of his surgery but started drinking again. He says Dr. Yadav did mention to him that his liver didn't look good during his operation.  -  We previously discussed potential involvement in support groups and /or referral for alcohol abuse counseling, but he declined.  Will monitor.    7.  Prophylaxis:  He took 2020 influenza vaccine. He took Prevnar 13 on 11-20-18.   We discussed COVID vaccination today and he is interested.  Will arrange for appt.    8. Follow up:   - F/u after EGD/C-scope and Feraheme with CBCD, CMP, Ferritin, iron profile, B12, Folate, Retic count     9.  ACO / KAVITHA/Other  Quality measures  -  David Mandujano did not receive 2020 flu vaccine. This was given in clinic today.  -  David Mandujano reports a pain score of 0.    -  Current outpatient and discharge medications have been reconciled for the patient.  Reviewed by: Jyoti Larios MD    This note was scribed for Jyoti Larios MD by Lynn Pinto RN.    I, Jyoti Larios MD, personally performed the services described in this documentation as scribed by the above named individual in my presence, and it is both accurate and complete.  01/19/2021       I spent 35 minutes with David Mandujano today.  In the office today, more than 50% of this time was spent face-to-face with him  in counseling / coordination of care, reviewing his interim medical history and  counseling on the current treatment plan.  All questions were answered to his satisfaction.         Electronically Signed by: JOSE CARLOS Diez        CC:   MD Kathleen Coppola Emmanuel C, MD Shawn Michael Acino, MD William Richards, MD Sandra Beck, MD

## 2021-01-29 ENCOUNTER — INFUSION (OUTPATIENT)
Dept: ONCOLOGY | Facility: HOSPITAL | Age: 71
End: 2021-01-29

## 2021-01-29 VITALS
WEIGHT: 270.8 LBS | RESPIRATION RATE: 18 BRPM | HEART RATE: 120 BPM | TEMPERATURE: 97.3 F | BODY MASS INDEX: 36.73 KG/M2 | OXYGEN SATURATION: 94 % | SYSTOLIC BLOOD PRESSURE: 140 MMHG | DIASTOLIC BLOOD PRESSURE: 94 MMHG

## 2021-01-29 DIAGNOSIS — K90.9 MALABSORPTION OF IRON: ICD-10-CM

## 2021-01-29 DIAGNOSIS — D50.0 IRON DEFICIENCY ANEMIA DUE TO CHRONIC BLOOD LOSS: Primary | ICD-10-CM

## 2021-01-29 DIAGNOSIS — Z95.828 PORT-A-CATH IN PLACE: ICD-10-CM

## 2021-01-29 PROCEDURE — 25010000003 HEPARIN LOCK FLUSH PER 10 UNITS: Performed by: NURSE PRACTITIONER

## 2021-01-29 PROCEDURE — 96374 THER/PROPH/DIAG INJ IV PUSH: CPT

## 2021-01-29 PROCEDURE — 25010000002 FERUMOXYTOL 510 MG/17ML SOLUTION 510 MG VIAL: Performed by: INTERNAL MEDICINE

## 2021-01-29 RX ORDER — HEPARIN SODIUM (PORCINE) LOCK FLUSH IV SOLN 100 UNIT/ML 100 UNIT/ML
500 SOLUTION INTRAVENOUS AS NEEDED
Status: DISCONTINUED | OUTPATIENT
Start: 2021-01-29 | End: 2021-01-29 | Stop reason: HOSPADM

## 2021-01-29 RX ORDER — HEPARIN SODIUM (PORCINE) LOCK FLUSH IV SOLN 100 UNIT/ML 100 UNIT/ML
500 SOLUTION INTRAVENOUS AS NEEDED
Status: CANCELLED | OUTPATIENT
Start: 2021-02-04

## 2021-01-29 RX ORDER — SODIUM CHLORIDE 0.9 % (FLUSH) 0.9 %
10 SYRINGE (ML) INJECTION AS NEEDED
Status: DISCONTINUED | OUTPATIENT
Start: 2021-01-29 | End: 2021-01-29 | Stop reason: HOSPADM

## 2021-01-29 RX ORDER — SODIUM CHLORIDE 9 MG/ML
250 INJECTION, SOLUTION INTRAVENOUS ONCE
Status: COMPLETED | OUTPATIENT
Start: 2021-01-29 | End: 2021-01-29

## 2021-01-29 RX ORDER — SODIUM CHLORIDE 0.9 % (FLUSH) 0.9 %
10 SYRINGE (ML) INJECTION AS NEEDED
Status: CANCELLED | OUTPATIENT
Start: 2021-02-04

## 2021-01-29 RX ADMIN — Medication 500 UNITS: at 14:46

## 2021-01-29 RX ADMIN — SODIUM CHLORIDE 250 ML: 9 INJECTION, SOLUTION INTRAVENOUS at 14:00

## 2021-01-29 RX ADMIN — FERUMOXYTOL 510 MG: 510 INJECTION INTRAVENOUS at 14:00

## 2021-01-29 RX ADMIN — SODIUM CHLORIDE, PRESERVATIVE FREE 10 ML: 5 INJECTION INTRAVENOUS at 14:45

## 2021-02-01 ENCOUNTER — TRANSCRIBE ORDERS (OUTPATIENT)
Dept: ADMINISTRATIVE | Facility: HOSPITAL | Age: 71
End: 2021-02-01

## 2021-02-01 DIAGNOSIS — Z01.818 PRE-OPERATIVE CLEARANCE: Primary | ICD-10-CM

## 2021-02-03 ENCOUNTER — LAB (OUTPATIENT)
Dept: LAB | Facility: HOSPITAL | Age: 71
End: 2021-02-03

## 2021-02-03 DIAGNOSIS — Z01.818 PRE-OPERATIVE CLEARANCE: ICD-10-CM

## 2021-02-03 PROCEDURE — U0004 COV-19 TEST NON-CDC HGH THRU: HCPCS

## 2021-02-03 PROCEDURE — C9803 HOPD COVID-19 SPEC COLLECT: HCPCS

## 2021-02-04 ENCOUNTER — INFUSION (OUTPATIENT)
Dept: ONCOLOGY | Facility: HOSPITAL | Age: 71
End: 2021-02-04

## 2021-02-04 VITALS
DIASTOLIC BLOOD PRESSURE: 93 MMHG | HEART RATE: 94 BPM | OXYGEN SATURATION: 96 % | BODY MASS INDEX: 36.48 KG/M2 | WEIGHT: 269 LBS | RESPIRATION RATE: 18 BRPM | TEMPERATURE: 98 F | SYSTOLIC BLOOD PRESSURE: 180 MMHG

## 2021-02-04 DIAGNOSIS — D50.0 IRON DEFICIENCY ANEMIA DUE TO CHRONIC BLOOD LOSS: Primary | ICD-10-CM

## 2021-02-04 DIAGNOSIS — K90.9 MALABSORPTION OF IRON: ICD-10-CM

## 2021-02-04 DIAGNOSIS — Z95.828 PORT-A-CATH IN PLACE: ICD-10-CM

## 2021-02-04 LAB — SARS-COV-2 RNA RESP QL NAA+PROBE: NOT DETECTED

## 2021-02-04 PROCEDURE — 96374 THER/PROPH/DIAG INJ IV PUSH: CPT

## 2021-02-04 PROCEDURE — 25010000003 HEPARIN LOCK FLUSH PER 10 UNITS: Performed by: NURSE PRACTITIONER

## 2021-02-04 PROCEDURE — 25010000002 FERUMOXYTOL 510 MG/17ML SOLUTION 510 MG VIAL: Performed by: INTERNAL MEDICINE

## 2021-02-04 RX ORDER — SODIUM CHLORIDE 9 MG/ML
250 INJECTION, SOLUTION INTRAVENOUS ONCE
Status: COMPLETED | OUTPATIENT
Start: 2021-02-04 | End: 2021-02-04

## 2021-02-04 RX ORDER — HEPARIN SODIUM (PORCINE) LOCK FLUSH IV SOLN 100 UNIT/ML 100 UNIT/ML
500 SOLUTION INTRAVENOUS AS NEEDED
Status: DISCONTINUED | OUTPATIENT
Start: 2021-02-04 | End: 2021-02-04 | Stop reason: HOSPADM

## 2021-02-04 RX ORDER — HEPARIN SODIUM (PORCINE) LOCK FLUSH IV SOLN 100 UNIT/ML 100 UNIT/ML
500 SOLUTION INTRAVENOUS AS NEEDED
Status: CANCELLED | OUTPATIENT
Start: 2021-02-04

## 2021-02-04 RX ORDER — SODIUM CHLORIDE 0.9 % (FLUSH) 0.9 %
10 SYRINGE (ML) INJECTION AS NEEDED
Status: DISCONTINUED | OUTPATIENT
Start: 2021-02-04 | End: 2021-02-04 | Stop reason: HOSPADM

## 2021-02-04 RX ORDER — SODIUM CHLORIDE 0.9 % (FLUSH) 0.9 %
10 SYRINGE (ML) INJECTION AS NEEDED
Status: CANCELLED | OUTPATIENT
Start: 2021-02-04

## 2021-02-04 RX ADMIN — SODIUM CHLORIDE, PRESERVATIVE FREE 500 UNITS: 5 INJECTION INTRAVENOUS at 12:45

## 2021-02-04 RX ADMIN — FERUMOXYTOL 510 MG: 510 INJECTION INTRAVENOUS at 12:05

## 2021-02-04 RX ADMIN — SODIUM CHLORIDE 250 ML: 9 INJECTION, SOLUTION INTRAVENOUS at 12:05

## 2021-02-26 ENCOUNTER — OFFICE VISIT (OUTPATIENT)
Dept: ONCOLOGY | Facility: CLINIC | Age: 71
End: 2021-02-26

## 2021-02-26 VITALS
HEART RATE: 102 BPM | DIASTOLIC BLOOD PRESSURE: 78 MMHG | OXYGEN SATURATION: 96 % | WEIGHT: 265 LBS | RESPIRATION RATE: 18 BRPM | TEMPERATURE: 97.3 F | SYSTOLIC BLOOD PRESSURE: 127 MMHG | BODY MASS INDEX: 35.94 KG/M2

## 2021-02-26 DIAGNOSIS — D50.0 IRON DEFICIENCY ANEMIA DUE TO CHRONIC BLOOD LOSS: Primary | ICD-10-CM

## 2021-02-26 DIAGNOSIS — K90.9 MALABSORPTION OF IRON: ICD-10-CM

## 2021-02-26 DIAGNOSIS — R53.83 OTHER FATIGUE: ICD-10-CM

## 2021-02-26 DIAGNOSIS — C20 MALIGNANT NEOPLASM OF RECTUM (HCC): ICD-10-CM

## 2021-02-26 DIAGNOSIS — I38 VALVULAR HEART DISEASE: ICD-10-CM

## 2021-02-26 DIAGNOSIS — E53.8 B12 DEFICIENCY: ICD-10-CM

## 2021-02-26 DIAGNOSIS — N18.30 STAGE 3 CHRONIC KIDNEY DISEASE, UNSPECIFIED WHETHER STAGE 3A OR 3B CKD (HCC): ICD-10-CM

## 2021-02-26 LAB
ALBUMIN SERPL-MCNC: 4.47 G/DL (ref 3.5–5.2)
ALBUMIN/GLOB SERPL: 1.3 G/DL
ALP SERPL-CCNC: 95 U/L (ref 39–117)
ALT SERPL W P-5'-P-CCNC: 49 U/L (ref 1–41)
ANION GAP SERPL CALCULATED.3IONS-SCNC: 13.9 MMOL/L (ref 5–15)
AST SERPL-CCNC: 53 U/L (ref 1–40)
BASOPHILS # BLD AUTO: 0.06 10*3/MM3 (ref 0–0.2)
BASOPHILS NFR BLD AUTO: 0.6 % (ref 0–1.5)
BILIRUB SERPL-MCNC: 0.6 MG/DL (ref 0–1.2)
BUN SERPL-MCNC: 18 MG/DL (ref 8–23)
BUN/CREAT SERPL: 12.4 (ref 7–25)
CALCIUM SPEC-SCNC: 10 MG/DL (ref 8.6–10.5)
CHLORIDE SERPL-SCNC: 101 MMOL/L (ref 98–107)
CO2 SERPL-SCNC: 25.1 MMOL/L (ref 22–29)
CREAT SERPL-MCNC: 1.45 MG/DL (ref 0.76–1.27)
DEPRECATED RDW RBC AUTO: 68.7 FL (ref 37–54)
EOSINOPHIL # BLD AUTO: 0.08 10*3/MM3 (ref 0–0.4)
EOSINOPHIL NFR BLD AUTO: 0.8 % (ref 0.3–6.2)
ERYTHROCYTE [DISTWIDTH] IN BLOOD BY AUTOMATED COUNT: 20.9 % (ref 12.3–15.4)
FERRITIN SERPL-MCNC: 147.5 NG/ML (ref 30–400)
GFR SERPL CREATININE-BSD FRML MDRD: 48 ML/MIN/1.73
GLOBULIN UR ELPH-MCNC: 3.4 GM/DL
GLUCOSE SERPL-MCNC: 150 MG/DL (ref 65–99)
HCT VFR BLD AUTO: 43.6 % (ref 37.5–51)
HGB BLD-MCNC: 13.7 G/DL (ref 13–17.7)
IMM GRANULOCYTES # BLD AUTO: 0.04 10*3/MM3 (ref 0–0.05)
IMM GRANULOCYTES NFR BLD AUTO: 0.4 % (ref 0–0.5)
IRON 24H UR-MRATE: 55 MCG/DL (ref 59–158)
IRON SATN MFR SERPL: 11 % (ref 20–50)
LYMPHOCYTES # BLD AUTO: 1.95 10*3/MM3 (ref 0.7–3.1)
LYMPHOCYTES NFR BLD AUTO: 19.3 % (ref 19.6–45.3)
MCH RBC QN AUTO: 29.3 PG (ref 26.6–33)
MCHC RBC AUTO-ENTMCNC: 31.4 G/DL (ref 31.5–35.7)
MCV RBC AUTO: 93.2 FL (ref 79–97)
MONOCYTES # BLD AUTO: 0.48 10*3/MM3 (ref 0.1–0.9)
MONOCYTES NFR BLD AUTO: 4.7 % (ref 5–12)
NEUTROPHILS NFR BLD AUTO: 7.5 10*3/MM3 (ref 1.7–7)
NEUTROPHILS NFR BLD AUTO: 74.2 % (ref 42.7–76)
NRBC BLD AUTO-RTO: 0 /100 WBC (ref 0–0.2)
PLATELET # BLD AUTO: 187 10*3/MM3 (ref 140–450)
PMV BLD AUTO: 9.7 FL (ref 6–12)
POTASSIUM SERPL-SCNC: 4.1 MMOL/L (ref 3.5–5.2)
PROT SERPL-MCNC: 7.9 G/DL (ref 6–8.5)
RBC # BLD AUTO: 4.68 10*6/MM3 (ref 4.14–5.8)
SODIUM SERPL-SCNC: 140 MMOL/L (ref 136–145)
TIBC SERPL-MCNC: 478 MCG/DL (ref 298–536)
TRANSFERRIN SERPL-MCNC: 321 MG/DL (ref 200–360)
WBC # BLD AUTO: 10.11 10*3/MM3 (ref 3.4–10.8)

## 2021-02-26 PROCEDURE — 36415 COLL VENOUS BLD VENIPUNCTURE: CPT | Performed by: INTERNAL MEDICINE

## 2021-02-26 PROCEDURE — 83540 ASSAY OF IRON: CPT | Performed by: NURSE PRACTITIONER

## 2021-02-26 PROCEDURE — 82746 ASSAY OF FOLIC ACID SERUM: CPT | Performed by: NURSE PRACTITIONER

## 2021-02-26 PROCEDURE — 82728 ASSAY OF FERRITIN: CPT | Performed by: NURSE PRACTITIONER

## 2021-02-26 PROCEDURE — 84466 ASSAY OF TRANSFERRIN: CPT | Performed by: NURSE PRACTITIONER

## 2021-02-26 PROCEDURE — 82378 CARCINOEMBRYONIC ANTIGEN: CPT | Performed by: NURSE PRACTITIONER

## 2021-02-26 PROCEDURE — 85025 COMPLETE CBC W/AUTO DIFF WBC: CPT | Performed by: NURSE PRACTITIONER

## 2021-02-26 PROCEDURE — 80053 COMPREHEN METABOLIC PANEL: CPT | Performed by: NURSE PRACTITIONER

## 2021-02-26 PROCEDURE — 82607 VITAMIN B-12: CPT | Performed by: NURSE PRACTITIONER

## 2021-02-26 PROCEDURE — 99214 OFFICE O/P EST MOD 30 MIN: CPT | Performed by: INTERNAL MEDICINE

## 2021-02-26 RX ORDER — SODIUM CHLORIDE 9 MG/ML
250 INJECTION, SOLUTION INTRAVENOUS ONCE
Status: CANCELLED | OUTPATIENT
Start: 2021-03-22

## 2021-02-26 RX ORDER — DIPHENHYDRAMINE HYDROCHLORIDE 50 MG/ML
50 INJECTION INTRAMUSCULAR; INTRAVENOUS AS NEEDED
Status: CANCELLED | OUTPATIENT
Start: 2021-03-22

## 2021-02-26 RX ORDER — PANTOPRAZOLE SODIUM 40 MG/1
40 TABLET, DELAYED RELEASE ORAL DAILY
Qty: 30 TABLET | Refills: 6 | Status: SHIPPED | OUTPATIENT
Start: 2021-02-26 | End: 2023-01-20

## 2021-02-26 RX ORDER — FAMOTIDINE 10 MG/ML
20 INJECTION, SOLUTION INTRAVENOUS AS NEEDED
Status: CANCELLED | OUTPATIENT
Start: 2021-03-22

## 2021-02-26 NOTE — PROGRESS NOTES
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  Combined chemoradiation with Xeloda 825 mg/m2 BID D1-5 or M-F during the course of radiation.  His dose is 500 mg x 4 or 2000 mg BID  Treatment finished 19.    2.  Dr. Yadav performed LAR with coloanal anstomosis and loop ileostomy 19.  Pathology showed  Residual invasive adneocarcinoma.  Tumor invaded the muscularis propria.  Surgical margins clear.  0/14 resected LNs involved.  ypT2N0.      3.       4.  Ileostomy reversal 19 (Dr. Yadav)    5.          CHIEF COMPLAINT:  Follow up of rectal cancer and iron deficiency anemia    HISTORY OF PRESENT ILLNESS:   David Mandujano is a very pleasant 70 y.o. male who was referred by Dr. Barnhart for evaluation and treatment of colorectal cancer. He began to notice rectal bleeding in January or 2018, and he also began to notice fatigue in approximately July. He assumed he was out of shape when he started to become short of breath and started trying to exercise more frequently, which only exacerbated the shortness of breath. He was evaluated by his PCP, Raj Wang MD, who after testing, found him to be severely iron deficient, hyperglycemic, and he also had an elevated PSA. He was on vacation at the time, and his PCP asked him to return home for further evaluation. He was started on oral iron therapy and later received IV iron infusions. He was also scheduled for a colonoscopy with Dr. Barnhart, during which a suspicious rectal mass was biopsied and pathology was positive for an ulcerated invasive, moderately differentiated rectal adenocarcinoma.     INTERVAL HISTORY:  Mr. Mandujano is here today for follow up of rectal cancer. He had EGD and colonoscopy with Dr. Barnhart as below.  He received IV iron.  Unfortunately, he continues to be very  fatigued.  He says he has upcoming appt with Dr. Joe on 3/8/21to see if his heart has anything to do with his symptoms.  He says his bowels seem to be doing a lot better after the bowel prep.  He wonders if there is something he could take on an ongoing basis which would help to keep him regular.     PAST MEDICAL HISTORY:  Past Medical History:   Diagnosis Date   • Anemia    • Arthritis    • Colon cancer (CMS/HCC)     s/p chemo therapy   • Heart murmur    • Hypertension    • Wrist fracture     surgically repaired       PAST SURGICAL HISTORY:  Past Surgical History:   Procedure Laterality Date   • ABDOMINAL SURGERY     • COLON SURGERY     • COLONOSCOPY     • FRACTURE SURGERY Left    • VENOUS ACCESS DEVICE (PORT) INSERTION N/A 5/20/2019    Procedure: INSERTION VENOUS ACCESS DEVICE;  Surgeon: Cindy Corrales MD;  Location: [facility];  Service: General   • WRIST SURGERY         FAMILY HISTORY:  Family History   Problem Relation Age of Onset   • Colon cancer Maternal Grandfather    • Other Sister    • Heart disease Sister    No other family history of malignancy.    SOCIAL HISTORY:  Social History     Socioeconomic History   • Marital status: Single     Spouse name: Not on file   • Number of children: Not on file   • Years of education: Not on file   • Highest education level: Not on file   Tobacco Use   • Smoking status: Never Smoker   • Smokeless tobacco: Current User     Types: Chew   Substance and Sexual Activity   • Alcohol use: Yes     Comment: occasional   • Drug use: No   • Sexual activity: Defer   Social History Narrative    He is , and is self employed/semi retired. He is a non smoker but chews tobacco. He used to drink daily, but says he has cut it in half.      REVIEW OF SYSTEMS:   A comprehensive 14 point review of systems was performed.  Significant findings as mentioned above.  All other systems reviewed and are negative.      MEDICATIONS:  The current medication list was reviewed in the  EMR    Current Outpatient Medications:   •  allopurinol (ZYLOPRIM) 100 MG tablet, Take 100 mg by mouth Daily., Disp: , Rfl:   •  amLODIPine (NORVASC) 10 MG tablet, Take 5 mg by mouth Daily., Disp: , Rfl:   •  capecitabine (XELODA) 150 MG chemo tablet, Take 1 tab by mouth in the AM and 1 tab by mouth in the PM on days 1-14, then off for 7 days. Take with 500 mg tabs for total dose of 2150mg, Disp: 28 tablet, Rfl: 5  •  folic acid (FOLVITE) 1 MG tablet, Take 1 tablet by mouth Daily., Disp: 30 tablet, Rfl: 6  •  HYDROcodone-acetaminophen (NORCO) 7.5-325 MG per tablet, Take 1 tablet by mouth 4 (Four) Times a Day As Needed for Moderate Pain ., Disp: 12 tablet, Rfl: 0  •  iron polysaccharides (NIFEREX) 150 MG capsule, Take 1 capsule by mouth Daily., Disp: 30 capsule, Rfl: 3  •  metoprolol succinate XL (TOPROL-XL) 50 MG 24 hr tablet, Take 1 tablet by mouth Daily., Disp: 30 tablet, Rfl: 11  •  ondansetron ODT (ZOFRAN-ODT) 8 MG disintegrating tablet, Dissolve 1 tablet on the tongue 3 (Three) Times a Day As Needed for Nausea or Vomiting., Disp: 30 tablet, Rfl: 5  •  predniSONE (DELTASONE) 10 MG tablet, Take 3 tabs daily x 1 day, 2 tabs daily x 2 days, then 1 tab daily x 1 day, Disp: 8 tablet, Rfl: 0  •  prochlorperazine (COMPAZINE) 5 MG tablet, Take 2 tablets by mouth Every 6 (Six) Hours As Needed for Nausea or Vomiting., Disp: 60 tablet, Rfl: 5  •  vitamin B-6 (PYRIDOXINE) 100 MG tablet, Take 1 tablet by mouth Daily., Disp: 30 tablet, Rfl: 1    ALLERGIES:  No Known Allergies    PHYSICAL EXAM:  Vitals:    02/26/21 1428   BP: 127/78   Pulse: 102   Resp: 18   Temp: 97.3 °F (36.3 °C)   SpO2: 96%     Pain Score    02/26/21 1428   PainSc: 0-No pain     General:  Awake, alert and oriented, appears well, in no acute distress  HEENT:  Pupils are equal, round and reactive to light and accommodation, Extra-ocular movements full, Oropharyx clear, mucous membranes moist  Neck:  No JVD, thyromegaly or lymphadenopathy  CV:  Regular rate  and rhythm, III/IV systolic murmur, no rubs or gallops  Resp:  Lungs are clear to auscultation bilaterally, no crackles  Abd:  Soft, non-tender, sl distended, bowel sounds present, no organomegaly or masses.  Surgical scars present.   Ext:  No clubbing, cyanosis or edema     Lymph:  No cervical, supraclavicular, axillary, adenopathy  Neuro:  Grossly non-focal exam    PATHOLOGY:  10-02-18      04-01-19:        04-09-19:        ENDOSCOPY:  10-02-18:      2-05-21 EGD/C-scope ([facility])  -  Mild reflux esophagitis with small erosions at the GE junction and a slight stricture  -  Endoscopically normal stomach, duodenum.  - Unremarkable surgical anastomosis 5 cm above the anal verge.   -  Minimal diverticulosis.  -  Otherwise normal colon and terminal ileum with fair prep.      IMAGING:  10-04-18 CT Abdomen Pelvis With Contrast   Findings  - LOWER THORAX: Patchy nonspecific subpleural nodularity in the left lung base that could represent sequelae of chronic airspace disease or early fibrosis.     ABDOMEN:  - LIVER: Homogeneous. No focal hepatic mass or ductal dilatation.  - GALLBLADDER: GALLSTONES WITHIN THE GALLBLADDER.  - PANCREAS: Unremarkable. No mass or ductal dilatation.  - SPLEEN: Homogeneous. No splenomegaly.  - ADRENALS: No mass.  - KIDNEYS: RIGHT RENAL CYST MEASURING 3.6 CM.  - GI TRACT: NONSPECIFIC DISTAL SIGMOID COLONIC WALL THICKENING VERSUS NONDISTENTION.  - PERITONEUM: No free air. No free fluid or loculated fluid collections.  - MESENTERY: Unremarkable.  - LYMPH NODES: No lymphadenopathy.  - VASCULATURE: ATHEROSCLEROTIC VASCULAR CALCIFICATION.  - ABDOMINAL WALL: SMALL BILATERAL HUMERAL HERNIAS CONTAINING ONLY FAT.  - OTHER: None.     PELVIS:  - BLADDER: No focal mass or significant wall thickening  - REPRODUCTIVE: Unremarkable as visualized.  - APPENDIX: Nondistended. No surrounding inflammation.  - BONES: No acute bony abnormality.     IMPRESSION:  1. Gallstones within the gallbladder.  2.Nonspecific distal  sigmoid colonic wall thickening versus nondistention.    PET/CT 10-29-18    11-13-18 MRI Pelvis Without Contrast  FINDINGS:  1.) TUMOR LOCATION AND CHARACTERISTICS     i) Distance of Inferior margin of Tumor from the dentate line: 9.5 CM  ii) Tumor at or below the puborectalis sling:  NO  iii) Relationship to the anterior peritoneal reflection: BELOW  iv) Craniocaudal length of the tumor: 6cm  v) Clock face of tumor: 5o'clock to 2o'clock  vi) Polypoid/ Annular/ Semi-annular: SEMI-ANNULAR  vii) Mucinous: YES     2.) EXTRAMURAL DEPTH OF INVASION AND MR T-CATEGORY       i) Extramural depth of invasion (Use 0mm for T1 or T2 tumor):  APPROXIMATELY 5 MM   ii) T category: T3 B              *Please indicate structures with possible invasion.  Specify laterality,  sequence and slice#: (see list below)     *  Anterior peritoneal reflection (T4a tumor): NO  *  Puborectalis: NO  *  Bladder: NO  *  Vascular Involvement of Iliac Vessels: NO  *  Levator ani: NO  *  Ureters: NO  *  Obturator: NO  *  Prostate: MARGINAL/TETHERED  *  Piriformis: NO  *  Uterus: NOT APPLICABLE  *  Pelvic Bone: NO  *  Vagina: NOT APPLICABLE  *  Sacrum: NO  *  Urethra: NO  *  Other:          iii) For low rectal tumors (maximum tumor depth at or below the  puborectalis sling):     *  Not applicable (tumor above the puborectalis sling: NOT APPLICABLE  *    *  Level 1 (submucosa only, no involvement of internal anal sphincter):       *  Level 2 (confined to the internal anal sphincter; no involvement of  intersphincteric fat):      *  Level 3 (intersphincteric fat involved):      *  Level 4 (involves external sphincter or beyond):         3. RELATIONSHIP OF THE TUMOR TO MESORECTAL FASCIA (MRF)       i) Shortest distance 0mm of the definitive tumour border to the MRF  is: AT 12:00   ii) Are there any tumour spiculations closer to the MRF? NO     iii) Location of Tumor margin to MRF: 12:00, AT THE LEVEL PROSTATE     4. EXTRAMURAL VENOUS INVASION       i)  Extramural Venous Invasion (EMVI): ABSENT     5. MESORECTAL LYMPH NODES AND TUMOUR DEPOSITS       i) Any suspicious mesorectal lymph nodes/tumor deposits: YES      *If yes, the most suspicious node/tumor deposit is: 15 MM IN  DIAMETER, ALONG THE UPPER MARGIN OF the tumor with minimum distance 7  MMmm from the MRF at 7o'clock     6. EXTRAMESORECTAL LYMPH NODES        i) Any suspicious extramesorectal lymph nodes: NO      *If yes, location and laterality of suspicious nodes:             Int. Iliac:                Ext. Iliac:                Common Iliac:                Obturator:                Inguinal:                Other:           ii) Is the BETH node station in the field of view: NO        *If Yes, are these nodes suspicious:         7. OTHER FINDINGS (COMPLICATIONS, METASTASES, LIMITATIONS)         NOTE: THERE IS SOME UNCERTAINTY WHETHER THE APPARENT SMALL FOCUS OF TUMOR EXTENDING TO THE PROSTATE AT THE 12:00 POSITION COULD ACTUALLY BE PROSTATE NODULE EXTENDING TOWARD THE TUMOR. RECTAL TUMOR IS FAVORED; ALTHOUGH THIS DETERMINATION CANNOT BE MADE WITH COMPLETE CERTAINTY, TUMOR IS CONSIDERED T3 B.        IMPRESSIONS:  MRI rectal cancer T category is: T3 B  Maximum EMD of invasion is: 5  Minimum tumor to MRF distance is: 0  Low rectal tumor component: NO  Mesorectal nodes/tumor deposits: SUSPICIOUS  EMVI: ABSENT  Extramesorectal nodes: NEGATIVE    CTCAP 05-21-20     FINDINGS:  Adenopathy:No evidence of mediastinal or hilar lymph node enlargement.  No axillary lymph node enlargement.     Fluid:There are no pericardial or pleural effusions.     LUNGS:No parenchymal soft tissue nodules or masses are present.     Skeletal:Arthritic change in the thoracic spine.      Upper abdomen shows cholelithiasis.      IMPRESSION:  1. No parenchymal soft tissue nodules or masses.  2. No pericardial or pleural effusions.  3. No adenopathy.   4. Coronary artery calcifications.   5. Cholelithiasis.     FINDINGS:   The lung bases are  clear. There are no pleural effusions.     The liver is homogeneous.     There is cholelithiasis.     The spleen is homogeneous.      There is no peripancreatic stranding or pancreatic head mass.      There is no adrenal enlargement.     Large right renal cyst is present. There is no solid renal mass or  hydronephrosis..      Otherwise I do not see any free fluid or walled off fluid collections.     The rectal wall appears to be thickened.     IMPRESSION:  1. Rectal wall thickening.  2. Cholelithiasis.         CT CAP 09/15/2020  Findings  LUNGS: Unremarkable. No parenchymal soft tissue nodules.  No focal air  space disease.  HEART: CORONARY ARTERY CALCIFICATIONS ARE NOTED.  PERICARDIUM: No effusion.  MEDIASTINUM: No masses. No enlarged lymph nodes.  No fluid collections.  PLEURA: No pleural effusion. No pleural mass or abnormal calcification.  MAJOR AIRWAYS: Clear. No intrinsic mass.  VASCULATURE: No evidence of aneurysm.  VISUALIZED UPPER ABDOMEN:  LIVER: Homogeneous. No focal hepatic mass or ductal dilatation.  SPLEEN: Homogeneous. No splenomegaly.  ADRENALS: No mass.  KIDNEYS: RIGHT RENAL CYST MEASURING 6.7 CM.  GI TRACT: Non-dilated. No definite wall thickening.  PERITONEUM: No free air. No free fluid or loculated fluid  collections.  ABDOMINAL WALL: No focal hernia or mass.  OTHER: None.  BONES: No acute bony abnormality.     IMPRESSION:  Impression:  1. Unremarkable exam.  No source for the patient symptoms.  2. Other incidental/non-acute findings as above.    Findings  LOWER THORAX: Clear. No effusions.  ABDOMEN:  LIVER: Homogeneous. No focal hepatic mass or ductal dilatation.GALLBLADDER: GALLSTONES IN THE GALLBLADDER.  PANCREAS: Unremarkable. No mass or ductal dilatation.  SPLEEN: Homogeneous. No splenomegaly.  ADRENALS: No mass.  KIDNEYS: RIGHT RENAL CYST MEASURING 6.4 CM.  GI TRACT: Possibly some rectal wall thickening.  PERITONEUM: No free air. No free fluid or loculated fluid  collections.  MESENTERY:  Unremarkable.  LYMPH NODES: No lymphadenopathy.  VASCULATURE: No evidence of aneurysm.  ABDOMINAL WALL: No focal hernia or mass.  OTHER: None.   PELVIS:   BLADDER: No focal mass or significant wall thickening   REPRODUCTIVE: Unremarkable as visualized.   APPENDIX: Nondistended. No surrounding inflammation.  BONES: No acute bony abnormality.     IMPRESSION:  Impression:  Rectal wall thickening noted.      CTCAP 01-14-21  CT FINDINGS: On the lung windows, the lungs are both well aerated and  clear. No pulmonary parenchymal lung nodules to suggest metastatic  disease were seen. There were no inflammatory infiltrates or pleural  effusions. On the soft tissue windows, there was no evidence of  supraclavicular or axillary lymphadenopathy. No enlarged mediastinal or  hilar lymph nodes were demonstrated. There was no evidence of  pericardial effusion.     IMPRESSION:  No CT findings of metastatic disease in the chest.     CT FINDINGS: The liver and spleen are stable in size and shape; no focal  lesions have developed within the liver. There is a calcified stone in  the dependent portion of the gallbladder. The pancreas showed no  evidence of mass or inflammation. No adrenal lesions were demonstrated.  There is some thinning of the cortical tissues in the kidneys with a 6.5  cm cyst in the right kidney. There was no ascites. The aorta is normal  in caliber. No enlarged lymph nodes were seen in the retroperitoneum or  mesentery. In the pelvis, postoperative changes are noted near the  rectum where there is a line of  GI staples. No evidence of residual or recurrent tumor is demonstrated.  On today's study there was no significant rectal wall thickening.     IMPRESSION:  No CT findings to suggest metastatic disease in the abdomen  or pelvis. There is less thickening of the rectal wall than seen on the  previous CT. The patient does have cholelithiasis and a large cyst in  the right kidney.     RECENT LABS:  Lab Results    Component Value Date    WBC 10.11 02/26/2021    HGB 13.7 02/26/2021    HCT 43.6 02/26/2021    MCV 93.2 02/26/2021    RDW 20.9 (H) 02/26/2021     02/26/2021    NEUTRORELPCT 74.2 02/26/2021    LYMPHORELPCT 19.3 (L) 02/26/2021    MONORELPCT 4.7 (L) 02/26/2021    EOSRELPCT 0.8 02/26/2021    BASORELPCT 0.6 02/26/2021    NEUTROABS 7.50 (H) 02/26/2021    LYMPHSABS 1.95 02/26/2021       Lab Results   Component Value Date     02/26/2021    K 4.1 02/26/2021    CO2 25.1 02/26/2021     02/26/2021    BUN 18 02/26/2021    CREATININE 1.45 (H) 02/26/2021    EGFRIFNONA 48 (L) 02/26/2021    GLUCOSE 150 (H) 02/26/2021    CALCIUM 10.0 02/26/2021    ALKPHOS 95 02/26/2021    AST 53 (H) 02/26/2021    ALT 49 (H) 02/26/2021    BILITOT 0.6 02/26/2021    ALBUMIN 4.47 02/26/2021    PROTEINTOT 7.9 02/26/2021       Lab Results   Component Value Date/Time    URICACID 8.9 (H) 07/17/2019 10:16 AM     Lab Results   Component Value Date    CEA 1.82 08/31/2020    CEA 1.83 07/21/2020    CEA 2.00 06/25/2020    CEA 2.52 04/21/2020    CEA 2.28 01/28/2020    CEA 2.28 06/26/2019    CEA 3.40 03/20/2019    CEA 17.80 (H) 01/11/2019    CEA 18.80 (H) 10/19/2018     Lab Results   Component Value Date    PSA 1.050 07/21/2020    PSA 1.110 06/25/2020    PSA 4.930 (H) 10/19/2018     Lab Results   Component Value Date    TSH 2.860 01/19/2021     Lab Results   Component Value Date    HGBA1C 5.80 (H) 06/25/2020    HGBA1C 5.50 01/28/2020    HGBA1C 5.40 03/20/2019       Lab Results   Component Value Date    FERRITIN 147.50 02/26/2021    IRON 55 (L) 02/26/2021    TIBC 478 02/26/2021    LABIRON 11 (L) 02/26/2021    EPOQGYYI11 1,119 (H) 01/19/2021    FOLATE 8.18 01/19/2021     ASSESSMENT & PLAN:  David Mandujano is a very pleasant 70 y.o. male with newly diagnosed rectal cancer. Clinically stage IIIC ([id number]).    1.  Rectal cancer:  -  He received standard neoadjuvant chemoradiation as above given locally advanced tumor with suspicious pelvic lymph  node.    - Pre-treatment MRI showed a locally advanced tumor and suspicious pelvic LN.  PET-CT was negative except in the local tumor.   -  There was a non-specific sub-pleural nodular opacity in the L lung base which was PET negative.  Perhaps this was inflammatory in nature. This area will require f/u on future imaging.  - Recommended neoadjuvant chemoradiation with Xeloda 825 mg/m2 BID D1-5 or M-F during the course of radiaton.  His dose was 500 mg x 4 or 2000 mg BID. Overall, he tolerated this well and completed treatment 1-21-19.    -  He saw Dr. Marilynn Yadav and underwent successful surgical resection as above.    -  He took adjuvant CapeOx treatment to prevent recurrence and has completed 8 cycles. Overall, he tolerated treatment well aside from fatigue and diarrhea (see below). He started to struggle with thrombocytopenia and possibly with neuropathy so gave his last cycle without Oxaliplatin.  -  He did well with take down of his ileostomy.  -  CT CAP remains without evidence of recurrent disease.  CEA from 08/31/2020 is normal.  Value from today is pending.  -  He underwent c-scope with Dr. Barnhart on 2-05-21 without cause for concern.  Repeat exam recommended after 3 years.  -  Check CT CAP in April along with CBCD, CMP, CEA.    2.  Iron deficiency Anemia:  -  He previously took Niferex but did not appear to absorb it and required IV  iron for repletion. He had this in September and again in November and in February.    -  His iron is again low.  Will see how he does with Monoferric IV supplementation.  -  He did have EGD and c-scope.  Perhaps he is having blood loss from gastritis.  Dr. Barnhart ordered Protonix, but he didn't get script.  Will order today. Advised him to take it in the AM on an empty stomach.    3.  Elevated Creatinine:  -  Mr. Mandujano has had difficulty with elevated Cr at times in the past but nothing consistent.  His last several metabolic profiles show persistent elevation of his Cr.  He  tells me he doesn't take NSAIDS, denies new medications and says he drinks lots of water.  I encouraged him to continue to push oral fluids.  I recommended referral to nephrology for further evaluation, but he declined at this time.    4.  Prominent systolic murmur:   -   He was evaluated by Dr. Joe and then by Dr. Barrera.  He underwent echocardiogram 1-30-19 which showed EF 70%, grade 1 diastolic dysfunction, mild to mod septal asymmetric hypertrophy, AV calcification with functionally bicuspid valve, mild AI and moderate AS, moderate MAC with mild MR, mild MS  -  Thought is that at some point in the future he will require AVR.   - He has not followed up with cardiology.  I told him that his easy fatiguability / poor exercise tolerance, etc may well be related to valvular heart disease and encouraged him to schedule f/u. He has appt to see Dr. Joe on 3-8-21.    5. .  Neuropathy:  -  Etiology is uncertain.  He did complain of some vague symptoms toward the end of his treatment (though he thinks they happened later), so this could be related to Oxaliplatin.    - Thyroid function is wnl.  HbA1C has been normal.    - B12 and folate are replete. He continues to take take SL B12 and folate as prescribed. At present, he feels neuropathy is stable/improved.  - He has been advised that alcohol can cause or worsen peripheral neuropathy.    6.   Depressed mood / Anxiety and Alcohol Abuse:  - Previously given trial of Lexapro but he did not find this helpful and discontinued this. He said he did quit drinking completely around the time of his surgery but started drinking again. He says Dr. Yadav did mention to him that his liver didn't look good during his operation.  -  We previously discussed potential involvement in support groups and /or referral for alcohol abuse counseling, but he declined.  Will monitor.    7.  Prophylaxis:  He took 2020 influenza vaccine. He took Prevnar 13 on 11-20-18.   We discussed COVID  vaccination and he was interested.  He missed appt due to weather and asks if this could be re-scheduled. Will reschedule for him.    8. Follow up:   -  CBCD, CMP, CEA, Ferritin and iron profile as well as CT CAP in April  -  Monoferric infusion  -  F/u with Dr. Joe as planned 3/8/21.  -  Re-order COVID vaccination      9.  ACO / KAVITHA/Other  Quality measures  -  David Mandujano received 2020 flu vaccine.    -  David Mandujano reports a pain score of 0.    -  Current outpatient and discharge medications have been reconciled for the patient.  Reviewed by: Jyoti Larios MD     This note was scribed for Jyoti Larios MD by Lynn Pinto RN.    I, Jyoti Larios MD, personally performed the services described in this documentation as scribed by the above named individual in my presence, and it is both accurate and complete.  02/26/2021       I spent 25 minutes with David Mandujano today.  In the office today, more than 50% of this time was spent face-to-face with him  in counseling / coordination of care, reviewing his interim medical history and counseling on the current treatment plan.  All questions were answered to his satisfaction.         Electronically Signed by: Jyoti Larios MD         CC:   MD Kathleen Coppola Emmanuel C, MD Shawn Michael Acino, MD William Richards, MD Sandra Beck, MD

## 2021-02-27 LAB
CEA SERPL-MCNC: 2.37 NG/ML
FOLATE SERPL-MCNC: 9.92 NG/ML (ref 4.78–24.2)
VIT B12 BLD-MCNC: 1538 PG/ML (ref 211–946)

## 2021-03-08 ENCOUNTER — TELEPHONE (OUTPATIENT)
Dept: CARDIOLOGY | Facility: CLINIC | Age: 71
End: 2021-03-08

## 2021-03-08 ENCOUNTER — IMMUNIZATION (OUTPATIENT)
Dept: VACCINE CLINIC | Facility: HOSPITAL | Age: 71
End: 2021-03-08

## 2021-03-08 ENCOUNTER — OFFICE VISIT (OUTPATIENT)
Dept: CARDIOLOGY | Facility: CLINIC | Age: 71
End: 2021-03-08

## 2021-03-08 VITALS
TEMPERATURE: 98.2 F | HEART RATE: 92 BPM | BODY MASS INDEX: 35.76 KG/M2 | WEIGHT: 264 LBS | HEIGHT: 72 IN | DIASTOLIC BLOOD PRESSURE: 73 MMHG | OXYGEN SATURATION: 96 % | SYSTOLIC BLOOD PRESSURE: 149 MMHG

## 2021-03-08 DIAGNOSIS — R07.2 PRECORDIAL PAIN: ICD-10-CM

## 2021-03-08 DIAGNOSIS — I10 ESSENTIAL HYPERTENSION: ICD-10-CM

## 2021-03-08 DIAGNOSIS — I42.2 HYPERTROPHIC CARDIOMYOPATHY (HCC): Primary | ICD-10-CM

## 2021-03-08 PROCEDURE — 99214 OFFICE O/P EST MOD 30 MIN: CPT | Performed by: PHYSICIAN ASSISTANT

## 2021-03-08 PROCEDURE — 93000 ELECTROCARDIOGRAM COMPLETE: CPT | Performed by: PHYSICIAN ASSISTANT

## 2021-03-08 PROCEDURE — 91300 HC SARSCOV02 VAC 30MCG/0.3ML IM: CPT | Performed by: INTERNAL MEDICINE

## 2021-03-08 PROCEDURE — 0001A: CPT | Performed by: INTERNAL MEDICINE

## 2021-03-08 RX ORDER — AMLODIPINE BESYLATE 5 MG/1
5 TABLET ORAL DAILY
Qty: 90 TABLET | Refills: 3 | Status: SHIPPED | OUTPATIENT
Start: 2021-03-08 | End: 2021-04-01 | Stop reason: ALTCHOICE

## 2021-03-08 RX ORDER — METOPROLOL SUCCINATE 25 MG/1
25 TABLET, EXTENDED RELEASE ORAL DAILY
Qty: 90 TABLET | Refills: 2 | Status: SHIPPED | OUTPATIENT
Start: 2021-03-08 | End: 2021-04-01

## 2021-03-08 NOTE — PROGRESS NOTES
Raj Wang MD  David Mandujano  1950 03/08/2021    Patient Active Problem List   Diagnosis   • Iron deficiency anemia, unspecified   • Rectal cancer (CMS/HCC)   • Preoperative cardiovascular examination   • Hypertrophic cardiomyopathy with LV OT gradient of about 68 mmHg in February 2019.   • Essential hypertension   • Port-A-Cath in place   • Iron deficiency anemia due to chronic blood loss   • Malabsorption of iron       Dear Raj Wang MD:    Subjective     History of Present Illness:    Chief Complaint   Patient presents with   • Med Management     MED LIST.    • Shortness of Breath   • Palpitations   • Fatigue       David Mandujano is a pleasant 70 y.o. male with a past medical history significant for asymmetrical septal hypertrophy with LVOT obstruction with an LVOT gradient of 68 mmHg as well as some mild aortic stenosis and regurgitation on echocardiogram in 2019.  He was since referred to Dr. Bowman in [facility] where patient was seen once he did recommend routine visits with annual echocardiograms as well as statin therapy however patient has been lost to follow-up since.  Patient does have history of colon cancer status post colectomy with chemotherapy and radiation and he follows closely with Dr. Larios who has also been treating for him for chronic iron deficiency anemia.  He comes in for cardiology follow-up.    She reports he has been suffering from increased weakness and fatigue as well as shortness of breath that has been present since August 2020 since that time he has been being treated for iron deficiency however recent CBC did reveal normal hemoglobin levels although he is still iron deficient.  He does deny any worsening orthopnea, PND, or pedal edema.  He also denies any chest pains whatsoever.  He is unaware of which antihypertensives he is taking he does not know if he has continued metoprolol versus amlodipine.    No Known Allergies:      Current Outpatient  Medications:   •  allopurinol (ZYLOPRIM) 100 MG tablet, Take 100 mg by mouth Daily., Disp: , Rfl:   •  amLODIPine (NORVASC) 5 MG tablet, Take 1 tablet by mouth Daily., Disp: 90 tablet, Rfl: 3  •  capecitabine (XELODA) 150 MG chemo tablet, Take 1 tab by mouth in the AM and 1 tab by mouth in the PM on days 1-14, then off for 7 days. Take with 500 mg tabs for total dose of 2150mg, Disp: 28 tablet, Rfl: 5  •  folic acid (FOLVITE) 1 MG tablet, Take 1 tablet by mouth Daily., Disp: 30 tablet, Rfl: 6  •  HYDROcodone-acetaminophen (NORCO) 7.5-325 MG per tablet, Take 1 tablet by mouth 4 (Four) Times a Day As Needed for Moderate Pain ., Disp: 12 tablet, Rfl: 0  •  iron polysaccharides (NIFEREX) 150 MG capsule, Take 1 capsule by mouth Daily., Disp: 30 capsule, Rfl: 3  •  metoprolol succinate XL (TOPROL-XL) 25 MG 24 hr tablet, Take 1 tablet by mouth Daily., Disp: 90 tablet, Rfl: 2  •  ondansetron ODT (ZOFRAN-ODT) 8 MG disintegrating tablet, Dissolve 1 tablet on the tongue 3 (Three) Times a Day As Needed for Nausea or Vomiting., Disp: 30 tablet, Rfl: 5  •  pantoprazole (Protonix) 40 MG EC tablet, Take 1 tablet by mouth Daily., Disp: 30 tablet, Rfl: 6  •  predniSONE (DELTASONE) 10 MG tablet, Take 3 tabs daily x 1 day, 2 tabs daily x 2 days, then 1 tab daily x 1 day, Disp: 8 tablet, Rfl: 0  •  prochlorperazine (COMPAZINE) 5 MG tablet, Take 2 tablets by mouth Every 6 (Six) Hours As Needed for Nausea or Vomiting., Disp: 60 tablet, Rfl: 5  •  vitamin B-6 (PYRIDOXINE) 100 MG tablet, Take 1 tablet by mouth Daily., Disp: 30 tablet, Rfl: 1    The following portions of the patient's history were reviewed and updated as appropriate: allergies, current medications, past family history, past medical history, past social history, past surgical history and problem list.    Social History     Tobacco Use   • Smoking status: Never Smoker   • Smokeless tobacco: Current User     Types: Chew   Substance Use Topics   • Alcohol use: Yes     Comment:  "occasional   • Drug use: No       ROS    Objective   Vitals:    03/08/21 1518   BP: 149/73   BP Location: Right arm   Patient Position: Sitting   Cuff Size: Adult   Pulse: 92   Temp: 98.2 °F (36.8 °C)   TempSrc: Infrared   SpO2: 96%   Weight: 120 kg (264 lb)   Height: 182.9 cm (72\")     Body mass index is 35.8 kg/m².    Constitutional:       General: Not in acute distress.     Appearance: Healthy appearance. Well-developed and not in distress. Not diaphoretic.   Eyes:      Conjunctiva/sclera: Conjunctivae normal.      Pupils: Pupils are equal, round, and reactive to light.   HENT:      Head: Normocephalic and atraumatic.   Neck:      Vascular: No carotid bruit or JVD.   Pulmonary:      Effort: Pulmonary effort is normal. No respiratory distress.      Breath sounds: Normal breath sounds.   Cardiovascular:      Normal rate. Regular rhythm.      Murmurs: There is a grade 4/6 harsh crescendo-decrescendo midsystolic murmur at the URSB, radiating to the neck.   Skin:     General: Skin is cool.   Neurological:      Mental Status: Alert, oriented to person, place, and time and oriented to person, place and time.         Lab Results   Component Value Date     02/26/2021    K 4.1 02/26/2021     02/26/2021    CO2 25.1 02/26/2021    BUN 18 02/26/2021    CREATININE 1.45 (H) 02/26/2021    GLUCOSE 150 (H) 02/26/2021    CALCIUM 10.0 02/26/2021    AST 53 (H) 02/26/2021    ALT 49 (H) 02/26/2021    ALKPHOS 95 02/26/2021    LABIL2 1.5 06/25/2014     No results found for: CKTOTAL  Lab Results   Component Value Date    WBC 10.11 02/26/2021    HGB 13.7 02/26/2021    HCT 43.6 02/26/2021     02/26/2021     No results found for: INR  No results found for: MG  Lab Results   Component Value Date    TSH 2.860 01/19/2021    PSA 1.050 07/21/2020    CHLPL 261 (H) 06/25/2014    TRIG 237 (H) 06/25/2014    HDL 74 06/25/2014     (H) 06/25/2014      No results found for: BNP    During this visit the following were done:  Labs " Reviewed [x]    Labs Ordered []    Radiology Reports Reviewed [x]    Radiology Ordered []    PCP Records Reviewed []    Referring Provider Records Reviewed []    ER Records Reviewed []    Hospital Records Reviewed []    History Obtained From Family []    Radiology Images Reviewed []    Other Reviewed []    Records Requested []         ECG 12 Lead    Date/Time: 3/8/2021 3:02 PM  Performed by: Germán Shaw PA-C  Authorized by: Germán Shaw PA-C   Comparison: compared with previous ECG   Similar to previous ECG  Rhythm: sinus rhythm  Ectopy: unifocal PVCs  ST Depression: I, aVL, V5 and V6  T inversion: aVL, I, V6 and V5  Other findings: left ventricular hypertrophy             Assessment/Plan    Diagnosis Plan   1. Hypertrophic cardiomyopathy with LV OT gradient of about 68 mmHg in February 2019.  Adult Transthoracic Echo Complete W/ Cont if Necessary Per Protocol    Stress Test With Myocardial Perfusion   2. Essential hypertension  Adult Transthoracic Echo Complete W/ Cont if Necessary Per Protocol    Lipid Panel   3. Precordial pain  Stress Test With Myocardial Perfusion            Recommendations:  1. Hypertrophic cardiomyopathy  1. Patient shortness of breath likely from his hypertrophic cardiomyopathy he has not had an echocardiogram since 2019 so we will reorder echocardiogram.  2. Shortness of breath could likely be from ischemic equivalent he does have very minimal ST changes in his lateral leads on his EKG so I will be ordering a stress test as well.  3. After patient left, he did call back to our office to confirm he has not been taking the metoprolol but is actually only been taking amlodipine so I will decrease amlodipine to 5 mg and start him on metoprolol succinate 25 mg daily.    Return in about 4 weeks (around 4/5/2021).    As always, I appreciate very much the opportunity to participate in the cardiovascular care of your patients.      With Best Regards,    Germán Shaw PA-C

## 2021-03-08 NOTE — TELEPHONE ENCOUNTER
Pt's daughter called back and stated that David has not been taking Metoprolol 50 mg in over a year. He as been taking the Amlodipine 10 mg.     Germán spoke with the pt.

## 2021-03-22 ENCOUNTER — INFUSION (OUTPATIENT)
Dept: ONCOLOGY | Facility: HOSPITAL | Age: 71
End: 2021-03-22

## 2021-03-22 ENCOUNTER — HOSPITAL ENCOUNTER (OUTPATIENT)
Dept: CARDIOLOGY | Facility: HOSPITAL | Age: 71
Discharge: HOME | End: 2021-03-22
Admitting: PHYSICIAN ASSISTANT

## 2021-03-22 ENCOUNTER — APPOINTMENT (OUTPATIENT)
Dept: CARDIOLOGY | Facility: HOSPITAL | Age: 71
End: 2021-03-22

## 2021-03-22 VITALS
OXYGEN SATURATION: 96 % | TEMPERATURE: 96.6 F | BODY MASS INDEX: 35.56 KG/M2 | HEART RATE: 95 BPM | WEIGHT: 262.2 LBS | DIASTOLIC BLOOD PRESSURE: 88 MMHG | SYSTOLIC BLOOD PRESSURE: 148 MMHG | RESPIRATION RATE: 18 BRPM

## 2021-03-22 DIAGNOSIS — I42.2 HYPERTROPHIC CARDIOMYOPATHY (HCC): ICD-10-CM

## 2021-03-22 DIAGNOSIS — Z95.828 PORT-A-CATH IN PLACE: ICD-10-CM

## 2021-03-22 DIAGNOSIS — D50.0 IRON DEFICIENCY ANEMIA DUE TO CHRONIC BLOOD LOSS: Primary | ICD-10-CM

## 2021-03-22 DIAGNOSIS — K90.9 MALABSORPTION OF IRON: ICD-10-CM

## 2021-03-22 DIAGNOSIS — I10 ESSENTIAL HYPERTENSION: ICD-10-CM

## 2021-03-22 PROCEDURE — 25010000003 HEPARIN LOCK FLUSH PER 10 UNITS: Performed by: NURSE PRACTITIONER

## 2021-03-22 PROCEDURE — 93306 TTE W/DOPPLER COMPLETE: CPT | Performed by: INTERNAL MEDICINE

## 2021-03-22 PROCEDURE — 96365 THER/PROPH/DIAG IV INF INIT: CPT

## 2021-03-22 PROCEDURE — 25010000002 FERRIC DERISOMALTOSE 1000 MG/10ML SOLUTION 10 ML VIAL: Performed by: INTERNAL MEDICINE

## 2021-03-22 PROCEDURE — 93306 TTE W/DOPPLER COMPLETE: CPT

## 2021-03-22 RX ORDER — HEPARIN SODIUM (PORCINE) LOCK FLUSH IV SOLN 100 UNIT/ML 100 UNIT/ML
500 SOLUTION INTRAVENOUS AS NEEDED
Status: DISCONTINUED | OUTPATIENT
Start: 2021-03-22 | End: 2021-03-22 | Stop reason: HOSPADM

## 2021-03-22 RX ORDER — HEPARIN SODIUM (PORCINE) LOCK FLUSH IV SOLN 100 UNIT/ML 100 UNIT/ML
500 SOLUTION INTRAVENOUS AS NEEDED
Status: CANCELLED | OUTPATIENT
Start: 2021-05-05

## 2021-03-22 RX ORDER — SODIUM CHLORIDE 9 MG/ML
250 INJECTION, SOLUTION INTRAVENOUS ONCE
Status: COMPLETED | OUTPATIENT
Start: 2021-03-22 | End: 2021-03-22

## 2021-03-22 RX ORDER — SODIUM CHLORIDE 0.9 % (FLUSH) 0.9 %
10 SYRINGE (ML) INJECTION AS NEEDED
Status: DISCONTINUED | OUTPATIENT
Start: 2021-03-22 | End: 2021-03-22 | Stop reason: HOSPADM

## 2021-03-22 RX ORDER — SODIUM CHLORIDE 0.9 % (FLUSH) 0.9 %
10 SYRINGE (ML) INJECTION AS NEEDED
Status: CANCELLED | OUTPATIENT
Start: 2021-05-05

## 2021-03-22 RX ADMIN — SODIUM CHLORIDE 250 ML: 9 INJECTION, SOLUTION INTRAVENOUS at 15:06

## 2021-03-22 RX ADMIN — FERRIC DERISOMALTOSE 1000 MG: 1000 SOLUTION INTRAVENOUS at 15:06

## 2021-03-22 RX ADMIN — SODIUM CHLORIDE, PRESERVATIVE FREE 10 ML: 5 INJECTION INTRAVENOUS at 16:10

## 2021-03-22 RX ADMIN — Medication 500 UNITS: at 16:11

## 2021-03-24 LAB
BH CV ECHO MEAS - % IVS THICK: 29.9 %
BH CV ECHO MEAS - % LVPW THICK: 42.5 %
BH CV ECHO MEAS - ACS: 2 CM
BH CV ECHO MEAS - AO MAX PG (FULL): 78.7 MMHG
BH CV ECHO MEAS - AO MAX PG: 98.8 MMHG
BH CV ECHO MEAS - AO MEAN PG (FULL): 31 MMHG
BH CV ECHO MEAS - AO MEAN PG: 42 MMHG
BH CV ECHO MEAS - AO ROOT AREA (BSA CORRECTED): 1.4
BH CV ECHO MEAS - AO ROOT AREA: 9.3 CM^2
BH CV ECHO MEAS - AO ROOT DIAM: 3.5 CM
BH CV ECHO MEAS - AO V2 MAX: 497 CM/SEC
BH CV ECHO MEAS - AO V2 MEAN: 294 CM/SEC
BH CV ECHO MEAS - AO V2 VTI: 86.5 CM
BH CV ECHO MEAS - AVA(I,A): 1.5 CM^2
BH CV ECHO MEAS - AVA(I,D): 1.5 CM^2
BH CV ECHO MEAS - AVA(V,A): 1.3 CM^2
BH CV ECHO MEAS - AVA(V,D): 1.3 CM^2
BH CV ECHO MEAS - BSA(HAYCOCK): 2.5 M^2
BH CV ECHO MEAS - BSA: 2.4 M^2
BH CV ECHO MEAS - BZI_BMI: 35.8 KILOGRAMS/M^2
BH CV ECHO MEAS - BZI_METRIC_HEIGHT: 182.9 CM
BH CV ECHO MEAS - BZI_METRIC_WEIGHT: 119.8 KG
BH CV ECHO MEAS - EDV(CUBED): 124.6 ML
BH CV ECHO MEAS - EDV(MOD-SP4): 69.5 ML
BH CV ECHO MEAS - EDV(TEICH): 118 ML
BH CV ECHO MEAS - EF(CUBED): 73.6 %
BH CV ECHO MEAS - EF(MOD-SP4): 56.1 %
BH CV ECHO MEAS - EF(TEICH): 65.1 %
BH CV ECHO MEAS - ESV(CUBED): 32.9 ML
BH CV ECHO MEAS - ESV(MOD-SP4): 30.5 ML
BH CV ECHO MEAS - ESV(TEICH): 41.1 ML
BH CV ECHO MEAS - FS: 35.8 %
BH CV ECHO MEAS - IVS/LVPW: 0.91
BH CV ECHO MEAS - IVSD: 1.5 CM
BH CV ECHO MEAS - IVSS: 1.9 CM
BH CV ECHO MEAS - LA DIMENSION: 4 CM
BH CV ECHO MEAS - LA/AO: 1.2
BH CV ECHO MEAS - LV DIASTOLIC VOL/BSA (35-75): 29 ML/M^2
BH CV ECHO MEAS - LV MASS(C)D: 330.9 GRAMS
BH CV ECHO MEAS - LV MASS(C)DI: 138.1 GRAMS/M^2
BH CV ECHO MEAS - LV MASS(C)S: 307 GRAMS
BH CV ECHO MEAS - LV MASS(C)SI: 128 GRAMS/M^2
BH CV ECHO MEAS - LV MAX PG: 20.1 MMHG
BH CV ECHO MEAS - LV MEAN PG: 11 MMHG
BH CV ECHO MEAS - LV SYSTOLIC VOL/BSA (12-30): 12.7 ML/M^2
BH CV ECHO MEAS - LV V1 MAX: 224 CM/SEC
BH CV ECHO MEAS - LV V1 MEAN: 153 CM/SEC
BH CV ECHO MEAS - LV V1 VTI: 46.2 CM
BH CV ECHO MEAS - LVIDD: 5 CM
BH CV ECHO MEAS - LVIDS: 3.2 CM
BH CV ECHO MEAS - LVLD AP4: 8.5 CM
BH CV ECHO MEAS - LVLS AP4: 8.2 CM
BH CV ECHO MEAS - LVOT AREA (M): 2.8 CM^2
BH CV ECHO MEAS - LVOT AREA: 2.8 CM^2
BH CV ECHO MEAS - LVOT DIAM: 1.9 CM
BH CV ECHO MEAS - LVPWD: 1.6 CM
BH CV ECHO MEAS - LVPWS: 2.3 CM
BH CV ECHO MEAS - MV A MAX VEL: 150 CM/SEC
BH CV ECHO MEAS - MV E MAX VEL: 86.4 CM/SEC
BH CV ECHO MEAS - MV E/A: 0.58
BH CV ECHO MEAS - PA ACC TIME: 0.1 SEC
BH CV ECHO MEAS - PA PR(ACCEL): 34.5 MMHG
BH CV ECHO MEAS - RAP SYSTOLE: 10 MMHG
BH CV ECHO MEAS - RVSP: 37.9 MMHG
BH CV ECHO MEAS - SI(AO): 337.3 ML/M^2
BH CV ECHO MEAS - SI(CUBED): 38.3 ML/M^2
BH CV ECHO MEAS - SI(LVOT): 54.6 ML/M^2
BH CV ECHO MEAS - SI(MOD-SP4): 16.3 ML/M^2
BH CV ECHO MEAS - SI(TEICH): 32.1 ML/M^2
BH CV ECHO MEAS - SV(AO): 808.6 ML
BH CV ECHO MEAS - SV(CUBED): 91.7 ML
BH CV ECHO MEAS - SV(LVOT): 131 ML
BH CV ECHO MEAS - SV(MOD-SP4): 39 ML
BH CV ECHO MEAS - SV(TEICH): 76.9 ML
BH CV ECHO MEAS - TR MAX VEL: 264 CM/SEC
MAXIMAL PREDICTED HEART RATE: 150 BPM
STRESS TARGET HR: 128 BPM

## 2021-03-25 ENCOUNTER — TELEPHONE (OUTPATIENT)
Dept: CARDIOLOGY | Facility: CLINIC | Age: 71
End: 2021-03-25

## 2021-03-25 NOTE — TELEPHONE ENCOUNTER
Echo results.     Notes recorded by Germán Shaw PA-C on 3/25/2021 at 12:14 PM EDT  Normal LVEF with grade 1 diastolic dysfunction with mild aortic regurgitation and moderate to severe aortic stenosis. We will discuss these details at next follow-up.    Called pt and advised him. He expressed understanding.

## 2021-03-29 ENCOUNTER — IMMUNIZATION (OUTPATIENT)
Dept: VACCINE CLINIC | Facility: HOSPITAL | Age: 71
End: 2021-03-29

## 2021-03-29 PROCEDURE — 91300 HC SARSCOV02 VAC 30MCG/0.3ML IM: CPT | Performed by: INTERNAL MEDICINE

## 2021-03-29 PROCEDURE — 0002A: CPT | Performed by: INTERNAL MEDICINE

## 2021-03-30 ENCOUNTER — TELEPHONE (OUTPATIENT)
Dept: CARDIOLOGY | Facility: CLINIC | Age: 71
End: 2021-03-30

## 2021-04-01 ENCOUNTER — OFFICE VISIT (OUTPATIENT)
Dept: CARDIOLOGY | Facility: CLINIC | Age: 71
End: 2021-04-01

## 2021-04-01 VITALS
DIASTOLIC BLOOD PRESSURE: 74 MMHG | RESPIRATION RATE: 14 BRPM | SYSTOLIC BLOOD PRESSURE: 142 MMHG | WEIGHT: 265.4 LBS | HEIGHT: 72 IN | TEMPERATURE: 97.1 F | BODY MASS INDEX: 35.95 KG/M2 | HEART RATE: 68 BPM | OXYGEN SATURATION: 100 %

## 2021-04-01 DIAGNOSIS — R06.09 DYSPNEA ON EXERTION: ICD-10-CM

## 2021-04-01 DIAGNOSIS — I35.0 AORTIC STENOSIS, MODERATE: ICD-10-CM

## 2021-04-01 DIAGNOSIS — I42.1 CARDIOMYOPATHY, HYPERTROPHIC OBSTRUCTIVE (HCC): Primary | ICD-10-CM

## 2021-04-01 DIAGNOSIS — R53.83 FATIGUE, UNSPECIFIED TYPE: ICD-10-CM

## 2021-04-01 DIAGNOSIS — I20.8 ANGINAL EQUIVALENT (HCC): ICD-10-CM

## 2021-04-01 DIAGNOSIS — C20 RECTAL CANCER (HCC): ICD-10-CM

## 2021-04-01 DIAGNOSIS — I10 ESSENTIAL HYPERTENSION: ICD-10-CM

## 2021-04-01 PROCEDURE — 99214 OFFICE O/P EST MOD 30 MIN: CPT | Performed by: INTERNAL MEDICINE

## 2021-04-01 RX ORDER — METOPROLOL SUCCINATE 50 MG/1
50 TABLET, EXTENDED RELEASE ORAL DAILY
Qty: 30 TABLET | Refills: 3 | Status: SHIPPED | OUTPATIENT
Start: 2021-04-01 | End: 2021-04-01 | Stop reason: SDUPTHER

## 2021-04-01 RX ORDER — METOPROLOL SUCCINATE 50 MG/1
50 TABLET, EXTENDED RELEASE ORAL DAILY
Qty: 30 TABLET | Refills: 3 | Status: SHIPPED | OUTPATIENT
Start: 2021-04-01 | End: 2021-11-16 | Stop reason: DRUGHIGH

## 2021-04-01 NOTE — PROGRESS NOTES
Raj Wang MD  David Mandujano  2021    Patient Active Problem List   Diagnosis   • Iron deficiency anemia, unspecified   • Rectal cancer (CMS/HCC)   • Preoperative cardiovascular examination   • Hypertrophic cardiomyopathy with LV OT gradient of about 68 mmHg in 2019.   • Essential hypertension   • Port-A-Cath in place   • Iron deficiency anemia due to chronic blood loss   • Malabsorption of iron       Dear Raj Wang MD:    Subjective     David Mandujano is a 70 y.o. male with the problems as listed above, presents    Chief Complaint   Patient presents with   • Follow-up     echo results,   • Med Management     list       History of Present Illness: Mr. Mandujano is a pleasant 70-year-old  male with history of hypertrophic cardiomyopathy with asymmetrical septal hypertrophy and LV outflow tract obstruction and history of longstanding hypertension, is here to discuss the recent echo Doppler study results.  On further questioning he states that he feels tired all the time.  He has dyspnea with moderate exertion such as walking about 15-20 minutes at normal pace..  He feels fatigued all the time. He denies any dizziness or syncope.  He denies any chest pains.  He denies any family history of sudden cardiac death or premature coronary artery disease.    David Mandujano  Echo Complete w/Doppler and Color Flow  Order# [id number]  Reading physician: Rogelio Joe MD Ordering physician: Germán Shaw PA-C Study date: 3/22/21   Patient Information    Patient Name   David Mandujano MRN   [id number] Legal Sex   Male  (Age)   1950 (70 y.o.)   Interpretation Summary    · Normal left ventricular cavity size noted. Left ventricular wall thickness is consistent with moderate septal asymmetric hypertrophy  · Significant LVOT gradient with a peak of 99 and mean of 42 mmHg  · Left ventricular ejection fraction appears to be 66 - 70%.  · Left ventricular diastolic function  is consistent with (grade I) impaired relaxation.  · The aortic valve is abnormal in structure. There is severe calcification of the aortic valve. Mild aortic valve regurgitation is present. Moderate to severe aortic valve stenosis is present.  · Moderate fibrocalcific disease of the posterior mitral leaflet Moderate mitral annular calcification is present. Mild mitral valve regurgitation is present. Mild mitral valve stenosis is present.  · There is no evidence of pericardial effusion. .  · Comments: May consider a transesophageal echocardiographic study to have a better look at the aortic valve and LVOT if clinically warranted.       No Known Allergies:      Current Outpatient Medications:   •  allopurinol (ZYLOPRIM) 100 MG tablet, Take 100 mg by mouth Daily., Disp: , Rfl:   •  folic acid (FOLVITE) 1 MG tablet, Take 1 tablet by mouth Daily., Disp: 30 tablet, Rfl: 6  •  metoprolol succinate XL (TOPROL-XL) 50 MG 24 hr tablet, Take 1 tablet by mouth Daily., Disp: 30 tablet, Rfl: 3  •  pantoprazole (Protonix) 40 MG EC tablet, Take 1 tablet by mouth Daily., Disp: 30 tablet, Rfl: 6  •  prochlorperazine (COMPAZINE) 5 MG tablet, Take 2 tablets by mouth Every 6 (Six) Hours As Needed for Nausea or Vomiting., Disp: 60 tablet, Rfl: 5  •  vitamin B-6 (PYRIDOXINE) 100 MG tablet, Take 1 tablet by mouth Daily., Disp: 30 tablet, Rfl: 1      The following portions of the patient's history were reviewed and updated as appropriate: allergies, current medications, past family history, past medical history, past social history, past surgical history and problem list.    Social History     Tobacco Use   • Smoking status: Never Smoker   • Smokeless tobacco: Current User     Types: Chew   Substance Use Topics   • Alcohol use: Yes     Comment: occasional   • Drug use: No       Review of Systems   Constitutional: Positive for malaise/fatigue. Negative for chills and fever.   HENT: Negative for nosebleeds and sore throat.    Respiratory:  "Positive for shortness of breath. Negative for cough, hemoptysis and wheezing.    Gastrointestinal: Negative for abdominal pain, hematemesis, hematochezia, melena, nausea and vomiting.   Genitourinary: Negative for dysuria and hematuria.   Neurological: Negative for headaches.       Objective   Vitals:    04/01/21 1212   BP: 142/74   BP Location: Left arm   Patient Position: Sitting   Cuff Size: Large Adult   Pulse: 68   Resp: 14   Temp: 97.1 °F (36.2 °C)   TempSrc: Temporal   SpO2: 100%   Weight: 120 kg (265 lb 6.4 oz)   Height: 182.9 cm (72.01\")     Body mass index is 35.99 kg/m².        Vitals reviewed.   Constitutional:       Appearance: Well-developed.   Eyes:      Pupils: Pupils are equal, round, and reactive to light.   Neck:      Thyroid: No thyromegaly.      Vascular: No JVD.      Trachea: No tracheal deviation.      Lymphadenopathy: No cervical adenopathy.   Pulmonary:      Effort: Pulmonary effort is normal.      Breath sounds: Normal breath sounds.   Cardiovascular:      PMI at left midclavicular line. Normal rate. Regular rhythm. Normal S1. Normal S2.      Murmurs: There is a grade 3/6 mid frequency midsystolic murmur at the URSB and LLSB. The intensity does not change with Valsalva.      No gallop. No click. No rub.   Pulses:     Intact distal pulses.   Edema:     Peripheral edema absent.   Abdominal:      General: Bowel sounds are normal.      Palpations: Abdomen is soft. There is no abdominal mass.      Tenderness: There is no abdominal tenderness.   Musculoskeletal: Normal range of motion.      Cervical back: Neck supple. Skin:     General: Skin is warm and dry.      Findings: No rash.   Neurological:      Mental Status: Alert and oriented to person, place, and time.         Lab Results   Component Value Date     02/26/2021    K 4.1 02/26/2021     02/26/2021    CO2 25.1 02/26/2021    BUN 18 02/26/2021    CREATININE 1.45 (H) 02/26/2021    GLUCOSE 150 (H) 02/26/2021    CALCIUM 10.0 " 02/26/2021    AST 53 (H) 02/26/2021    ALT 49 (H) 02/26/2021    ALKPHOS 95 02/26/2021    LABIL2 1.5 06/25/2014     No results found for: CKTOTAL  Lab Results   Component Value Date    WBC 10.11 02/26/2021    HGB 13.7 02/26/2021    HCT 43.6 02/26/2021     02/26/2021        Assessment/Plan :   Diagnosis Plan   1. Cardiomyopathy, hypertrophic obstructive with LVOT gradient of about 99 mmHg on recent Echo-doppler study.     2. Aortic stenosis, moderate     3. Essential hypertension     4. Fatigue, unspecified type     5. Dyspnea on exertion (NYHA class II-III)     6. Rectal cancer (adenocarcinoma), status post chemotherapy, radiation therapy and surgical resection in 2019 with apparently no recurrence.     7. Anginal equivalent .         Recommendations:  1. For his hypertrophic cardiomyopathy with asymmetrical septal hypertrophy and LVOT obstruction, I would like to set him up to see Dr. Oneill at [facility] for consideration of alcohol septal ablation.  2. For his fatigue which could be angina equivalent, will evaluate further with a Lexiscan sestamibi study as he has some risk factors for coronary disease.  3. We will increase the metoprolol succinate to 50 mg daily and perhaps discontinue the amlodipine if his systolic blood pressure stays less than 140.  I have asked him to keep a check on the blood pressure while he is holding the amlodipine.    Return in about 2 months (around 6/1/2021).    As always, Raj Wang MD  I appreciate very much the opportunity to participate in the cardiovascular care of your patients. Please do not hesitate to call me with any questions with regards to David Mandujano evaluation and management.           With Best Regards,        Rogelio Joe MD, [facility]    Please note that portions of this note were completed with a voice recognition program.

## 2021-04-08 ENCOUNTER — HOSPITAL ENCOUNTER (OUTPATIENT)
Dept: CARDIOLOGY | Facility: HOSPITAL | Age: 71
End: 2021-04-08

## 2021-04-08 ENCOUNTER — HOSPITAL ENCOUNTER (OUTPATIENT)
Dept: NUCLEAR MEDICINE | Facility: HOSPITAL | Age: 71
End: 2021-04-08

## 2021-04-08 ENCOUNTER — HOSPITAL ENCOUNTER (OUTPATIENT)
Dept: NUCLEAR MEDICINE | Facility: HOSPITAL | Age: 71
Discharge: HOME | End: 2021-04-08

## 2021-04-08 DIAGNOSIS — I42.2 HYPERTROPHIC CARDIOMYOPATHY (HCC): ICD-10-CM

## 2021-04-08 DIAGNOSIS — R07.2 PRECORDIAL PAIN: ICD-10-CM

## 2021-04-09 ENCOUNTER — HOSPITAL ENCOUNTER (OUTPATIENT)
Dept: NUCLEAR MEDICINE | Facility: HOSPITAL | Age: 71
Discharge: HOME | End: 2021-04-09

## 2021-04-09 ENCOUNTER — HOSPITAL ENCOUNTER (OUTPATIENT)
Dept: CARDIOLOGY | Facility: HOSPITAL | Age: 71
Discharge: HOME | End: 2021-04-09

## 2021-04-09 LAB
BH CV REST NUCLEAR ISOTOPE DOSE: 9.7 MCI
BH CV STRESS BP STAGE 1: NORMAL
BH CV STRESS BP STAGE 2: NORMAL
BH CV STRESS COMMENTS STAGE 1: NORMAL
BH CV STRESS COMMENTS STAGE 2: NORMAL
BH CV STRESS DOSE REGADENOSON STAGE 1: 0.4
BH CV STRESS DURATION MIN STAGE 1: 0
BH CV STRESS DURATION MIN STAGE 2: 4
BH CV STRESS DURATION SEC STAGE 1: 10
BH CV STRESS DURATION SEC STAGE 2: 0
BH CV STRESS HR STAGE 1: 85
BH CV STRESS HR STAGE 2: 87
BH CV STRESS NUCLEAR ISOTOPE DOSE: 31.7 MCI
BH CV STRESS PROTOCOL 1: NORMAL
BH CV STRESS RECOVERY BP: NORMAL MMHG
BH CV STRESS RECOVERY HR: 76 BPM
BH CV STRESS STAGE 1: 1
BH CV STRESS STAGE 2: 2
LV EF NUC BP: 48 %
MAXIMAL PREDICTED HEART RATE: 150 BPM
PERCENT MAX PREDICTED HR: 58 %
STRESS BASELINE BP: NORMAL MMHG
STRESS BASELINE HR: 68 BPM
STRESS PERCENT HR: 68 %
STRESS POST PEAK BP: NORMAL MMHG
STRESS POST PEAK HR: 87 BPM
STRESS TARGET HR: 128 BPM

## 2021-04-09 PROCEDURE — 93018 CV STRESS TEST I&R ONLY: CPT | Performed by: INTERNAL MEDICINE

## 2021-04-09 PROCEDURE — 93017 CV STRESS TEST TRACING ONLY: CPT

## 2021-04-09 PROCEDURE — 25010000002 REGADENOSON 0.4 MG/5ML SOLUTION: Performed by: PHYSICIAN ASSISTANT

## 2021-04-09 PROCEDURE — 78452 HT MUSCLE IMAGE SPECT MULT: CPT

## 2021-04-09 PROCEDURE — A9500 TC99M SESTAMIBI: HCPCS | Performed by: PHYSICIAN ASSISTANT

## 2021-04-09 PROCEDURE — 0 TECHNETIUM SESTAMIBI: Performed by: PHYSICIAN ASSISTANT

## 2021-04-09 PROCEDURE — 78452 HT MUSCLE IMAGE SPECT MULT: CPT | Performed by: INTERNAL MEDICINE

## 2021-04-09 RX ADMIN — REGADENOSON 0.4 MG: 0.08 INJECTION, SOLUTION INTRAVENOUS at 10:39

## 2021-04-09 RX ADMIN — TECHNETIUM TC 99M SESTAMIBI 1 DOSE: 1 INJECTION INTRAVENOUS at 09:20

## 2021-04-09 RX ADMIN — TECHNETIUM TC 99M SESTAMIBI 1 DOSE: 1 INJECTION INTRAVENOUS at 10:39

## 2021-05-05 ENCOUNTER — APPOINTMENT (OUTPATIENT)
Dept: ONCOLOGY | Facility: HOSPITAL | Age: 71
End: 2021-05-05

## 2021-05-14 ENCOUNTER — HOSPITAL ENCOUNTER (OUTPATIENT)
Dept: CT IMAGING | Facility: HOSPITAL | Age: 71
Discharge: HOME | End: 2021-05-14

## 2021-05-14 ENCOUNTER — APPOINTMENT (OUTPATIENT)
Dept: CT IMAGING | Facility: HOSPITAL | Age: 71
End: 2021-05-14

## 2021-05-14 DIAGNOSIS — K90.9 MALABSORPTION OF IRON: ICD-10-CM

## 2021-05-14 DIAGNOSIS — C20 MALIGNANT NEOPLASM OF RECTUM (HCC): ICD-10-CM

## 2021-05-14 DIAGNOSIS — D50.0 IRON DEFICIENCY ANEMIA DUE TO CHRONIC BLOOD LOSS: ICD-10-CM

## 2021-05-14 DIAGNOSIS — E53.8 B12 DEFICIENCY: ICD-10-CM

## 2021-05-14 PROCEDURE — 71260 CT THORAX DX C+: CPT | Performed by: RADIOLOGY

## 2021-05-14 PROCEDURE — 25010000002 IOPAMIDOL 61 % SOLUTION: Performed by: INTERNAL MEDICINE

## 2021-05-14 PROCEDURE — 82565 ASSAY OF CREATININE: CPT

## 2021-05-14 PROCEDURE — 74177 CT ABD & PELVIS W/CONTRAST: CPT | Performed by: RADIOLOGY

## 2021-05-14 PROCEDURE — 71260 CT THORAX DX C+: CPT

## 2021-05-14 PROCEDURE — 74177 CT ABD & PELVIS W/CONTRAST: CPT

## 2021-05-14 RX ADMIN — IOPAMIDOL 100 ML: 612 INJECTION, SOLUTION INTRAVENOUS at 11:22

## 2021-05-18 ENCOUNTER — OFFICE VISIT (OUTPATIENT)
Dept: ONCOLOGY | Facility: CLINIC | Age: 71
End: 2021-05-18

## 2021-05-18 ENCOUNTER — INFUSION (OUTPATIENT)
Dept: ONCOLOGY | Facility: HOSPITAL | Age: 71
End: 2021-05-18

## 2021-05-18 VITALS
BODY MASS INDEX: 36.22 KG/M2 | RESPIRATION RATE: 18 BRPM | OXYGEN SATURATION: 94 % | SYSTOLIC BLOOD PRESSURE: 135 MMHG | TEMPERATURE: 98 F | DIASTOLIC BLOOD PRESSURE: 93 MMHG | TEMPERATURE: 98 F | WEIGHT: 267.1 LBS | RESPIRATION RATE: 18 BRPM | BODY MASS INDEX: 36.22 KG/M2 | OXYGEN SATURATION: 94 % | SYSTOLIC BLOOD PRESSURE: 135 MMHG | HEART RATE: 131 BPM | HEART RATE: 131 BPM | WEIGHT: 267.1 LBS | DIASTOLIC BLOOD PRESSURE: 93 MMHG

## 2021-05-18 DIAGNOSIS — E53.8 B12 DEFICIENCY: ICD-10-CM

## 2021-05-18 DIAGNOSIS — N18.30 STAGE 3 CHRONIC KIDNEY DISEASE, UNSPECIFIED WHETHER STAGE 3A OR 3B CKD (HCC): ICD-10-CM

## 2021-05-18 DIAGNOSIS — Z95.828 PORT-A-CATH IN PLACE: ICD-10-CM

## 2021-05-18 DIAGNOSIS — D50.0 IRON DEFICIENCY ANEMIA DUE TO CHRONIC BLOOD LOSS: ICD-10-CM

## 2021-05-18 DIAGNOSIS — C20 MALIGNANT NEOPLASM OF RECTUM (HCC): ICD-10-CM

## 2021-05-18 DIAGNOSIS — D50.0 IRON DEFICIENCY ANEMIA DUE TO CHRONIC BLOOD LOSS: Primary | ICD-10-CM

## 2021-05-18 DIAGNOSIS — I38 VALVULAR HEART DISEASE: ICD-10-CM

## 2021-05-18 DIAGNOSIS — C20 MALIGNANT NEOPLASM OF RECTUM (HCC): Primary | ICD-10-CM

## 2021-05-18 DIAGNOSIS — F10.10 ALCOHOL ABUSE: ICD-10-CM

## 2021-05-18 DIAGNOSIS — K90.9 MALABSORPTION OF IRON: ICD-10-CM

## 2021-05-18 LAB
ALBUMIN SERPL-MCNC: 4.24 G/DL (ref 3.5–5.2)
ALBUMIN/GLOB SERPL: 1.3 G/DL
ALP SERPL-CCNC: 86 U/L (ref 39–117)
ALT SERPL W P-5'-P-CCNC: 36 U/L (ref 1–41)
ANION GAP SERPL CALCULATED.3IONS-SCNC: 11.5 MMOL/L (ref 5–15)
AST SERPL-CCNC: 39 U/L (ref 1–40)
BASOPHILS # BLD AUTO: 0.03 10*3/MM3 (ref 0–0.2)
BASOPHILS NFR BLD AUTO: 0.4 % (ref 0–1.5)
BILIRUB SERPL-MCNC: 0.5 MG/DL (ref 0–1.2)
BUN SERPL-MCNC: 13 MG/DL (ref 8–23)
BUN/CREAT SERPL: 9.8 (ref 7–25)
CALCIUM SPEC-SCNC: 9.5 MG/DL (ref 8.6–10.5)
CHLORIDE SERPL-SCNC: 104 MMOL/L (ref 98–107)
CO2 SERPL-SCNC: 27.5 MMOL/L (ref 22–29)
CREAT SERPL-MCNC: 1.32 MG/DL (ref 0.76–1.27)
DEPRECATED RDW RBC AUTO: 51.1 FL (ref 37–54)
EOSINOPHIL # BLD AUTO: 0.08 10*3/MM3 (ref 0–0.4)
EOSINOPHIL NFR BLD AUTO: 1 % (ref 0.3–6.2)
ERYTHROCYTE [DISTWIDTH] IN BLOOD BY AUTOMATED COUNT: 14.6 % (ref 12.3–15.4)
FERRITIN SERPL-MCNC: 68.57 NG/ML (ref 30–400)
GFR SERPL CREATININE-BSD FRML MDRD: 54 ML/MIN/1.73
GLOBULIN UR ELPH-MCNC: 3.4 GM/DL
GLUCOSE SERPL-MCNC: 132 MG/DL (ref 65–99)
HCT VFR BLD AUTO: 47.1 % (ref 37.5–51)
HGB BLD-MCNC: 15.5 G/DL (ref 13–17.7)
IMM GRANULOCYTES # BLD AUTO: 0.03 10*3/MM3 (ref 0–0.05)
IMM GRANULOCYTES NFR BLD AUTO: 0.4 % (ref 0–0.5)
IRON 24H UR-MRATE: 69 MCG/DL (ref 59–158)
IRON SATN MFR SERPL: 15 % (ref 20–50)
LYMPHOCYTES # BLD AUTO: 0.88 10*3/MM3 (ref 0.7–3.1)
LYMPHOCYTES NFR BLD AUTO: 11.2 % (ref 19.6–45.3)
MCH RBC QN AUTO: 31.3 PG (ref 26.6–33)
MCHC RBC AUTO-ENTMCNC: 32.9 G/DL (ref 31.5–35.7)
MCV RBC AUTO: 95 FL (ref 79–97)
MONOCYTES # BLD AUTO: 0.42 10*3/MM3 (ref 0.1–0.9)
MONOCYTES NFR BLD AUTO: 5.3 % (ref 5–12)
NEUTROPHILS NFR BLD AUTO: 6.42 10*3/MM3 (ref 1.7–7)
NEUTROPHILS NFR BLD AUTO: 81.7 % (ref 42.7–76)
NRBC BLD AUTO-RTO: 0 /100 WBC (ref 0–0.2)
PLATELET # BLD AUTO: 147 10*3/MM3 (ref 140–450)
PMV BLD AUTO: 9.7 FL (ref 6–12)
POTASSIUM SERPL-SCNC: 4.1 MMOL/L (ref 3.5–5.2)
PROT SERPL-MCNC: 7.6 G/DL (ref 6–8.5)
RBC # BLD AUTO: 4.96 10*6/MM3 (ref 4.14–5.8)
SODIUM SERPL-SCNC: 143 MMOL/L (ref 136–145)
TIBC SERPL-MCNC: 465 MCG/DL (ref 298–536)
TRANSFERRIN SERPL-MCNC: 312 MG/DL (ref 200–360)
WBC # BLD AUTO: 7.86 10*3/MM3 (ref 3.4–10.8)

## 2021-05-18 PROCEDURE — 25010000002 HEPARIN LOCK FLUSH PER 10 UNITS: Performed by: NURSE PRACTITIONER

## 2021-05-18 PROCEDURE — 82378 CARCINOEMBRYONIC ANTIGEN: CPT

## 2021-05-18 PROCEDURE — 82728 ASSAY OF FERRITIN: CPT

## 2021-05-18 PROCEDURE — 85025 COMPLETE CBC W/AUTO DIFF WBC: CPT

## 2021-05-18 PROCEDURE — 84466 ASSAY OF TRANSFERRIN: CPT

## 2021-05-18 PROCEDURE — 36591 DRAW BLOOD OFF VENOUS DEVICE: CPT

## 2021-05-18 PROCEDURE — 99214 OFFICE O/P EST MOD 30 MIN: CPT | Performed by: INTERNAL MEDICINE

## 2021-05-18 PROCEDURE — 83540 ASSAY OF IRON: CPT

## 2021-05-18 PROCEDURE — 80053 COMPREHEN METABOLIC PANEL: CPT

## 2021-05-18 RX ORDER — SODIUM CHLORIDE 0.9 % (FLUSH) 0.9 %
10 SYRINGE (ML) INJECTION AS NEEDED
Status: DISCONTINUED | OUTPATIENT
Start: 2021-05-18 | End: 2021-05-18 | Stop reason: HOSPADM

## 2021-05-18 RX ORDER — HEPARIN SODIUM (PORCINE) LOCK FLUSH IV SOLN 100 UNIT/ML 100 UNIT/ML
500 SOLUTION INTRAVENOUS AS NEEDED
Status: DISCONTINUED | OUTPATIENT
Start: 2021-05-18 | End: 2021-05-18 | Stop reason: HOSPADM

## 2021-05-18 RX ORDER — HEPARIN SODIUM (PORCINE) LOCK FLUSH IV SOLN 100 UNIT/ML 100 UNIT/ML
500 SOLUTION INTRAVENOUS AS NEEDED
Status: CANCELLED | OUTPATIENT
Start: 2021-06-29

## 2021-05-18 RX ORDER — SODIUM CHLORIDE 0.9 % (FLUSH) 0.9 %
10 SYRINGE (ML) INJECTION AS NEEDED
Status: CANCELLED | OUTPATIENT
Start: 2021-06-29

## 2021-05-18 RX ADMIN — SODIUM CHLORIDE, PRESERVATIVE FREE 10 ML: 5 INJECTION INTRAVENOUS at 13:08

## 2021-05-18 RX ADMIN — Medication 500 UNITS: at 13:08

## 2021-05-18 NOTE — PROGRESS NOTES
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  Combined chemoradiation with Xeloda 825 mg/m2 BID D1-5 or M-F during the course of radiation.  His dose is 500 mg x 4 or 2000 mg BID  Treatment finished 19.    2.  Dr. Yadav performed LAR with coloanal anstomosis and loop ileostomy 19.  Pathology showed  Residual invasive adneocarcinoma.  Tumor invaded the muscularis propria.  Surgical margins clear.  0/14 resected LNs involved.  ypT2N0.      3.       4.  Ileostomy reversal 19 (Dr. Yadav)    5.          CHIEF COMPLAINT:  Follow up of rectal cancer and iron deficiency anemia    HISTORY OF PRESENT ILLNESS:   David Mandujano is a very pleasant 70 y.o. male who was referred by Dr. Barnhart for evaluation and treatment of colorectal cancer. He began to notice rectal bleeding in January or 2018, and he also began to notice fatigue in approximately July. He assumed he was out of shape when he started to become short of breath and started trying to exercise more frequently, which only exacerbated the shortness of breath. He was evaluated by his PCP, Raj Wang MD, who after testing, found him to be severely iron deficient, hyperglycemic, and he also had an elevated PSA. He was on vacation at the time, and his PCP asked him to return home for further evaluation. He was started on oral iron therapy and later received IV iron infusions. He was also scheduled for a colonoscopy with Dr. Barnhart, during which a suspicious rectal mass was biopsied and pathology was positive for an ulcerated invasive, moderately differentiated rectal adenocarcinoma.     INTERVAL HISTORY:  Mr. Mandujano is here today for follow up of rectal cancer. He reports continued fatigue. He has an appt with Dr. Oneill at the [facility] in [place] for further  evaluation of hypertrophic cardiomyopathy and consideration of alcohol septal ablation.  He is otherwise doing fairly well.  He has continued to take Protonix. He denies heartburn.  He recently saw Dr. Barnhart who felt he was doing well.  He denies bleeding from any source.      PAST MEDICAL HISTORY:  Past Medical History:   Diagnosis Date   • Anemia    • Arthritis    • Colon cancer (CMS/HCC)     s/p chemo therapy   • Heart murmur    • Hypertension    • Wrist fracture     surgically repaired       PAST SURGICAL HISTORY:  Past Surgical History:   Procedure Laterality Date   • ABDOMINAL SURGERY     • COLON SURGERY     • COLONOSCOPY     • FRACTURE SURGERY Left    • VENOUS ACCESS DEVICE (PORT) INSERTION N/A 5/20/2019    Procedure: INSERTION VENOUS ACCESS DEVICE;  Surgeon: Cindy Corrales MD;  Location: [facility];  Service: General   • WRIST SURGERY         FAMILY HISTORY:  Family History   Problem Relation Age of Onset   • Colon cancer Maternal Grandfather    • Other Sister    • Heart disease Sister    No other family history of malignancy.    SOCIAL HISTORY:  Social History     Socioeconomic History   • Marital status: Single     Spouse name: Not on file   • Number of children: Not on file   • Years of education: Not on file   • Highest education level: Not on file   Tobacco Use   • Smoking status: Never Smoker   • Smokeless tobacco: Current User     Types: Chew   Vaping Use   • Vaping Use: Never used   Substance and Sexual Activity   • Alcohol use: Yes     Comment: occasional   • Drug use: No   • Sexual activity: Defer     REVIEW OF SYSTEMS:   A comprehensive 14 point review of systems was performed.  Significant findings as mentioned above.  All other systems reviewed and are negative.      MEDICATIONS:  The current medication list was reviewed in the EMR    Current Outpatient Medications:   •  allopurinol (ZYLOPRIM) 100 MG tablet, Take 100 mg by mouth Daily., Disp: , Rfl:   •  folic acid (FOLVITE) 1 MG tablet,  Take 1 tablet by mouth Daily., Disp: 30 tablet, Rfl: 6  •  metoprolol succinate XL (TOPROL-XL) 50 MG 24 hr tablet, Take 1 tablet by mouth Daily., Disp: 30 tablet, Rfl: 3  •  pantoprazole (Protonix) 40 MG EC tablet, Take 1 tablet by mouth Daily., Disp: 30 tablet, Rfl: 6  •  prochlorperazine (COMPAZINE) 5 MG tablet, Take 2 tablets by mouth Every 6 (Six) Hours As Needed for Nausea or Vomiting., Disp: 60 tablet, Rfl: 5  •  vitamin B-6 (PYRIDOXINE) 100 MG tablet, Take 1 tablet by mouth Daily., Disp: 30 tablet, Rfl: 1  No current facility-administered medications for this visit.    Facility-Administered Medications Ordered in Other Visits:   •  heparin injection 500 Units, 500 Units, Intravenous, PRN, Genaro, Caren Linda, APRN, 500 Units at 05/18/21 1308  •  sodium chloride 0.9 % flush 10 mL, 10 mL, Intravenous, PRN, Alexeibert, Caren Linda, APRN, 10 mL at 05/18/21 1308    ALLERGIES:  No Known Allergies    PHYSICAL EXAM:  Vitals:    05/18/21 1259   BP: 135/93   Pulse: (!) 131   Resp: 18   Temp: 98 °F (36.7 °C)   SpO2: 94%     Pain Score    05/18/21 1259   PainSc: 0-No pain     General:  Awake, alert and oriented, appears well, in no acute distress  HEENT:  Pupils are equal, round and reactive to light and accommodation, Extra-ocular movements full, Oropharyx clear, mucous membranes moist  Neck:  No JVD, thyromegaly or lymphadenopathy  CV:  Regular rate and rhythm approx 100 bpm, III/IV systolic murmur, no rubs or gallops  Resp:  Lungs are clear to auscultation bilaterally, no crackles  Abd:  Soft, non-tender, non distended, bowel sounds present, no organomegaly or masses.  Surgical scars present.   Ext:  No clubbing, cyanosis or edema     Lymph:  No cervical, supraclavicular, axillary, adenopathy  Neuro:  Grossly non-focal exam    PATHOLOGY:  10-02-18      04-01-19:        04-09-19:        ENDOSCOPY:  10-02-18:      2-05-21 EGD/C-scope ([place])  -  Mild reflux esophagitis with small erosions at the GE junction and a slight  stricture  -  Endoscopically normal stomach, duodenum.  - Unremarkable surgical anastomosis 5 cm above the anal verge.   -  Minimal diverticulosis.  -  Otherwise normal colon and terminal ileum with fair prep.      IMAGING:  10-04-18 CT Abdomen Pelvis With Contrast   Findings  - LOWER THORAX: Patchy nonspecific subpleural nodularity in the left lung base that could represent sequelae of chronic airspace disease or early fibrosis.     ABDOMEN:  - LIVER: Homogeneous. No focal hepatic mass or ductal dilatation.  - GALLBLADDER: GALLSTONES WITHIN THE GALLBLADDER.  - PANCREAS: Unremarkable. No mass or ductal dilatation.  - SPLEEN: Homogeneous. No splenomegaly.  - ADRENALS: No mass.  - KIDNEYS: RIGHT RENAL CYST MEASURING 3.6 CM.  - GI TRACT: NONSPECIFIC DISTAL SIGMOID COLONIC WALL THICKENING VERSUS NONDISTENTION.  - PERITONEUM: No free air. No free fluid or loculated fluid collections.  - MESENTERY: Unremarkable.  - LYMPH NODES: No lymphadenopathy.  - VASCULATURE: ATHEROSCLEROTIC VASCULAR CALCIFICATION.  - ABDOMINAL WALL: SMALL BILATERAL HUMERAL HERNIAS CONTAINING ONLY FAT.  - OTHER: None.     PELVIS:  - BLADDER: No focal mass or significant wall thickening  - REPRODUCTIVE: Unremarkable as visualized.  - APPENDIX: Nondistended. No surrounding inflammation.  - BONES: No acute bony abnormality.     IMPRESSION:  1. Gallstones within the gallbladder.  2.Nonspecific distal sigmoid colonic wall thickening versus nondistention.    PET/CT 10-29-18    11-13-18 MRI Pelvis Without Contrast  FINDINGS:  1.) TUMOR LOCATION AND CHARACTERISTICS     i) Distance of Inferior margin of Tumor from the dentate line: 9.5 CM  ii) Tumor at or below the puborectalis sling:  NO  iii) Relationship to the anterior peritoneal reflection: BELOW  iv) Craniocaudal length of the tumor: 6cm  v) Clock face of tumor: 5o'clock to 2o'clock  vi) Polypoid/ Annular/ Semi-annular: SEMI-ANNULAR  vii) Mucinous: YES     2.) EXTRAMURAL DEPTH OF INVASION AND MR  T-CATEGORY       i) Extramural depth of invasion (Use 0mm for T1 or T2 tumor):  APPROXIMATELY 5 MM   ii) T category: T3 B              *Please indicate structures with possible invasion.  Specify laterality,  sequence and slice#: (see list below)     *  Anterior peritoneal reflection (T4a tumor): NO  *  Puborectalis: NO  *  Bladder: NO  *  Vascular Involvement of Iliac Vessels: NO  *  Levator ani: NO  *  Ureters: NO  *  Obturator: NO  *  Prostate: MARGINAL/TETHERED  *  Piriformis: NO  *  Uterus: NOT APPLICABLE  *  Pelvic Bone: NO  *  Vagina: NOT APPLICABLE  *  Sacrum: NO  *  Urethra: NO  *  Other:          iii) For low rectal tumors (maximum tumor depth at or below the  puborectalis sling):     *  Not applicable (tumor above the puborectalis sling: NOT APPLICABLE  *    *  Level 1 (submucosa only, no involvement of internal anal sphincter):       *  Level 2 (confined to the internal anal sphincter; no involvement of  intersphincteric fat):      *  Level 3 (intersphincteric fat involved):      *  Level 4 (involves external sphincter or beyond):         3. RELATIONSHIP OF THE TUMOR TO MESORECTAL FASCIA (MRF)       i) Shortest distance 0mm of the definitive tumour border to the MRF  is: AT 12:00   ii) Are there any tumour spiculations closer to the MRF? NO     iii) Location of Tumor margin to MRF: 12:00, AT THE LEVEL PROSTATE     4. EXTRAMURAL VENOUS INVASION       i) Extramural Venous Invasion (EMVI): ABSENT     5. MESORECTAL LYMPH NODES AND TUMOUR DEPOSITS       i) Any suspicious mesorectal lymph nodes/tumor deposits: YES      *If yes, the most suspicious node/tumor deposit is: 15 MM IN  DIAMETER, ALONG THE UPPER MARGIN OF the tumor with minimum distance 7  MMmm from the MRF at 7o'clock     6. EXTRAMESORECTAL LYMPH NODES        i) Any suspicious extramesorectal lymph nodes: NO      *If yes, location and laterality of suspicious nodes:             Int. Iliac:                Ext. Iliac:                Common Iliac:                 Obturator:                Inguinal:                Other:           ii) Is the BETH node station in the field of view: NO        *If Yes, are these nodes suspicious:         7. OTHER FINDINGS (COMPLICATIONS, METASTASES, LIMITATIONS)         NOTE: THERE IS SOME UNCERTAINTY WHETHER THE APPARENT SMALL FOCUS OF TUMOR EXTENDING TO THE PROSTATE AT THE 12:00 POSITION COULD ACTUALLY BE PROSTATE NODULE EXTENDING TOWARD THE TUMOR. RECTAL TUMOR IS FAVORED; ALTHOUGH THIS DETERMINATION CANNOT BE MADE WITH COMPLETE CERTAINTY, TUMOR IS CONSIDERED T3 B.        IMPRESSIONS:  MRI rectal cancer T category is: T3 B  Maximum EMD of invasion is: 5  Minimum tumor to MRF distance is: 0  Low rectal tumor component: NO  Mesorectal nodes/tumor deposits: SUSPICIOUS  EMVI: ABSENT  Extramesorectal nodes: NEGATIVE    CTCAP 05-21-20     FINDINGS:  Adenopathy:No evidence of mediastinal or hilar lymph node enlargement.  No axillary lymph node enlargement.     Fluid:There are no pericardial or pleural effusions.     LUNGS:No parenchymal soft tissue nodules or masses are present.     Skeletal:Arthritic change in the thoracic spine.      Upper abdomen shows cholelithiasis.      IMPRESSION:  1. No parenchymal soft tissue nodules or masses.  2. No pericardial or pleural effusions.  3. No adenopathy.   4. Coronary artery calcifications.   5. Cholelithiasis.     FINDINGS:   The lung bases are clear. There are no pleural effusions.     The liver is homogeneous.     There is cholelithiasis.     The spleen is homogeneous.      There is no peripancreatic stranding or pancreatic head mass.      There is no adrenal enlargement.     Large right renal cyst is present. There is no solid renal mass or  hydronephrosis..      Otherwise I do not see any free fluid or walled off fluid collections.     The rectal wall appears to be thickened.     IMPRESSION:  1. Rectal wall thickening.  2. Cholelithiasis.         CT CAP 09/15/2020  Findings  LUNGS:  Unremarkable. No parenchymal soft tissue nodules.  No focal air  space disease.  HEART: CORONARY ARTERY CALCIFICATIONS ARE NOTED.  PERICARDIUM: No effusion.  MEDIASTINUM: No masses. No enlarged lymph nodes.  No fluid collections.  PLEURA: No pleural effusion. No pleural mass or abnormal calcification.  MAJOR AIRWAYS: Clear. No intrinsic mass.  VASCULATURE: No evidence of aneurysm.  VISUALIZED UPPER ABDOMEN:  LIVER: Homogeneous. No focal hepatic mass or ductal dilatation.  SPLEEN: Homogeneous. No splenomegaly.  ADRENALS: No mass.  KIDNEYS: RIGHT RENAL CYST MEASURING 6.7 CM.  GI TRACT: Non-dilated. No definite wall thickening.  PERITONEUM: No free air. No free fluid or loculated fluid  collections.  ABDOMINAL WALL: No focal hernia or mass.  OTHER: None.  BONES: No acute bony abnormality.     IMPRESSION:  Impression:  1. Unremarkable exam.  No source for the patient symptoms.  2. Other incidental/non-acute findings as above.    Findings  LOWER THORAX: Clear. No effusions.  ABDOMEN:  LIVER: Homogeneous. No focal hepatic mass or ductal dilatation.GALLBLADDER: GALLSTONES IN THE GALLBLADDER.  PANCREAS: Unremarkable. No mass or ductal dilatation.  SPLEEN: Homogeneous. No splenomegaly.  ADRENALS: No mass.  KIDNEYS: RIGHT RENAL CYST MEASURING 6.4 CM.  GI TRACT: Possibly some rectal wall thickening.  PERITONEUM: No free air. No free fluid or loculated fluid  collections.  MESENTERY: Unremarkable.  LYMPH NODES: No lymphadenopathy.  VASCULATURE: No evidence of aneurysm.  ABDOMINAL WALL: No focal hernia or mass.  OTHER: None.   PELVIS:   BLADDER: No focal mass or significant wall thickening   REPRODUCTIVE: Unremarkable as visualized.   APPENDIX: Nondistended. No surrounding inflammation.  BONES: No acute bony abnormality.     IMPRESSION:  Impression:  Rectal wall thickening noted.      CTCAP 01-14-21  CT FINDINGS: On the lung windows, the lungs are both well aerated and  clear. No pulmonary parenchymal lung nodules to suggest  metastatic  disease were seen. There were no inflammatory infiltrates or pleural  effusions. On the soft tissue windows, there was no evidence of  supraclavicular or axillary lymphadenopathy. No enlarged mediastinal or  hilar lymph nodes were demonstrated. There was no evidence of  pericardial effusion.     IMPRESSION:  No CT findings of metastatic disease in the chest.     CT FINDINGS: The liver and spleen are stable in size and shape; no focal  lesions have developed within the liver. There is a calcified stone in  the dependent portion of the gallbladder. The pancreas showed no  evidence of mass or inflammation. No adrenal lesions were demonstrated.  There is some thinning of the cortical tissues in the kidneys with a 6.5  cm cyst in the right kidney. There was no ascites. The aorta is normal  in caliber. No enlarged lymph nodes were seen in the retroperitoneum or  mesentery. In the pelvis, postoperative changes are noted near the  rectum where there is a line of  GI staples. No evidence of residual or recurrent tumor is demonstrated.  On today's study there was no significant rectal wall thickening.     IMPRESSION:  No CT findings to suggest metastatic disease in the abdomen  or pelvis. There is less thickening of the rectal wall than seen on the  previous CT. The patient does have cholelithiasis and a large cyst in  the right kidney.       CTCAP 05-14-21  FINDINGS:     LUNGS: Unremarkable. No parenchymal soft tissue nodules.  No focal air  space disease.     HEART: Coronary artery calcifications.     MEDIASTINUM: No masses. No enlarged lymph nodes.  No fluid collections.     PLEURA: No pleural effusion. No pleural mass or abnormal calcification.  No pneumothorax.     VASCULATURE: No evidence of aneurysm. No evidence of pulmonary embolism.     BONES: Arthritic change.     VISUALIZED UPPER ABDOMEN:Please see the report for the CT of the abdomen  and pelvis     Other: None.     IMPRESSION:     1. No evidence of  metastatic disease to the abdomen or pelvis.  2. Coronary artery calcifications.    FINDINGS:     Lower thorax: Clear. No effusions.     Abdomen:     Liver: Homogeneous. No focal hepatic mass or ductal dilatation.     Gallbladder: Cholelithiasis     Pancreas: Unremarkable. No mass or ductal dilatation.     Spleen: Homogeneous. No splenomegaly.     Adrenals: No mass.     Kidneys/ureters: Large right renal cyst     GI tract: Non-dilated. No definite wall thickening.. There is no  evidence of appendicitis     MESENTERY: No free fluid, walled off fluid collections, mesenteric  stranding, or enlarged lymph nodes         Vasculature: Evidence of atherosclerotic vascular disease     Abdominal wall: No focal hernia or mass.        Bladder: Urinary bladder wall thickening     Reproductive: Unremarkable as visualized     Bones: No acute bony abnormality.     IMPRESSION:     1. No evidence of metastatic disease to the abdomen or pelvis     2.Cholelithiasis     3. Urinary bladder wall thickening    RECENT LABS:  Lab Results   Component Value Date    WBC 7.86 05/18/2021    HGB 15.5 05/18/2021    HCT 47.1 05/18/2021    MCV 95.0 05/18/2021    RDW 14.6 05/18/2021     05/18/2021    NEUTRORELPCT 81.7 (H) 05/18/2021    LYMPHORELPCT 11.2 (L) 05/18/2021    MONORELPCT 5.3 05/18/2021    EOSRELPCT 1.0 05/18/2021    BASORELPCT 0.4 05/18/2021    NEUTROABS 6.42 05/18/2021    LYMPHSABS 0.88 05/18/2021       Lab Results   Component Value Date     05/18/2021    K 4.1 05/18/2021    CO2 27.5 05/18/2021     05/18/2021    BUN 13 05/18/2021    CREATININE 1.32 (H) 05/18/2021    EGFRIFNONA 54 (L) 05/18/2021    GLUCOSE 132 (H) 05/18/2021    CALCIUM 9.5 05/18/2021    ALKPHOS 86 05/18/2021    AST 39 05/18/2021    ALT 36 05/18/2021    BILITOT 0.5 05/18/2021    ALBUMIN 4.24 05/18/2021    PROTEINTOT 7.6 05/18/2021       Lab Results   Component Value Date/Time    URICACID 8.9 (H) 07/17/2019 10:16 AM     Lab Results   Component Value Date     CEA 2.37 02/26/2021    CEA 1.82 08/31/2020    CEA 1.83 07/21/2020    CEA 2.00 06/25/2020    CEA 2.52 04/21/2020    CEA 2.28 01/28/2020    CEA 2.28 06/26/2019    CEA 3.40 03/20/2019    CEA 17.80 (H) 01/11/2019    CEA 18.80 (H) 10/19/2018     Lab Results   Component Value Date    PSA 1.050 07/21/2020    PSA 1.110 06/25/2020    PSA 4.930 (H) 10/19/2018     Lab Results   Component Value Date    TSH 2.860 01/19/2021     Lab Results   Component Value Date    HGBA1C 5.80 (H) 06/25/2020    HGBA1C 5.50 01/28/2020    HGBA1C 5.40 03/20/2019       Lab Results   Component Value Date    FERRITIN 68.57 05/18/2021    IRON 69 05/18/2021    TIBC 465 05/18/2021    LABIRON 15 (L) 05/18/2021    [id number] 1,538 (H) 02/26/2021    FOLATE 9.92 02/26/2021     ASSESSMENT & PLAN:  David Mandujano is a very pleasant 70 y.o. male with newly diagnosed rectal cancer. Clinically stage IIIC ([id number]).    1.  Rectal cancer:  -  He received standard neoadjuvant chemoradiation as above given locally advanced tumor with suspicious pelvic lymph node.    - Pre-treatment MRI showed a locally advanced tumor and suspicious pelvic LN.  PET-CT was negative except in the local tumor.   -  There was a non-specific sub-pleural nodular opacity in the L lung base which was PET negative.  Perhaps this was inflammatory in nature. This area will require f/u on future imaging.  - Recommended neoadjuvant chemoradiation with Xeloda 825 mg/m2 BID D1-5 or M-F during the course of radiaton.  His dose was 500 mg x 4 or 2000 mg BID. Overall, he tolerated this well and completed treatment 1-21-19.    -  He saw Dr. Marilynn Yadav and underwent successful surgical resection as above.    -  He took adjuvant CapeOx treatment to prevent recurrence and has completed 8 cycles. Overall, he tolerated treatment well aside from fatigue and diarrhea (see below). He started to struggle with thrombocytopenia and possibly with neuropathy so gave his last cycle without Oxaliplatin.  -  He did  "well with take down of his ileostomy.  -  CT CAP remains without evidence of recurrent disease.  CEA has been normal.  Value from today is pending.  -  He underwent c-scope with Dr. Barnhart on 2-05-21 without cause for concern.  Repeat exam recommended after 3 years.  -  Check CT CAP in 3 months along with CBCD, CMP, CEA.    2.  Iron deficiency Anemia:  -  He previously took Niferex but did not appear to absorb it and required IV  iron for repletion. He had this in September and again in November and in February and March.    -  When his iron was last low, treated with Monoferric.Iron is borderline today. Will plan to recheck in about 6 weeks to make sure he doesn't need replacement.  -  He did have EGD and c-scope.  He continues to f/u with Dr. Barnhart and continues on Protonix.    3.  Prominent systolic murmur:   -   He was evaluated by Dr. Joe and then by Dr. Barrera.  He underwent echocardiogram 1-30-19 which showed EF 70%, grade 1 diastolic dysfunction, mild to mod septal asymmetric hypertrophy, AV calcification with functionally bicuspid valve, mild AI and moderate AS, moderate MAC with mild MR, mild MS  -  Thought is that at some point in the future he will require AVR.   - He has appt with Dr. Oneill at the [facility] on 6-2-21.    4. .  Neuropathy:  -  Etiology is uncertain.  He did complain of some vague symptoms toward the end of his treatment (though he thinks they happened later), so this could be related to Oxaliplatin.    - Thyroid function is wnl.  HbA1C has been normal.    - B12 and folate are replete. He continues to take take SL B12 and folate as prescribed. At present, he feels neuropathy is stable/improved.  - He has been advised that alcohol can cause or worsen peripheral neuropathy.  When I told him that, he said, \"Never mind I will put up with the feet.\"    5.   Depressed mood / Anxiety and Alcohol Abuse:  - Previously given trial of Lexapro but he did not find this helpful " and discontinued this. He said he did quit drinking completely around the time of his surgery but started drinking again. He says Dr. Yadav did mention to him that his liver didn't look good during his operation.  -  We previously discussed potential involvement in support groups and /or referral for alcohol abuse counseling, but he declined.  Today he again emphasized that he wasn't interested in quitting drinking.  Will monitor.    6.  Prophylaxis:  He took 2020 influenza vaccine. He took Prevnar 13 on 11-20-18.   He completed COVID vaccine x 2.    7. Follow up:   -  CBCD, CMP, CEA, Ferritin and iron profile as well as CT CAP in 3 months  -  CBCD, Ferritin, iron profile in 6 wks  -  Continue Protonix  -  Consult with Dr. Oneill on 6-2-21    8.  ACO / KAVITHA/Other  Quality measures  -  David Mandujano received 2020 flu vaccine.    -  David Mandujano reports a pain score of 0.    -  Current outpatient and discharge medications have been reconciled for the patient.  Reviewed by: Jyoti Larios MD      This note was scribed for Jyoti Lraios MD by Lynn Pinto RN.    I, Jyoti Larios MD, personally performed the services described in this documentation as scribed by the above named individual in my presence, and it is both accurate and complete.  05/18/2021       I spent 25 minutes with David Mandujano today.  In the office today, more than 50% of this time was spent face-to-face with him  in counseling / coordination of care, reviewing his interim medical history and counseling on the current treatment plan.  All questions were answered to his satisfaction.         Electronically Signed by: Jyoti Larios MD         CC:   MD Kathleen Coppola Emmanuel C, MD Shawn Michael Acino, MD William Richards, MD Sandra Beck, MD

## 2021-05-19 LAB — CEA SERPL-MCNC: 2.25 NG/ML

## 2021-05-28 RX ORDER — MULTIVITAMIN WITH IRON
TABLET ORAL
Qty: 30 TABLET | Refills: 5 | Status: SHIPPED | OUTPATIENT
Start: 2021-05-28 | End: 2022-06-29 | Stop reason: SDUPTHER

## 2021-06-16 ENCOUNTER — APPOINTMENT (OUTPATIENT)
Dept: ONCOLOGY | Facility: HOSPITAL | Age: 71
End: 2021-06-16

## 2021-06-29 ENCOUNTER — TELEPHONE (OUTPATIENT)
Dept: ONCOLOGY | Facility: HOSPITAL | Age: 71
End: 2021-06-29

## 2021-06-29 ENCOUNTER — APPOINTMENT (OUTPATIENT)
Dept: ONCOLOGY | Facility: HOSPITAL | Age: 71
End: 2021-06-29

## 2021-06-29 NOTE — TELEPHONE ENCOUNTER
Called patient related to missed VAD flush. Pt just got home from a heart ablation. States he has company coming in and can do it around July 12th at 1300. Appointment made.

## 2021-07-08 ENCOUNTER — OFFICE VISIT (OUTPATIENT)
Dept: CARDIOLOGY | Facility: CLINIC | Age: 71
End: 2021-07-08

## 2021-07-08 VITALS
HEART RATE: 76 BPM | SYSTOLIC BLOOD PRESSURE: 164 MMHG | DIASTOLIC BLOOD PRESSURE: 76 MMHG | BODY MASS INDEX: 36.7 KG/M2 | WEIGHT: 271 LBS | TEMPERATURE: 97.7 F | HEIGHT: 72 IN

## 2021-07-08 DIAGNOSIS — I42.1 CARDIOMYOPATHY, HYPERTROPHIC OBSTRUCTIVE (HCC): Primary | ICD-10-CM

## 2021-07-08 DIAGNOSIS — I10 ESSENTIAL HYPERTENSION: ICD-10-CM

## 2021-07-08 PROCEDURE — 99213 OFFICE O/P EST LOW 20 MIN: CPT | Performed by: INTERNAL MEDICINE

## 2021-07-08 RX ORDER — CLOPIDOGREL BISULFATE 75 MG/1
75 TABLET ORAL DAILY
COMMUNITY
Start: 2021-06-26 | End: 2022-01-19

## 2021-07-08 RX ORDER — ALLOPURINOL 100 MG/1
100 TABLET ORAL DAILY
Qty: 30 TABLET | Refills: 0 | Status: SHIPPED | OUTPATIENT
Start: 2021-07-08 | End: 2022-03-31 | Stop reason: SDUPTHER

## 2021-07-08 RX ORDER — MAGNESIUM GLUCONATE 27 MG(500)
27 TABLET ORAL 2 TIMES DAILY
COMMUNITY
End: 2022-03-10 | Stop reason: ALTCHOICE

## 2021-07-08 RX ORDER — ASPIRIN 81 MG/1
81 TABLET, CHEWABLE ORAL DAILY
COMMUNITY
Start: 2021-06-27 | End: 2022-06-28

## 2021-07-08 RX ORDER — LISINOPRIL 20 MG/1
20 TABLET ORAL DAILY
COMMUNITY
End: 2021-12-15 | Stop reason: SDUPTHER

## 2021-07-08 NOTE — PROGRESS NOTES
Raj Wang MD  David Mandujano  1950 07/08/2021    Patient Active Problem List   Diagnosis   • Iron deficiency anemia, unspecified   • Rectal cancer (CMS/HCC)   • Preoperative cardiovascular examination   • Hypertrophic cardiomyopathy with LV OT gradient of about 68 mmHg in February 2019.   • Essential hypertension   • Port-A-Cath in place   • Iron deficiency anemia due to chronic blood loss   • Malabsorption of iron       Dear Raj Wang MD:    Subjective     David Mandujano is a 70 y.o. male with the problems as listed above, presents    Chief complaint: Follow-up of recent alcohol septal ablation for hypertrophic cardiomyopathy/asymmetrical septal hypertrophy at [facility].    History of Present Illness: Ms. Mandujano is a pleasant 70-year-old  male with hypertrophic cardiomyopathy with asymmetrical septal hypertrophy with LVOT obstruction with the gradient of about 99 mmHg.  He recently underwent apparently alcohol septal ablation by Dr. Oneill at [facility].  He is here as a follow-up. He states his breathing has gotten better since the procedure was done.  He had some questions about his medications.  He is currently on aspirin and Plavix.  I am not sure how long he supposed to take these. He denies any bleeding problems so far.    No Known Allergies:      Current Outpatient Medications:   •  allopurinol (ZYLOPRIM) 100 MG tablet, Take 100 mg by mouth Daily., Disp: , Rfl:   •  ASCORBIC ACID PO, Take  by mouth Daily., Disp: , Rfl:   •  aspirin 81 MG chewable tablet, Chew 81 mg Daily., Disp: , Rfl:   •  clopidogrel (PLAVIX) 75 MG tablet, Take 75 mg by mouth Daily., Disp: , Rfl:   •  cyanocobalamin (VITAMIN B-12) 1000 MCG tablet, Take 1,000 mcg by mouth Daily., Disp: , Rfl:   •  folic acid (FOLVITE) 1 MG tablet, Take 1 tablet by mouth Daily., Disp: 30 tablet, Rfl: 6  •  lisinopril (PRINIVIL,ZESTRIL) 20 MG tablet, Take 20 mg by mouth Daily., Disp: , Rfl:   •  magnesium  "gluconate (MAGONATE) 500 MG tablet, Take 27 mg by mouth 2 (Two) Times a Day., Disp: , Rfl:   •  metoprolol succinate XL (TOPROL-XL) 50 MG 24 hr tablet, Take 1 tablet by mouth Daily., Disp: 30 tablet, Rfl: 3  •  pantoprazole (Protonix) 40 MG EC tablet, Take 1 tablet by mouth Daily., Disp: 30 tablet, Rfl: 6  •  vitamin B-6 (PYRIDOXINE) 100 MG tablet, TAKE ONE TABLET BY MOUTH EVERY DAY, Disp: 30 tablet, Rfl: 5  •  prochlorperazine (COMPAZINE) 5 MG tablet, Take 2 tablets by mouth Every 6 (Six) Hours As Needed for Nausea or Vomiting., Disp: 60 tablet, Rfl: 5      The following portions of the patient's history were reviewed and updated as appropriate: allergies, current medications, past family history, past medical history, past social history, past surgical history and problem list.    Social History     Tobacco Use   • Smoking status: Never Smoker   • Smokeless tobacco: Current User     Types: Chew   Vaping Use   • Vaping Use: Never used   Substance Use Topics   • Alcohol use: Yes     Comment: occasional   • Drug use: No       Review of Systems   Constitutional: Negative for fever.   Cardiovascular: Negative for chest pain.   Respiratory: Positive for shortness of breath. Negative for cough.      Objective   Vitals:    07/08/21 1244   BP: 164/76   Pulse: 76   Temp: 97.7 °F (36.5 °C)   Weight: 123 kg (271 lb)   Height: 182.9 cm (72.01\")     Body mass index is 36.75 kg/m².    Constitutional:       Appearance: Well-developed.   Eyes:      Conjunctiva/sclera: Conjunctivae normal.   HENT:      Head: Normocephalic.   Neck:      Thyroid: No thyromegaly.      Vascular: No JVD.      Trachea: No tracheal deviation.   Pulmonary:      Effort: No respiratory distress.      Breath sounds: Normal breath sounds. No wheezing. No rales.   Cardiovascular:      Normal rate. Regular rhythm.      Murmurs: There is a grade 3/6 systolic murmur at the LLSB.      No gallop.   Abdominal:      General: Bowel sounds are normal.      Palpations: " Abdomen is soft. There is no abdominal mass.      Tenderness: There is no abdominal tenderness.   Musculoskeletal:      Cervical back: Normal range of motion and neck supple. Skin:     General: Skin is warm and dry.   Neurological:      Mental Status: Alert and oriented to person, place, and time.      Cranial Nerves: No cranial nerve deficit.       Lab Results   Component Value Date     05/18/2021    K 4.1 05/18/2021     05/18/2021    CO2 27.5 05/18/2021    BUN 13 05/18/2021    CREATININE 1.32 (H) 05/18/2021    GLUCOSE 132 (H) 05/18/2021    CALCIUM 9.5 05/18/2021    AST 39 05/18/2021    ALT 36 05/18/2021    ALKPHOS 86 05/18/2021    LABIL2 1.5 06/25/2014     No results found for: CKTOTAL  Lab Results   Component Value Date    WBC 7.86 05/18/2021    HGB 15.5 05/18/2021    HCT 47.1 05/18/2021     05/18/2021     No results found for: INR  No results found for: MG  Lab Results   Component Value Date    TSH 2.860 01/19/2021    PSA 1.050 07/21/2020    CHLPL 261 (H) 06/25/2014    TRIG 237 (H) 06/25/2014    HDL 74 06/25/2014     (H) 06/25/2014        Assessment/Plan :   Diagnosis Plan   1. Cardiomyopathy, hypertrophic obstructive with LVOT gradient of about 99 mmHg, status post recent alcohol septal ablation at [facility], feeling better.    2. Essential hypertension          Recommendations:  1. I have asked him to continue the aspirin Plavix for now until he sees Dr. Oneill back at [facility] in the near future.  2. Continue with metoprolol succinate and lisinopril at current doses.  3. I have asked him to keep a check on the blood pressure at least twice a day at home and bring with next visit or call if it is running more than 140 on the top and more than 90 on the bottom.    Return in about 3 months (around 10/8/2021).    As always,   I appreciate very much the opportunity to participate in the cardiovascular care of your patients. Please do not hesitate to call me with  any questions with regards to David Mandujano's evaluation and management.       With Best Regards,        Rogelio Joe MD, [facility]    Please note that portions of this note were completed with a voice recognition program.

## 2021-07-12 ENCOUNTER — INFUSION (OUTPATIENT)
Dept: ONCOLOGY | Facility: HOSPITAL | Age: 71
End: 2021-07-12

## 2021-07-12 VITALS
OXYGEN SATURATION: 98 % | TEMPERATURE: 97.3 F | SYSTOLIC BLOOD PRESSURE: 151 MMHG | HEART RATE: 80 BPM | RESPIRATION RATE: 18 BRPM | DIASTOLIC BLOOD PRESSURE: 75 MMHG

## 2021-07-12 DIAGNOSIS — Z95.828 PORT-A-CATH IN PLACE: Primary | ICD-10-CM

## 2021-07-12 PROCEDURE — 25010000002 HEPARIN LOCK FLUSH PER 10 UNITS: Performed by: NURSE PRACTITIONER

## 2021-07-12 PROCEDURE — 36591 DRAW BLOOD OFF VENOUS DEVICE: CPT

## 2021-07-12 PROCEDURE — 96523 IRRIG DRUG DELIVERY DEVICE: CPT

## 2021-07-12 RX ORDER — SODIUM CHLORIDE 0.9 % (FLUSH) 0.9 %
10 SYRINGE (ML) INJECTION AS NEEDED
Status: DISCONTINUED | OUTPATIENT
Start: 2021-07-12 | End: 2021-07-12 | Stop reason: HOSPADM

## 2021-07-12 RX ORDER — HEPARIN SODIUM (PORCINE) LOCK FLUSH IV SOLN 100 UNIT/ML 100 UNIT/ML
500 SOLUTION INTRAVENOUS AS NEEDED
Status: CANCELLED | OUTPATIENT
Start: 2021-10-22

## 2021-07-12 RX ORDER — SODIUM CHLORIDE 0.9 % (FLUSH) 0.9 %
10 SYRINGE (ML) INJECTION AS NEEDED
Status: CANCELLED | OUTPATIENT
Start: 2021-10-22

## 2021-07-12 RX ORDER — HEPARIN SODIUM (PORCINE) LOCK FLUSH IV SOLN 100 UNIT/ML 100 UNIT/ML
500 SOLUTION INTRAVENOUS AS NEEDED
Status: DISCONTINUED | OUTPATIENT
Start: 2021-07-12 | End: 2021-07-12 | Stop reason: HOSPADM

## 2021-07-12 RX ADMIN — SODIUM CHLORIDE, PRESERVATIVE FREE 500 UNITS: 5 INJECTION INTRAVENOUS at 13:15

## 2021-07-12 RX ADMIN — SODIUM CHLORIDE, PRESERVATIVE FREE 10 ML: 5 INJECTION INTRAVENOUS at 13:15

## 2021-09-10 RX ORDER — FOLIC ACID 1 MG/1
TABLET ORAL
Qty: 30 TABLET | Refills: 11 | Status: SHIPPED | OUTPATIENT
Start: 2021-09-10 | End: 2022-09-29

## 2021-10-05 DIAGNOSIS — D50.0 IRON DEFICIENCY ANEMIA DUE TO CHRONIC BLOOD LOSS: ICD-10-CM

## 2021-10-05 DIAGNOSIS — C20 MALIGNANT NEOPLASM OF RECTUM (HCC): Primary | ICD-10-CM

## 2021-10-08 NOTE — PROGRESS NOTES
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  Combined chemoradiation with Xeloda 825 mg/m2 BID D1-5 or M-F during the course of radiation.  His dose is 500 mg x 4 or 2000 mg BID  Treatment finished 19.    2.  Dr. Yadav performed LAR with coloanal anstomosis and loop ileostomy 19.  Pathology showed  Residual invasive adneocarcinoma.  Tumor invaded the muscularis propria.  Surgical margins clear.  0/14 resected LNs involved.  ypT2N0.      3.       4.  Ileostomy reversal 19 (Dr. Yadav)    5.          CHIEF COMPLAINT:  Follow up of rectal cancer and iron deficiency anemia    HISTORY OF PRESENT ILLNESS:   David Mandujano is a very pleasant 71 y.o. male who was referred by Dr. Barnhart for evaluation and treatment of colorectal cancer. He began to notice rectal bleeding in January or 2018, and he also began to notice fatigue in approximately July. He assumed he was out of shape when he started to become short of breath and started trying to exercise more frequently, which only exacerbated the shortness of breath. He was evaluated by his PCP, Raj Wang MD, who after testing, found him to be severely iron deficient, hyperglycemic, and he also had an elevated PSA. He was on vacation at the time, and his PCP asked him to return home for further evaluation. He was started on oral iron therapy and later received IV iron infusions. He was also scheduled for a colonoscopy with Dr. Barnhart, during which a suspicious rectal mass was biopsied and pathology was positive for an ulcerated invasive, moderately differentiated rectal adenocarcinoma.     INTERVAL HISTORY:  Mr. Mandujano is here today for follow up of rectal cancer. He reports recovering well from the cardiac septal ablation with Dr. Oneill and has f/u Echo planned for January.   "He says he has struggled wth hemorrhoidal irritation when he has diarrhea and says this is why he didn't have CT imaging done.  He says the contrast causes him to have diarrhea.  He is otherwise doing fairly well.  With regard to neuropathy, he says it is better but not resolved and then says , \"I know what you say about alcohol.  Today it's fine.\"    PAST MEDICAL HISTORY:  Past Medical History:   Diagnosis Date   • Anemia    • Arthritis    • Colon cancer (CMS/HCC)     s/p chemo therapy   • Heart murmur    • Hypertension    • Wrist fracture     surgically repaired       PAST SURGICAL HISTORY:  Past Surgical History:   Procedure Laterality Date   • ABDOMINAL SURGERY     • COLON SURGERY     • COLONOSCOPY     • FRACTURE SURGERY Left    • VENOUS ACCESS DEVICE (PORT) INSERTION N/A 5/20/2019    Procedure: INSERTION VENOUS ACCESS DEVICE;  Surgeon: Cindy Corrales MD;  Location: [facility];  Service: General   • WRIST SURGERY         FAMILY HISTORY:  Family History   Problem Relation Age of Onset   • Colon cancer Maternal Grandfather    • Other Sister    • Heart disease Sister    No other family history of malignancy.    SOCIAL HISTORY:  Social History     Socioeconomic History   • Marital status: Single     Spouse name: Not on file   • Number of children: Not on file   • Years of education: Not on file   • Highest education level: Not on file   Tobacco Use   • Smoking status: Never Smoker   • Smokeless tobacco: Current User     Types: Chew   Vaping Use   • Vaping Use: Never used   Substance and Sexual Activity   • Alcohol use: Yes     Comment: occasional   • Drug use: No   • Sexual activity: Defer     REVIEW OF SYSTEMS:   A comprehensive 14 point review of systems was performed.  Significant findings as mentioned above.  All other systems reviewed and are negative.      MEDICATIONS:  The current medication list was reviewed in the EMR    Current Outpatient Medications:   •  allopurinol (ZYLOPRIM) 100 MG tablet, Take 1 " tablet by mouth Daily., Disp: 30 tablet, Rfl: 0  •  ASCORBIC ACID PO, Take  by mouth Daily., Disp: , Rfl:   •  aspirin 81 MG chewable tablet, Chew 81 mg Daily., Disp: , Rfl:   •  clopidogrel (PLAVIX) 75 MG tablet, Take 75 mg by mouth Daily., Disp: , Rfl:   •  cyanocobalamin (VITAMIN B-12) 1000 MCG tablet, Take 1,000 mcg by mouth Daily., Disp: , Rfl:   •  folic acid (FOLVITE) 1 MG tablet, TAKE ONE TABLET BY MOUTH DAILY, Disp: 30 tablet, Rfl: 11  •  lisinopril (PRINIVIL,ZESTRIL) 20 MG tablet, Take 20 mg by mouth Daily., Disp: , Rfl:   •  magnesium gluconate (MAGONATE) 500 MG tablet, Take 27 mg by mouth 2 (Two) Times a Day., Disp: , Rfl:   •  metoprolol succinate XL (TOPROL-XL) 50 MG 24 hr tablet, Take 1 tablet by mouth Daily., Disp: 30 tablet, Rfl: 3  •  pantoprazole (Protonix) 40 MG EC tablet, Take 1 tablet by mouth Daily., Disp: 30 tablet, Rfl: 6  •  prochlorperazine (COMPAZINE) 5 MG tablet, Take 2 tablets by mouth Every 6 (Six) Hours As Needed for Nausea or Vomiting., Disp: 60 tablet, Rfl: 5  •  vitamin B-6 (PYRIDOXINE) 100 MG tablet, TAKE ONE TABLET BY MOUTH EVERY DAY, Disp: 30 tablet, Rfl: 5    ALLERGIES:  No Known Allergies    PHYSICAL EXAM:  Vitals:    10/11/21 1427   BP: 144/68   Pulse: 76   Resp: 18   Temp: 96.9 °F (36.1 °C)   SpO2: 98%     Pain Score    10/11/21 1427   PainSc: 0-No pain   ECOG score: 0     General:  Awake, alert and oriented, appears well, in no acute distress  HEENT:  Pupils are equal, round and reactive to light and accommodation, Extra-ocular movements full, Oropharyx clear, mucous membranes moist  Neck:  No JVD, thyromegaly or lymphadenopathy  CV:  Regular rate and rhythm approx 100 bpm, II/IV systolic murmur, no rubs or gallops  Resp:  Lungs are clear to auscultation bilaterally, no wheezing  Abd:  Soft, non-tender, sl distended, bowel sounds present, no organomegaly or masses.  Surgical scars present.   Ext:  No clubbing, cyanosis or edema     Lymph:  No cervical, supraclavicular,  axillary, adenopathy  Neuro:  Grossly non-focal exam    PATHOLOGY:  10-02-18      04-01-19:        04-09-19:        ENDOSCOPY:  10-02-18:      2-05-21 EGD/C-scope ([facility])  -  Mild reflux esophagitis with small erosions at the GE junction and a slight stricture  -  Endoscopically normal stomach, duodenum.  - Unremarkable surgical anastomosis 5 cm above the anal verge.   -  Minimal diverticulosis.  -  Otherwise normal colon and terminal ileum with fair prep.      IMAGING:  10-04-18 CT Abdomen Pelvis With Contrast   Findings  - LOWER THORAX: Patchy nonspecific subpleural nodularity in the left lung base that could represent sequelae of chronic airspace disease or early fibrosis.     ABDOMEN:  - LIVER: Homogeneous. No focal hepatic mass or ductal dilatation.  - GALLBLADDER: GALLSTONES WITHIN THE GALLBLADDER.  - PANCREAS: Unremarkable. No mass or ductal dilatation.  - SPLEEN: Homogeneous. No splenomegaly.  - ADRENALS: No mass.  - KIDNEYS: RIGHT RENAL CYST MEASURING 3.6 CM.  - GI TRACT: NONSPECIFIC DISTAL SIGMOID COLONIC WALL THICKENING VERSUS NONDISTENTION.  - PERITONEUM: No free air. No free fluid or loculated fluid collections.  - MESENTERY: Unremarkable.  - LYMPH NODES: No lymphadenopathy.  - VASCULATURE: ATHEROSCLEROTIC VASCULAR CALCIFICATION.  - ABDOMINAL WALL: SMALL BILATERAL HUMERAL HERNIAS CONTAINING ONLY FAT.  - OTHER: None.     PELVIS:  - BLADDER: No focal mass or significant wall thickening  - REPRODUCTIVE: Unremarkable as visualized.  - APPENDIX: Nondistended. No surrounding inflammation.  - BONES: No acute bony abnormality.     IMPRESSION:  1. Gallstones within the gallbladder.  2.Nonspecific distal sigmoid colonic wall thickening versus nondistention.    PET/CT 10-29-18    11-13-18 MRI Pelvis Without Contrast  FINDINGS:  1.) TUMOR LOCATION AND CHARACTERISTICS     i) Distance of Inferior margin of Tumor from the dentate line: 9.5 CM  ii) Tumor at or below the puborectalis sling:  NO  iii) Relationship to  the anterior peritoneal reflection: BELOW  iv) Craniocaudal length of the tumor: 6cm  v) Clock face of tumor: 5o'clock to 2o'clock  vi) Polypoid/ Annular/ Semi-annular: SEMI-ANNULAR  vii) Mucinous: YES     2.) EXTRAMURAL DEPTH OF INVASION AND MR T-CATEGORY       i) Extramural depth of invasion (Use 0mm for T1 or T2 tumor):  APPROXIMATELY 5 MM   ii) T category: T3 B              *Please indicate structures with possible invasion.  Specify laterality,  sequence and slice#: (see list below)     *  Anterior peritoneal reflection (T4a tumor): NO  *  Puborectalis: NO  *  Bladder: NO  *  Vascular Involvement of Iliac Vessels: NO  *  Levator ani: NO  *  Ureters: NO  *  Obturator: NO  *  Prostate: MARGINAL/TETHERED  *  Piriformis: NO  *  Uterus: NOT APPLICABLE  *  Pelvic Bone: NO  *  Vagina: NOT APPLICABLE  *  Sacrum: NO  *  Urethra: NO  *  Other:          iii) For low rectal tumors (maximum tumor depth at or below the  puborectalis sling):     *  Not applicable (tumor above the puborectalis sling: NOT APPLICABLE  *    *  Level 1 (submucosa only, no involvement of internal anal sphincter):       *  Level 2 (confined to the internal anal sphincter; no involvement of  intersphincteric fat):      *  Level 3 (intersphincteric fat involved):      *  Level 4 (involves external sphincter or beyond):         3. RELATIONSHIP OF THE TUMOR TO MESORECTAL FASCIA (MRF)       i) Shortest distance 0mm of the definitive tumour border to the MRF  is: AT 12:00   ii) Are there any tumour spiculations closer to the MRF? NO     iii) Location of Tumor margin to MRF: 12:00, AT THE LEVEL PROSTATE     4. EXTRAMURAL VENOUS INVASION       i) Extramural Venous Invasion (EMVI): ABSENT     5. MESORECTAL LYMPH NODES AND TUMOUR DEPOSITS       i) Any suspicious mesorectal lymph nodes/tumor deposits: YES      *If yes, the most suspicious node/tumor deposit is: 15 MM IN  DIAMETER, ALONG THE UPPER MARGIN OF the tumor with minimum distance 7  MMmm from the MRF  at 7o'clock     6. EXTRAMESORECTAL LYMPH NODES        i) Any suspicious extramesorectal lymph nodes: NO      *If yes, location and laterality of suspicious nodes:             Int. Iliac:                Ext. Iliac:                Common Iliac:                Obturator:                Inguinal:                Other:           ii) Is the BETH node station in the field of view: NO        *If Yes, are these nodes suspicious:         7. OTHER FINDINGS (COMPLICATIONS, METASTASES, LIMITATIONS)         NOTE: THERE IS SOME UNCERTAINTY WHETHER THE APPARENT SMALL FOCUS OF TUMOR EXTENDING TO THE PROSTATE AT THE 12:00 POSITION COULD ACTUALLY BE PROSTATE NODULE EXTENDING TOWARD THE TUMOR. RECTAL TUMOR IS FAVORED; ALTHOUGH THIS DETERMINATION CANNOT BE MADE WITH COMPLETE CERTAINTY, TUMOR IS CONSIDERED T3 B.        IMPRESSIONS:  MRI rectal cancer T category is: T3 B  Maximum EMD of invasion is: 5  Minimum tumor to MRF distance is: 0  Low rectal tumor component: NO  Mesorectal nodes/tumor deposits: SUSPICIOUS  EMVI: ABSENT  Extramesorectal nodes: NEGATIVE    CTCAP 05-21-20     FINDINGS:  Adenopathy:No evidence of mediastinal or hilar lymph node enlargement.  No axillary lymph node enlargement.     Fluid:There are no pericardial or pleural effusions.     LUNGS:No parenchymal soft tissue nodules or masses are present.     Skeletal:Arthritic change in the thoracic spine.      Upper abdomen shows cholelithiasis.      IMPRESSION:  1. No parenchymal soft tissue nodules or masses.  2. No pericardial or pleural effusions.  3. No adenopathy.   4. Coronary artery calcifications.   5. Cholelithiasis.     FINDINGS:   The lung bases are clear. There are no pleural effusions.     The liver is homogeneous.     There is cholelithiasis.     The spleen is homogeneous.      There is no peripancreatic stranding or pancreatic head mass.      There is no adrenal enlargement.     Large right renal cyst is present. There is no solid renal mass  or  hydronephrosis..      Otherwise I do not see any free fluid or walled off fluid collections.     The rectal wall appears to be thickened.     IMPRESSION:  1. Rectal wall thickening.  2. Cholelithiasis.         CT CAP 09/15/2020  Findings  LUNGS: Unremarkable. No parenchymal soft tissue nodules.  No focal air  space disease.  HEART: CORONARY ARTERY CALCIFICATIONS ARE NOTED.  PERICARDIUM: No effusion.  MEDIASTINUM: No masses. No enlarged lymph nodes.  No fluid collections.  PLEURA: No pleural effusion. No pleural mass or abnormal calcification.  MAJOR AIRWAYS: Clear. No intrinsic mass.  VASCULATURE: No evidence of aneurysm.  VISUALIZED UPPER ABDOMEN:  LIVER: Homogeneous. No focal hepatic mass or ductal dilatation.  SPLEEN: Homogeneous. No splenomegaly.  ADRENALS: No mass.  KIDNEYS: RIGHT RENAL CYST MEASURING 6.7 CM.  GI TRACT: Non-dilated. No definite wall thickening.  PERITONEUM: No free air. No free fluid or loculated fluid  collections.  ABDOMINAL WALL: No focal hernia or mass.  OTHER: None.  BONES: No acute bony abnormality.     IMPRESSION:  Impression:  1. Unremarkable exam.  No source for the patient symptoms.  2. Other incidental/non-acute findings as above.    Findings  LOWER THORAX: Clear. No effusions.  ABDOMEN:  LIVER: Homogeneous. No focal hepatic mass or ductal dilatation.GALLBLADDER: GALLSTONES IN THE GALLBLADDER.  PANCREAS: Unremarkable. No mass or ductal dilatation.  SPLEEN: Homogeneous. No splenomegaly.  ADRENALS: No mass.  KIDNEYS: RIGHT RENAL CYST MEASURING 6.4 CM.  GI TRACT: Possibly some rectal wall thickening.  PERITONEUM: No free air. No free fluid or loculated fluid  collections.  MESENTERY: Unremarkable.  LYMPH NODES: No lymphadenopathy.  VASCULATURE: No evidence of aneurysm.  ABDOMINAL WALL: No focal hernia or mass.  OTHER: None.   PELVIS:   BLADDER: No focal mass or significant wall thickening   REPRODUCTIVE: Unremarkable as visualized.   APPENDIX: Nondistended. No surrounding  inflammation.  BONES: No acute bony abnormality.     IMPRESSION:  Impression:  Rectal wall thickening noted.      [facility] 01-14-21  CT FINDINGS: On the lung windows, the lungs are both well aerated and  clear. No pulmonary parenchymal lung nodules to suggest metastatic  disease were seen. There were no inflammatory infiltrates or pleural  effusions. On the soft tissue windows, there was no evidence of  supraclavicular or axillary lymphadenopathy. No enlarged mediastinal or  hilar lymph nodes were demonstrated. There was no evidence of  pericardial effusion.     IMPRESSION:  No CT findings of metastatic disease in the chest.     CT FINDINGS: The liver and spleen are stable in size and shape; no focal  lesions have developed within the liver. There is a calcified stone in  the dependent portion of the gallbladder. The pancreas showed no  evidence of mass or inflammation. No adrenal lesions were demonstrated.  There is some thinning of the cortical tissues in the kidneys with a 6.5  cm cyst in the right kidney. There was no ascites. The aorta is normal  in caliber. No enlarged lymph nodes were seen in the retroperitoneum or  mesentery. In the pelvis, postoperative changes are noted near the  rectum where there is a line of  GI staples. No evidence of residual or recurrent tumor is demonstrated.  On today's study there was no significant rectal wall thickening.     IMPRESSION:  No CT findings to suggest metastatic disease in the abdomen  or pelvis. There is less thickening of the rectal wall than seen on the  previous CT. The patient does have cholelithiasis and a large cyst in  the right kidney.       [facility] 05-14-21  FINDINGS:     LUNGS: Unremarkable. No parenchymal soft tissue nodules.  No focal air  space disease.     HEART: Coronary artery calcifications.     MEDIASTINUM: No masses. No enlarged lymph nodes.  No fluid collections.     PLEURA: No pleural effusion. No pleural mass or abnormal calcification.  No  pneumothorax.     VASCULATURE: No evidence of aneurysm. No evidence of pulmonary embolism.     BONES: Arthritic change.     VISUALIZED UPPER ABDOMEN:Please see the report for the CT of the abdomen  and pelvis     Other: None.     IMPRESSION:     1. No evidence of metastatic disease to the abdomen or pelvis.  2. Coronary artery calcifications.    FINDINGS:     Lower thorax: Clear. No effusions.     Abdomen:     Liver: Homogeneous. No focal hepatic mass or ductal dilatation.     Gallbladder: Cholelithiasis     Pancreas: Unremarkable. No mass or ductal dilatation.     Spleen: Homogeneous. No splenomegaly.     Adrenals: No mass.     Kidneys/ureters: Large right renal cyst     GI tract: Non-dilated. No definite wall thickening.. There is no  evidence of appendicitis     MESENTERY: No free fluid, walled off fluid collections, mesenteric  stranding, or enlarged lymph nodes         Vasculature: Evidence of atherosclerotic vascular disease     Abdominal wall: No focal hernia or mass.        Bladder: Urinary bladder wall thickening     Reproductive: Unremarkable as visualized     Bones: No acute bony abnormality.     IMPRESSION:     1. No evidence of metastatic disease to the abdomen or pelvis     2.Cholelithiasis     3. Urinary bladder wall thickening    RECENT LABS:  Lab Results   Component Value Date    WBC 9.21 10/11/2021    HGB 14.0 10/11/2021    HCT 44.8 10/11/2021    MCV 89.1 10/11/2021    RDW 15.0 10/11/2021     10/11/2021    NEUTRORELPCT 75.5 10/11/2021    LYMPHORELPCT 17.5 (L) 10/11/2021    MONORELPCT 5.3 10/11/2021    EOSRELPCT 1.3 10/11/2021    BASORELPCT 0.2 10/11/2021    NEUTROABS 6.95 10/11/2021    LYMPHSABS 1.61 10/11/2021       Lab Results   Component Value Date     10/11/2021    K 4.0 10/11/2021    CO2 25.4 10/11/2021     10/11/2021    BUN 15 10/11/2021    CREATININE 1.36 (H) 10/11/2021    EGFRIFNONA 52 (L) 10/11/2021    GLUCOSE 122 (H) 10/11/2021    CALCIUM 9.8 10/11/2021    ALKPHOS 76  10/11/2021    AST 39 10/11/2021    ALT 34 10/11/2021    BILITOT 0.6 10/11/2021    ALBUMIN 4.52 10/11/2021    PROTEINTOT 7.9 10/11/2021       Lab Results   Component Value Date    CEA 2.25 05/18/2021    CEA 2.37 02/26/2021    CEA 1.82 08/31/2020    CEA 1.83 07/21/2020    CEA 2.00 06/25/2020    CEA 2.52 04/21/2020    CEA 2.28 01/28/2020    CEA 2.28 06/26/2019    CEA 3.40 03/20/2019    CEA 17.80 (H) 01/11/2019    CEA 18.80 (H) 10/19/2018     Lab Results   Component Value Date    PSA 1.050 07/21/2020    PSA 1.110 06/25/2020    PSA 4.930 (H) 10/19/2018     Lab Results   Component Value Date    TSH 2.860 01/19/2021     Lab Results   Component Value Date    HGBA1C 5.80 (H) 06/25/2020    HGBA1C 5.50 01/28/2020    HGBA1C 5.40 03/20/2019       Lab Results   Component Value Date    FERRITIN 33.21 10/11/2021    IRON 38 (L) 10/11/2021    TIBC 484 10/11/2021    LABIRON 8 (L) 10/11/2021    DUHNWLOM60 1,538 (H) 02/26/2021    FOLATE 9.92 02/26/2021     ASSESSMENT & PLAN:  David Mandujano is a very pleasant 71 y.o. male with newly diagnosed rectal cancer. Clinically stage IIIC ([id number]).    1.  Rectal cancer:  -  He received standard neoadjuvant chemoradiation as above given locally advanced tumor with suspicious pelvic lymph node.    - Pre-treatment MRI showed a locally advanced tumor and suspicious pelvic LN.  PET-CT was negative except in the local tumor.   -  There was a non-specific sub-pleural nodular opacity in the L lung base which was PET negative.  Perhaps this was inflammatory in nature. This area will require f/u on future imaging.  - Recommended neoadjuvant chemoradiation with Xeloda 825 mg/m2 BID D1-5 or M-F during the course of radiaton.  His dose was 500 mg x 4 or 2000 mg BID. Overall, he tolerated this well and completed treatment 1-21-19.    -  He saw Dr. Marilynn Yadav and underwent successful surgical resection as above.    -  He took adjuvant CapeOx treatment to prevent recurrence and has completed 8 cycles.  Overall, he tolerated treatment well aside from fatigue and diarrhea (see below). He started to struggle with thrombocytopenia and possibly with neuropathy so gave his last cycle without Oxaliplatin.  -  He did well with take down of his ileostomy.  -  CT CAP ordered to evaluate for recurrent disease but he didn't have this done b/c of concern for diarrhea w/ oral contrast.  I recommended we get this rescheduled with Gastroview contrast and he is agreeable..  CEA has been normal.  Value from today is pending.  -  He underwent c-scope with Dr. Barnhart on 2-05-21 without cause for concern.  Repeat exam recommended after 3 years.    2.  Iron deficiency Anemia, recurrent.:  -  He previously took Niferex but did not appear to absorb it and required IV  iron for repletion. He had this in September and again in November and in February and March.    -  Iron is again low today.  Will order Feraheme.  -  He did have EGD and c-scope.  He continues to f/u with Dr. Barnhart and continues on Protonix.  ASide from occasional hemorrhoidal blood loss he denies bleeding from any site.  Recommended Preparation H for hemorrhoidal irritation.    3. Neuropathy:  -  Etiology is uncertain.  He did complain of some vague symptoms toward the end of his treatment (though he thinks they happened later), so this could be related to Oxaliplatin.    - Thyroid function is wnl.  HbA1C has been normal.    - B12 and folate are replete. He continues to take take SL B12 and folate as prescribed. At present, he feels neuropathy is stable/improved.  - He has been advised that alcohol can cause or worsen peripheral neuropathy.      4.   Depressed mood / Anxiety and Alcohol Abuse:  - Previously given trial of Lexapro but he did not find this helpful and discontinued this. He said he did quit drinking completely around the time of his surgery but started drinking again. He says Dr. Yadav did mention to him that his liver didn't look good during his  operation.  -  We previously discussed potential involvement in support groups and /or referral for alcohol abuse counseling, but he declined.  Will monitor.    5.  Prophylaxis:  He took 2020 influenza vaccine. He took Prevnar 13 on 11-20-18.   He completed COVID vaccine x 2 and plans to get booster soon.  Will give 2021 influenza vaccine today.    6.  ACO / KAVITHA/Other  Quality measures  -  David Mandujano did not receive 2021 flu vaccine.  Will give this today.    -  David Mandujano reports a pain score of 0.    -  Current outpatient and discharge medications have been reconciled for the patient.  Reviewed by: Jyoti Larios MD     7.  Follow up:   -  CEA drawn - pending  -  Order Feraheme replacement  -  Order CT CAP with gastroview oral contrast  -  2021 influenza vaccine  -  Return after CT imaging.  -  F/u with Dr. Oneill with echocardiogram in January.    This note was scribed for Jyoti Larios MD by Lynn Pinto RN.    I, Jyoti Larios MD, personally performed the services described in this documentation as scribed by the above named individual in my presence, and it is both accurate and complete.  10/11/2021     I spent 35 minutes with David Mandujano today.  In the office today, more than 50% of this time was spent face-to-face with him  in counseling / coordination of care, reviewing his interim medical history and counseling on the current treatment plan.  All questions were answered to his satisfaction.         Electronically Signed by: Jyoti Larios MD         CC:   MD Kathleen Coppola Emmanuel C, MD Shawn Michael Acino, MD William Richards, MD Sandra Beck, MD

## 2021-10-11 ENCOUNTER — LAB (OUTPATIENT)
Dept: ONCOLOGY | Facility: CLINIC | Age: 71
End: 2021-10-11

## 2021-10-11 ENCOUNTER — OFFICE VISIT (OUTPATIENT)
Dept: ONCOLOGY | Facility: CLINIC | Age: 71
End: 2021-10-11

## 2021-10-11 VITALS
SYSTOLIC BLOOD PRESSURE: 144 MMHG | BODY MASS INDEX: 35.65 KG/M2 | DIASTOLIC BLOOD PRESSURE: 68 MMHG | HEART RATE: 76 BPM | OXYGEN SATURATION: 98 % | HEIGHT: 72 IN | RESPIRATION RATE: 18 BRPM | TEMPERATURE: 96.9 F | WEIGHT: 263.2 LBS

## 2021-10-11 DIAGNOSIS — C20 MALIGNANT NEOPLASM OF RECTUM (HCC): ICD-10-CM

## 2021-10-11 DIAGNOSIS — K64.9 HEMORRHOIDS, UNSPECIFIED HEMORRHOID TYPE: ICD-10-CM

## 2021-10-11 DIAGNOSIS — D50.0 IRON DEFICIENCY ANEMIA DUE TO CHRONIC BLOOD LOSS: ICD-10-CM

## 2021-10-11 DIAGNOSIS — F10.10 ALCOHOL ABUSE: ICD-10-CM

## 2021-10-11 DIAGNOSIS — K90.9 MALABSORPTION OF IRON: ICD-10-CM

## 2021-10-11 DIAGNOSIS — C20 MALIGNANT NEOPLASM OF RECTUM (HCC): Primary | ICD-10-CM

## 2021-10-11 DIAGNOSIS — N18.30 STAGE 3 CHRONIC KIDNEY DISEASE, UNSPECIFIED WHETHER STAGE 3A OR 3B CKD (HCC): ICD-10-CM

## 2021-10-11 LAB
ALBUMIN SERPL-MCNC: 4.52 G/DL (ref 3.5–5.2)
ALBUMIN/GLOB SERPL: 1.3 G/DL
ALP SERPL-CCNC: 76 U/L (ref 39–117)
ALT SERPL W P-5'-P-CCNC: 34 U/L (ref 1–41)
ANION GAP SERPL CALCULATED.3IONS-SCNC: 14.6 MMOL/L (ref 5–15)
AST SERPL-CCNC: 39 U/L (ref 1–40)
BASOPHILS # BLD AUTO: 0.02 10*3/MM3 (ref 0–0.2)
BASOPHILS NFR BLD AUTO: 0.2 % (ref 0–1.5)
BILIRUB SERPL-MCNC: 0.6 MG/DL (ref 0–1.2)
BUN SERPL-MCNC: 15 MG/DL (ref 8–23)
BUN/CREAT SERPL: 11 (ref 7–25)
CALCIUM SPEC-SCNC: 9.8 MG/DL (ref 8.6–10.5)
CHLORIDE SERPL-SCNC: 102 MMOL/L (ref 98–107)
CO2 SERPL-SCNC: 25.4 MMOL/L (ref 22–29)
CREAT SERPL-MCNC: 1.36 MG/DL (ref 0.76–1.27)
DEPRECATED RDW RBC AUTO: 48.7 FL (ref 37–54)
EOSINOPHIL # BLD AUTO: 0.12 10*3/MM3 (ref 0–0.4)
EOSINOPHIL NFR BLD AUTO: 1.3 % (ref 0.3–6.2)
ERYTHROCYTE [DISTWIDTH] IN BLOOD BY AUTOMATED COUNT: 15 % (ref 12.3–15.4)
FERRITIN SERPL-MCNC: 33.21 NG/ML (ref 30–400)
GFR SERPL CREATININE-BSD FRML MDRD: 52 ML/MIN/1.73
GLOBULIN UR ELPH-MCNC: 3.4 GM/DL
GLUCOSE SERPL-MCNC: 122 MG/DL (ref 65–99)
HCT VFR BLD AUTO: 44.8 % (ref 37.5–51)
HGB BLD-MCNC: 14 G/DL (ref 13–17.7)
IMM GRANULOCYTES # BLD AUTO: 0.02 10*3/MM3 (ref 0–0.05)
IMM GRANULOCYTES NFR BLD AUTO: 0.2 % (ref 0–0.5)
IRON 24H UR-MRATE: 38 MCG/DL (ref 59–158)
IRON SATN MFR SERPL: 8 % (ref 20–50)
LYMPHOCYTES # BLD AUTO: 1.61 10*3/MM3 (ref 0.7–3.1)
LYMPHOCYTES NFR BLD AUTO: 17.5 % (ref 19.6–45.3)
MCH RBC QN AUTO: 27.8 PG (ref 26.6–33)
MCHC RBC AUTO-ENTMCNC: 31.3 G/DL (ref 31.5–35.7)
MCV RBC AUTO: 89.1 FL (ref 79–97)
MONOCYTES # BLD AUTO: 0.49 10*3/MM3 (ref 0.1–0.9)
MONOCYTES NFR BLD AUTO: 5.3 % (ref 5–12)
NEUTROPHILS NFR BLD AUTO: 6.95 10*3/MM3 (ref 1.7–7)
NEUTROPHILS NFR BLD AUTO: 75.5 % (ref 42.7–76)
NRBC BLD AUTO-RTO: 0 /100 WBC (ref 0–0.2)
PLATELET # BLD AUTO: 181 10*3/MM3 (ref 140–450)
PMV BLD AUTO: 9.8 FL (ref 6–12)
POTASSIUM SERPL-SCNC: 4 MMOL/L (ref 3.5–5.2)
PROT SERPL-MCNC: 7.9 G/DL (ref 6–8.5)
RBC # BLD AUTO: 5.03 10*6/MM3 (ref 4.14–5.8)
SODIUM SERPL-SCNC: 142 MMOL/L (ref 136–145)
TIBC SERPL-MCNC: 484 MCG/DL (ref 298–536)
TRANSFERRIN SERPL-MCNC: 325 MG/DL (ref 200–360)
WBC # BLD AUTO: 9.21 10*3/MM3 (ref 3.4–10.8)

## 2021-10-11 PROCEDURE — 99214 OFFICE O/P EST MOD 30 MIN: CPT | Performed by: INTERNAL MEDICINE

## 2021-10-11 PROCEDURE — 85025 COMPLETE CBC W/AUTO DIFF WBC: CPT | Performed by: INTERNAL MEDICINE

## 2021-10-11 PROCEDURE — 82728 ASSAY OF FERRITIN: CPT | Performed by: INTERNAL MEDICINE

## 2021-10-11 PROCEDURE — 84466 ASSAY OF TRANSFERRIN: CPT | Performed by: INTERNAL MEDICINE

## 2021-10-11 PROCEDURE — 82378 CARCINOEMBRYONIC ANTIGEN: CPT | Performed by: INTERNAL MEDICINE

## 2021-10-11 PROCEDURE — 80053 COMPREHEN METABOLIC PANEL: CPT | Performed by: INTERNAL MEDICINE

## 2021-10-11 PROCEDURE — 83540 ASSAY OF IRON: CPT | Performed by: INTERNAL MEDICINE

## 2021-10-11 PROCEDURE — G0008 ADMIN INFLUENZA VIRUS VAC: HCPCS | Performed by: INTERNAL MEDICINE

## 2021-10-11 PROCEDURE — 90662 IIV NO PRSV INCREASED AG IM: CPT | Performed by: INTERNAL MEDICINE

## 2021-10-11 RX ORDER — SODIUM CHLORIDE 9 MG/ML
250 INJECTION, SOLUTION INTRAVENOUS ONCE
Status: CANCELLED | OUTPATIENT
Start: 2021-12-13

## 2021-10-11 RX ORDER — SODIUM CHLORIDE 9 MG/ML
250 INJECTION, SOLUTION INTRAVENOUS ONCE
Status: CANCELLED | OUTPATIENT
Start: 2021-12-10

## 2021-10-12 LAB — CEA SERPL-MCNC: 1.9 NG/ML

## 2021-10-22 ENCOUNTER — APPOINTMENT (OUTPATIENT)
Dept: ONCOLOGY | Facility: HOSPITAL | Age: 71
End: 2021-10-22

## 2021-10-25 ENCOUNTER — APPOINTMENT (OUTPATIENT)
Dept: ONCOLOGY | Facility: HOSPITAL | Age: 71
End: 2021-10-25

## 2021-10-25 ENCOUNTER — APPOINTMENT (OUTPATIENT)
Dept: CT IMAGING | Facility: HOSPITAL | Age: 71
End: 2021-10-25

## 2021-11-11 NOTE — PROGRESS NOTES
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  Combined chemoradiation with Xeloda 825 mg/m2 BID D1-5 or M-F during the course of radiation.  His dose is 500 mg x 4 or 2000 mg BID  Treatment finished 19.    2.  Dr. Yadav performed LAR with coloanal anstomosis and loop ileostomy 19.  Pathology showed  Residual invasive adneocarcinoma.  Tumor invaded the muscularis propria.  Surgical margins clear.  0/14 resected LNs involved.  ypT2N0.      3.       4.  Ileostomy reversal 19 (Dr. Yadav)    5.          CHIEF COMPLAINT:  Follow up of rectal cancer and iron deficiency anemia    HISTORY OF PRESENT ILLNESS:   David Mandujano is a very pleasant 71 y.o. male who was referred by Dr. Barnhart for evaluation and treatment of colorectal cancer. He began to notice rectal bleeding in January or 2018, and he also began to notice fatigue in approximately July. He assumed he was out of shape when he started to become short of breath and started trying to exercise more frequently, which only exacerbated the shortness of breath. He was evaluated by his PCP, Raj Wang MD, who after testing, found him to be severely iron deficient, hyperglycemic, and he also had an elevated PSA. He was on vacation at the time, and his PCP asked him to return home for further evaluation. He was started on oral iron therapy and later received IV iron infusions. He was also scheduled for a colonoscopy with Dr. Barnhart, during which a suspicious rectal mass was biopsied and pathology was positive for an ulcerated invasive, moderately differentiated rectal adenocarcinoma.     INTERVAL HISTORY:  Mr. Mandujano is here today for follow up of rectal cancer. He says over the last 3 weeks he has felt much better.  His BP is elevated today but he says he ran out of his BP  meds for 2-3 days.  He just had them filled.  His cardiologist reduced Metoprolol from 50 to 25 mg.  He cancelled and then no-showed IV iron appts so he hasn't had this done yet.  He is anxious to hear the outcome of recent imaging.  He is otherwise well without complaint.    PAST MEDICAL HISTORY:  Past Medical History:   Diagnosis Date   • Anemia    • Arthritis    • Colon cancer (HCC)     s/p chemo therapy   • Heart murmur    • Hypertension    • Wrist fracture     surgically repaired       PAST SURGICAL HISTORY:  Past Surgical History:   Procedure Laterality Date   • ABDOMINAL SURGERY     • COLON SURGERY     • COLONOSCOPY     • FRACTURE SURGERY Left    • VENOUS ACCESS DEVICE (PORT) INSERTION N/A 5/20/2019    Procedure: INSERTION VENOUS ACCESS DEVICE;  Surgeon: Cindy Corrales MD;  Location: [facility];  Service: General   • WRIST SURGERY         FAMILY HISTORY:  Family History   Problem Relation Age of Onset   • Colon cancer Maternal Grandfather    • Other Sister    • Heart disease Sister    No other family history of malignancy.    SOCIAL HISTORY:  Social History     Socioeconomic History   • Marital status: Single   Tobacco Use   • Smoking status: Never Smoker   • Smokeless tobacco: Current User     Types: Chew   Vaping Use   • Vaping Use: Never used   Substance and Sexual Activity   • Alcohol use: Yes     Comment: occasional   • Drug use: No   • Sexual activity: Defer     REVIEW OF SYSTEMS:   A comprehensive 14 point review of systems was performed.  Significant findings as mentioned above.  All other systems reviewed and are negative.      MEDICATIONS:  The current medication list was reviewed in the EMR    Current Outpatient Medications:   •  allopurinol (ZYLOPRIM) 100 MG tablet, Take 1 tablet by mouth Daily., Disp: 30 tablet, Rfl: 0  •  ASCORBIC ACID PO, Take  by mouth Daily., Disp: , Rfl:   •  aspirin 81 MG chewable tablet, Chew 81 mg Daily., Disp: , Rfl:   •  clopidogrel (PLAVIX) 75 MG tablet, Take 75 mg  by mouth Daily., Disp: , Rfl:   •  cyanocobalamin (VITAMIN B-12) 1000 MCG tablet, Take 1,000 mcg by mouth Daily., Disp: , Rfl:   •  folic acid (FOLVITE) 1 MG tablet, TAKE ONE TABLET BY MOUTH DAILY, Disp: 30 tablet, Rfl: 11  •  lisinopril (PRINIVIL,ZESTRIL) 20 MG tablet, Take 20 mg by mouth Daily., Disp: , Rfl:   •  magnesium gluconate (MAGONATE) 500 MG tablet, Take 27 mg by mouth 2 (Two) Times a Day., Disp: , Rfl:   •  metoprolol succinate XL (TOPROL-XL) 50 MG 24 hr tablet, Take 1 tablet by mouth Daily. (Patient taking differently: Take 25 mg by mouth Daily.), Disp: 30 tablet, Rfl: 3  •  pantoprazole (Protonix) 40 MG EC tablet, Take 1 tablet by mouth Daily., Disp: 30 tablet, Rfl: 6  •  prochlorperazine (COMPAZINE) 5 MG tablet, Take 2 tablets by mouth Every 6 (Six) Hours As Needed for Nausea or Vomiting., Disp: 60 tablet, Rfl: 5  •  vitamin B-6 (PYRIDOXINE) 100 MG tablet, TAKE ONE TABLET BY MOUTH EVERY DAY, Disp: 30 tablet, Rfl: 5    ALLERGIES:  No Known Allergies    PHYSICAL EXAM:  Vitals:    11/16/21 1023   BP: (!) 186/81   Pulse: 91   Resp: 18   Temp: 97.1 °F (36.2 °C)   SpO2: 97%     Pain Score    11/16/21 1023   PainSc: 0-No pain   ECOG score: 0     General:  Awake, alert and oriented, appears well, in good spirits.  HEENT:  Pupils are equal, round and reactive to light and accommodation, Extra-ocular movements full, Oropharyx clear, mucous membranes moist  Neck:  No JVD, thyromegaly or lymphadenopathy  CV:  Regular rate and rhythm, II/IV systolic murmur, no rubs or gallops  Resp:  Lungs are clear to auscultation bilaterally, no crackles  Abd:  Soft, non-tender, non distended, bowel sounds present, no organomegaly or masses.  Surgical scars present.   Ext:  No clubbing, cyanosis or edema     Lymph:  No cervical, supraclavicular, axillary, adenopathy  Neuro:  Grossly non-focal exam    PATHOLOGY:  10-02-18      04-01-19:        04-09-19:        ENDOSCOPY:  10-02-18:      2-05-21 EGD/C-scope ([place])  -  Mild  reflux esophagitis with small erosions at the GE junction and a slight stricture  -  Endoscopically normal stomach, duodenum.  - Unremarkable surgical anastomosis 5 cm above the anal verge.   -  Minimal diverticulosis.  -  Otherwise normal colon and terminal ileum with fair prep.      IMAGING:  10-04-18 CT Abdomen Pelvis With Contrast   Findings  - LOWER THORAX: Patchy nonspecific subpleural nodularity in the left lung base that could represent sequelae of chronic airspace disease or early fibrosis.     ABDOMEN:  - LIVER: Homogeneous. No focal hepatic mass or ductal dilatation.  - GALLBLADDER: GALLSTONES WITHIN THE GALLBLADDER.  - PANCREAS: Unremarkable. No mass or ductal dilatation.  - SPLEEN: Homogeneous. No splenomegaly.  - ADRENALS: No mass.  - KIDNEYS: RIGHT RENAL CYST MEASURING 3.6 CM.  - GI TRACT: NONSPECIFIC DISTAL SIGMOID COLONIC WALL THICKENING VERSUS NONDISTENTION.  - PERITONEUM: No free air. No free fluid or loculated fluid collections.  - MESENTERY: Unremarkable.  - LYMPH NODES: No lymphadenopathy.  - VASCULATURE: ATHEROSCLEROTIC VASCULAR CALCIFICATION.  - ABDOMINAL WALL: SMALL BILATERAL HUMERAL HERNIAS CONTAINING ONLY FAT.  - OTHER: None.     PELVIS:  - BLADDER: No focal mass or significant wall thickening  - REPRODUCTIVE: Unremarkable as visualized.  - APPENDIX: Nondistended. No surrounding inflammation.  - BONES: No acute bony abnormality.     IMPRESSION:  1. Gallstones within the gallbladder.  2.Nonspecific distal sigmoid colonic wall thickening versus nondistention.    PET/CT 10-29-18    11-13-18 MRI Pelvis Without Contrast  FINDINGS:  1.) TUMOR LOCATION AND CHARACTERISTICS     i) Distance of Inferior margin of Tumor from the dentate line: 9.5 CM  ii) Tumor at or below the puborectalis sling:  NO  iii) Relationship to the anterior peritoneal reflection: BELOW  iv) Craniocaudal length of the tumor: 6cm  v) Clock face of tumor: 5o'clock to 2o'clock  vi) Polypoid/ Annular/ Semi-annular:  SEMI-ANNULAR  vii) Mucinous: YES     2.) EXTRAMURAL DEPTH OF INVASION AND MR T-CATEGORY       i) Extramural depth of invasion (Use 0mm for T1 or T2 tumor):  APPROXIMATELY 5 MM   ii) T category: T3 B              *Please indicate structures with possible invasion.  Specify laterality,  sequence and slice#: (see list below)     *  Anterior peritoneal reflection (T4a tumor): NO  *  Puborectalis: NO  *  Bladder: NO  *  Vascular Involvement of Iliac Vessels: NO  *  Levator ani: NO  *  Ureters: NO  *  Obturator: NO  *  Prostate: MARGINAL/TETHERED  *  Piriformis: NO  *  Uterus: NOT APPLICABLE  *  Pelvic Bone: NO  *  Vagina: NOT APPLICABLE  *  Sacrum: NO  *  Urethra: NO  *  Other:          iii) For low rectal tumors (maximum tumor depth at or below the  puborectalis sling):     *  Not applicable (tumor above the puborectalis sling: NOT APPLICABLE  *    *  Level 1 (submucosa only, no involvement of internal anal sphincter):       *  Level 2 (confined to the internal anal sphincter; no involvement of  intersphincteric fat):      *  Level 3 (intersphincteric fat involved):      *  Level 4 (involves external sphincter or beyond):         3. RELATIONSHIP OF THE TUMOR TO MESORECTAL FASCIA (MRF)       i) Shortest distance 0mm of the definitive tumour border to the MRF  is: AT 12:00   ii) Are there any tumour spiculations closer to the MRF? NO     iii) Location of Tumor margin to MRF: 12:00, AT THE LEVEL PROSTATE     4. EXTRAMURAL VENOUS INVASION       i) Extramural Venous Invasion (EMVI): ABSENT     5. MESORECTAL LYMPH NODES AND TUMOUR DEPOSITS       i) Any suspicious mesorectal lymph nodes/tumor deposits: YES      *If yes, the most suspicious node/tumor deposit is: 15 MM IN  DIAMETER, ALONG THE UPPER MARGIN OF the tumor with minimum distance 7  MMmm from the MRF at 7o'clock     6. EXTRAMESORECTAL LYMPH NODES        i) Any suspicious extramesorectal lymph nodes: NO      *If yes, location and laterality of suspicious nodes:              Int. Iliac:                Ext. Iliac:                Common Iliac:                Obturator:                Inguinal:                Other:           ii) Is the BETH node station in the field of view: NO        *If Yes, are these nodes suspicious:         7. OTHER FINDINGS (COMPLICATIONS, METASTASES, LIMITATIONS)         NOTE: THERE IS SOME UNCERTAINTY WHETHER THE APPARENT SMALL FOCUS OF TUMOR EXTENDING TO THE PROSTATE AT THE 12:00 POSITION COULD ACTUALLY BE PROSTATE NODULE EXTENDING TOWARD THE TUMOR. RECTAL TUMOR IS FAVORED; ALTHOUGH THIS DETERMINATION CANNOT BE MADE WITH COMPLETE CERTAINTY, TUMOR IS CONSIDERED T3 B.        IMPRESSIONS:  MRI rectal cancer T category is: T3 B  Maximum EMD of invasion is: 5  Minimum tumor to MRF distance is: 0  Low rectal tumor component: NO  Mesorectal nodes/tumor deposits: SUSPICIOUS  EMVI: ABSENT  Extramesorectal nodes: NEGATIVE    CTCAP 05-21-20     FINDINGS:  Adenopathy:No evidence of mediastinal or hilar lymph node enlargement.  No axillary lymph node enlargement.     Fluid:There are no pericardial or pleural effusions.     LUNGS:No parenchymal soft tissue nodules or masses are present.     Skeletal:Arthritic change in the thoracic spine.      Upper abdomen shows cholelithiasis.      IMPRESSION:  1. No parenchymal soft tissue nodules or masses.  2. No pericardial or pleural effusions.  3. No adenopathy.   4. Coronary artery calcifications.   5. Cholelithiasis.     FINDINGS:   The lung bases are clear. There are no pleural effusions.     The liver is homogeneous.     There is cholelithiasis.     The spleen is homogeneous.      There is no peripancreatic stranding or pancreatic head mass.      There is no adrenal enlargement.     Large right renal cyst is present. There is no solid renal mass or  hydronephrosis..      Otherwise I do not see any free fluid or walled off fluid collections.     The rectal wall appears to be thickened.     IMPRESSION:  1. Rectal wall  thickening.  2. Cholelithiasis.         CT CAP 09/15/2020  Findings  LUNGS: Unremarkable. No parenchymal soft tissue nodules.  No focal air  space disease.  HEART: CORONARY ARTERY CALCIFICATIONS ARE NOTED.  PERICARDIUM: No effusion.  MEDIASTINUM: No masses. No enlarged lymph nodes.  No fluid collections.  PLEURA: No pleural effusion. No pleural mass or abnormal calcification.  MAJOR AIRWAYS: Clear. No intrinsic mass.  VASCULATURE: No evidence of aneurysm.  VISUALIZED UPPER ABDOMEN:  LIVER: Homogeneous. No focal hepatic mass or ductal dilatation.  SPLEEN: Homogeneous. No splenomegaly.  ADRENALS: No mass.  KIDNEYS: RIGHT RENAL CYST MEASURING 6.7 CM.  GI TRACT: Non-dilated. No definite wall thickening.  PERITONEUM: No free air. No free fluid or loculated fluid  collections.  ABDOMINAL WALL: No focal hernia or mass.  OTHER: None.  BONES: No acute bony abnormality.     IMPRESSION:  Impression:  1. Unremarkable exam.  No source for the patient symptoms.  2. Other incidental/non-acute findings as above.    Findings  LOWER THORAX: Clear. No effusions.  ABDOMEN:  LIVER: Homogeneous. No focal hepatic mass or ductal dilatation.GALLBLADDER: GALLSTONES IN THE GALLBLADDER.  PANCREAS: Unremarkable. No mass or ductal dilatation.  SPLEEN: Homogeneous. No splenomegaly.  ADRENALS: No mass.  KIDNEYS: RIGHT RENAL CYST MEASURING 6.4 CM.  GI TRACT: Possibly some rectal wall thickening.  PERITONEUM: No free air. No free fluid or loculated fluid  collections.  MESENTERY: Unremarkable.  LYMPH NODES: No lymphadenopathy.  VASCULATURE: No evidence of aneurysm.  ABDOMINAL WALL: No focal hernia or mass.  OTHER: None.   PELVIS:   BLADDER: No focal mass or significant wall thickening   REPRODUCTIVE: Unremarkable as visualized.   APPENDIX: Nondistended. No surrounding inflammation.  BONES: No acute bony abnormality.     IMPRESSION:  Impression:  Rectal wall thickening noted.      CTCAP 01-14-21  CT FINDINGS: On the lung windows, the lungs are both  well aerated and  clear. No pulmonary parenchymal lung nodules to suggest metastatic  disease were seen. There were no inflammatory infiltrates or pleural  effusions. On the soft tissue windows, there was no evidence of  supraclavicular or axillary lymphadenopathy. No enlarged mediastinal or  hilar lymph nodes were demonstrated. There was no evidence of  pericardial effusion.     IMPRESSION:  No CT findings of metastatic disease in the chest.     CT FINDINGS: The liver and spleen are stable in size and shape; no focal  lesions have developed within the liver. There is a calcified stone in  the dependent portion of the gallbladder. The pancreas showed no  evidence of mass or inflammation. No adrenal lesions were demonstrated.  There is some thinning of the cortical tissues in the kidneys with a 6.5  cm cyst in the right kidney. There was no ascites. The aorta is normal  in caliber. No enlarged lymph nodes were seen in the retroperitoneum or  mesentery. In the pelvis, postoperative changes are noted near the  rectum where there is a line of  GI staples. No evidence of residual or recurrent tumor is demonstrated.  On today's study there was no significant rectal wall thickening.     IMPRESSION:  No CT findings to suggest metastatic disease in the abdomen  or pelvis. There is less thickening of the rectal wall than seen on the  previous CT. The patient does have cholelithiasis and a large cyst in  the right kidney.       CTCAP 05-14-21  FINDINGS:     LUNGS: Unremarkable. No parenchymal soft tissue nodules.  No focal air  space disease.     HEART: Coronary artery calcifications.     MEDIASTINUM: No masses. No enlarged lymph nodes.  No fluid collections.     PLEURA: No pleural effusion. No pleural mass or abnormal calcification.  No pneumothorax.     VASCULATURE: No evidence of aneurysm. No evidence of pulmonary embolism.     BONES: Arthritic change.     VISUALIZED UPPER ABDOMEN:Please see the report for the CT of the  abdomen  and pelvis     Other: None.     IMPRESSION:     1. No evidence of metastatic disease to the abdomen or pelvis.  2. Coronary artery calcifications.    FINDINGS:     Lower thorax: Clear. No effusions.     Abdomen:     Liver: Homogeneous. No focal hepatic mass or ductal dilatation.     Gallbladder: Cholelithiasis     Pancreas: Unremarkable. No mass or ductal dilatation.     Spleen: Homogeneous. No splenomegaly.     Adrenals: No mass.     Kidneys/ureters: Large right renal cyst     GI tract: Non-dilated. No definite wall thickening.. There is no  evidence of appendicitis     MESENTERY: No free fluid, walled off fluid collections, mesenteric  stranding, or enlarged lymph nodes         Vasculature: Evidence of atherosclerotic vascular disease     Abdominal wall: No focal hernia or mass.        Bladder: Urinary bladder wall thickening     Reproductive: Unremarkable as visualized     Bones: No acute bony abnormality.     IMPRESSION:     1. No evidence of metastatic disease to the abdomen or pelvis     2.Cholelithiasis     3. Urinary bladder wall thickening        CTCAP 11-12-21  FINDINGS:    LUNGS:  Unremarkable.  No mass.  No consolidation.    PLEURAL SPACE:  Unremarkable.  No pneumothorax.  No significant  effusion.    HEART:  Coronary artery calcifications are noted.  No significant  pericardial effusion.    BONES/JOINTS:  Anterior thoracic spine osteophyte formation.  No acute  fracture.  No dislocation.    SOFT TISSUES:  Unremarkable.    VASCULATURE:  Unremarkable.  No thoracic aortic aneurysm.    LYMPH NODES:  Unremarkable.  No enlarged lymph nodes.     IMPRESSION:    No acute findings in the chest.    FINDINGS:    LUNG BASES:  Unremarkable.  No mass.  No consolidation.      ABDOMEN:    LIVER:  Unremarkable.  No mass.    GALLBLADDER AND BILE DUCTS:  Gallstones in the gallbladder.  No ductal  dilation.    PANCREAS:  Unremarkable.  No mass.  No ductal dilation.    SPLEEN:  Unremarkable.  No splenomegaly.     ADRENALS:  Unremarkable.  No mass.    KIDNEYS AND URETERS:  6.6 cm right renal cyst.  No hydronephrosis.    STOMACH AND BOWEL:  Changes of distal colonic anastomosis noted.  No  obstruction.  No mucosal thickening.      PELVIS:    APPENDIX:  No findings to suggest acute appendicitis.    BLADDER:  Unremarkable.  No mass.    REPRODUCTIVE:  Unremarkable as visualized.      ABDOMEN and PELVIS:    INTRAPERITONEAL SPACE:  Unremarkable.  No free air.  No significant  fluid collection.    BONES/JOINTS:  No acute fracture.  No dislocation.    SOFT TISSUES:  Unremarkable.    VASCULATURE:  Unremarkable.  No abdominal aortic aneurysm.    LYMPH NODES:  Unremarkable.  No enlarged lymph nodes.     IMPRESSION:    No acute findings in the abdomen or pelvis.    RECENT LABS:  Lab Results   Component Value Date    WBC 7.41 11/16/2021    HGB 14.6 11/16/2021    HCT 47.4 11/16/2021    MCV 90.5 11/16/2021    RDW 15.2 11/16/2021     11/16/2021    NEUTRORELPCT 74.7 11/16/2021    LYMPHORELPCT 18.6 (L) 11/16/2021    MONORELPCT 4.9 (L) 11/16/2021    EOSRELPCT 0.9 11/16/2021    BASORELPCT 0.4 11/16/2021    NEUTROABS 5.53 11/16/2021    LYMPHSABS 1.38 11/16/2021       Lab Results   Component Value Date     11/16/2021    K 4.5 11/16/2021    CO2 20.6 (L) 11/16/2021     11/16/2021    BUN 14 11/16/2021    CREATININE 1.19 11/16/2021    EGFRIFNONA 60 (L) 11/16/2021    GLUCOSE 116 (H) 11/16/2021    CALCIUM 9.3 11/16/2021    ALKPHOS 85 11/16/2021    AST 33 11/16/2021    ALT 30 11/16/2021    BILITOT 0.4 11/16/2021    ALBUMIN 4.40 11/16/2021    PROTEINTOT 8.0 11/16/2021       Lab Results   Component Value Date    CEA 1.90 10/11/2021    CEA 2.25 05/18/2021    CEA 2.37 02/26/2021    CEA 1.82 08/31/2020    CEA 1.83 07/21/2020    CEA 2.00 06/25/2020    CEA 2.52 04/21/2020    CEA 2.28 01/28/2020    CEA 2.28 06/26/2019    CEA 3.40 03/20/2019    CEA 17.80 (H) 01/11/2019    CEA 18.80 (H) 10/19/2018     Lab Results   Component Value Date    PSA  1.050 07/21/2020    PSA 1.110 06/25/2020    PSA 4.930 (H) 10/19/2018     Lab Results   Component Value Date    TSH 2.860 01/19/2021     Lab Results   Component Value Date    HGBA1C 5.80 (H) 06/25/2020    HGBA1C 5.50 01/28/2020    HGBA1C 5.40 03/20/2019       Lab Results   Component Value Date    FERRITIN 36.86 11/16/2021    IRON 40 (L) 11/16/2021    TIBC 495 11/16/2021    LABIRON 8 (L) 11/16/2021    [id number] 1,538 (H) 02/26/2021    FOLATE 9.92 02/26/2021     ASSESSMENT & PLAN:  David Mandujano is a very pleasant 71 y.o. male with newly diagnosed rectal cancer. Clinically stage IIIC ([id number]).    1.  Rectal cancer:  -  He received standard neoadjuvant chemoradiation as above given locally advanced tumor with suspicious pelvic lymph node.    - Pre-treatment MRI showed a locally advanced tumor and suspicious pelvic LN.  PET-CT was negative except in the local tumor.   -  There was a non-specific sub-pleural nodular opacity in the L lung base which was PET negative.  Perhaps this was inflammatory in nature. This area will require f/u on future imaging.  - Recommended neoadjuvant chemoradiation with Xeloda 825 mg/m2 BID D1-5 or M-F during the course of radiaton.  His dose was 500 mg x 4 or 2000 mg BID. Overall, he tolerated this well and completed treatment 1-21-19.    -  He saw Dr. Marilynn Yadav and underwent successful surgical resection as above.    -  He took adjuvant CapeOx treatment to prevent recurrence and has completed 8 cycles. Overall, he tolerated treatment well aside from fatigue and diarrhea (see below). He started to struggle with thrombocytopenia and possibly with neuropathy so gave his last cycle without Oxaliplatin.  -  He did well with take down of his ileostomy.  -  CT CAP without evidence of metastasis.  CEA has been normal.    -  He underwent c-scope with Dr. Barnhart on 2-05-21 without cause for concern.  Repeat exam recommended after 3 years.  -  Will plan to repeat CT CAP as well as lab including  CEA in mid May 2022    2.  Iron deficiency Anemia, recurrent.:  -  He previously took Niferex but did not appear to absorb it and required IV  iron for repletion. He had this in September and again in November and in February and March.    -  Iron is again low.  Ordered Feraheme and this was scheduled but he didn't show for infusion.  Will reschedule..  -  He did have EGD and c-scope.  He continues to f/u with Dr. Barnhart and continues on Protonix.  ASide from occasional hemorrhoidal blood loss he denies bleeding from any site.  Recommended Preparation H for hemorrhoidal irritation.    3. Neuropathy:  -  Etiology is uncertain.  He did complain of some vague symptoms toward the end of his treatment (though he thinks they happened later), so this could be related to Oxaliplatin.    - Thyroid function is wnl.  HbA1C has been normal.    - B12 and folate are replete. He continues to take take SL B12 and folate as prescribed. At present, he feels neuropathy is stable/improved.  - He has been advised that alcohol can cause or worsen peripheral neuropathy.      4.   Depressed mood / Anxiety and Alcohol Abuse:  - Previously given trial of Lexapro but he did not find this helpful and discontinued this. He said he did quit drinking completely around the time of his surgery but started drinking again. He says Dr. Yadav did mention to him that his liver didn't look good during his operation.  -  We previously discussed potential involvement in support groups and /or referral for alcohol abuse counseling, but he declined.  Will monitor.    5.  Prophylaxis:  He took 2021 influenza vaccine. He took Prevnar 13 on 11-20-18.   He completed COVID vaccine x 2.  Recommended he take booster.       6.  ACO / KAVITHA/Other  Quality measures  -  David LATRICE Delbert received 2021 flu vaccine.    -  David POLLARD Mandujano reports a pain score of 0.    -  Current outpatient and discharge medications have been reconciled for the patient.  Reviewed by: Jyoti  MD Ric     7.  Follow up:   -  Reschedule Feraheme replacement  -  F/u with APRN in 3 months with CBCD, CMP. Ferritin, iron profile.  -  Order CT CAP with IV contrast only in May 2022 and I will see him after imaging with CBCD, CMP, CEA, PSA, Ferritin, iron profile  -  Recommended COVID booster  -  F/u with Dr. Oneill with echocardiogram in January.  jIf BP remains elevated after he is back on his medications, recommended he call them for advice    This note was scribed for Jyoti Larios MD by Lynn Pinto RN.    I, Jyoti Larios MD, personally performed the services described in this documentation as scribed by the above named individual in my presence, and it is both accurate and complete.  11/16/2021     I spent 30 minutes with David Mandujano today.  In the office today, more than 50% of this time was spent face-to-face with him  in counseling / coordination of care, reviewing his interim medical history and counseling on the current treatment plan.  All questions were answered to his satisfaction.         Electronically Signed by: Jyoti Larios MD         CC:   MD Kathleen Coppola Emmanuel C, MD Shawn Michael Acino, MD William Richards, MD Sandra Beck, MD

## 2021-11-12 ENCOUNTER — HOSPITAL ENCOUNTER (OUTPATIENT)
Dept: CT IMAGING | Facility: HOSPITAL | Age: 71
Discharge: HOME | End: 2021-11-12

## 2021-11-12 DIAGNOSIS — D50.0 IRON DEFICIENCY ANEMIA DUE TO CHRONIC BLOOD LOSS: ICD-10-CM

## 2021-11-12 DIAGNOSIS — C20 MALIGNANT NEOPLASM OF RECTUM (HCC): ICD-10-CM

## 2021-11-12 LAB — CREAT BLDA-MCNC: 1.5 MG/DL (ref 0.6–1.3)

## 2021-11-12 PROCEDURE — 71260 CT THORAX DX C+: CPT

## 2021-11-12 PROCEDURE — 74177 CT ABD & PELVIS W/CONTRAST: CPT | Performed by: RADIOLOGY

## 2021-11-12 PROCEDURE — 82565 ASSAY OF CREATININE: CPT

## 2021-11-12 PROCEDURE — 71260 CT THORAX DX C+: CPT | Performed by: RADIOLOGY

## 2021-11-12 PROCEDURE — 25010000002 IOPAMIDOL 61 % SOLUTION: Performed by: INTERNAL MEDICINE

## 2021-11-12 PROCEDURE — 74177 CT ABD & PELVIS W/CONTRAST: CPT

## 2021-11-12 RX ADMIN — IOPAMIDOL 80 ML: 612 INJECTION, SOLUTION INTRAVENOUS at 13:14

## 2021-11-16 ENCOUNTER — OFFICE VISIT (OUTPATIENT)
Dept: ONCOLOGY | Facility: CLINIC | Age: 71
End: 2021-11-16

## 2021-11-16 ENCOUNTER — LAB (OUTPATIENT)
Dept: ONCOLOGY | Facility: CLINIC | Age: 71
End: 2021-11-16

## 2021-11-16 VITALS
WEIGHT: 261.6 LBS | BODY MASS INDEX: 35.48 KG/M2 | SYSTOLIC BLOOD PRESSURE: 186 MMHG | DIASTOLIC BLOOD PRESSURE: 81 MMHG | TEMPERATURE: 97.1 F | HEART RATE: 91 BPM | OXYGEN SATURATION: 97 % | RESPIRATION RATE: 18 BRPM

## 2021-11-16 DIAGNOSIS — D50.0 IRON DEFICIENCY ANEMIA DUE TO CHRONIC BLOOD LOSS: ICD-10-CM

## 2021-11-16 DIAGNOSIS — R97.20 ELEVATED PSA: ICD-10-CM

## 2021-11-16 DIAGNOSIS — F10.10 ALCOHOL ABUSE: ICD-10-CM

## 2021-11-16 DIAGNOSIS — C20 MALIGNANT NEOPLASM OF RECTUM (HCC): Primary | ICD-10-CM

## 2021-11-16 DIAGNOSIS — N18.30 STAGE 3 CHRONIC KIDNEY DISEASE, UNSPECIFIED WHETHER STAGE 3A OR 3B CKD (HCC): ICD-10-CM

## 2021-11-16 DIAGNOSIS — K90.9 MALABSORPTION OF IRON: ICD-10-CM

## 2021-11-16 LAB
ALBUMIN SERPL-MCNC: 4.4 G/DL (ref 3.5–5.2)
ALBUMIN/GLOB SERPL: 1.2 G/DL
ALP SERPL-CCNC: 85 U/L (ref 39–117)
ALT SERPL W P-5'-P-CCNC: 30 U/L (ref 1–41)
ANION GAP SERPL CALCULATED.3IONS-SCNC: 15.4 MMOL/L (ref 5–15)
AST SERPL-CCNC: 33 U/L (ref 1–40)
BASOPHILS # BLD AUTO: 0.03 10*3/MM3 (ref 0–0.2)
BASOPHILS NFR BLD AUTO: 0.4 % (ref 0–1.5)
BILIRUB SERPL-MCNC: 0.4 MG/DL (ref 0–1.2)
BUN SERPL-MCNC: 14 MG/DL (ref 8–23)
BUN/CREAT SERPL: 11.8 (ref 7–25)
CALCIUM SPEC-SCNC: 9.3 MG/DL (ref 8.6–10.5)
CEA SERPL-MCNC: 2.02 NG/ML
CHLORIDE SERPL-SCNC: 105 MMOL/L (ref 98–107)
CO2 SERPL-SCNC: 20.6 MMOL/L (ref 22–29)
CREAT SERPL-MCNC: 1.19 MG/DL (ref 0.76–1.27)
DEPRECATED RDW RBC AUTO: 49.5 FL (ref 37–54)
EOSINOPHIL # BLD AUTO: 0.07 10*3/MM3 (ref 0–0.4)
EOSINOPHIL NFR BLD AUTO: 0.9 % (ref 0.3–6.2)
ERYTHROCYTE [DISTWIDTH] IN BLOOD BY AUTOMATED COUNT: 15.2 % (ref 12.3–15.4)
FERRITIN SERPL-MCNC: 36.86 NG/ML (ref 30–400)
GFR SERPL CREATININE-BSD FRML MDRD: 60 ML/MIN/1.73
GLOBULIN UR ELPH-MCNC: 3.6 GM/DL
GLUCOSE SERPL-MCNC: 116 MG/DL (ref 65–99)
HCT VFR BLD AUTO: 47.4 % (ref 37.5–51)
HGB BLD-MCNC: 14.6 G/DL (ref 13–17.7)
IMM GRANULOCYTES # BLD AUTO: 0.04 10*3/MM3 (ref 0–0.05)
IMM GRANULOCYTES NFR BLD AUTO: 0.5 % (ref 0–0.5)
IRON 24H UR-MRATE: 40 MCG/DL (ref 59–158)
IRON SATN MFR SERPL: 8 % (ref 20–50)
LYMPHOCYTES # BLD AUTO: 1.38 10*3/MM3 (ref 0.7–3.1)
LYMPHOCYTES NFR BLD AUTO: 18.6 % (ref 19.6–45.3)
MCH RBC QN AUTO: 27.9 PG (ref 26.6–33)
MCHC RBC AUTO-ENTMCNC: 30.8 G/DL (ref 31.5–35.7)
MCV RBC AUTO: 90.5 FL (ref 79–97)
MONOCYTES # BLD AUTO: 0.36 10*3/MM3 (ref 0.1–0.9)
MONOCYTES NFR BLD AUTO: 4.9 % (ref 5–12)
NEUTROPHILS NFR BLD AUTO: 5.53 10*3/MM3 (ref 1.7–7)
NEUTROPHILS NFR BLD AUTO: 74.7 % (ref 42.7–76)
NRBC BLD AUTO-RTO: 0 /100 WBC (ref 0–0.2)
PLATELET # BLD AUTO: 204 10*3/MM3 (ref 140–450)
PMV BLD AUTO: 9.8 FL (ref 6–12)
POTASSIUM SERPL-SCNC: 4.5 MMOL/L (ref 3.5–5.2)
PROT SERPL-MCNC: 8 G/DL (ref 6–8.5)
RBC # BLD AUTO: 5.24 10*6/MM3 (ref 4.14–5.8)
SODIUM SERPL-SCNC: 141 MMOL/L (ref 136–145)
TIBC SERPL-MCNC: 495 MCG/DL (ref 298–536)
TRANSFERRIN SERPL-MCNC: 332 MG/DL (ref 200–360)
WBC # BLD AUTO: 7.41 10*3/MM3 (ref 3.4–10.8)

## 2021-11-16 PROCEDURE — 83540 ASSAY OF IRON: CPT | Performed by: INTERNAL MEDICINE

## 2021-11-16 PROCEDURE — 82728 ASSAY OF FERRITIN: CPT | Performed by: INTERNAL MEDICINE

## 2021-11-16 PROCEDURE — 85025 COMPLETE CBC W/AUTO DIFF WBC: CPT | Performed by: INTERNAL MEDICINE

## 2021-11-16 PROCEDURE — 82378 CARCINOEMBRYONIC ANTIGEN: CPT | Performed by: INTERNAL MEDICINE

## 2021-11-16 PROCEDURE — 80053 COMPREHEN METABOLIC PANEL: CPT | Performed by: INTERNAL MEDICINE

## 2021-11-16 PROCEDURE — 99214 OFFICE O/P EST MOD 30 MIN: CPT | Performed by: INTERNAL MEDICINE

## 2021-11-16 PROCEDURE — 84466 ASSAY OF TRANSFERRIN: CPT | Performed by: INTERNAL MEDICINE

## 2021-11-16 RX ORDER — METOPROLOL SUCCINATE 25 MG/1
25 TABLET, EXTENDED RELEASE ORAL DAILY
COMMUNITY
End: 2021-12-15 | Stop reason: SDUPTHER

## 2021-12-06 ENCOUNTER — TELEPHONE (OUTPATIENT)
Dept: ONCOLOGY | Facility: HOSPITAL | Age: 71
End: 2021-12-06

## 2021-12-06 NOTE — TELEPHONE ENCOUNTER
----- Message from Oliver Viera MA sent at 12/6/2021 12:16 PM EST -----  Pt isn't going to be able to make it for his iron infusion today.   Pt would like to r/s

## 2021-12-10 ENCOUNTER — INFUSION (OUTPATIENT)
Dept: ONCOLOGY | Facility: HOSPITAL | Age: 71
End: 2021-12-10

## 2021-12-10 VITALS
TEMPERATURE: 98 F | HEART RATE: 103 BPM | BODY MASS INDEX: 35.67 KG/M2 | OXYGEN SATURATION: 95 % | WEIGHT: 263 LBS | DIASTOLIC BLOOD PRESSURE: 105 MMHG | RESPIRATION RATE: 18 BRPM | SYSTOLIC BLOOD PRESSURE: 190 MMHG

## 2021-12-10 DIAGNOSIS — D50.0 IRON DEFICIENCY ANEMIA DUE TO CHRONIC BLOOD LOSS: Primary | ICD-10-CM

## 2021-12-10 DIAGNOSIS — Z95.828 PORT-A-CATH IN PLACE: ICD-10-CM

## 2021-12-10 DIAGNOSIS — K90.9 MALABSORPTION OF IRON: ICD-10-CM

## 2021-12-10 PROCEDURE — 96374 THER/PROPH/DIAG INJ IV PUSH: CPT

## 2021-12-10 PROCEDURE — 25010000002 HEPARIN LOCK FLUSH PER 10 UNITS

## 2021-12-10 PROCEDURE — 25010000002 FERUMOXYTOL 510 MG/17ML SOLUTION 510 MG VIAL: Performed by: INTERNAL MEDICINE

## 2021-12-10 RX ORDER — HEPARIN SODIUM (PORCINE) LOCK FLUSH IV SOLN 100 UNIT/ML 100 UNIT/ML
500 SOLUTION INTRAVENOUS AS NEEDED
Status: DISCONTINUED | OUTPATIENT
Start: 2021-12-10 | End: 2021-12-10 | Stop reason: HOSPADM

## 2021-12-10 RX ORDER — SODIUM CHLORIDE 0.9 % (FLUSH) 0.9 %
10 SYRINGE (ML) INJECTION AS NEEDED
Status: DISCONTINUED | OUTPATIENT
Start: 2021-12-10 | End: 2021-12-10 | Stop reason: HOSPADM

## 2021-12-10 RX ORDER — HEPARIN SODIUM (PORCINE) LOCK FLUSH IV SOLN 100 UNIT/ML 100 UNIT/ML
SOLUTION INTRAVENOUS
Status: COMPLETED
Start: 2021-12-10 | End: 2021-12-10

## 2021-12-10 RX ORDER — SODIUM CHLORIDE 9 MG/ML
250 INJECTION, SOLUTION INTRAVENOUS ONCE
Status: COMPLETED | OUTPATIENT
Start: 2021-12-10 | End: 2021-12-10

## 2021-12-10 RX ADMIN — FERUMOXYTOL 510 MG: 510 INJECTION INTRAVENOUS at 12:00

## 2021-12-10 RX ADMIN — HEPARIN SODIUM (PORCINE) LOCK FLUSH IV SOLN 100 UNIT/ML 500 UNITS: 100 SOLUTION at 12:45

## 2021-12-10 RX ADMIN — SODIUM CHLORIDE 250 ML: 9 INJECTION, SOLUTION INTRAVENOUS at 12:00

## 2021-12-10 RX ADMIN — Medication 500 UNITS: at 12:45

## 2021-12-15 ENCOUNTER — INFUSION (OUTPATIENT)
Dept: ONCOLOGY | Facility: HOSPITAL | Age: 71
End: 2021-12-15

## 2021-12-15 VITALS
DIASTOLIC BLOOD PRESSURE: 106 MMHG | RESPIRATION RATE: 18 BRPM | WEIGHT: 262 LBS | TEMPERATURE: 97.8 F | BODY MASS INDEX: 35.53 KG/M2 | HEART RATE: 77 BPM | OXYGEN SATURATION: 94 % | SYSTOLIC BLOOD PRESSURE: 205 MMHG

## 2021-12-15 DIAGNOSIS — C20 RECTAL CANCER (HCC): Primary | ICD-10-CM

## 2021-12-15 DIAGNOSIS — Z95.828 PORT-A-CATH IN PLACE: ICD-10-CM

## 2021-12-15 DIAGNOSIS — D50.0 IRON DEFICIENCY ANEMIA DUE TO CHRONIC BLOOD LOSS: ICD-10-CM

## 2021-12-15 DIAGNOSIS — K90.9 MALABSORPTION OF IRON: ICD-10-CM

## 2021-12-15 PROCEDURE — 96365 THER/PROPH/DIAG IV INF INIT: CPT

## 2021-12-15 PROCEDURE — 96374 THER/PROPH/DIAG INJ IV PUSH: CPT

## 2021-12-15 PROCEDURE — 25010000002 FERUMOXYTOL 510 MG/17ML SOLUTION 510 MG VIAL: Performed by: INTERNAL MEDICINE

## 2021-12-15 PROCEDURE — 25010000002 HEPARIN LOCK FLUSH PER 10 UNITS: Performed by: INTERNAL MEDICINE

## 2021-12-15 RX ORDER — METOPROLOL SUCCINATE 25 MG/1
25 TABLET, EXTENDED RELEASE ORAL DAILY
Qty: 30 TABLET | Refills: 1 | Status: SHIPPED | OUTPATIENT
Start: 2021-12-15 | End: 2022-03-10 | Stop reason: SDUPTHER

## 2021-12-15 RX ORDER — METOPROLOL SUCCINATE 25 MG/1
25 TABLET, EXTENDED RELEASE ORAL ONCE
Status: COMPLETED | OUTPATIENT
Start: 2021-12-15 | End: 2021-12-15

## 2021-12-15 RX ORDER — LISINOPRIL 20 MG/1
20 TABLET ORAL DAILY
Qty: 30 TABLET | Refills: 1 | Status: SHIPPED | OUTPATIENT
Start: 2021-12-15 | End: 2022-01-19 | Stop reason: SDUPTHER

## 2021-12-15 RX ORDER — SODIUM CHLORIDE 9 MG/ML
250 INJECTION, SOLUTION INTRAVENOUS ONCE
Status: COMPLETED | OUTPATIENT
Start: 2021-12-15 | End: 2021-12-15

## 2021-12-15 RX ORDER — SODIUM CHLORIDE 0.9 % (FLUSH) 0.9 %
10 SYRINGE (ML) INJECTION AS NEEDED
Status: DISCONTINUED | OUTPATIENT
Start: 2021-12-15 | End: 2021-12-15 | Stop reason: HOSPADM

## 2021-12-15 RX ORDER — HEPARIN SODIUM (PORCINE) LOCK FLUSH IV SOLN 100 UNIT/ML 100 UNIT/ML
500 SOLUTION INTRAVENOUS AS NEEDED
Status: DISCONTINUED | OUTPATIENT
Start: 2021-12-15 | End: 2021-12-15 | Stop reason: HOSPADM

## 2021-12-15 RX ADMIN — METOPROLOL SUCCINATE 25 MG: 25 TABLET, FILM COATED, EXTENDED RELEASE ORAL at 10:45

## 2021-12-15 RX ADMIN — Medication 500 UNITS: at 10:47

## 2021-12-15 RX ADMIN — FERUMOXYTOL 510 MG: 510 INJECTION INTRAVENOUS at 10:26

## 2021-12-15 RX ADMIN — SODIUM CHLORIDE, PRESERVATIVE FREE 10 ML: 5 INJECTION INTRAVENOUS at 10:47

## 2021-12-15 RX ADMIN — SODIUM CHLORIDE 250 ML: 9 INJECTION, SOLUTION INTRAVENOUS at 10:24

## 2021-12-20 ENCOUNTER — APPOINTMENT (OUTPATIENT)
Dept: ONCOLOGY | Facility: HOSPITAL | Age: 71
End: 2021-12-20

## 2022-01-19 ENCOUNTER — OFFICE VISIT (OUTPATIENT)
Dept: CARDIOLOGY | Facility: CLINIC | Age: 72
End: 2022-01-19

## 2022-01-19 VITALS
SYSTOLIC BLOOD PRESSURE: 156 MMHG | DIASTOLIC BLOOD PRESSURE: 98 MMHG | TEMPERATURE: 96.8 F | OXYGEN SATURATION: 98 % | HEART RATE: 128 BPM | BODY MASS INDEX: 35.72 KG/M2 | WEIGHT: 263.4 LBS

## 2022-01-19 DIAGNOSIS — I10 ESSENTIAL HYPERTENSION: ICD-10-CM

## 2022-01-19 DIAGNOSIS — I42.1 CARDIOMYOPATHY, HYPERTROPHIC OBSTRUCTIVE: Primary | ICD-10-CM

## 2022-01-19 PROCEDURE — 99214 OFFICE O/P EST MOD 30 MIN: CPT | Performed by: NURSE PRACTITIONER

## 2022-01-19 PROCEDURE — 93000 ELECTROCARDIOGRAM COMPLETE: CPT | Performed by: NURSE PRACTITIONER

## 2022-01-19 RX ORDER — LISINOPRIL 40 MG/1
40 TABLET ORAL DAILY
Qty: 30 TABLET | Refills: 5 | Status: SHIPPED | OUTPATIENT
Start: 2022-01-19 | End: 2022-10-31

## 2022-01-19 NOTE — PROGRESS NOTES
Raj Wang MD  David Mandujano  1950 01/19/2022    Patient Active Problem List   Diagnosis   • Iron deficiency anemia, unspecified   • Rectal cancer (HCC)   • Preoperative cardiovascular examination   • Hypertrophic cardiomyopathy with LV OT gradient of about 68 mmHg in February 2019.   • Essential hypertension   • Port-A-Cath in place   • Iron deficiency anemia due to chronic blood loss   • Malabsorption of iron       Dear Raj Wang MD:    Subjective     Chief Complaint   Patient presents with   • Follow-up   • Med Management           History of Present Illness:    David Mandujano is a 71 y.o. male with a past medical history of hypertrophic cardiomyopathy with asymmetrical septal hypertrophy with LVOT obstruction with the gradient of about 99 mm/hg. He underwent alcohol septal ablation by Dr. Oneill at [facility]. His last follow up was in September of 2021 with Dr. Odilon Brandon at [facility]. At that time echocardiogram was recommended and metoprolol dosage was decreased to 25 mg daily. The patient reports he is doing very well. He denies chest pain, shortness of breath, or palpitations. Overall, he feels better since the procedure. He reports he needs to have echocardiogram completed at the request of his cardiologist at . However, he wants to have this done locally.           No Known Allergies:      Current Outpatient Medications:   •  allopurinol (ZYLOPRIM) 100 MG tablet, Take 1 tablet by mouth Daily., Disp: 30 tablet, Rfl: 0  •  ASCORBIC ACID PO, Take  by mouth Daily., Disp: , Rfl:   •  aspirin 81 MG chewable tablet, Chew 81 mg Daily., Disp: , Rfl:   •  cyanocobalamin (VITAMIN B-12) 1000 MCG tablet, Take 1,000 mcg by mouth Daily., Disp: , Rfl:   •  folic acid (FOLVITE) 1 MG tablet, TAKE ONE TABLET BY MOUTH DAILY, Disp: 30 tablet, Rfl: 11  •  lisinopril (PRINIVIL,ZESTRIL) 40 MG tablet, Take 1 tablet by mouth Daily., Disp: 30 tablet, Rfl: 5  •  magnesium gluconate  (MAGONATE) 500 MG tablet, Take 27 mg by mouth 2 (Two) Times a Day., Disp: , Rfl:   •  metoprolol succinate XL (TOPROL-XL) 25 MG 24 hr tablet, Take 1 tablet by mouth Daily., Disp: 30 tablet, Rfl: 1  •  pantoprazole (Protonix) 40 MG EC tablet, Take 1 tablet by mouth Daily., Disp: 30 tablet, Rfl: 6  •  prochlorperazine (COMPAZINE) 5 MG tablet, Take 2 tablets by mouth Every 6 (Six) Hours As Needed for Nausea or Vomiting., Disp: 60 tablet, Rfl: 5  •  vitamin B-6 (PYRIDOXINE) 100 MG tablet, TAKE ONE TABLET BY MOUTH EVERY DAY, Disp: 30 tablet, Rfl: 5      The following portions of the patient's history were reviewed and updated as appropriate: allergies, current medications, past family history, past medical history, past social history, past surgical history and problem list.    Social History     Tobacco Use   • Smoking status: Never Smoker   • Smokeless tobacco: Current User     Types: Chew   Vaping Use   • Vaping Use: Never used   Substance Use Topics   • Alcohol use: Yes     Comment: occasional   • Drug use: No       ROS    Objective   Vitals:    01/19/22 1345 01/19/22 1424   BP: 156/98    BP Location: Right arm    Patient Position: Sitting    Pulse: (!) 128    Temp: 96.8 °F (36 °C)    SpO2: (!) 68% 98%   Weight: 119 kg (263 lb 6.4 oz)      Body mass index is 35.72 kg/m².        Vitals reviewed.   Constitutional:       Appearance: Healthy appearance. Well-developed and not in distress.   HENT:      Head: Normocephalic and atraumatic.   Pulmonary:      Effort: Pulmonary effort is normal.      Breath sounds: Normal breath sounds. No wheezing. No rales.   Cardiovascular:      Tachycardia present. Regular rhythm.      Murmurs: There is a grade 3/6 holosystolic murmur at the LLSB.      . No S3 and S4 gallop.   Edema:     Peripheral edema absent.   Abdominal:      General: Bowel sounds are normal.      Palpations: Abdomen is soft.   Skin:     General: Skin is warm and dry.   Neurological:      Mental Status: Alert, oriented  to person, place, and time and oriented to person, place and time.   Psychiatric:         Mood and Affect: Mood normal.         Behavior: Behavior normal.         Lab Results   Component Value Date     11/16/2021    K 4.5 11/16/2021     11/16/2021    CO2 20.6 (L) 11/16/2021    BUN 14 11/16/2021    CREATININE 1.19 11/16/2021    GLUCOSE 116 (H) 11/16/2021    CALCIUM 9.3 11/16/2021    AST 33 11/16/2021    ALT 30 11/16/2021    ALKPHOS 85 11/16/2021    LABIL2 1.5 06/25/2014     No results found for: CKTOTAL  Lab Results   Component Value Date    WBC 7.41 11/16/2021    HGB 14.6 11/16/2021    HCT 47.4 11/16/2021     11/16/2021     No results found for: INR  No results found for: MG  Lab Results   Component Value Date    TSH 2.860 01/19/2021    PSA 1.050 07/21/2020    CHLPL 261 (H) 06/25/2014    TRIG 237 (H) 06/25/2014    HDL 74 06/25/2014     (H) 06/25/2014      No results found for: BNP          ECG 12 Lead    Date/Time: 1/19/2022 1:53 PM  Performed by: Lilly Matos APRN  Authorized by: Lilly Matos APRN   Comparison: compared with previous ECG   Comparison to previous ECG: Right bundle branch block now present compared to prior  Rhythm: sinus tachycardia  BPM: 109  Conduction: right bundle branch block  Other findings: non-specific ST-T wave changes    Clinical impression: abnormal EKG              Assessment/Plan    Diagnosis Plan   1. Cardiomyopathy, hypertrophic obstructive with LVOT gradient of about 99 mmHg, status post recent alcohol septal ablation at [facility]..  ECG 12 Lead    Adult Transthoracic Echo Complete W/ Cont if Necessary Per Protocol   2. Essential hypertension  ECG 12 Lead    Basic Metabolic Panel    lisinopril (PRINIVIL,ZESTRIL) 40 MG tablet                Recommendations:    1. His EKG today reveals non specific ST/T wave changes. I have reviewed this with Dr. Joe. Since the patient's stress test in March of 2021 was negative for evidence of ischemia  and he is completely asymptomatic, will continue to monitor.  2. His BP is uncontrolled. Will increase lisinopril to 40 mg daily and repeat BMP in one week.  3. Echocardiogram ordered today.  4. Follow up in 2 months or sooner if needed.         Return in about 2 months (around 3/19/2022) for Recheck.    As always, I appreciate very much the opportunity to participate in the cardiovascular care of your patients.      With Best Regards,    JOSE CARLOS Hoff

## 2022-01-26 ENCOUNTER — TELEPHONE (OUTPATIENT)
Dept: ONCOLOGY | Facility: HOSPITAL | Age: 72
End: 2022-01-26

## 2022-01-26 NOTE — TELEPHONE ENCOUNTER
Pt scheduled for port flush on this date and did not show.  Attempted to telephone patient to re-schedule, no answer and no voicemail available.

## 2022-02-01 ENCOUNTER — LAB (OUTPATIENT)
Dept: LAB | Facility: HOSPITAL | Age: 72
End: 2022-02-01

## 2022-02-01 DIAGNOSIS — I10 ESSENTIAL HYPERTENSION: ICD-10-CM

## 2022-02-01 PROCEDURE — 80048 BASIC METABOLIC PNL TOTAL CA: CPT

## 2022-02-01 PROCEDURE — 36415 COLL VENOUS BLD VENIPUNCTURE: CPT

## 2022-02-02 LAB
ANION GAP SERPL CALCULATED.3IONS-SCNC: 14.2 MMOL/L (ref 5–15)
BUN SERPL-MCNC: 15 MG/DL (ref 8–23)
BUN/CREAT SERPL: 12.1 (ref 7–25)
CALCIUM SPEC-SCNC: 9.4 MG/DL (ref 8.6–10.5)
CHLORIDE SERPL-SCNC: 104 MMOL/L (ref 98–107)
CO2 SERPL-SCNC: 24.8 MMOL/L (ref 22–29)
CREAT SERPL-MCNC: 1.24 MG/DL (ref 0.76–1.27)
GFR SERPL CREATININE-BSD FRML MDRD: 57 ML/MIN/1.73
GLUCOSE SERPL-MCNC: 123 MG/DL (ref 65–99)
POTASSIUM SERPL-SCNC: 4.2 MMOL/L (ref 3.5–5.2)
SODIUM SERPL-SCNC: 143 MMOL/L (ref 136–145)

## 2022-02-07 ENCOUNTER — HOSPITAL ENCOUNTER (OUTPATIENT)
Dept: CARDIOLOGY | Facility: HOSPITAL | Age: 72
End: 2022-02-07

## 2022-02-21 ENCOUNTER — HOSPITAL ENCOUNTER (OUTPATIENT)
Dept: CARDIOLOGY | Facility: HOSPITAL | Age: 72
Discharge: HOME | End: 2022-02-21
Admitting: NURSE PRACTITIONER

## 2022-02-21 ENCOUNTER — LAB (OUTPATIENT)
Dept: ONCOLOGY | Facility: CLINIC | Age: 72
End: 2022-02-21

## 2022-02-21 ENCOUNTER — OFFICE VISIT (OUTPATIENT)
Dept: ONCOLOGY | Facility: CLINIC | Age: 72
End: 2022-02-21

## 2022-02-21 VITALS
SYSTOLIC BLOOD PRESSURE: 196 MMHG | OXYGEN SATURATION: 98 % | DIASTOLIC BLOOD PRESSURE: 93 MMHG | HEART RATE: 83 BPM | WEIGHT: 266.2 LBS | RESPIRATION RATE: 18 BRPM | TEMPERATURE: 97.1 F | BODY MASS INDEX: 36.06 KG/M2 | HEIGHT: 72 IN

## 2022-02-21 DIAGNOSIS — I42.1 CARDIOMYOPATHY, HYPERTROPHIC OBSTRUCTIVE: ICD-10-CM

## 2022-02-21 DIAGNOSIS — R97.20 ELEVATED PSA: ICD-10-CM

## 2022-02-21 DIAGNOSIS — G62.9 NEUROPATHY: ICD-10-CM

## 2022-02-21 DIAGNOSIS — K90.9 MALABSORPTION OF IRON: ICD-10-CM

## 2022-02-21 DIAGNOSIS — F32.A DEPRESSION, UNSPECIFIED DEPRESSION TYPE: ICD-10-CM

## 2022-02-21 DIAGNOSIS — F10.10 ALCOHOL ABUSE: ICD-10-CM

## 2022-02-21 DIAGNOSIS — D50.0 IRON DEFICIENCY ANEMIA DUE TO CHRONIC BLOOD LOSS: ICD-10-CM

## 2022-02-21 DIAGNOSIS — C20 MALIGNANT NEOPLASM OF RECTUM: ICD-10-CM

## 2022-02-21 DIAGNOSIS — C20 RECTAL CANCER: Primary | ICD-10-CM

## 2022-02-21 LAB
ALBUMIN SERPL-MCNC: 4.37 G/DL (ref 3.5–5.2)
ALBUMIN/GLOB SERPL: 1.2 G/DL
ALP SERPL-CCNC: 88 U/L (ref 39–117)
ALT SERPL W P-5'-P-CCNC: 19 U/L (ref 1–41)
ANION GAP SERPL CALCULATED.3IONS-SCNC: 14 MMOL/L (ref 5–15)
AST SERPL-CCNC: 22 U/L (ref 1–40)
BASOPHILS # BLD AUTO: 0.03 10*3/MM3 (ref 0–0.2)
BASOPHILS NFR BLD AUTO: 0.3 % (ref 0–1.5)
BILIRUB SERPL-MCNC: 0.7 MG/DL (ref 0–1.2)
BUN SERPL-MCNC: 16 MG/DL (ref 8–23)
BUN/CREAT SERPL: 13.3 (ref 7–25)
CALCIUM SPEC-SCNC: 9.9 MG/DL (ref 8.6–10.5)
CEA SERPL-MCNC: 2.01 NG/ML
CHLORIDE SERPL-SCNC: 100 MMOL/L (ref 98–107)
CO2 SERPL-SCNC: 25 MMOL/L (ref 22–29)
CREAT SERPL-MCNC: 1.2 MG/DL (ref 0.76–1.27)
DEPRECATED RDW RBC AUTO: 49.5 FL (ref 37–54)
EOSINOPHIL # BLD AUTO: 0.16 10*3/MM3 (ref 0–0.4)
EOSINOPHIL NFR BLD AUTO: 1.8 % (ref 0.3–6.2)
ERYTHROCYTE [DISTWIDTH] IN BLOOD BY AUTOMATED COUNT: 14.2 % (ref 12.3–15.4)
FERRITIN SERPL-MCNC: 157.6 NG/ML (ref 30–400)
GFR SERPL CREATININE-BSD FRML MDRD: 60 ML/MIN/1.73
GLOBULIN UR ELPH-MCNC: 3.6 GM/DL
GLUCOSE SERPL-MCNC: 134 MG/DL (ref 65–99)
HCT VFR BLD AUTO: 49.2 % (ref 37.5–51)
HGB BLD-MCNC: 16.6 G/DL (ref 13–17.7)
IMM GRANULOCYTES # BLD AUTO: 0.03 10*3/MM3 (ref 0–0.05)
IMM GRANULOCYTES NFR BLD AUTO: 0.3 % (ref 0–0.5)
IRON 24H UR-MRATE: 89 MCG/DL (ref 59–158)
IRON SATN MFR SERPL: 22 % (ref 20–50)
LYMPHOCYTES # BLD AUTO: 2.15 10*3/MM3 (ref 0.7–3.1)
LYMPHOCYTES NFR BLD AUTO: 23.9 % (ref 19.6–45.3)
MCH RBC QN AUTO: 31.9 PG (ref 26.6–33)
MCHC RBC AUTO-ENTMCNC: 33.7 G/DL (ref 31.5–35.7)
MCV RBC AUTO: 94.6 FL (ref 79–97)
MONOCYTES # BLD AUTO: 0.46 10*3/MM3 (ref 0.1–0.9)
MONOCYTES NFR BLD AUTO: 5.1 % (ref 5–12)
NEUTROPHILS NFR BLD AUTO: 6.17 10*3/MM3 (ref 1.7–7)
NEUTROPHILS NFR BLD AUTO: 68.6 % (ref 42.7–76)
NRBC BLD AUTO-RTO: 0 /100 WBC (ref 0–0.2)
PLATELET # BLD AUTO: 174 10*3/MM3 (ref 140–450)
PMV BLD AUTO: 9.9 FL (ref 6–12)
POTASSIUM SERPL-SCNC: 4.1 MMOL/L (ref 3.5–5.2)
PROT SERPL-MCNC: 8 G/DL (ref 6–8.5)
PSA SERPL-MCNC: 2.56 NG/ML (ref 0–4)
RBC # BLD AUTO: 5.2 10*6/MM3 (ref 4.14–5.8)
SODIUM SERPL-SCNC: 139 MMOL/L (ref 136–145)
TIBC SERPL-MCNC: 411 MCG/DL (ref 298–536)
TRANSFERRIN SERPL-MCNC: 276 MG/DL (ref 200–360)
WBC NRBC COR # BLD: 9 10*3/MM3 (ref 3.4–10.8)

## 2022-02-21 PROCEDURE — 93306 TTE W/DOPPLER COMPLETE: CPT

## 2022-02-21 PROCEDURE — 82728 ASSAY OF FERRITIN: CPT | Performed by: INTERNAL MEDICINE

## 2022-02-21 PROCEDURE — 82378 CARCINOEMBRYONIC ANTIGEN: CPT | Performed by: INTERNAL MEDICINE

## 2022-02-21 PROCEDURE — 99214 OFFICE O/P EST MOD 30 MIN: CPT | Performed by: NURSE PRACTITIONER

## 2022-02-21 PROCEDURE — 84466 ASSAY OF TRANSFERRIN: CPT | Performed by: INTERNAL MEDICINE

## 2022-02-21 PROCEDURE — 83540 ASSAY OF IRON: CPT | Performed by: INTERNAL MEDICINE

## 2022-02-21 PROCEDURE — 80053 COMPREHEN METABOLIC PANEL: CPT | Performed by: INTERNAL MEDICINE

## 2022-02-21 PROCEDURE — 85025 COMPLETE CBC W/AUTO DIFF WBC: CPT | Performed by: INTERNAL MEDICINE

## 2022-02-21 PROCEDURE — 93306 TTE W/DOPPLER COMPLETE: CPT | Performed by: INTERNAL MEDICINE

## 2022-02-21 PROCEDURE — 84153 ASSAY OF PSA TOTAL: CPT | Performed by: INTERNAL MEDICINE

## 2022-02-21 NOTE — PROGRESS NOTES
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  Combined chemoradiation with Xeloda 825 mg/m2 BID D1-5 or M-F during the course of radiation.  His dose is 500 mg x 4 or 2000 mg BID  Treatment finished 19.    2.  Dr. Yadav performed LAR with coloanal anstomosis and loop ileostomy 19.  Pathology showed  Residual invasive adneocarcinoma.  Tumor invaded the muscularis propria.  Surgical margins clear.  0/14 resected LNs involved.  ypT2N0.      3.       4.  Ileostomy reversal 19 (Dr. Yadav)    5.        CHIEF COMPLAINT:  Follow up of rectal cancer and iron deficiency anemia    HISTORY OF PRESENT ILLNESS:   David Mandujano is a very pleasant 71 y.o. male who was referred by Dr. Barnhart for evaluation and treatment of colorectal cancer. He began to notice rectal bleeding in January or 2018, and he also began to notice fatigue in approximately July. He assumed he was out of shape when he started to become short of breath and started trying to exercise more frequently, which only exacerbated the shortness of breath. He was evaluated by his PCP, Raj Wang MD, who after testing, found him to be severely iron deficient, hyperglycemic, and he also had an elevated PSA. He was on vacation at the time, and his PCP asked him to return home for further evaluation. He was started on oral iron therapy and later received IV iron infusions. He was also scheduled for a colonoscopy with Dr. Barnhart, during which a suspicious rectal mass was biopsied and pathology was positive for an ulcerated invasive, moderately differentiated rectal adenocarcinoma.     INTERVAL HISTORY:  Mr. Mandujano is here today for follow up of rectal cancer. Since his last visit, overall, he has been doing well. His main complaint is chronic fatigue. He has also been  struggling with hypertension the past ~3-4 months. He says he followed up with cardiology who has been adjusting his medications. He is also scheduled for ECHO later today. He was again replaced with IV Feraheme in mid December. At present, denies obvious bleeding from any source. He reports following up with Dr. Barnhart last week. He has no other specific complaints today.     PAST MEDICAL HISTORY:  Past Medical History:   Diagnosis Date   • Anemia    • Arthritis    • Colon cancer (HCC)     s/p chemo therapy   • Heart murmur    • Hypertension    • Wrist fracture     surgically repaired       PAST SURGICAL HISTORY:  Past Surgical History:   Procedure Laterality Date   • ABDOMINAL SURGERY     • COLON SURGERY     • COLONOSCOPY     • FRACTURE SURGERY Left    • VENOUS ACCESS DEVICE (PORT) INSERTION N/A 5/20/2019    Procedure: INSERTION VENOUS ACCESS DEVICE;  Surgeon: Cindy Corrales MD;  Location: [facility];  Service: General   • WRIST SURGERY         FAMILY HISTORY:  Family History   Problem Relation Age of Onset   • Colon cancer Maternal Grandfather    • Other Sister    • Heart disease Sister    No other family history of malignancy.    SOCIAL HISTORY:  Social History     Socioeconomic History   • Marital status: Single   Tobacco Use   • Smoking status: Never Smoker   • Smokeless tobacco: Current User     Types: Chew   Vaping Use   • Vaping Use: Never used   Substance and Sexual Activity   • Alcohol use: Yes     Comment: occasional   • Drug use: No   • Sexual activity: Defer     REVIEW OF SYSTEMS:   A comprehensive 14 point review of systems was performed.  Significant findings as mentioned above.  All other systems reviewed and are negative.      MEDICATIONS:  The current medication list was reviewed in the EMR    Current Outpatient Medications:   •  allopurinol (ZYLOPRIM) 100 MG tablet, Take 1 tablet by mouth Daily., Disp: 30 tablet, Rfl: 0  •  ASCORBIC ACID PO, Take  by mouth Daily., Disp: , Rfl:   •  aspirin 81  MG chewable tablet, Chew 81 mg Daily., Disp: , Rfl:   •  cyanocobalamin (VITAMIN B-12) 1000 MCG tablet, Take 1,000 mcg by mouth Daily., Disp: , Rfl:   •  folic acid (FOLVITE) 1 MG tablet, TAKE ONE TABLET BY MOUTH DAILY, Disp: 30 tablet, Rfl: 11  •  lisinopril (PRINIVIL,ZESTRIL) 40 MG tablet, Take 1 tablet by mouth Daily., Disp: 30 tablet, Rfl: 5  •  magnesium gluconate (MAGONATE) 500 MG tablet, Take 27 mg by mouth 2 (Two) Times a Day., Disp: , Rfl:   •  metoprolol succinate XL (TOPROL-XL) 25 MG 24 hr tablet, Take 1 tablet by mouth Daily., Disp: 30 tablet, Rfl: 1  •  pantoprazole (Protonix) 40 MG EC tablet, Take 1 tablet by mouth Daily., Disp: 30 tablet, Rfl: 6  •  prochlorperazine (COMPAZINE) 5 MG tablet, Take 2 tablets by mouth Every 6 (Six) Hours As Needed for Nausea or Vomiting., Disp: 60 tablet, Rfl: 5  •  vitamin B-6 (PYRIDOXINE) 100 MG tablet, TAKE ONE TABLET BY MOUTH EVERY DAY, Disp: 30 tablet, Rfl: 5    ALLERGIES:  No Known Allergies    PHYSICAL EXAM:  Vitals:    02/21/22 0958   BP: (!) 196/93   Pulse: 83   Resp: 18   Temp: 97.1 °F (36.2 °C)   SpO2: 98%     Pain Score    02/21/22 0958   PainSc: 0-No pain   ECOG score: 0     General:  Awake, alert and oriented, appears well, in good spirits.  HEENT:  Pupils are equal, round and reactive to light and accommodation, Extra-ocular movements full, Oropharyx clear, mucous membranes moist  Neck:  No JVD, thyromegaly or lymphadenopathy  CV:  Regular rate and rhythm, II/IV systolic murmur, no rubs or gallops  Resp:  CTAB  Abd:  Soft, non-tender, non distended, bowel sounds present, no organomegaly or masses.  Surgical scars present.   Ext:  No clubbing, cyanosis or edema     Lymph:  No cervical, supraclavicular, axillary, adenopathy  Neuro:  Grossly non-focal exam    PATHOLOGY:  10-02-18      04-01-19:        04-09-19:        ENDOSCOPY:  10-02-18:      2-05-21 EGD/C-scope ([place])  -  Mild reflux esophagitis with small erosions at the GE junction and a slight  stricture  -  Endoscopically normal stomach, duodenum.  - Unremarkable surgical anastomosis 5 cm above the anal verge.   -  Minimal diverticulosis.  -  Otherwise normal colon and terminal ileum with fair prep.      IMAGING:  10-04-18 CT Abdomen Pelvis With Contrast   Findings  - LOWER THORAX: Patchy nonspecific subpleural nodularity in the left lung base that could represent sequelae of chronic airspace disease or early fibrosis.     ABDOMEN:  - LIVER: Homogeneous. No focal hepatic mass or ductal dilatation.  - GALLBLADDER: GALLSTONES WITHIN THE GALLBLADDER.  - PANCREAS: Unremarkable. No mass or ductal dilatation.  - SPLEEN: Homogeneous. No splenomegaly.  - ADRENALS: No mass.  - KIDNEYS: RIGHT RENAL CYST MEASURING 3.6 CM.  - GI TRACT: NONSPECIFIC DISTAL SIGMOID COLONIC WALL THICKENING VERSUS NONDISTENTION.  - PERITONEUM: No free air. No free fluid or loculated fluid collections.  - MESENTERY: Unremarkable.  - LYMPH NODES: No lymphadenopathy.  - VASCULATURE: ATHEROSCLEROTIC VASCULAR CALCIFICATION.  - ABDOMINAL WALL: SMALL BILATERAL HUMERAL HERNIAS CONTAINING ONLY FAT.  - OTHER: None.     PELVIS:  - BLADDER: No focal mass or significant wall thickening  - REPRODUCTIVE: Unremarkable as visualized.  - APPENDIX: Nondistended. No surrounding inflammation.  - BONES: No acute bony abnormality.     IMPRESSION:  1. Gallstones within the gallbladder.  2.Nonspecific distal sigmoid colonic wall thickening versus nondistention.    PET/CT 10-29-18    11-13-18 MRI Pelvis Without Contrast  FINDINGS:  1.) TUMOR LOCATION AND CHARACTERISTICS     i) Distance of Inferior margin of Tumor from the dentate line: 9.5 CM  ii) Tumor at or below the puborectalis sling:  NO  iii) Relationship to the anterior peritoneal reflection: BELOW  iv) Craniocaudal length of the tumor: 6cm  v) Clock face of tumor: 5o'clock to 2o'clock  vi) Polypoid/ Annular/ Semi-annular: SEMI-ANNULAR  vii) Mucinous: YES     2.) EXTRAMURAL DEPTH OF INVASION AND MR  T-CATEGORY       i) Extramural depth of invasion (Use 0mm for T1 or T2 tumor):  APPROXIMATELY 5 MM   ii) T category: T3 B              *Please indicate structures with possible invasion.  Specify laterality,  sequence and slice#: (see list below)     *  Anterior peritoneal reflection (T4a tumor): NO  *  Puborectalis: NO  *  Bladder: NO  *  Vascular Involvement of Iliac Vessels: NO  *  Levator ani: NO  *  Ureters: NO  *  Obturator: NO  *  Prostate: MARGINAL/TETHERED  *  Piriformis: NO  *  Uterus: NOT APPLICABLE  *  Pelvic Bone: NO  *  Vagina: NOT APPLICABLE  *  Sacrum: NO  *  Urethra: NO  *  Other:          iii) For low rectal tumors (maximum tumor depth at or below the  puborectalis sling):     *  Not applicable (tumor above the puborectalis sling: NOT APPLICABLE  *    *  Level 1 (submucosa only, no involvement of internal anal sphincter):       *  Level 2 (confined to the internal anal sphincter; no involvement of  intersphincteric fat):      *  Level 3 (intersphincteric fat involved):      *  Level 4 (involves external sphincter or beyond):         3. RELATIONSHIP OF THE TUMOR TO MESORECTAL FASCIA (MRF)       i) Shortest distance 0mm of the definitive tumour border to the MRF  is: AT 12:00   ii) Are there any tumour spiculations closer to the MRF? NO     iii) Location of Tumor margin to MRF: 12:00, AT THE LEVEL PROSTATE     4. EXTRAMURAL VENOUS INVASION       i) Extramural Venous Invasion (EMVI): ABSENT     5. MESORECTAL LYMPH NODES AND TUMOUR DEPOSITS       i) Any suspicious mesorectal lymph nodes/tumor deposits: YES      *If yes, the most suspicious node/tumor deposit is: 15 MM IN  DIAMETER, ALONG THE UPPER MARGIN OF the tumor with minimum distance 7  MMmm from the MRF at 7o'clock     6. EXTRAMESORECTAL LYMPH NODES        i) Any suspicious extramesorectal lymph nodes: NO      *If yes, location and laterality of suspicious nodes:             Int. Iliac:                Ext. Iliac:                Common Iliac:                 Obturator:                Inguinal:                Other:           ii) Is the BETH node station in the field of view: NO        *If Yes, are these nodes suspicious:         7. OTHER FINDINGS (COMPLICATIONS, METASTASES, LIMITATIONS)         NOTE: THERE IS SOME UNCERTAINTY WHETHER THE APPARENT SMALL FOCUS OF TUMOR EXTENDING TO THE PROSTATE AT THE 12:00 POSITION COULD ACTUALLY BE PROSTATE NODULE EXTENDING TOWARD THE TUMOR. RECTAL TUMOR IS FAVORED; ALTHOUGH THIS DETERMINATION CANNOT BE MADE WITH COMPLETE CERTAINTY, TUMOR IS CONSIDERED T3 B.        IMPRESSIONS:  MRI rectal cancer T category is: T3 B  Maximum EMD of invasion is: 5  Minimum tumor to MRF distance is: 0  Low rectal tumor component: NO  Mesorectal nodes/tumor deposits: SUSPICIOUS  EMVI: ABSENT  Extramesorectal nodes: NEGATIVE    CTCAP 05-21-20     FINDINGS:  Adenopathy:No evidence of mediastinal or hilar lymph node enlargement.  No axillary lymph node enlargement.     Fluid:There are no pericardial or pleural effusions.     LUNGS:No parenchymal soft tissue nodules or masses are present.     Skeletal:Arthritic change in the thoracic spine.      Upper abdomen shows cholelithiasis.      IMPRESSION:  1. No parenchymal soft tissue nodules or masses.  2. No pericardial or pleural effusions.  3. No adenopathy.   4. Coronary artery calcifications.   5. Cholelithiasis.     FINDINGS:   The lung bases are clear. There are no pleural effusions.     The liver is homogeneous.     There is cholelithiasis.     The spleen is homogeneous.      There is no peripancreatic stranding or pancreatic head mass.      There is no adrenal enlargement.     Large right renal cyst is present. There is no solid renal mass or  hydronephrosis..      Otherwise I do not see any free fluid or walled off fluid collections.     The rectal wall appears to be thickened.     IMPRESSION:  1. Rectal wall thickening.  2. Cholelithiasis.         CT CAP 09/15/2020  Findings  LUNGS:  Unremarkable. No parenchymal soft tissue nodules.  No focal air  space disease.  HEART: CORONARY ARTERY CALCIFICATIONS ARE NOTED.  PERICARDIUM: No effusion.  MEDIASTINUM: No masses. No enlarged lymph nodes.  No fluid collections.  PLEURA: No pleural effusion. No pleural mass or abnormal calcification.  MAJOR AIRWAYS: Clear. No intrinsic mass.  VASCULATURE: No evidence of aneurysm.  VISUALIZED UPPER ABDOMEN:  LIVER: Homogeneous. No focal hepatic mass or ductal dilatation.  SPLEEN: Homogeneous. No splenomegaly.  ADRENALS: No mass.  KIDNEYS: RIGHT RENAL CYST MEASURING 6.7 CM.  GI TRACT: Non-dilated. No definite wall thickening.  PERITONEUM: No free air. No free fluid or loculated fluid  collections.  ABDOMINAL WALL: No focal hernia or mass.  OTHER: None.  BONES: No acute bony abnormality.     IMPRESSION:  Impression:  1. Unremarkable exam.  No source for the patient symptoms.  2. Other incidental/non-acute findings as above.    Findings  LOWER THORAX: Clear. No effusions.  ABDOMEN:  LIVER: Homogeneous. No focal hepatic mass or ductal dilatation.GALLBLADDER: GALLSTONES IN THE GALLBLADDER.  PANCREAS: Unremarkable. No mass or ductal dilatation.  SPLEEN: Homogeneous. No splenomegaly.  ADRENALS: No mass.  KIDNEYS: RIGHT RENAL CYST MEASURING 6.4 CM.  GI TRACT: Possibly some rectal wall thickening.  PERITONEUM: No free air. No free fluid or loculated fluid  collections.  MESENTERY: Unremarkable.  LYMPH NODES: No lymphadenopathy.  VASCULATURE: No evidence of aneurysm.  ABDOMINAL WALL: No focal hernia or mass.  OTHER: None.   PELVIS:   BLADDER: No focal mass or significant wall thickening   REPRODUCTIVE: Unremarkable as visualized.   APPENDIX: Nondistended. No surrounding inflammation.  BONES: No acute bony abnormality.     IMPRESSION:  Impression:  Rectal wall thickening noted.      CTCAP 01-14-21  CT FINDINGS: On the lung windows, the lungs are both well aerated and  clear. No pulmonary parenchymal lung nodules to suggest  metastatic  disease were seen. There were no inflammatory infiltrates or pleural  effusions. On the soft tissue windows, there was no evidence of  supraclavicular or axillary lymphadenopathy. No enlarged mediastinal or  hilar lymph nodes were demonstrated. There was no evidence of  pericardial effusion.     IMPRESSION:  No CT findings of metastatic disease in the chest.     CT FINDINGS: The liver and spleen are stable in size and shape; no focal  lesions have developed within the liver. There is a calcified stone in  the dependent portion of the gallbladder. The pancreas showed no  evidence of mass or inflammation. No adrenal lesions were demonstrated.  There is some thinning of the cortical tissues in the kidneys with a 6.5  cm cyst in the right kidney. There was no ascites. The aorta is normal  in caliber. No enlarged lymph nodes were seen in the retroperitoneum or  mesentery. In the pelvis, postoperative changes are noted near the  rectum where there is a line of  GI staples. No evidence of residual or recurrent tumor is demonstrated.  On today's study there was no significant rectal wall thickening.     IMPRESSION:  No CT findings to suggest metastatic disease in the abdomen  or pelvis. There is less thickening of the rectal wall than seen on the  previous CT. The patient does have cholelithiasis and a large cyst in  the right kidney.       CTCAP 05-14-21  FINDINGS:     LUNGS: Unremarkable. No parenchymal soft tissue nodules.  No focal air  space disease.     HEART: Coronary artery calcifications.     MEDIASTINUM: No masses. No enlarged lymph nodes.  No fluid collections.     PLEURA: No pleural effusion. No pleural mass or abnormal calcification.  No pneumothorax.     VASCULATURE: No evidence of aneurysm. No evidence of pulmonary embolism.     BONES: Arthritic change.     VISUALIZED UPPER ABDOMEN:Please see the report for the CT of the abdomen  and pelvis     Other: None.     IMPRESSION:     1. No evidence of  metastatic disease to the abdomen or pelvis.  2. Coronary artery calcifications.    FINDINGS:     Lower thorax: Clear. No effusions.     Abdomen:     Liver: Homogeneous. No focal hepatic mass or ductal dilatation.     Gallbladder: Cholelithiasis     Pancreas: Unremarkable. No mass or ductal dilatation.     Spleen: Homogeneous. No splenomegaly.     Adrenals: No mass.     Kidneys/ureters: Large right renal cyst     GI tract: Non-dilated. No definite wall thickening.. There is no  evidence of appendicitis     MESENTERY: No free fluid, walled off fluid collections, mesenteric  stranding, or enlarged lymph nodes         Vasculature: Evidence of atherosclerotic vascular disease     Abdominal wall: No focal hernia or mass.        Bladder: Urinary bladder wall thickening     Reproductive: Unremarkable as visualized     Bones: No acute bony abnormality.     IMPRESSION:     1. No evidence of metastatic disease to the abdomen or pelvis     2.Cholelithiasis     3. Urinary bladder wall thickening        CTCAP 11-12-21  FINDINGS:    LUNGS:  Unremarkable.  No mass.  No consolidation.    PLEURAL SPACE:  Unremarkable.  No pneumothorax.  No significant  effusion.    HEART:  Coronary artery calcifications are noted.  No significant  pericardial effusion.    BONES/JOINTS:  Anterior thoracic spine osteophyte formation.  No acute  fracture.  No dislocation.    SOFT TISSUES:  Unremarkable.    VASCULATURE:  Unremarkable.  No thoracic aortic aneurysm.    LYMPH NODES:  Unremarkable.  No enlarged lymph nodes.     IMPRESSION:    No acute findings in the chest.    FINDINGS:    LUNG BASES:  Unremarkable.  No mass.  No consolidation.      ABDOMEN:    LIVER:  Unremarkable.  No mass.    GALLBLADDER AND BILE DUCTS:  Gallstones in the gallbladder.  No ductal  dilation.    PANCREAS:  Unremarkable.  No mass.  No ductal dilation.    SPLEEN:  Unremarkable.  No splenomegaly.    ADRENALS:  Unremarkable.  No mass.    KIDNEYS AND URETERS:  6.6 cm right renal  cyst.  No hydronephrosis.    STOMACH AND BOWEL:  Changes of distal colonic anastomosis noted.  No  obstruction.  No mucosal thickening.      PELVIS:    APPENDIX:  No findings to suggest acute appendicitis.    BLADDER:  Unremarkable.  No mass.    REPRODUCTIVE:  Unremarkable as visualized.      ABDOMEN and PELVIS:    INTRAPERITONEAL SPACE:  Unremarkable.  No free air.  No significant  fluid collection.    BONES/JOINTS:  No acute fracture.  No dislocation.    SOFT TISSUES:  Unremarkable.    VASCULATURE:  Unremarkable.  No abdominal aortic aneurysm.    LYMPH NODES:  Unremarkable.  No enlarged lymph nodes.     IMPRESSION:    No acute findings in the abdomen or pelvis.    RECENT LABS:  Lab Results   Component Value Date    WBC 9.00 02/21/2022    HGB 16.6 02/21/2022    HCT 49.2 02/21/2022    MCV 94.6 02/21/2022    RDW 14.2 02/21/2022     02/21/2022    NEUTRORELPCT 68.6 02/21/2022    LYMPHORELPCT 23.9 02/21/2022    MONORELPCT 5.1 02/21/2022    EOSRELPCT 1.8 02/21/2022    BASORELPCT 0.3 02/21/2022    NEUTROABS 6.17 02/21/2022    LYMPHSABS 2.15 02/21/2022       Lab Results   Component Value Date     02/21/2022    K 4.1 02/21/2022    CO2 25.0 02/21/2022     02/21/2022    BUN 16 02/21/2022    CREATININE 1.20 02/21/2022    EGFRIFNONA 60 (L) 02/21/2022    GLUCOSE 134 (H) 02/21/2022    CALCIUM 9.9 02/21/2022    ALKPHOS 88 02/21/2022    AST 22 02/21/2022    ALT 19 02/21/2022    BILITOT 0.7 02/21/2022    ALBUMIN 4.37 02/21/2022    PROTEINTOT 8.0 02/21/2022       Lab Results   Component Value Date    CEA 2.02 11/16/2021    CEA 1.90 10/11/2021    CEA 2.25 05/18/2021    CEA 2.37 02/26/2021    CEA 1.82 08/31/2020    CEA 1.83 07/21/2020    CEA 2.00 06/25/2020    CEA 2.52 04/21/2020    CEA 2.28 01/28/2020    CEA 2.28 06/26/2019    CEA 3.40 03/20/2019    CEA 17.80 (H) 01/11/2019    CEA 18.80 (H) 10/19/2018     Lab Results   Component Value Date    PSA 1.050 07/21/2020    PSA 1.110 06/25/2020    PSA 4.930 (H) 10/19/2018      Lab Results   Component Value Date    TSH 2.860 01/19/2021     Lab Results   Component Value Date    HGBA1C 5.80 (H) 06/25/2020    HGBA1C 5.50 01/28/2020    HGBA1C 5.40 03/20/2019       Lab Results   Component Value Date    FERRITIN 157.60 02/21/2022    IRON 89 02/21/2022    TIBC 411 02/21/2022    LABIRON 22 02/21/2022    EWXPCKQC13 1,538 (H) 02/26/2021    FOLATE 9.92 02/26/2021     ASSESSMENT & PLAN:  David Mandujano is a very pleasant 71 y.o. male with newly diagnosed rectal cancer. Clinically stage IIIC ([id number]).    1.  Rectal cancer:  -  He received standard neoadjuvant chemoradiation as above given locally advanced tumor with suspicious pelvic lymph node.    - Pre-treatment MRI showed a locally advanced tumor and suspicious pelvic LN.  PET-CT was negative except in the local tumor.   -  There was a non-specific sub-pleural nodular opacity in the L lung base which was PET negative.  Perhaps this was inflammatory in nature. This area will require f/u on future imaging.  - Recommended neoadjuvant chemoradiation with Xeloda 825 mg/m2 BID D1-5 or M-F during the course of radiaton.  His dose was 500 mg x 4 or 2000 mg BID. Overall, he tolerated this well and completed treatment 1-21-19.    -  He saw Dr. Marilynn Yadav and underwent successful surgical resection as above.    -  He took adjuvant CapeOx treatment to prevent recurrence and has completed 8 cycles. Overall, he tolerated treatment well aside from fatigue and diarrhea. He started to struggle with thrombocytopenia and possibly with neuropathy so gave his last cycle without Oxaliplatin.  -  He did well with take down of his ileostomy.  -  Most recent CT CAP from November 2021 without evidence of metastasis.  CEA has been normal. CEA from today is pending.     -  He underwent c-scope with Dr. Barnhart on 2-05-21 without cause for concern.  Repeat exam recommended after 3 years.  -Clinically, he continues to do well. Exam/labs from today without cause for  concern.   -  He will RTC in ~3 months for MD follow up with repeat CT CAP with contrast as well as labs including CEA prior.     2.  Iron deficiency Anemia:  -  He previously took Niferex but did not appear to absorb it and has required IV iron replacement. Most recently, he was replaced in mid December 2021.     -  He did have EGD and c-scope.  He continues to f/u with Dr. Barnhart and continues on Protonix.  -At present, he denies obvious bleeding from any source.   -Repeat CBC shows normal Hg and iron is now replete. Will repeat iron studies with next follow up.     3. Neuropathy:  -  Etiology is uncertain.  He did complain of some vague symptoms toward the end of his treatment (though he thinks they happened later), so this could be related to Oxaliplatin.    - Thyroid function is wnl.  HbA1C has been normal.    - B12 and folate replete. He continues to take take SL B12 and folate as prescribed. At present, he denies having neuropathy.  - He has been advised to avoid alcohol as this can cause or worsen peripheral neuropathy.      4.   Depressed mood / Anxiety and Alcohol Abuse:  - Previously given trial of Lexapro but he did not find this helpful and discontinued this. He said he did quit drinking completely around the time of his surgery but started drinking again. He says Dr. Yadav did mention to him that his liver didn't look good during his operation.  -  We previously discussed potential involvement in support groups and /or referral for alcohol abuse counseling, but he declined.  Will monitor.    5. Hypertension:  -He has been struggling with this the past ~3-4 months. He reports following with cardiology who has been adjusting his medications. /93 today. He is also scheduled for ECHO later today. He was advised to monitor BP at home and follow up with cardiology for further management. He has upcoming follow up in early March.     6.  Prophylaxis:  He took 2021 influenza vaccine. He took Prevnar 13 on  11-20-18.   He completed COVID vaccine x 2.  Recommended booster.     ACO / KAVITHA/Other  Quality measures  -  David Mandujano received 2021 flu vaccine.  -  David Mandujano reports a pain score of 0.    -  Current outpatient and discharge medications have been reconciled for the patient.  Reviewed by: JOSE CARLOS Menjivar      7.  Follow up:   -MD follow up in 3 months as scheduled with repeat  CT CAP with IV contrast prior with  CBCD, CMP, CEA, PSA, Ferritin, iron profile.  -  Recommended COVID booster       I spent 30 minutes with David Mandujano today.  In the office today, more than 50% of this time was spent face-to-face with him  in counseling / coordination of care, reviewing his interim medical history and counseling on the current treatment plan.  All questions were answered to his satisfaction.         Electronically Signed by: JOSE CARLOS Damon        CC:   MD Kathleen Coppola Emmanuel C, MD Shawn Michael Acino, MD William Richards, MD Sandra Beck, MD

## 2022-02-22 LAB
BH CV ECHO MEAS - ACS: 1.6 CM
BH CV ECHO MEAS - AO MAX PG (FULL): 32.9 MMHG
BH CV ECHO MEAS - AO MAX PG: 42.9 MMHG
BH CV ECHO MEAS - AO MEAN PG (FULL): 17 MMHG
BH CV ECHO MEAS - AO MEAN PG: 22 MMHG
BH CV ECHO MEAS - AO ROOT AREA (BSA CORRECTED): 1.4
BH CV ECHO MEAS - AO ROOT AREA: 8.8 CM^2
BH CV ECHO MEAS - AO ROOT DIAM: 3.4 CM
BH CV ECHO MEAS - AO V2 MAX: 326.5 CM/SEC
BH CV ECHO MEAS - AO V2 MEAN: 215.5 CM/SEC
BH CV ECHO MEAS - AO V2 VTI: 64.7 CM
BH CV ECHO MEAS - AVA(I,A): 1.7 CM^2
BH CV ECHO MEAS - AVA(I,D): 1.7 CM^2
BH CV ECHO MEAS - AVA(V,A): 1.7 CM^2
BH CV ECHO MEAS - AVA(V,D): 1.7 CM^2
BH CV ECHO MEAS - BSA(HAYCOCK): 2.5 M^2
BH CV ECHO MEAS - BSA: 2.4 M^2
BH CV ECHO MEAS - BZI_BMI: 35.7 KILOGRAMS/M^2
BH CV ECHO MEAS - BZI_METRIC_HEIGHT: 182.9 CM
BH CV ECHO MEAS - BZI_METRIC_WEIGHT: 119.3 KG
BH CV ECHO MEAS - EDV(MOD-SP4): 64 ML
BH CV ECHO MEAS - EF(MOD-SP4): 66.6 %
BH CV ECHO MEAS - ESV(MOD-SP4): 21.4 ML
BH CV ECHO MEAS - LA DIMENSION: 3.2 CM
BH CV ECHO MEAS - LA/AO: 0.96
BH CV ECHO MEAS - LV DIASTOLIC VOL/BSA (35-75): 26.7 ML/M^2
BH CV ECHO MEAS - LV MAX PG: 10 MMHG
BH CV ECHO MEAS - LV MEAN PG: 5 MMHG
BH CV ECHO MEAS - LV SYSTOLIC VOL/BSA (12-30): 8.9 ML/M^2
BH CV ECHO MEAS - LV V1 MAX: 158 CM/SEC
BH CV ECHO MEAS - LV V1 MEAN: 109 CM/SEC
BH CV ECHO MEAS - LV V1 VTI: 31 CM
BH CV ECHO MEAS - LVLD AP4: 8.4 CM
BH CV ECHO MEAS - LVLS AP4: 7.3 CM
BH CV ECHO MEAS - LVOT AREA (M): 3.5 CM^2
BH CV ECHO MEAS - LVOT AREA: 3.5 CM^2
BH CV ECHO MEAS - LVOT DIAM: 2.1 CM
BH CV ECHO MEAS - MV A MAX VEL: 147 CM/SEC
BH CV ECHO MEAS - MV E MAX VEL: 81.9 CM/SEC
BH CV ECHO MEAS - MV E/A: 0.56
BH CV ECHO MEAS - PA ACC TIME: 0.06 SEC
BH CV ECHO MEAS - PA PR(ACCEL): 50.7 MMHG
BH CV ECHO MEAS - SI(AO): 238.3 ML/M^2
BH CV ECHO MEAS - SI(LVOT): 44.9 ML/M^2
BH CV ECHO MEAS - SI(MOD-SP4): 17.8 ML/M^2
BH CV ECHO MEAS - SV(AO): 570.3 ML
BH CV ECHO MEAS - SV(LVOT): 107.4 ML
BH CV ECHO MEAS - SV(MOD-SP4): 42.6 ML
MAXIMAL PREDICTED HEART RATE: 149 BPM
STRESS TARGET HR: 127 BPM

## 2022-03-10 ENCOUNTER — OFFICE VISIT (OUTPATIENT)
Dept: CARDIOLOGY | Facility: CLINIC | Age: 72
End: 2022-03-10

## 2022-03-10 VITALS
DIASTOLIC BLOOD PRESSURE: 88 MMHG | BODY MASS INDEX: 36.27 KG/M2 | TEMPERATURE: 96.8 F | WEIGHT: 267.8 LBS | SYSTOLIC BLOOD PRESSURE: 181 MMHG | HEART RATE: 85 BPM | HEIGHT: 72 IN

## 2022-03-10 DIAGNOSIS — I42.1 CARDIOMYOPATHY, HYPERTROPHIC OBSTRUCTIVE: Primary | ICD-10-CM

## 2022-03-10 DIAGNOSIS — I10 ESSENTIAL HYPERTENSION: ICD-10-CM

## 2022-03-10 DIAGNOSIS — I35.0 AORTIC STENOSIS, MODERATE: ICD-10-CM

## 2022-03-10 PROCEDURE — 99214 OFFICE O/P EST MOD 30 MIN: CPT | Performed by: NURSE PRACTITIONER

## 2022-03-10 RX ORDER — METOPROLOL SUCCINATE 50 MG/1
50 TABLET, EXTENDED RELEASE ORAL DAILY
Qty: 30 TABLET | Refills: 11 | Status: SHIPPED | OUTPATIENT
Start: 2022-03-10 | End: 2022-03-31 | Stop reason: SDUPTHER

## 2022-03-10 NOTE — PROGRESS NOTES
Raj Wang MD  David Mandujano  1950  03/10/2022    Patient Active Problem List   Diagnosis   • Iron deficiency anemia, unspecified   • Rectal cancer (HCC)   • Preoperative cardiovascular examination   • Hypertrophic cardiomyopathy with LV OT gradient of about 68 mmHg in February 2019.   • Essential hypertension   • Port-A-Cath in place   • Iron deficiency anemia due to chronic blood loss   • Malabsorption of iron       Dear Raj Wang MD:    Subjective     Chief Complaint   Patient presents with   • Follow-up     ECHO RESULTS           History of Present Illness:    David Mandujano is a 71 y.o. male with a past medical history of hypertrophic cardiomyopathy with asymmetrical septal hypertrophy with LVOT obstruction with the gradient of about 99 mm/hg. He underwent alcohol septal ablation by Dr. Oneill at [facility]. His last follow up was in September of 2021 with Dr. Odilon Brandon at [facility]. Echo reveals EF of 66-70%, grade I diastolic dysfunction, and moderate aortic valve stenosis. Overall he is feeling well. He states he quit exercising so he notices he has more dyspnea with moderate exertion. Denies orthopnea, PND, and lower extremity edema. His BP has been elevated in our office. Previously lisinopril was increased. Subsequent creatinine and potassium were normal. He doesn't monitor his BP at home.          No Known Allergies:      Current Outpatient Medications:   •  allopurinol (ZYLOPRIM) 100 MG tablet, Take 1 tablet by mouth Daily., Disp: 30 tablet, Rfl: 0  •  ASCORBIC ACID PO, Take  by mouth Daily., Disp: , Rfl:   •  aspirin 81 MG chewable tablet, Chew 81 mg Daily., Disp: , Rfl:   •  cyanocobalamin (VITAMIN B-12) 1000 MCG tablet, Take 1,000 mcg by mouth Daily., Disp: , Rfl:   •  folic acid (FOLVITE) 1 MG tablet, TAKE ONE TABLET BY MOUTH DAILY, Disp: 30 tablet, Rfl: 11  •  lisinopril (PRINIVIL,ZESTRIL) 40 MG tablet, Take 1 tablet by mouth Daily., Disp: 30 tablet,  "Rfl: 5  •  metoprolol succinate XL (TOPROL-XL) 50 MG 24 hr tablet, Take 1 tablet by mouth Daily., Disp: 30 tablet, Rfl: 11  •  pantoprazole (Protonix) 40 MG EC tablet, Take 1 tablet by mouth Daily., Disp: 30 tablet, Rfl: 6  •  vitamin B-6 (PYRIDOXINE) 100 MG tablet, TAKE ONE TABLET BY MOUTH EVERY DAY, Disp: 30 tablet, Rfl: 5      The following portions of the patient's history were reviewed and updated as appropriate: allergies, current medications, past family history, past medical history, past social history, past surgical history and problem list.    Social History     Tobacco Use   • Smoking status: Never Smoker   • Smokeless tobacco: Current User     Types: Chew   Vaping Use   • Vaping Use: Never used   Substance Use Topics   • Alcohol use: Yes     Comment: occasional   • Drug use: No       ROS    Objective   Vitals:    03/10/22 1438   BP: (!) 181/88   Pulse: 85   Temp: 96.8 °F (36 °C)   Weight: 121 kg (267 lb 12.8 oz)   Height: 182.9 cm (72\")     Body mass index is 36.32 kg/m².        Vitals reviewed.   Constitutional:       Appearance: Healthy appearance. Well-developed and not in distress.   HENT:      Head: Normocephalic and atraumatic.   Pulmonary:      Effort: Pulmonary effort is normal.      Breath sounds: Normal breath sounds. No wheezing. No rales.   Cardiovascular:      Normal rate. Regular rhythm.      Murmurs: There is a grade 2/6 holosystolic murmur.      . No S3 and S4 gallop.   Edema:     Peripheral edema absent.   Abdominal:      General: Bowel sounds are normal.      Palpations: Abdomen is soft.   Skin:     General: Skin is warm and dry.   Neurological:      Mental Status: Alert, oriented to person, place, and time and oriented to person, place and time.   Psychiatric:         Mood and Affect: Mood normal.         Behavior: Behavior normal.         Lab Results   Component Value Date     02/21/2022    K 4.1 02/21/2022     02/21/2022    CO2 25.0 02/21/2022    BUN 16 02/21/2022    " CREATININE 1.20 02/21/2022    GLUCOSE 134 (H) 02/21/2022    CALCIUM 9.9 02/21/2022    AST 22 02/21/2022    ALT 19 02/21/2022    ALKPHOS 88 02/21/2022    LABIL2 1.5 06/25/2014        Lab Results   Component Value Date    WBC 9.00 02/21/2022    HGB 16.6 02/21/2022    HCT 49.2 02/21/2022     02/21/2022        Lab Results   Component Value Date    TSH 2.860 01/19/2021    PSA 2.560 02/21/2022    CHLPL 261 (H) 06/25/2014    TRIG 237 (H) 06/25/2014    HDL 74 06/25/2014     (H) 06/25/2014             Procedures      Assessment/Plan    Diagnosis Plan   1. Cardiomyopathy, hypertrophic obstructive with LVOT gradient of about 99 mmHg, status post recent alcohol septal ablation at [facility]..  metoprolol succinate XL (TOPROL-XL) 50 MG 24 hr tablet   2. Essential hypertension  metoprolol succinate XL (TOPROL-XL) 50 MG 24 hr tablet   3. Aortic stenosis, moderate  metoprolol succinate XL (TOPROL-XL) 50 MG 24 hr tablet                Recommendations:    1. Since his BP is uncontrolled, will increase metoprolol succinate to 50 mg daily. I asked him to start monitoring his BP at home twice daily and bring logs to his next visit. He was agreeable.  2. Will continue to monitor aortic valve periodically with echocardiogram.  3. He will keep follow up with CT surgery at [facility].  4. Follow up in 4 weeks or sooner if needed.         Return in about 4 weeks (around 4/7/2022) for Recheck.    As always, I appreciate very much the opportunity to participate in the cardiovascular care of your patients.      With Best Regards,    JOSE CARLOS Hoff

## 2022-03-31 ENCOUNTER — OFFICE VISIT (OUTPATIENT)
Dept: CARDIOLOGY | Facility: CLINIC | Age: 72
End: 2022-03-31

## 2022-03-31 VITALS
DIASTOLIC BLOOD PRESSURE: 85 MMHG | WEIGHT: 269.8 LBS | HEIGHT: 72 IN | HEART RATE: 73 BPM | TEMPERATURE: 97 F | BODY MASS INDEX: 36.54 KG/M2 | SYSTOLIC BLOOD PRESSURE: 155 MMHG

## 2022-03-31 DIAGNOSIS — I35.0 AORTIC STENOSIS, MODERATE: ICD-10-CM

## 2022-03-31 DIAGNOSIS — I42.1 CARDIOMYOPATHY, HYPERTROPHIC OBSTRUCTIVE: ICD-10-CM

## 2022-03-31 DIAGNOSIS — I10 ESSENTIAL HYPERTENSION: Primary | ICD-10-CM

## 2022-03-31 PROCEDURE — 99214 OFFICE O/P EST MOD 30 MIN: CPT | Performed by: NURSE PRACTITIONER

## 2022-03-31 RX ORDER — METOPROLOL SUCCINATE 100 MG/1
100 TABLET, EXTENDED RELEASE ORAL DAILY
Qty: 30 TABLET | Refills: 5 | Status: SHIPPED | OUTPATIENT
Start: 2022-03-31 | End: 2022-10-31

## 2022-03-31 RX ORDER — ALLOPURINOL 100 MG/1
100 TABLET ORAL DAILY
Qty: 30 TABLET | Refills: 2 | Status: SHIPPED | OUTPATIENT
Start: 2022-03-31 | End: 2022-12-13 | Stop reason: SDUPTHER

## 2022-03-31 NOTE — PROGRESS NOTES
Raj Wang MD  David Mandujano  1950 03/31/2022    Patient Active Problem List   Diagnosis   • Iron deficiency anemia, unspecified   • Rectal cancer (HCC)   • Preoperative cardiovascular examination   • Hypertrophic cardiomyopathy with LV OT gradient of about 68 mmHg in February 2019.   • Essential hypertension   • Port-A-Cath in place   • Iron deficiency anemia due to chronic blood loss   • Malabsorption of iron       Dear Raj Wang MD:    Subjective     Chief Complaint   Patient presents with   • Follow-up           History of Present Illness:    David Mandujano is a 71 y.o. male with a past medical history of hypertrophic cardiomyopathy with asymmetrical septal hypertrophy with LVOT obstruction with the gradient of about 99 mm/hg. He underwent alcohol septal ablation by Dr. Oneill at [facility]. His last follow up was in September of 2021 with Dr. Odilon Brandon at [facility]. Echo reveals EF of 66-70%, grade I diastolic dysfunction, and moderate aortic valve stenosis. He presents today for routine cardiology follow up. The patient denies chest pain, shortness of breath, palpitations, dizziness, lightheadedness, near syncope, and syncope.  His BP has improved, but still above goal.          No Known Allergies:      Current Outpatient Medications:   •  allopurinol (ZYLOPRIM) 100 MG tablet, Take 1 tablet by mouth Daily., Disp: 30 tablet, Rfl: 2  •  ASCORBIC ACID PO, Take  by mouth Daily., Disp: , Rfl:   •  aspirin 81 MG chewable tablet, Chew 81 mg Daily., Disp: , Rfl:   •  cyanocobalamin (VITAMIN B-12) 1000 MCG tablet, Take 1,000 mcg by mouth Daily., Disp: , Rfl:   •  folic acid (FOLVITE) 1 MG tablet, TAKE ONE TABLET BY MOUTH DAILY, Disp: 30 tablet, Rfl: 11  •  lisinopril (PRINIVIL,ZESTRIL) 40 MG tablet, Take 1 tablet by mouth Daily., Disp: 30 tablet, Rfl: 5  •  metoprolol succinate XL (TOPROL-XL) 100 MG 24 hr tablet, Take 1 tablet by mouth Daily., Disp: 30 tablet, Rfl: 5  •   "pantoprazole (Protonix) 40 MG EC tablet, Take 1 tablet by mouth Daily., Disp: 30 tablet, Rfl: 6  •  vitamin B-6 (PYRIDOXINE) 100 MG tablet, TAKE ONE TABLET BY MOUTH EVERY DAY, Disp: 30 tablet, Rfl: 5      The following portions of the patient's history were reviewed and updated as appropriate: allergies, current medications, past family history, past medical history, past social history, past surgical history and problem list.    Social History     Tobacco Use   • Smoking status: Never Smoker   • Smokeless tobacco: Current User     Types: Chew   Vaping Use   • Vaping Use: Never used   Substance Use Topics   • Alcohol use: Yes     Comment: occasional   • Drug use: No       ROS    Objective   Vitals:    03/31/22 1320   BP: 155/85   Pulse: 73   Temp: 97 °F (36.1 °C)   Weight: 122 kg (269 lb 12.8 oz)   Height: 182.9 cm (72\")     Body mass index is 36.59 kg/m².        Vitals reviewed.   Constitutional:       Appearance: Healthy appearance. Well-developed and not in distress.   HENT:      Head: Normocephalic and atraumatic.   Pulmonary:      Effort: Pulmonary effort is normal.      Breath sounds: Normal breath sounds. No wheezing. No rales.   Cardiovascular:      Normal rate. Regular rhythm.      Murmurs: There is a grade 2/6 holosystolic murmur at the URSB.      . No S3 and S4 gallop.   Edema:     Peripheral edema absent.   Abdominal:      General: Bowel sounds are normal.      Palpations: Abdomen is soft.   Skin:     General: Skin is warm and dry.   Neurological:      Mental Status: Alert, oriented to person, place, and time and oriented to person, place and time.   Psychiatric:         Mood and Affect: Mood normal.         Behavior: Behavior normal.         Lab Results   Component Value Date     02/21/2022    K 4.1 02/21/2022     02/21/2022    CO2 25.0 02/21/2022    BUN 16 02/21/2022    CREATININE 1.20 02/21/2022    GLUCOSE 134 (H) 02/21/2022    CALCIUM 9.9 02/21/2022    AST 22 02/21/2022    ALT 19 " 02/21/2022    ALKPHOS 88 02/21/2022    LABIL2 1.5 06/25/2014     No results found for: CKTOTAL  Lab Results   Component Value Date    WBC 9.00 02/21/2022    HGB 16.6 02/21/2022    HCT 49.2 02/21/2022     02/21/2022     No results found for: INR  No results found for: MG  Lab Results   Component Value Date    TSH 2.860 01/19/2021    PSA 2.560 02/21/2022    CHLPL 261 (H) 06/25/2014    TRIG 237 (H) 06/25/2014    HDL 74 06/25/2014     (H) 06/25/2014      No results found for: BNP        Procedures      Assessment/Plan    Diagnosis Plan   1. Essential hypertension  metoprolol succinate XL (TOPROL-XL) 100 MG 24 hr tablet   2. Hypertrophic cardiomyopathy with LV OT gradient of about 68 mmHg in February 2019.     3. Cardiomyopathy, hypertrophic obstructive with LVOT gradient of about 99 mmHg, status post recent alcohol septal ablation at [facility]..  metoprolol succinate XL (TOPROL-XL) 100 MG 24 hr tablet   4. Aortic stenosis, moderate  metoprolol succinate XL (TOPROL-XL) 100 MG 24 hr tablet                Recommendations:    1. Essential hypertension-uncontrolled.  Will increase metoprolol succinate to 100 mg daily.  Continue with current dose of lisinopril.  I asked him to call us with blood pressure readings in about 2 weeks.  2. Hypertrophic cardiomyopathy status post alcohol septal ablation-following with CT surgery at [facility].  3. Moderate aortic stenosis-we will continue to monitor periodically with echocardiogram.  4. Follow-up in 4 weeks or sooner if needed.        Return in about 4 weeks (around 4/28/2022) for Recheck.    As always, I appreciate very much the opportunity to participate in the cardiovascular care of your patients.      With Best Regards,    JOSE CARLOS Hoff

## 2022-04-28 ENCOUNTER — OFFICE VISIT (OUTPATIENT)
Dept: CARDIOLOGY | Facility: CLINIC | Age: 72
End: 2022-04-28

## 2022-04-28 VITALS
DIASTOLIC BLOOD PRESSURE: 83 MMHG | TEMPERATURE: 97.1 F | WEIGHT: 268.8 LBS | BODY MASS INDEX: 36.41 KG/M2 | HEART RATE: 81 BPM | SYSTOLIC BLOOD PRESSURE: 178 MMHG | HEIGHT: 72 IN

## 2022-04-28 DIAGNOSIS — I10 ESSENTIAL HYPERTENSION: ICD-10-CM

## 2022-04-28 DIAGNOSIS — I35.0 AORTIC STENOSIS, MODERATE: ICD-10-CM

## 2022-04-28 DIAGNOSIS — I42.1 CARDIOMYOPATHY, HYPERTROPHIC OBSTRUCTIVE: Primary | ICD-10-CM

## 2022-04-28 PROCEDURE — 99214 OFFICE O/P EST MOD 30 MIN: CPT | Performed by: NURSE PRACTITIONER

## 2022-04-28 RX ORDER — AMLODIPINE BESYLATE 5 MG/1
5 TABLET ORAL DAILY
Qty: 30 TABLET | Refills: 11 | Status: SHIPPED | OUTPATIENT
Start: 2022-04-28 | End: 2022-06-01 | Stop reason: SDUPTHER

## 2022-04-28 NOTE — PROGRESS NOTES
Raj Wang MD  David Mandujano  1950 04/28/2022    Patient Active Problem List   Diagnosis   • Iron deficiency anemia, unspecified   • Rectal cancer (HCC)   • Preoperative cardiovascular examination   • Hypertrophic cardiomyopathy with LV OT gradient of about 68 mmHg in February 2019.   • Essential hypertension   • Port-A-Cath in place   • Iron deficiency anemia due to chronic blood loss   • Malabsorption of iron       Dear Raj Wang MD:    Subjective     Chief Complaint   Patient presents with   • Follow-up     4 week follow up           History of Present Illness:    David Mandujano is a 71 y.o. male with a past medical history of hypertension, moderate aortic stenosis, hypertrophic cardiomyopathy with asymmetrical septal hypertrophy with LVOT obstruction with the gradient of about 99 mm/hg s/p alcohol septal ablation by Dr. Oneill at [facility] in 2020. He presents today for routine cardiology follow up. Reports he has been doing well. He does have some fatigue but denies any chest pains or shortness of breath. BP has been consistently elevated at home with readings in the 170's and 180's.           No Known Allergies:      Current Outpatient Medications:   •  allopurinol (ZYLOPRIM) 100 MG tablet, Take 1 tablet by mouth Daily., Disp: 30 tablet, Rfl: 2  •  ASCORBIC ACID PO, Take  by mouth Daily., Disp: , Rfl:   •  aspirin 81 MG chewable tablet, Chew 81 mg Daily., Disp: , Rfl:   •  cyanocobalamin (VITAMIN B-12) 1000 MCG tablet, Take 1,000 mcg by mouth Daily., Disp: , Rfl:   •  folic acid (FOLVITE) 1 MG tablet, TAKE ONE TABLET BY MOUTH DAILY, Disp: 30 tablet, Rfl: 11  •  lisinopril (PRINIVIL,ZESTRIL) 40 MG tablet, Take 1 tablet by mouth Daily., Disp: 30 tablet, Rfl: 5  •  metoprolol succinate XL (TOPROL-XL) 100 MG 24 hr tablet, Take 1 tablet by mouth Daily., Disp: 30 tablet, Rfl: 5  •  pantoprazole (Protonix) 40 MG EC tablet, Take 1 tablet by mouth Daily., Disp: 30 tablet, Rfl: 6  •   "vitamin B-6 (PYRIDOXINE) 100 MG tablet, TAKE ONE TABLET BY MOUTH EVERY DAY, Disp: 30 tablet, Rfl: 5  •  amLODIPine (NORVASC) 5 MG tablet, Take 1 tablet by mouth Daily., Disp: 30 tablet, Rfl: 11      The following portions of the patient's history were reviewed and updated as appropriate: allergies, current medications, past family history, past medical history, past social history, past surgical history and problem list.    Social History     Tobacco Use   • Smoking status: Never Smoker   • Smokeless tobacco: Current User     Types: Chew   Vaping Use   • Vaping Use: Never used   Substance Use Topics   • Alcohol use: Yes     Comment: occasional   • Drug use: No       ROS    Objective   Vitals:    04/28/22 1131   BP: 178/83   Pulse: 81   Temp: 97.1 °F (36.2 °C)   Weight: 122 kg (268 lb 12.8 oz)   Height: 182.9 cm (72.01\")     Body mass index is 36.45 kg/m².        Vitals reviewed.   Constitutional:       Appearance: Healthy appearance. Well-developed and not in distress.   HENT:      Head: Normocephalic and atraumatic.   Pulmonary:      Effort: Pulmonary effort is normal.      Breath sounds: Normal breath sounds. No wheezing. No rales.   Cardiovascular:      Normal rate. Regular rhythm.      Murmurs: There is a grade 2/6 systolic murmur.      . No S3 and S4 gallop.   Edema:     Peripheral edema absent.   Abdominal:      General: Bowel sounds are normal.      Palpations: Abdomen is soft.   Skin:     General: Skin is warm and dry.   Neurological:      Mental Status: Alert, oriented to person, place, and time and oriented to person, place and time.   Psychiatric:         Mood and Affect: Mood normal.         Behavior: Behavior normal.         Lab Results   Component Value Date     02/21/2022    K 4.1 02/21/2022     02/21/2022    CO2 25.0 02/21/2022    BUN 16 02/21/2022    CREATININE 1.20 02/21/2022    GLUCOSE 134 (H) 02/21/2022    CALCIUM 9.9 02/21/2022    AST 22 02/21/2022    ALT 19 02/21/2022    ALKPHOS 88 " 02/21/2022    LABIL2 1.5 06/25/2014     No results found for: CKTOTAL  Lab Results   Component Value Date    WBC 9.00 02/21/2022    HGB 16.6 02/21/2022    HCT 49.2 02/21/2022     02/21/2022     No results found for: INR  Lab Results   Component Value Date    MG 1.8 (L) 06/26/2021     Lab Results   Component Value Date    TSH 2.860 01/19/2021    PSA 2.560 02/21/2022    CHLPL 261 (H) 06/25/2014    TRIG 237 (H) 06/25/2014    HDL 74 06/25/2014     (H) 06/25/2014      No results found for: BNP        Procedures      Assessment/Plan    Diagnosis Plan   1. Cardiomyopathy, hypertrophic obstructive with LVOT gradient of about 99 mmHg, status post recent alcohol septal ablation at [facility]..     2. Aortic stenosis, moderate     3. Essential hypertension  amLODIPine (NORVASC) 5 MG tablet                Recommendations:    1. HOCM status post alcohol septal ablation in 2021- following annually with CT surgery at .  2. Moderate aortic stenosis-we will continue to monitor periodically with echocardiogram.  3. Essential hypertension-uncontrolled.  Will add amlodipine 5 mg daily.  He is going to monitor his blood pressure at home and call us in 2 weeks with readings.  If BP is running greater than 140/80 we will plan to increase amlodipine to 10 mg daily.  4. Follow-up in 2 months or sooner if needed.        Return in about 2 months (around 6/28/2022) for Recheck.    As always, I appreciate very much the opportunity to participate in the cardiovascular care of your patients.      With Best Regards,    JOSE CARLOS Hoff

## 2022-05-04 ENCOUNTER — APPOINTMENT (OUTPATIENT)
Dept: CT IMAGING | Facility: HOSPITAL | Age: 72
End: 2022-05-04

## 2022-05-04 ENCOUNTER — TELEPHONE (OUTPATIENT)
Dept: CARDIOLOGY | Facility: CLINIC | Age: 72
End: 2022-05-04

## 2022-05-04 NOTE — TELEPHONE ENCOUNTER
Patient wanted to call to let Lilly know he is taking his medicine now. He will let her know if it needs changed.     Thanks!

## 2022-05-26 ENCOUNTER — HOSPITAL ENCOUNTER (OUTPATIENT)
Dept: CT IMAGING | Facility: HOSPITAL | Age: 72
Discharge: HOME | End: 2022-05-26

## 2022-05-26 DIAGNOSIS — C20 MALIGNANT NEOPLASM OF RECTUM: ICD-10-CM

## 2022-05-26 DIAGNOSIS — D50.0 IRON DEFICIENCY ANEMIA DUE TO CHRONIC BLOOD LOSS: ICD-10-CM

## 2022-05-26 DIAGNOSIS — R97.20 ELEVATED PSA: ICD-10-CM

## 2022-05-26 LAB — CREAT BLDA-MCNC: 1.2 MG/DL (ref 0.6–1.3)

## 2022-05-26 PROCEDURE — 71260 CT THORAX DX C+: CPT

## 2022-05-26 PROCEDURE — 25010000002 IOPAMIDOL 61 % SOLUTION: Performed by: INTERNAL MEDICINE

## 2022-05-26 PROCEDURE — 82565 ASSAY OF CREATININE: CPT

## 2022-05-26 PROCEDURE — 74177 CT ABD & PELVIS W/CONTRAST: CPT

## 2022-05-26 PROCEDURE — 74177 CT ABD & PELVIS W/CONTRAST: CPT | Performed by: RADIOLOGY

## 2022-05-26 PROCEDURE — 71260 CT THORAX DX C+: CPT | Performed by: RADIOLOGY

## 2022-05-26 RX ADMIN — IOPAMIDOL 85 ML: 612 INJECTION, SOLUTION INTRAVENOUS at 12:44

## 2022-06-01 ENCOUNTER — LAB (OUTPATIENT)
Dept: ONCOLOGY | Facility: CLINIC | Age: 72
End: 2022-06-01

## 2022-06-01 ENCOUNTER — OFFICE VISIT (OUTPATIENT)
Dept: CARDIOLOGY | Facility: CLINIC | Age: 72
End: 2022-06-01

## 2022-06-01 VITALS
OXYGEN SATURATION: 95 % | SYSTOLIC BLOOD PRESSURE: 184 MMHG | BODY MASS INDEX: 36.66 KG/M2 | HEART RATE: 57 BPM | TEMPERATURE: 96.9 F | DIASTOLIC BLOOD PRESSURE: 76 MMHG | WEIGHT: 270.4 LBS | RESPIRATION RATE: 18 BRPM

## 2022-06-01 VITALS
BODY MASS INDEX: 36.7 KG/M2 | HEART RATE: 60 BPM | WEIGHT: 271 LBS | OXYGEN SATURATION: 95 % | HEIGHT: 72 IN | SYSTOLIC BLOOD PRESSURE: 154 MMHG | DIASTOLIC BLOOD PRESSURE: 71 MMHG

## 2022-06-01 DIAGNOSIS — I10 ESSENTIAL HYPERTENSION: Primary | ICD-10-CM

## 2022-06-01 DIAGNOSIS — I42.1 CARDIOMYOPATHY, HYPERTROPHIC OBSTRUCTIVE: ICD-10-CM

## 2022-06-01 DIAGNOSIS — D50.0 IRON DEFICIENCY ANEMIA DUE TO CHRONIC BLOOD LOSS: ICD-10-CM

## 2022-06-01 DIAGNOSIS — I35.0 AORTIC STENOSIS, MODERATE: ICD-10-CM

## 2022-06-01 DIAGNOSIS — C20 MALIGNANT NEOPLASM OF RECTUM: ICD-10-CM

## 2022-06-01 DIAGNOSIS — R97.20 ELEVATED PSA: ICD-10-CM

## 2022-06-01 LAB
ALBUMIN SERPL-MCNC: 4.28 G/DL (ref 3.5–5.2)
ALBUMIN/GLOB SERPL: 1.3 G/DL
ALP SERPL-CCNC: 79 U/L (ref 39–117)
ALT SERPL W P-5'-P-CCNC: 31 U/L (ref 1–41)
ANION GAP SERPL CALCULATED.3IONS-SCNC: 13.5 MMOL/L (ref 5–15)
AST SERPL-CCNC: 28 U/L (ref 1–40)
BASOPHILS # BLD AUTO: 0.04 10*3/MM3 (ref 0–0.2)
BASOPHILS NFR BLD AUTO: 0.5 % (ref 0–1.5)
BILIRUB SERPL-MCNC: 0.7 MG/DL (ref 0–1.2)
BUN SERPL-MCNC: 15 MG/DL (ref 8–23)
BUN/CREAT SERPL: 11.2 (ref 7–25)
CALCIUM SPEC-SCNC: 9.8 MG/DL (ref 8.6–10.5)
CHLORIDE SERPL-SCNC: 102 MMOL/L (ref 98–107)
CO2 SERPL-SCNC: 25.5 MMOL/L (ref 22–29)
CREAT SERPL-MCNC: 1.34 MG/DL (ref 0.76–1.27)
DEPRECATED RDW RBC AUTO: 41.8 FL (ref 37–54)
EGFRCR SERPLBLD CKD-EPI 2021: 56.6 ML/MIN/1.73
EOSINOPHIL # BLD AUTO: 0.11 10*3/MM3 (ref 0–0.4)
EOSINOPHIL NFR BLD AUTO: 1.3 % (ref 0.3–6.2)
ERYTHROCYTE [DISTWIDTH] IN BLOOD BY AUTOMATED COUNT: 12.1 % (ref 12.3–15.4)
FERRITIN SERPL-MCNC: 135.6 NG/ML (ref 30–400)
GLOBULIN UR ELPH-MCNC: 3.4 GM/DL
GLUCOSE SERPL-MCNC: 106 MG/DL (ref 65–99)
HCT VFR BLD AUTO: 46.7 % (ref 37.5–51)
HGB BLD-MCNC: 16.2 G/DL (ref 13–17.7)
IMM GRANULOCYTES # BLD AUTO: 0.04 10*3/MM3 (ref 0–0.05)
IMM GRANULOCYTES NFR BLD AUTO: 0.5 % (ref 0–0.5)
IRON 24H UR-MRATE: 119 MCG/DL (ref 59–158)
IRON SATN MFR SERPL: 28 % (ref 20–50)
LYMPHOCYTES # BLD AUTO: 1.67 10*3/MM3 (ref 0.7–3.1)
LYMPHOCYTES NFR BLD AUTO: 20.4 % (ref 19.6–45.3)
MCH RBC QN AUTO: 32.9 PG (ref 26.6–33)
MCHC RBC AUTO-ENTMCNC: 34.7 G/DL (ref 31.5–35.7)
MCV RBC AUTO: 94.9 FL (ref 79–97)
MONOCYTES # BLD AUTO: 0.44 10*3/MM3 (ref 0.1–0.9)
MONOCYTES NFR BLD AUTO: 5.4 % (ref 5–12)
NEUTROPHILS NFR BLD AUTO: 5.87 10*3/MM3 (ref 1.7–7)
NEUTROPHILS NFR BLD AUTO: 71.9 % (ref 42.7–76)
NRBC BLD AUTO-RTO: 0 /100 WBC (ref 0–0.2)
PLATELET # BLD AUTO: 168 10*3/MM3 (ref 140–450)
PMV BLD AUTO: 9.7 FL (ref 6–12)
POTASSIUM SERPL-SCNC: 4.2 MMOL/L (ref 3.5–5.2)
PROT SERPL-MCNC: 7.7 G/DL (ref 6–8.5)
RBC # BLD AUTO: 4.92 10*6/MM3 (ref 4.14–5.8)
SODIUM SERPL-SCNC: 141 MMOL/L (ref 136–145)
TIBC SERPL-MCNC: 425 MCG/DL (ref 298–536)
TRANSFERRIN SERPL-MCNC: 285 MG/DL (ref 200–360)
WBC NRBC COR # BLD: 8.17 10*3/MM3 (ref 3.4–10.8)

## 2022-06-01 PROCEDURE — 83540 ASSAY OF IRON: CPT | Performed by: INTERNAL MEDICINE

## 2022-06-01 PROCEDURE — 99214 OFFICE O/P EST MOD 30 MIN: CPT | Performed by: NURSE PRACTITIONER

## 2022-06-01 PROCEDURE — 80053 COMPREHEN METABOLIC PANEL: CPT | Performed by: INTERNAL MEDICINE

## 2022-06-01 PROCEDURE — 84153 ASSAY OF PSA TOTAL: CPT | Performed by: INTERNAL MEDICINE

## 2022-06-01 PROCEDURE — 82728 ASSAY OF FERRITIN: CPT | Performed by: INTERNAL MEDICINE

## 2022-06-01 PROCEDURE — 85025 COMPLETE CBC W/AUTO DIFF WBC: CPT | Performed by: INTERNAL MEDICINE

## 2022-06-01 PROCEDURE — 84466 ASSAY OF TRANSFERRIN: CPT | Performed by: INTERNAL MEDICINE

## 2022-06-01 PROCEDURE — 82378 CARCINOEMBRYONIC ANTIGEN: CPT | Performed by: INTERNAL MEDICINE

## 2022-06-01 RX ORDER — AMLODIPINE BESYLATE 10 MG/1
10 TABLET ORAL DAILY
Qty: 30 TABLET | Refills: 11 | Status: SHIPPED | OUTPATIENT
Start: 2022-06-01

## 2022-06-01 NOTE — PROGRESS NOTES
Raj Wang MD  David Mandujano  1950 06/01/2022    Patient Active Problem List   Diagnosis   • Iron deficiency anemia, unspecified   • Rectal cancer (HCC)   • Preoperative cardiovascular examination   • Hypertrophic cardiomyopathy with LV OT gradient of about 68 mmHg in February 2019.   • Essential hypertension   • Port-A-Cath in place   • Iron deficiency anemia due to chronic blood loss   • Malabsorption of iron       Dear Raj Wang MD:    Subjective     Chief Complaint   Patient presents with   • Med Management     Verbal.    • Results     Echo.            History of Present Illness:    David Mandujano is a 71 y.o. male with a past medical history of hypertension, moderate aortic stenosis, hypertrophic cardiomyopathy with asymmetrical septal hypertrophy with LVOT obstruction with the gradient of about 99 mm/hg s/p alcohol septal ablation by Dr. Oneill at [facility] in 2020. He presents today for routine cardiology follow up.  For his uncontrolled hypertension, amlodipine was added at his previous visit.  He has not been monitoring blood pressure closely as he has been traveling to [place] and forgot his lisinopril.  Today, he has not taken some of his medications due to fasting for lab work.  He reports at other provider's offices his blood pressure has been consistently elevated greater than 150/90.  Recently underwent echocardiogram which revealed LVEF of 66 to 70% with normal LV wall thickness noted.  There was moderate aortic valve stenosis noted as well.          No Known Allergies:      Current Outpatient Medications:   •  allopurinol (ZYLOPRIM) 100 MG tablet, Take 1 tablet by mouth Daily., Disp: 30 tablet, Rfl: 2  •  amLODIPine (NORVASC) 10 MG tablet, Take 1 tablet by mouth Daily., Disp: 30 tablet, Rfl: 11  •  ASCORBIC ACID PO, Take  by mouth Daily., Disp: , Rfl:   •  aspirin 81 MG chewable tablet, Chew 81 mg Daily., Disp: , Rfl:   •  cyanocobalamin (VITAMIN B-12) 1000 MCG  "tablet, Take 1,000 mcg by mouth Daily., Disp: , Rfl:   •  folic acid (FOLVITE) 1 MG tablet, TAKE ONE TABLET BY MOUTH DAILY, Disp: 30 tablet, Rfl: 11  •  lisinopril (PRINIVIL,ZESTRIL) 40 MG tablet, Take 1 tablet by mouth Daily., Disp: 30 tablet, Rfl: 5  •  metoprolol succinate XL (TOPROL-XL) 100 MG 24 hr tablet, Take 1 tablet by mouth Daily., Disp: 30 tablet, Rfl: 5  •  pantoprazole (Protonix) 40 MG EC tablet, Take 1 tablet by mouth Daily., Disp: 30 tablet, Rfl: 6  •  vitamin B-6 (PYRIDOXINE) 100 MG tablet, TAKE ONE TABLET BY MOUTH EVERY DAY, Disp: 30 tablet, Rfl: 5      The following portions of the patient's history were reviewed and updated as appropriate: allergies, current medications, past family history, past medical history, past social history, past surgical history and problem list.    Social History     Tobacco Use   • Smoking status: Never Smoker   • Smokeless tobacco: Current User     Types: Chew   Vaping Use   • Vaping Use: Never used   Substance Use Topics   • Alcohol use: Yes     Comment: occasional   • Drug use: No       ROS    Objective   Vitals:    06/01/22 1454   BP: 154/71   BP Location: Left arm   Patient Position: Sitting   Cuff Size: Adult   Pulse: 60   SpO2: 95%   Weight: 123 kg (271 lb)   Height: 182.9 cm (72\")     Body mass index is 36.75 kg/m².        Vitals reviewed.   Constitutional:       Appearance: Healthy appearance. Well-developed and not in distress.   HENT:      Head: Normocephalic and atraumatic.   Pulmonary:      Effort: Pulmonary effort is normal.      Breath sounds: Normal breath sounds. No wheezing. No rales.   Cardiovascular:      Normal rate. Regular rhythm.      Murmurs: There is a grade 3/6 systolic murmur at the LLSB.      . No S3 and S4 gallop.   Edema:     Peripheral edema absent.   Abdominal:      General: Bowel sounds are normal.      Palpations: Abdomen is soft.   Skin:     General: Skin is warm and dry.   Neurological:      Mental Status: Alert, oriented to person, " place, and time and oriented to person, place and time.   Psychiatric:         Mood and Affect: Mood normal.         Behavior: Behavior normal.         Lab Results   Component Value Date     06/01/2022    K 4.2 06/01/2022     06/01/2022    CO2 25.5 06/01/2022    BUN 15 06/01/2022    CREATININE 1.34 (H) 06/01/2022    GLUCOSE 106 (H) 06/01/2022    CALCIUM 9.8 06/01/2022    AST 28 06/01/2022    ALT 31 06/01/2022    ALKPHOS 79 06/01/2022    LABIL2 1.5 06/25/2014     No results found for: CKTOTAL  Lab Results   Component Value Date    WBC 8.17 06/01/2022    HGB 16.2 06/01/2022    HCT 46.7 06/01/2022     06/01/2022     No results found for: INR  Lab Results   Component Value Date    MG 1.8 (L) 06/26/2021     Lab Results   Component Value Date    TSH 2.860 01/19/2021    PSA 2.560 02/21/2022    CHLPL 261 (H) 06/25/2014    TRIG 237 (H) 06/25/2014    HDL 74 06/25/2014     (H) 06/25/2014      No results found for: BNP        Procedures      Assessment & Plan    Diagnosis Plan   1. Essential hypertension  amLODIPine (NORVASC) 10 MG tablet   2. Cardiomyopathy, hypertrophic obstructive with LVOT gradient of about 99 mmHg, status post recent alcohol septal ablation at [facility]..     3. Aortic stenosis, moderate                  Recommendations:    1. Essential hypertension-improving.  Will increase amlodipine to 10 mg daily.  He will continue lisinopril and metoprolol.  2. Hypertrophic cardiomyopathy s/p alcohol septal ablation at [facility]- echocardiogram results reviewed with the patient today.  He does have follow-up at  next month.  3. Moderate aortic stenosis-we will continue to monitor periodically with echocardiogram.  4. Follow-up in 4 weeks or sooner if needed.        Return in about 4 weeks (around 6/29/2022) for Recheck.    As always, I appreciate very much the opportunity to participate in the cardiovascular care of your patients.      With Best Regards,    Lilly Matos,  APRN

## 2022-06-02 LAB
CEA SERPL-MCNC: 1.77 NG/ML
PSA SERPL-MCNC: 2.61 NG/ML (ref 0–4)

## 2022-06-28 ENCOUNTER — OFFICE VISIT (OUTPATIENT)
Dept: ONCOLOGY | Facility: CLINIC | Age: 72
End: 2022-06-28

## 2022-06-28 ENCOUNTER — LAB (OUTPATIENT)
Dept: ONCOLOGY | Facility: CLINIC | Age: 72
End: 2022-06-28

## 2022-06-28 ENCOUNTER — OFFICE VISIT (OUTPATIENT)
Dept: CARDIOLOGY | Facility: CLINIC | Age: 72
End: 2022-06-28

## 2022-06-28 VITALS
DIASTOLIC BLOOD PRESSURE: 70 MMHG | OXYGEN SATURATION: 97 % | WEIGHT: 262 LBS | HEART RATE: 65 BPM | TEMPERATURE: 97.3 F | BODY MASS INDEX: 34.72 KG/M2 | HEIGHT: 73 IN | RESPIRATION RATE: 18 BRPM | SYSTOLIC BLOOD PRESSURE: 150 MMHG

## 2022-06-28 VITALS
BODY MASS INDEX: 35.56 KG/M2 | SYSTOLIC BLOOD PRESSURE: 124 MMHG | OXYGEN SATURATION: 97 % | HEART RATE: 61 BPM | WEIGHT: 262.2 LBS | DIASTOLIC BLOOD PRESSURE: 65 MMHG

## 2022-06-28 DIAGNOSIS — C20 MALIGNANT NEOPLASM OF RECTUM: Primary | ICD-10-CM

## 2022-06-28 DIAGNOSIS — F10.10 ALCOHOL ABUSE: ICD-10-CM

## 2022-06-28 DIAGNOSIS — I42.1 CARDIOMYOPATHY, HYPERTROPHIC OBSTRUCTIVE: ICD-10-CM

## 2022-06-28 DIAGNOSIS — I10 ESSENTIAL HYPERTENSION: ICD-10-CM

## 2022-06-28 DIAGNOSIS — R97.20 ELEVATED PSA: ICD-10-CM

## 2022-06-28 DIAGNOSIS — D50.0 IRON DEFICIENCY ANEMIA DUE TO CHRONIC BLOOD LOSS: ICD-10-CM

## 2022-06-28 DIAGNOSIS — I35.0 AORTIC STENOSIS, MODERATE: ICD-10-CM

## 2022-06-28 DIAGNOSIS — C20 MALIGNANT NEOPLASM OF RECTUM: ICD-10-CM

## 2022-06-28 DIAGNOSIS — I42.1 CARDIOMYOPATHY, HYPERTROPHIC OBSTRUCTIVE: Primary | ICD-10-CM

## 2022-06-28 DIAGNOSIS — I25.10 ASCVD (ARTERIOSCLEROTIC CARDIOVASCULAR DISEASE): ICD-10-CM

## 2022-06-28 DIAGNOSIS — G62.9 NEUROPATHY: ICD-10-CM

## 2022-06-28 DIAGNOSIS — C20 RECTAL CANCER: ICD-10-CM

## 2022-06-28 LAB
ALBUMIN SERPL-MCNC: 4.6 G/DL (ref 3.5–5.2)
ALBUMIN/GLOB SERPL: 1.4 G/DL
ALP SERPL-CCNC: 71 U/L (ref 39–117)
ALT SERPL W P-5'-P-CCNC: 39 U/L (ref 1–41)
ANION GAP SERPL CALCULATED.3IONS-SCNC: 12.4 MMOL/L (ref 5–15)
AST SERPL-CCNC: 37 U/L (ref 1–40)
BASOPHILS # BLD AUTO: 0.04 10*3/MM3 (ref 0–0.2)
BASOPHILS NFR BLD AUTO: 0.4 % (ref 0–1.5)
BILIRUB SERPL-MCNC: 0.7 MG/DL (ref 0–1.2)
BUN SERPL-MCNC: 19 MG/DL (ref 8–23)
BUN/CREAT SERPL: 14.7 (ref 7–25)
CALCIUM SPEC-SCNC: 9.7 MG/DL (ref 8.6–10.5)
CHLORIDE SERPL-SCNC: 100 MMOL/L (ref 98–107)
CHOLEST SERPL-MCNC: 213 MG/DL (ref 0–200)
CO2 SERPL-SCNC: 25.6 MMOL/L (ref 22–29)
CREAT SERPL-MCNC: 1.29 MG/DL (ref 0.76–1.27)
DEPRECATED RDW RBC AUTO: 42.4 FL (ref 37–54)
EGFRCR SERPLBLD CKD-EPI 2021: 59.3 ML/MIN/1.73
EOSINOPHIL # BLD AUTO: 0.1 10*3/MM3 (ref 0–0.4)
EOSINOPHIL NFR BLD AUTO: 1 % (ref 0.3–6.2)
ERYTHROCYTE [DISTWIDTH] IN BLOOD BY AUTOMATED COUNT: 12.1 % (ref 12.3–15.4)
FERRITIN SERPL-MCNC: 282.8 NG/ML (ref 30–400)
GLOBULIN UR ELPH-MCNC: 3.3 GM/DL
GLUCOSE SERPL-MCNC: 121 MG/DL (ref 65–99)
HCT VFR BLD AUTO: 47.5 % (ref 37.5–51)
HDLC SERPL-MCNC: 92 MG/DL (ref 40–60)
HGB BLD-MCNC: 16.1 G/DL (ref 13–17.7)
IMM GRANULOCYTES # BLD AUTO: 0.03 10*3/MM3 (ref 0–0.05)
IMM GRANULOCYTES NFR BLD AUTO: 0.3 % (ref 0–0.5)
IRON 24H UR-MRATE: 115 MCG/DL (ref 59–158)
IRON SATN MFR SERPL: 33 % (ref 20–50)
LDLC SERPL CALC-MCNC: 109 MG/DL (ref 0–100)
LDLC/HDLC SERPL: 1.17 {RATIO}
LYMPHOCYTES # BLD AUTO: 2.1 10*3/MM3 (ref 0.7–3.1)
LYMPHOCYTES NFR BLD AUTO: 21.7 % (ref 19.6–45.3)
MCH RBC QN AUTO: 32.4 PG (ref 26.6–33)
MCHC RBC AUTO-ENTMCNC: 33.9 G/DL (ref 31.5–35.7)
MCV RBC AUTO: 95.6 FL (ref 79–97)
MONOCYTES # BLD AUTO: 0.65 10*3/MM3 (ref 0.1–0.9)
MONOCYTES NFR BLD AUTO: 6.7 % (ref 5–12)
NEUTROPHILS NFR BLD AUTO: 6.76 10*3/MM3 (ref 1.7–7)
NEUTROPHILS NFR BLD AUTO: 69.9 % (ref 42.7–76)
NRBC BLD AUTO-RTO: 0 /100 WBC (ref 0–0.2)
PLATELET # BLD AUTO: 167 10*3/MM3 (ref 140–450)
PMV BLD AUTO: 9.9 FL (ref 6–12)
POTASSIUM SERPL-SCNC: 4.4 MMOL/L (ref 3.5–5.2)
PROT SERPL-MCNC: 7.9 G/DL (ref 6–8.5)
RBC # BLD AUTO: 4.97 10*6/MM3 (ref 4.14–5.8)
SODIUM SERPL-SCNC: 138 MMOL/L (ref 136–145)
TIBC SERPL-MCNC: 352 MCG/DL (ref 298–536)
TRANSFERRIN SERPL-MCNC: 236 MG/DL (ref 200–360)
TRIGL SERPL-MCNC: 65 MG/DL (ref 0–150)
VLDLC SERPL-MCNC: 12 MG/DL (ref 5–40)
WBC NRBC COR # BLD: 9.68 10*3/MM3 (ref 3.4–10.8)

## 2022-06-28 PROCEDURE — 80061 LIPID PANEL: CPT | Performed by: NURSE PRACTITIONER

## 2022-06-28 PROCEDURE — 82378 CARCINOEMBRYONIC ANTIGEN: CPT | Performed by: INTERNAL MEDICINE

## 2022-06-28 PROCEDURE — 99214 OFFICE O/P EST MOD 30 MIN: CPT | Performed by: INTERNAL MEDICINE

## 2022-06-28 PROCEDURE — 80053 COMPREHEN METABOLIC PANEL: CPT | Performed by: INTERNAL MEDICINE

## 2022-06-28 PROCEDURE — 82728 ASSAY OF FERRITIN: CPT | Performed by: INTERNAL MEDICINE

## 2022-06-28 PROCEDURE — 84466 ASSAY OF TRANSFERRIN: CPT | Performed by: INTERNAL MEDICINE

## 2022-06-28 PROCEDURE — 99213 OFFICE O/P EST LOW 20 MIN: CPT | Performed by: NURSE PRACTITIONER

## 2022-06-28 PROCEDURE — 85025 COMPLETE CBC W/AUTO DIFF WBC: CPT | Performed by: INTERNAL MEDICINE

## 2022-06-28 PROCEDURE — 83540 ASSAY OF IRON: CPT | Performed by: INTERNAL MEDICINE

## 2022-06-28 PROCEDURE — 84153 ASSAY OF PSA TOTAL: CPT | Performed by: INTERNAL MEDICINE

## 2022-06-28 RX ORDER — ASPIRIN 81 MG/1
81 TABLET ORAL DAILY
Qty: 30 TABLET | Refills: 5 | Status: SHIPPED | OUTPATIENT
Start: 2022-06-28

## 2022-06-28 NOTE — PROGRESS NOTES
Raj Wang MD  David Mandujano  1950 06/28/2022    Patient Active Problem List   Diagnosis   • Iron deficiency anemia, unspecified   • Rectal cancer (HCC)   • Preoperative cardiovascular examination   • Hypertrophic cardiomyopathy with LV OT gradient of about 68 mmHg in February 2019.   • Essential hypertension   • Port-A-Cath in place   • Iron deficiency anemia due to chronic blood loss   • Malabsorption of iron       Dear Raj Wang MD:    Subjective     Chief Complaint   Patient presents with   • Hypertension           History of Present Illness:    David Mandujano is a 71 y.o. male with a past medical history of hypertension, moderate aortic stenosis, hypertrophic cardiomyopathy with asymmetrical septal hypertrophy with LVOT obstruction with the gradient of about 99 mm/hg s/p alcohol septal ablation by Dr. Oneill at [facility] in 2020. He presents today for routine cardiology follow up. His blood pressure is much improved after increasing amlodipine to 10 mg daily. He denies any chest pains or shortness of breath. He does have some fatigue. He has follow up at [facility] with Dr. Brandon next month. He is going to review echocardiogram there as well. Of note, he did have nonobstructive CAD on left heart catheterization performed at .          No Known Allergies:      Current Outpatient Medications:   •  allopurinol (ZYLOPRIM) 100 MG tablet, Take 1 tablet by mouth Daily., Disp: 30 tablet, Rfl: 2  •  amLODIPine (NORVASC) 10 MG tablet, Take 1 tablet by mouth Daily., Disp: 30 tablet, Rfl: 11  •  ASCORBIC ACID PO, Take  by mouth Daily., Disp: , Rfl:   •  lisinopril (PRINIVIL,ZESTRIL) 40 MG tablet, Take 1 tablet by mouth Daily., Disp: 30 tablet, Rfl: 5  •  metoprolol succinate XL (TOPROL-XL) 100 MG 24 hr tablet, Take 1 tablet by mouth Daily., Disp: 30 tablet, Rfl: 5  •  pantoprazole (Protonix) 40 MG EC tablet, Take 1 tablet by mouth Daily., Disp: 30 tablet, Rfl: 6  •  aspirin (aspirin) 81 MG EC  tablet, Take 1 tablet by mouth Daily., Disp: 30 tablet, Rfl: 5  •  cyanocobalamin (VITAMIN B-12) 1000 MCG tablet, Take 1,000 mcg by mouth Daily., Disp: , Rfl:   •  folic acid (FOLVITE) 1 MG tablet, TAKE ONE TABLET BY MOUTH DAILY, Disp: 30 tablet, Rfl: 11  •  vitamin B-6 (PYRIDOXINE) 100 MG tablet, TAKE ONE TABLET BY MOUTH EVERY DAY, Disp: 30 tablet, Rfl: 5      The following portions of the patient's history were reviewed and updated as appropriate: allergies, current medications, past family history, past medical history, past social history, past surgical history and problem list.    Social History     Tobacco Use   • Smoking status: Never Smoker   • Smokeless tobacco: Current User     Types: Chew   Vaping Use   • Vaping Use: Never used   Substance Use Topics   • Alcohol use: Yes     Comment: occasional   • Drug use: No       ROS    Objective   Vitals:    06/28/22 1449 06/28/22 1453   BP: 151/70 124/65   Pulse: 61    SpO2: 97%    Weight: 119 kg (262 lb 3.2 oz)      Body mass index is 35.56 kg/m².    Manual BP in left arm is 132/58  Manual BP in right arm is 134/62      Vitals reviewed.   Constitutional:       Appearance: Healthy appearance. Well-developed and not in distress.   HENT:      Head: Normocephalic and atraumatic.   Pulmonary:      Effort: Pulmonary effort is normal.      Breath sounds: Normal breath sounds. No wheezing. No rales.   Cardiovascular:      Normal rate. Regular rhythm.      Murmurs: There is a grade 3/6 systolic murmur at the LLSB.      . No S3 and S4 gallop.   Edema:     Peripheral edema absent.   Abdominal:      General: Bowel sounds are normal.      Palpations: Abdomen is soft.   Skin:     General: Skin is warm and dry.   Neurological:      Mental Status: Alert, oriented to person, place, and time and oriented to person, place and time.   Psychiatric:         Mood and Affect: Mood normal.         Behavior: Behavior normal.         Lab Results   Component Value Date     06/01/2022     K 4.2 06/01/2022     06/01/2022    CO2 25.5 06/01/2022    BUN 15 06/01/2022    CREATININE 1.34 (H) 06/01/2022    GLUCOSE 106 (H) 06/01/2022    CALCIUM 9.8 06/01/2022    AST 28 06/01/2022    ALT 31 06/01/2022    ALKPHOS 79 06/01/2022    LABIL2 1.5 06/25/2014     No results found for: CKTOTAL  Lab Results   Component Value Date    WBC 8.17 06/01/2022    HGB 16.2 06/01/2022    HCT 46.7 06/01/2022     06/01/2022     No results found for: INR  Lab Results   Component Value Date    MG 1.8 (L) 06/26/2021     Lab Results   Component Value Date    TSH 2.860 01/19/2021    PSA 2.610 06/01/2022    CHLPL 261 (H) 06/25/2014    TRIG 237 (H) 06/25/2014    HDL 74 06/25/2014     (H) 06/25/2014      No results found for: BNP        Procedures      Assessment & Plan    Diagnosis Plan   1. Cardiomyopathy, hypertrophic obstructive with LVOT gradient of about 99 mmHg, status post recent alcohol septal ablation at [facility]..  Comprehensive Metabolic Panel    Lipid Panel   2. Essential hypertension  Comprehensive Metabolic Panel    Lipid Panel   3. Aortic stenosis, moderate  Comprehensive Metabolic Panel    Lipid Panel   4. ASCVD (arteriosclerotic cardiovascular disease)  aspirin (aspirin) 81 MG EC tablet    Comprehensive Metabolic Panel    Lipid Panel                Recommendations:    1. HCOM s/p alcohol septal ablation - following with [facility].   2. Essential hypertension - controlled Continue amlodipine, lisinopril, and metoprolol.  3. Moderate aortic valve stenosis-we will continue to monitor periodically with echocardiogram.  4. ASCVD-no recent anginal symptoms.  I have asked him to take a low-dose aspirin.  We will also obtain a CMP and lipid panel and add statin as indicated.  5. Follow-up in 3 to 4 months or sooner if needed.        Return in about 3 months (around 9/28/2022) for Recheck.    As always, I appreciate very much the opportunity to participate in the cardiovascular care of your  patients.      With Best Regards,    JOSE CARLOS Hoff

## 2022-06-29 LAB
CEA SERPL-MCNC: 2.11 NG/ML
PSA SERPL-MCNC: 3.35 NG/ML (ref 0–4)

## 2022-06-29 RX ORDER — MULTIVITAMIN WITH IRON
100 TABLET ORAL DAILY
Qty: 30 TABLET | Refills: 5 | Status: SHIPPED | OUTPATIENT
Start: 2022-06-29

## 2022-06-30 DIAGNOSIS — I25.10 ASCVD (ARTERIOSCLEROTIC CARDIOVASCULAR DISEASE): Primary | ICD-10-CM

## 2022-06-30 RX ORDER — ATORVASTATIN CALCIUM 10 MG/1
10 TABLET, FILM COATED ORAL DAILY
Qty: 30 TABLET | Refills: 11 | Status: SHIPPED | OUTPATIENT
Start: 2022-06-30 | End: 2022-12-08 | Stop reason: SDUPTHER

## 2022-08-31 NOTE — PROGRESS NOTES
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 Mandujano is here today for a PAC flush and an acute visit. Since his last visit, overall, he reports he has been doing well and says he feels \"great\" today. However, for the past ~2-3 weeks he has been having increased fatigue.  He also noticed a few episodes of dark stool which resolved yesterday. He denies rectal bleeding or any other changes in bowel habits. Denies pain. He resumed SL B12 replacement last week along with folic acid. He continues to have neuropathy in bilateral hands/feet which he believes is slowly improving. He reports good appetite and stable weight. He denies any other complaints today.     PAST MEDICAL HISTORY:  Past Medical History:   Diagnosis Date   • Anemia    • Arthritis    • Colon cancer (CMS/HCC)     s/p chemo therapy   • Heart murmur    • Hypertension    • Wrist fracture     surgically repaired       PAST SURGICAL HISTORY:  Past Surgical History:   Procedure Laterality Date   • ABDOMINAL SURGERY     • COLON SURGERY     • COLONOSCOPY     • FRACTURE SURGERY Left    • VENOUS ACCESS DEVICE (PORT) INSERTION N/A 2019    Procedure: INSERTION VENOUS ACCESS DEVICE;  Surgeon: Cindy Corrales MD;  Location: [facility];  Service: General   • WRIST SURGERY         FAMILY HISTORY:  Family History   Problem Relation Age of Onset   • Colon cancer Maternal Grandfather    • Other Sister    • Heart disease Sister    No other family history of malignancy.    SOCIAL HISTORY:  Social History     Socioeconomic History   • Marital status: Single     Spouse name: Not on file   • Number of children: Not on file   • Years of education: Not on file   • Highest education level: Not on file   Tobacco Use   • Smoking status: Never Smoker   • Smokeless tobacco: Current User     Types: Chew   Substance and Sexual " Activity   • Alcohol use: Yes     Comment: occasional   • Drug use: No   • Sexual activity: Defer   Social History Narrative    He is , and is self employed/semi retired. He is a non smoker but chews tobacco. He used to drink daily, but says he has cut it in half.      REVIEW OF SYSTEMS:   A comprehensive 14 point review of systems was performed.  Significant findings as mentioned above.  All other systems reviewed and are negative.      MEDICATIONS:  The current medication list was reviewed in the EMR    Current Outpatient Medications:   •  allopurinol (ZYLOPRIM) 100 MG tablet, Take 100 mg by mouth Daily., Disp: , Rfl:   •  amLODIPine (NORVASC) 10 MG tablet, Take 5 mg by mouth Daily., Disp: , Rfl:   •  capecitabine (XELODA) 150 MG chemo tablet, Take 1 tab by mouth in the AM and 1 tab by mouth in the PM on days 1-14, then off for 7 days. Take with 500 mg tabs for total dose of 2150mg, Disp: 28 tablet, Rfl: 5  •  ferrous sulfate 325 (65 FE) MG tablet, Take 325 mg by mouth 2 (Two) Times a Day., Disp: , Rfl:   •  folic acid (FOLVITE) 1 MG tablet, Take 1 tablet by mouth Daily., Disp: 30 tablet, Rfl: 6  •  HYDROcodone-acetaminophen (NORCO) 7.5-325 MG per tablet, Take 1 tablet by mouth 4 (Four) Times a Day As Needed for Moderate Pain ., Disp: 12 tablet, Rfl: 0  •  iron polysaccharides (NIFEREX) 150 MG capsule, Take 1 capsule by mouth Daily., Disp: 30 capsule, Rfl: 3  •  metoprolol succinate XL (TOPROL-XL) 50 MG 24 hr tablet, Take 1 tablet by mouth Daily., Disp: 30 tablet, Rfl: 11  •  ondansetron ODT (ZOFRAN-ODT) 8 MG disintegrating tablet, Dissolve 1 tablet on the tongue 3 (Three) Times a Day As Needed for Nausea or Vomiting., Disp: 30 tablet, Rfl: 5  •  predniSONE (DELTASONE) 10 MG tablet, Take 3 tabs daily x 1 day, 2 tabs daily x 2 days, then 1 tab daily x 1 day, Disp: 8 tablet, Rfl: 0  •  prochlorperazine (COMPAZINE) 5 MG tablet, Take 2 tablets by mouth Every 6 (Six) Hours As Needed for Nausea or Vomiting.,  Disp: 60 tablet, Rfl: 5  No current facility-administered medications for this visit.     Facility-Administered Medications Ordered in Other Visits:   •  heparin injection 500 Units, 500 Units, Intravenous, PRN, Jyoti Larios MD, 500 Units at 07/21/20 1403  •  sodium chloride 0.9 % flush 10 mL, 10 mL, Intravenous, PRN, Jyoti Larios MD, 10 mL at 07/21/20 1403    ALLERGIES:  No Known Allergies    PHYSICAL EXAM:  Vitals:    07/21/20 1337   BP: 149/75   Pulse: 86   Resp: 18   Temp: 97.8 °F (36.6 °C)   SpO2: 98%     Pain Score    07/21/20 1337   PainSc: 0-No pain   General:  Awake, alert and oriented, appears well  HEENT:  Pupils are equal, round and reactive to light and accommodation, Extra-ocular movements full, Oropharyx clear, mucous membranes moist  Neck:  No JVD, thyromegaly or lymphadenopathy  CV:  Regular rate and rhythm, III/IV systolic murmur, no rubs or gallops  Resp:  Lungs are clear to auscultation bilaterally, no crackles  Abd:  Soft, non-tender, sl distended, bowel sounds present, no organomegaly or masses.  Surgical scars present.   Ext:  No clubbing, cyanosis or edema     Lymph:  No cervical, supraclavicular, axillary, adenopathy  Neuro:  grossly non-focal exam    PATHOLOGY:  10-02-18      04-01-19:        04-09-19:        ENDOSCOPY:  10-02-18:        IMAGING:  10-04-18 CT Abdomen Pelvis With Contrast   Findings  - LOWER THORAX: Patchy nonspecific subpleural nodularity in the left lung base that could represent sequelae of chronic airspace disease or early fibrosis.     ABDOMEN:  - LIVER: Homogeneous. No focal hepatic mass or ductal dilatation.  - GALLBLADDER: GALLSTONES WITHIN THE GALLBLADDER.  - PANCREAS: Unremarkable. No mass or ductal dilatation.  - SPLEEN: Homogeneous. No splenomegaly.  - ADRENALS: No mass.  - KIDNEYS: RIGHT RENAL CYST MEASURING 3.6 CM.  - GI TRACT: NONSPECIFIC DISTAL SIGMOID COLONIC WALL THICKENING VERSUS NONDISTENTION.  - PERITONEUM: No free air. No free fluid or  loculated fluid collections.  - MESENTERY: Unremarkable.  - LYMPH NODES: No lymphadenopathy.  - VASCULATURE: ATHEROSCLEROTIC VASCULAR CALCIFICATION.  - ABDOMINAL WALL: SMALL BILATERAL HUMERAL HERNIAS CONTAINING ONLY FAT.  - OTHER: None.     PELVIS:  - BLADDER: No focal mass or significant wall thickening  - REPRODUCTIVE: Unremarkable as visualized.  - APPENDIX: Nondistended. No surrounding inflammation.  - BONES: No acute bony abnormality.     IMPRESSION:  1. Gallstones within the gallbladder.  2.Nonspecific distal sigmoid colonic wall thickening versus nondistention.    PET/CT 10-29-18    11-13-18 MRI Pelvis Without Contrast  FINDINGS:  1.) TUMOR LOCATION AND CHARACTERISTICS     i) Distance of Inferior margin of Tumor from the dentate line: 9.5 CM  ii) Tumor at or below the puborectalis sling:  NO  iii) Relationship to the anterior peritoneal reflection: BELOW  iv) Craniocaudal length of the tumor: 6cm  v) Clock face of tumor: 5o'clock to 2o'clock  vi) Polypoid/ Annular/ Semi-annular: SEMI-ANNULAR  vii) Mucinous: YES     2.) EXTRAMURAL DEPTH OF INVASION AND MR T-CATEGORY       i) Extramural depth of invasion (Use 0mm for T1 or T2 tumor):  APPROXIMATELY 5 MM   ii) T category: T3 B              *Please indicate structures with possible invasion.  Specify laterality,  sequence and slice#: (see list below)     *  Anterior peritoneal reflection (T4a tumor): NO  *  Puborectalis: NO  *  Bladder: NO  *  Vascular Involvement of Iliac Vessels: NO  *  Levator ani: NO  *  Ureters: NO  *  Obturator: NO  *  Prostate: MARGINAL/TETHERED  *  Piriformis: NO  *  Uterus: NOT APPLICABLE  *  Pelvic Bone: NO  *  Vagina: NOT APPLICABLE  *  Sacrum: NO  *  Urethra: NO  *  Other:          iii) For low rectal tumors (maximum tumor depth at or below the  puborectalis sling):     *  Not applicable (tumor above the puborectalis sling: NOT APPLICABLE  *    *  Level 1 (submucosa only, no involvement of internal anal sphincter):       *  Level 2  (confined to the internal anal sphincter; no involvement of  intersphincteric fat):      *  Level 3 (intersphincteric fat involved):      *  Level 4 (involves external sphincter or beyond):         3. RELATIONSHIP OF THE TUMOR TO MESORECTAL FASCIA (MRF)       i) Shortest distance 0mm of the definitive tumour border to the MRF  is: AT 12:00   ii) Are there any tumour spiculations closer to the MRF? NO     iii) Location of Tumor margin to MRF: 12:00, AT THE LEVEL PROSTATE     4. EXTRAMURAL VENOUS INVASION       i) Extramural Venous Invasion (EMVI): ABSENT     5. MESORECTAL LYMPH NODES AND TUMOUR DEPOSITS       i) Any suspicious mesorectal lymph nodes/tumor deposits: YES      *If yes, the most suspicious node/tumor deposit is: 15 MM IN  DIAMETER, ALONG THE UPPER MARGIN OF the tumor with minimum distance 7  MMmm from the MRF at 7o'clock     6. EXTRAMESORECTAL LYMPH NODES        i) Any suspicious extramesorectal lymph nodes: NO      *If yes, location and laterality of suspicious nodes:             Int. Iliac:                Ext. Iliac:                Common Iliac:                Obturator:                Inguinal:                Other:           ii) Is the BETH node station in the field of view: NO        *If Yes, are these nodes suspicious:         7. OTHER FINDINGS (COMPLICATIONS, METASTASES, LIMITATIONS)         NOTE: THERE IS SOME UNCERTAINTY WHETHER THE APPARENT SMALL FOCUS OF TUMOR EXTENDING TO THE PROSTATE AT THE 12:00 POSITION COULD ACTUALLY BE PROSTATE NODULE EXTENDING TOWARD THE TUMOR. RECTAL TUMOR IS FAVORED; ALTHOUGH THIS DETERMINATION CANNOT BE MADE WITH COMPLETE CERTAINTY, TUMOR IS CONSIDERED T3 B.        IMPRESSIONS:  MRI rectal cancer T category is: T3 B  Maximum EMD of invasion is: 5  Minimum tumor to MRF distance is: 0  Low rectal tumor component: NO  Mesorectal nodes/tumor deposits: SUSPICIOUS  EMVI: ABSENT  Extramesorectal nodes: NEGATIVE    CTCAP 05-21-20     FINDINGS:  Adenopathy:No evidence of  mediastinal or hilar lymph node enlargement.  No axillary lymph node enlargement.     Fluid:There are no pericardial or pleural effusions.     LUNGS:No parenchymal soft tissue nodules or masses are present.     Skeletal:Arthritic change in the thoracic spine.      Upper abdomen shows cholelithiasis.      IMPRESSION:  1. No parenchymal soft tissue nodules or masses.  2. No pericardial or pleural effusions.  3. No adenopathy.   4. Coronary artery calcifications.   5. Cholelithiasis.     FINDINGS:   The lung bases are clear. There are no pleural effusions.     The liver is homogeneous.     There is cholelithiasis.     The spleen is homogeneous.      There is no peripancreatic stranding or pancreatic head mass.      There is no adrenal enlargement.     Large right renal cyst is present. There is no solid renal mass or  hydronephrosis..      Otherwise I do not see any free fluid or walled off fluid collections.     The rectal wall appears to be thickened.     IMPRESSION:  1. Rectal wall thickening.  2. Cholelithiasis.     RECENT LABS:  Lab Results   Component Value Date    WBC 5.17 07/21/2020    HGB 11.1 (L) 07/21/2020    HCT 34.4 (L) 07/21/2020    MCV 91.5 07/21/2020    RDW 13.7 07/21/2020     07/21/2020    NEUTRORELPCT 71.7 07/21/2020    LYMPHORELPCT 17.0 (L) 07/21/2020    MONORELPCT 8.7 07/21/2020    EOSRELPCT 1.2 07/21/2020    BASORELPCT 0.6 07/21/2020    NEUTROABS 3.71 07/21/2020    LYMPHSABS 0.88 07/21/2020       Lab Results   Component Value Date     07/21/2020    K 4.0 07/21/2020    CO2 24.1 07/21/2020     07/21/2020    BUN 18 07/21/2020    CREATININE 1.18 07/21/2020    EGFRIFNONA 61 07/21/2020    GLUCOSE 125 (H) 07/21/2020    CALCIUM 9.4 07/21/2020    ALKPHOS 80 07/21/2020    AST 28 07/21/2020    ALT 30 07/21/2020    BILITOT 0.5 07/21/2020    ALBUMIN 4.18 07/21/2020    PROTEINTOT 7.4 07/21/2020       Lab Results   Component Value Date/Time    URICACID 8.9 (H) 07/17/2019 10:16 AM     Lab  Results   Component Value Date    CEA 2.00 06/25/2020    CEA 2.52 04/21/2020    CEA 2.28 01/28/2020    CEA 2.28 06/26/2019    CEA 3.40 03/20/2019    CEA 17.80 (H) 01/11/2019    CEA 18.80 (H) 10/19/2018     Lab Results   Component Value Date    PSA 1.110 06/25/2020    PSA 4.930 (H) 10/19/2018     Lab Results   Component Value Date    TSH 1.560 06/25/2020     Lab Results   Component Value Date    HGBA1C 5.80 (H) 06/25/2020    HGBA1C 5.50 01/28/2020    HGBA1C 5.40 03/20/2019       Lab Results   Component Value Date    FERRITIN 28.44 (L) 07/21/2020    IRON 130 07/21/2020    TIBC 481 07/21/2020    LABIRON 27 07/21/2020    [id number] 232 06/25/2020    FOLATE 4.30 (L) 06/25/2020     ASSESSMENT & PLAN:  David Mandujano is a very pleasant 69 y.o. male with rectal cancer. Clinically stage IIIC ([id number]).    1.  Rectal cancer:  -Please see Dr. Larios' recent follow up note from 6/25/20 for full details.   -Most recent CT CAP without evidence of recurrent disease. CEA from June was normal. Repeat CEA from today pending.   -He will follow up with Dr. Larios in September with repeat imaging prior. Will likely need endoscopic exam in December.  -He is being seen today for PAC flush and an acute visit with complaints of increased fatigue (see below).     2. Fatigue  3. Anemia  -Likely multifactorial.   -CBC showing Hg 11. 1 which has dropped from last visit. B12 and folate were previously checked and low. He resumed SL B12 and folic acid supplementation last week; advised him to continue. Repeat B12 and folate from today pending. Checked TSH which was normal.   -Checked iron studies/ferritin and iron is low again. Therefore, will resume Niferex 150 mg daily which he previously took and tolerated well. Rx provided today.   -Will recheck B12, folate and iron studies with next follow up.     4.  Neuropathy:  -  Etiology is uncertain.  He did complain of some vague symptoms toward the end of his treatment so this could be related to  Oxaliplatin.    -  Thyroid function is wnl.  HbA1C sl elevated.   -  B12 and folate were low. Therefore, he was restarted on SL B12 replacement of folic acid as above. At present, he feels neuropathy is stable/improved.   -  He has been advised that alcohol can cause or worsen peripheral neuropathy.    ACO / KAVITHA/Other  Quality measures  -  David Mandujano received 2019 flu vaccine.  -  David Mandujano reports a pain score of 0.    -  Current outpatient and discharge medications have been reconciled for the patient.  Reviewed by: JOSE CARLOS Menjivar      I spent 25 minutes with David Mandujano today.  In the office today, more than 50% of this time was spent face-to-face with him  in counseling / coordination of care, reviewing his interim medical history and counseling on the current treatment plan.  All questions were answered to his satisfaction.         Electronically Signed by: JOSE CARLOS Damon        CC:   MD Kathleen Coppola Emmanuel C, MD Shawn Michael Acino, MD William Richards, MD Sandra Beck, MD          Statement Selected

## 2022-09-29 RX ORDER — FOLIC ACID 1 MG/1
TABLET ORAL
Qty: 30 TABLET | Refills: 11 | Status: SHIPPED | OUTPATIENT
Start: 2022-09-29

## 2022-10-28 DIAGNOSIS — I10 ESSENTIAL HYPERTENSION: ICD-10-CM

## 2022-10-31 ENCOUNTER — TELEPHONE (OUTPATIENT)
Dept: CARDIOLOGY | Facility: CLINIC | Age: 72
End: 2022-10-31

## 2022-10-31 DIAGNOSIS — I42.1 CARDIOMYOPATHY, HYPERTROPHIC OBSTRUCTIVE: ICD-10-CM

## 2022-10-31 DIAGNOSIS — I35.0 AORTIC STENOSIS, MODERATE: ICD-10-CM

## 2022-10-31 DIAGNOSIS — I10 ESSENTIAL HYPERTENSION: ICD-10-CM

## 2022-10-31 RX ORDER — LISINOPRIL 40 MG/1
TABLET ORAL
Qty: 30 TABLET | Refills: 11 | Status: SHIPPED | OUTPATIENT
Start: 2022-10-31 | End: 2022-12-13 | Stop reason: SDUPTHER

## 2022-10-31 RX ORDER — METOPROLOL SUCCINATE 100 MG/1
TABLET, EXTENDED RELEASE ORAL
Qty: 30 TABLET | Refills: 5 | Status: SHIPPED | OUTPATIENT
Start: 2022-10-31

## 2022-10-31 NOTE — TELEPHONE ENCOUNTER
HUB OK TO READ     Called pt to see if he had his labs done. Left voicemail for the pt to call me back. If he has not had them done, he will need to reschedule his appointment for tomorrow.

## 2022-12-07 ENCOUNTER — LAB (OUTPATIENT)
Dept: LAB | Facility: HOSPITAL | Age: 72
End: 2022-12-07

## 2022-12-07 ENCOUNTER — HOSPITAL ENCOUNTER (OUTPATIENT)
Dept: CT IMAGING | Facility: HOSPITAL | Age: 72
Discharge: HOME | End: 2022-12-07

## 2022-12-07 DIAGNOSIS — I42.1 CARDIOMYOPATHY, HYPERTROPHIC OBSTRUCTIVE: ICD-10-CM

## 2022-12-07 DIAGNOSIS — C20 MALIGNANT NEOPLASM OF RECTUM: ICD-10-CM

## 2022-12-07 DIAGNOSIS — R97.20 ELEVATED PSA: ICD-10-CM

## 2022-12-07 DIAGNOSIS — I10 ESSENTIAL HYPERTENSION: ICD-10-CM

## 2022-12-07 DIAGNOSIS — D50.0 IRON DEFICIENCY ANEMIA DUE TO CHRONIC BLOOD LOSS: ICD-10-CM

## 2022-12-07 DIAGNOSIS — I35.0 AORTIC STENOSIS, MODERATE: ICD-10-CM

## 2022-12-07 DIAGNOSIS — I25.10 ASCVD (ARTERIOSCLEROTIC CARDIOVASCULAR DISEASE): ICD-10-CM

## 2022-12-07 LAB
ALBUMIN SERPL-MCNC: 4.4 G/DL (ref 3.5–5.2)
ALBUMIN/GLOB SERPL: 1.2 G/DL
ALP SERPL-CCNC: 96 U/L (ref 39–117)
ALT SERPL W P-5'-P-CCNC: 22 U/L (ref 1–41)
ANION GAP SERPL CALCULATED.3IONS-SCNC: 12.2 MMOL/L (ref 5–15)
AST SERPL-CCNC: 23 U/L (ref 1–40)
BILIRUB SERPL-MCNC: 0.7 MG/DL (ref 0–1.2)
BUN SERPL-MCNC: 14 MG/DL (ref 8–23)
BUN/CREAT SERPL: 10.1 (ref 7–25)
CALCIUM SPEC-SCNC: 9.7 MG/DL (ref 8.6–10.5)
CHLORIDE SERPL-SCNC: 99 MMOL/L (ref 98–107)
CHOLEST SERPL-MCNC: 211 MG/DL (ref 0–200)
CO2 SERPL-SCNC: 26.8 MMOL/L (ref 22–29)
CREAT BLDA-MCNC: 1.5 MG/DL (ref 0.6–1.3)
CREAT SERPL-MCNC: 1.39 MG/DL (ref 0.76–1.27)
EGFRCR SERPLBLD CKD-EPI 2021: 53.9 ML/MIN/1.73
GLOBULIN UR ELPH-MCNC: 3.8 GM/DL
GLUCOSE SERPL-MCNC: 125 MG/DL (ref 65–99)
HDLC SERPL-MCNC: 77 MG/DL (ref 40–60)
LDLC SERPL CALC-MCNC: 114 MG/DL (ref 0–100)
LDLC/HDLC SERPL: 1.44 {RATIO}
POTASSIUM SERPL-SCNC: 4.3 MMOL/L (ref 3.5–5.2)
PROT SERPL-MCNC: 8.2 G/DL (ref 6–8.5)
SODIUM SERPL-SCNC: 138 MMOL/L (ref 136–145)
TRIGL SERPL-MCNC: 117 MG/DL (ref 0–150)
VLDLC SERPL-MCNC: 20 MG/DL (ref 5–40)

## 2022-12-07 PROCEDURE — 25010000002 IOPAMIDOL 61 % SOLUTION: Performed by: INTERNAL MEDICINE

## 2022-12-07 PROCEDURE — 82565 ASSAY OF CREATININE: CPT

## 2022-12-07 PROCEDURE — 36415 COLL VENOUS BLD VENIPUNCTURE: CPT

## 2022-12-07 PROCEDURE — 74177 CT ABD & PELVIS W/CONTRAST: CPT

## 2022-12-07 PROCEDURE — 80061 LIPID PANEL: CPT

## 2022-12-07 PROCEDURE — 74177 CT ABD & PELVIS W/CONTRAST: CPT | Performed by: RADIOLOGY

## 2022-12-07 PROCEDURE — 71260 CT THORAX DX C+: CPT

## 2022-12-07 PROCEDURE — 80053 COMPREHEN METABOLIC PANEL: CPT

## 2022-12-07 PROCEDURE — 71260 CT THORAX DX C+: CPT | Performed by: RADIOLOGY

## 2022-12-07 RX ADMIN — IOPAMIDOL 85 ML: 612 INJECTION, SOLUTION INTRAVENOUS at 11:00

## 2022-12-08 DIAGNOSIS — I10 ESSENTIAL HYPERTENSION: ICD-10-CM

## 2022-12-08 DIAGNOSIS — I25.10 ASCVD (ARTERIOSCLEROTIC CARDIOVASCULAR DISEASE): ICD-10-CM

## 2022-12-08 RX ORDER — ATORVASTATIN CALCIUM 20 MG/1
20 TABLET, FILM COATED ORAL DAILY
Qty: 30 TABLET | Refills: 2 | Status: SHIPPED | OUTPATIENT
Start: 2022-12-08 | End: 2022-12-13 | Stop reason: SDUPTHER

## 2022-12-09 RX ORDER — ALLOPURINOL 100 MG/1
TABLET ORAL
Qty: 30 TABLET | Refills: 11 | OUTPATIENT
Start: 2022-12-09

## 2022-12-12 ENCOUNTER — TELEPHONE (OUTPATIENT)
Dept: CARDIOLOGY | Facility: CLINIC | Age: 72
End: 2022-12-12

## 2022-12-12 NOTE — TELEPHONE ENCOUNTER
Caller: David Mandujano    Relationship: Self    Best call back number: [phone]    What is the best time to reach you: ANYTIME    What was the call regarding: PATIENT STATED IN October HE RAN OUT OF BLOOD PRESSURE MEDICATION AND HE STATED THAT HE WASN'T SURE IF THIS MEDICATION WAS STILL NEEDED. HE ALSO WANTED TO KNOW IF KATE SAENZ COULD UP HIS GOUT MEDICATION. HE DOESN'T HAVE THE NAMES OF THIS MEDICATION.     Do you require a callback: YES

## 2022-12-13 ENCOUNTER — OFFICE VISIT (OUTPATIENT)
Dept: CARDIOLOGY | Facility: CLINIC | Age: 72
End: 2022-12-13

## 2022-12-13 VITALS
BODY MASS INDEX: 35.52 KG/M2 | SYSTOLIC BLOOD PRESSURE: 153 MMHG | HEIGHT: 73 IN | HEART RATE: 67 BPM | DIASTOLIC BLOOD PRESSURE: 74 MMHG | WEIGHT: 268 LBS

## 2022-12-13 DIAGNOSIS — R42 DIZZINESS: ICD-10-CM

## 2022-12-13 DIAGNOSIS — I42.1 CARDIOMYOPATHY, HYPERTROPHIC OBSTRUCTIVE: ICD-10-CM

## 2022-12-13 DIAGNOSIS — I35.0 AORTIC STENOSIS, MODERATE: ICD-10-CM

## 2022-12-13 DIAGNOSIS — I10 ESSENTIAL HYPERTENSION: ICD-10-CM

## 2022-12-13 DIAGNOSIS — N28.9 ABNORMAL RENAL FUNCTION: ICD-10-CM

## 2022-12-13 DIAGNOSIS — I25.10 ASCVD (ARTERIOSCLEROTIC CARDIOVASCULAR DISEASE): Primary | ICD-10-CM

## 2022-12-13 PROCEDURE — 99214 OFFICE O/P EST MOD 30 MIN: CPT | Performed by: NURSE PRACTITIONER

## 2022-12-13 PROCEDURE — 93000 ELECTROCARDIOGRAM COMPLETE: CPT | Performed by: NURSE PRACTITIONER

## 2022-12-13 RX ORDER — ATORVASTATIN CALCIUM 20 MG/1
20 TABLET, FILM COATED ORAL DAILY
Qty: 30 TABLET | Refills: 11 | Status: SHIPPED | OUTPATIENT
Start: 2022-12-13

## 2022-12-13 RX ORDER — LISINOPRIL 40 MG/1
40 TABLET ORAL DAILY
Qty: 30 TABLET | Refills: 11 | Status: SHIPPED | OUTPATIENT
Start: 2022-12-13 | End: 2022-12-13 | Stop reason: SDUPTHER

## 2022-12-13 RX ORDER — LISINOPRIL 20 MG/1
20 TABLET ORAL DAILY
Qty: 30 TABLET | Refills: 5 | Status: SHIPPED | OUTPATIENT
Start: 2022-12-13

## 2022-12-13 RX ORDER — ALLOPURINOL 100 MG/1
100 TABLET ORAL DAILY
Qty: 30 TABLET | Refills: 0 | Status: SHIPPED | OUTPATIENT
Start: 2022-12-13

## 2022-12-13 NOTE — PROGRESS NOTES
Raj Wang MD  David Mandujano  1950 12/13/2022    Patient Active Problem List   Diagnosis   • Iron deficiency anemia, unspecified   • Rectal cancer (HCC)   • Preoperative cardiovascular examination   • Hypertrophic cardiomyopathy with LV OT gradient of about 68 mmHg in February 2019.   • Essential hypertension   • Port-A-Cath in place   • Iron deficiency anemia due to chronic blood loss   • Malabsorption of iron       Dear Raj Wang MD:    Subjective     Chief Complaint   Patient presents with   • Follow-up     ROUTINE   • Dizziness           History of Present Illness:    David Mandujano is a 72 y.o. male with a past medical history of nonobstructive ASCVD noted on left heart catheterization at  in 2020, hypertension, moderate aortic valve stenosis, hypertrophic cardiomyopathy with asymmetrical septal hypertrophy with LVOT obstruction status post alcohol septal ablation by Dr. Oneill at [facility] in 2020.  He presents today for cardiology follow-up.  He denies any recent chest pains or shortness of breath.  Reports he did have an episode of dizziness a few days ago.  He was decorating his house outdoors when he felt very dizzy for several minutes and had to rest.  Denies any near-syncope or syncope.  States this is the only time this has occurred.  Reports his blood pressure has been fairly well controlled but recently slightly higher as he has been out of lisinopril.  Recent LDL was 114.  Thus, atorvastatin was increased to 20 mg daily.  His most recent creatinine was 1.5          No Known Allergies:      Current Outpatient Medications:   •  allopurinol (ZYLOPRIM) 100 MG tablet, Take 1 tablet by mouth Daily., Disp: 30 tablet, Rfl: 0  •  amLODIPine (NORVASC) 10 MG tablet, Take 1 tablet by mouth Daily., Disp: 30 tablet, Rfl: 11  •  ASCORBIC ACID PO, Take  by mouth Daily., Disp: , Rfl:   •  aspirin (aspirin) 81 MG EC tablet, Take 1 tablet by mouth Daily., Disp: 30 tablet, Rfl: 5  •   "atorvastatin (LIPITOR) 20 MG tablet, Take 1 tablet by mouth Daily., Disp: 30 tablet, Rfl: 11  •  cyanocobalamin (VITAMIN B-12) 1000 MCG tablet, Take 1,000 mcg by mouth Daily., Disp: , Rfl:   •  folic acid (FOLVITE) 1 MG tablet, TAKE ONE TABLET BY MOUTH DAILY, Disp: 30 tablet, Rfl: 11  •  lisinopril (PRINIVIL,ZESTRIL) 20 MG tablet, Take 1 tablet by mouth Daily. FOR BLOOD PRESSURE, Disp: 30 tablet, Rfl: 5  •  metoprolol succinate XL (TOPROL-XL) 100 MG 24 hr tablet, TAKE ONE TABLET BY MOUTH DAILY FOR HEART AND/OR BLOOD PRESSURE, Disp: 30 tablet, Rfl: 5  •  pantoprazole (Protonix) 40 MG EC tablet, Take 1 tablet by mouth Daily., Disp: 30 tablet, Rfl: 6  •  vitamin B-6 (PYRIDOXINE) 100 MG tablet, Take 1 tablet by mouth Daily., Disp: 30 tablet, Rfl: 5      The following portions of the patient's history were reviewed and updated as appropriate: allergies, current medications, past family history, past medical history, past social history, past surgical history and problem list.    Social History     Tobacco Use   • Smoking status: Never   • Smokeless tobacco: Current     Types: Chew   Vaping Use   • Vaping Use: Never used   Substance Use Topics   • Alcohol use: Yes     Comment: occasional   • Drug use: No       ROS    Objective   Vitals:    12/13/22 1116   BP: 153/74   Pulse: 67   Weight: 122 kg (268 lb)   Height: 185.4 cm (73\")     Body mass index is 35.36 kg/m².        Vitals reviewed.   Constitutional:       Appearance: Healthy appearance. Well-developed and not in distress.   HENT:      Head: Normocephalic and atraumatic.   Pulmonary:      Effort: Pulmonary effort is normal.      Breath sounds: Normal breath sounds. No wheezing. No rales.   Cardiovascular:      Normal rate. Regular rhythm.      Murmurs: There is a grade 3/6 holosystolic murmur at the URSB.      . No S3 and S4 gallop.   Edema:     Peripheral edema absent.   Abdominal:      General: Bowel sounds are normal.      Palpations: Abdomen is soft.   Skin:     " General: Skin is warm and dry.   Neurological:      Mental Status: Alert, oriented to person, place, and time and oriented to person, place and time.   Psychiatric:         Mood and Affect: Mood normal.         Behavior: Behavior normal.         Lab Results   Component Value Date     12/07/2022    K 4.3 12/07/2022    CL 99 12/07/2022    CO2 26.8 12/07/2022    BUN 14 12/07/2022    CREATININE 1.39 (H) 12/07/2022    GLUCOSE 125 (H) 12/07/2022    CALCIUM 9.7 12/07/2022    AST 23 12/07/2022    ALT 22 12/07/2022    ALKPHOS 96 12/07/2022    LABIL2 1.5 06/25/2014     No results found for: CKTOTAL  Lab Results   Component Value Date    WBC 9.68 06/28/2022    HGB 16.1 06/28/2022    HCT 47.5 06/28/2022     06/28/2022     No results found for: INR  Lab Results   Component Value Date    MG 1.8 (L) 06/26/2021     Lab Results   Component Value Date    TSH 2.860 01/19/2021    PSA 3.350 06/28/2022    CHLPL 261 (H) 06/25/2014    TRIG 117 12/07/2022    HDL 77 (H) 12/07/2022     (H) 12/07/2022      No results found for: BNP          ECG 12 Lead    Date/Time: 12/13/2022 11:15 AM  Performed by: Lilly Matos APRN  Authorized by: Lilly Matos APRN   Comparison: compared with previous ECG   Similar to previous ECG  Rhythm: sinus rhythm and sinus bradycardia  BPM: 57  Conduction: right bundle branch block and 1st degree AV block              Assessment & Plan    Diagnosis Plan   1. ASCVD (arteriosclerotic cardiovascular disease)  atorvastatin (LIPITOR) 20 MG tablet      2. Essential hypertension  Basic Metabolic Panel    lisinopril (PRINIVIL,ZESTRIL) 20 MG tablet      3. Cardiomyopathy, hypertrophic obstructive with LVOT gradient of about 99 mmHg, status post recent alcohol septal ablation at [facility]..        4. Dizziness  Cardiac Event Monitor      5. Abnormal renal function        6. Aortic stenosis, moderate                     Recommendations:    1. ASCVD-nonobstructive.  No recent anginal  symptoms.  Continue low-dose aspirin, atorvastatin, and metoprolol.  2. Essential hypertension-above goal.  He will resume lisinopril at 20 mg daily.  BMP in 1 week  3. Hypertrophic cardiomyopathy s/p alcohol ablation-following with [facility]  4. Dizziness-will evaluate further with a 30-day event monitor to rule out any arrhythmias, tachycardia, or bradycardia which could be causing his symptoms.  5. Abnormal renal function-will monitor with labs after resuming lisinopril.  If creatinine remains abnormal, will plan to refer to nephrology  6. Moderate aortic valve stenosis-we will repeat echocardiogram in February 2023.  7. Follow-up in 6 weeks or sooner if needed.        Return in about 6 weeks (around 1/24/2023) for Recheck.    As always, I appreciate very much the opportunity to participate in the cardiovascular care of your patients.      With Best Regards,    JOSE CARLOS Hoff

## 2022-12-15 NOTE — TELEPHONE ENCOUNTER
Spoke topt he will follow up with PCP for his gout med refills and he is taking all the meds listed for his BP

## 2022-12-15 NOTE — TELEPHONE ENCOUNTER
Please ask him to contact his PCP about changing his gout medication.    He should be taking amlodipine 10 mg daily, lisinopril 20 mg daily, and metoprolol succinate 100 mg daily for his blood pressure.

## 2022-12-21 ENCOUNTER — LAB (OUTPATIENT)
Dept: LAB | Facility: HOSPITAL | Age: 72
End: 2022-12-21

## 2022-12-21 DIAGNOSIS — R97.20 ELEVATED PSA: ICD-10-CM

## 2022-12-21 DIAGNOSIS — I10 ESSENTIAL HYPERTENSION: ICD-10-CM

## 2022-12-21 DIAGNOSIS — C20 MALIGNANT NEOPLASM OF RECTUM: ICD-10-CM

## 2022-12-21 DIAGNOSIS — D50.0 IRON DEFICIENCY ANEMIA DUE TO CHRONIC BLOOD LOSS: ICD-10-CM

## 2022-12-21 LAB
ALBUMIN SERPL-MCNC: 3.98 G/DL (ref 3.5–5.2)
ALBUMIN/GLOB SERPL: 1 G/DL
ALP SERPL-CCNC: 85 U/L (ref 39–117)
ALT SERPL W P-5'-P-CCNC: 19 U/L (ref 1–41)
ANION GAP SERPL CALCULATED.3IONS-SCNC: 12.7 MMOL/L (ref 5–15)
AST SERPL-CCNC: 27 U/L (ref 1–40)
BASOPHILS # BLD AUTO: 0.04 10*3/MM3 (ref 0–0.2)
BASOPHILS NFR BLD AUTO: 0.4 % (ref 0–1.5)
BILIRUB SERPL-MCNC: 0.7 MG/DL (ref 0–1.2)
BUN SERPL-MCNC: 17 MG/DL (ref 8–23)
BUN/CREAT SERPL: 13.6 (ref 7–25)
CALCIUM SPEC-SCNC: 9.4 MG/DL (ref 8.6–10.5)
CHLORIDE SERPL-SCNC: 101 MMOL/L (ref 98–107)
CO2 SERPL-SCNC: 25.3 MMOL/L (ref 22–29)
CREAT SERPL-MCNC: 1.25 MG/DL (ref 0.76–1.27)
DEPRECATED RDW RBC AUTO: 44.2 FL (ref 37–54)
EGFRCR SERPLBLD CKD-EPI 2021: 61.2 ML/MIN/1.73
EOSINOPHIL # BLD AUTO: 0.18 10*3/MM3 (ref 0–0.4)
EOSINOPHIL NFR BLD AUTO: 1.6 % (ref 0.3–6.2)
ERYTHROCYTE [DISTWIDTH] IN BLOOD BY AUTOMATED COUNT: 12.3 % (ref 12.3–15.4)
FERRITIN SERPL-MCNC: 221.9 NG/ML (ref 30–400)
GLOBULIN UR ELPH-MCNC: 3.8 GM/DL
GLUCOSE SERPL-MCNC: 106 MG/DL (ref 65–99)
HCT VFR BLD AUTO: 45.7 % (ref 37.5–51)
HGB BLD-MCNC: 15.7 G/DL (ref 13–17.7)
IMM GRANULOCYTES # BLD AUTO: 0.02 10*3/MM3 (ref 0–0.05)
IMM GRANULOCYTES NFR BLD AUTO: 0.2 % (ref 0–0.5)
IRON 24H UR-MRATE: 76 MCG/DL (ref 59–158)
IRON SATN MFR SERPL: 21 % (ref 20–50)
LYMPHOCYTES # BLD AUTO: 2.55 10*3/MM3 (ref 0.7–3.1)
LYMPHOCYTES NFR BLD AUTO: 23.3 % (ref 19.6–45.3)
MCH RBC QN AUTO: 33.5 PG (ref 26.6–33)
MCHC RBC AUTO-ENTMCNC: 34.4 G/DL (ref 31.5–35.7)
MCV RBC AUTO: 97.6 FL (ref 79–97)
MONOCYTES # BLD AUTO: 0.55 10*3/MM3 (ref 0.1–0.9)
MONOCYTES NFR BLD AUTO: 5 % (ref 5–12)
NEUTROPHILS NFR BLD AUTO: 69.5 % (ref 42.7–76)
NEUTROPHILS NFR BLD AUTO: 7.59 10*3/MM3 (ref 1.7–7)
NRBC BLD AUTO-RTO: 0 /100 WBC (ref 0–0.2)
PLATELET # BLD AUTO: 191 10*3/MM3 (ref 140–450)
PMV BLD AUTO: 10.1 FL (ref 6–12)
POTASSIUM SERPL-SCNC: 4.1 MMOL/L (ref 3.5–5.2)
PROT SERPL-MCNC: 7.8 G/DL (ref 6–8.5)
RBC # BLD AUTO: 4.68 10*6/MM3 (ref 4.14–5.8)
SODIUM SERPL-SCNC: 139 MMOL/L (ref 136–145)
TIBC SERPL-MCNC: 367 MCG/DL (ref 298–536)
TRANSFERRIN SERPL-MCNC: 246 MG/DL (ref 200–360)
WBC NRBC COR # BLD: 10.93 10*3/MM3 (ref 3.4–10.8)

## 2022-12-21 PROCEDURE — 84466 ASSAY OF TRANSFERRIN: CPT

## 2022-12-21 PROCEDURE — 80053 COMPREHEN METABOLIC PANEL: CPT

## 2022-12-21 PROCEDURE — 82378 CARCINOEMBRYONIC ANTIGEN: CPT

## 2022-12-21 PROCEDURE — 85025 COMPLETE CBC W/AUTO DIFF WBC: CPT

## 2022-12-21 PROCEDURE — 82728 ASSAY OF FERRITIN: CPT

## 2022-12-21 PROCEDURE — 83540 ASSAY OF IRON: CPT

## 2022-12-21 PROCEDURE — 84153 ASSAY OF PSA TOTAL: CPT

## 2022-12-22 LAB
CEA SERPL-MCNC: 1.52 NG/ML
PSA SERPL-MCNC: 2.95 NG/ML (ref 0–4)

## 2023-01-10 ENCOUNTER — HOSPITAL ENCOUNTER (OUTPATIENT)
Dept: CARDIOLOGY | Facility: HOSPITAL | Age: 73
Discharge: HOME | End: 2023-01-10
Admitting: NURSE PRACTITIONER
Payer: MEDICARE

## 2023-01-10 DIAGNOSIS — I35.0 AORTIC STENOSIS, MODERATE: ICD-10-CM

## 2023-01-10 PROCEDURE — 93306 TTE W/DOPPLER COMPLETE: CPT

## 2023-01-10 PROCEDURE — 93306 TTE W/DOPPLER COMPLETE: CPT | Performed by: INTERNAL MEDICINE

## 2023-01-15 LAB
BH CV ECHO MEAS - AO MAX PG: 52.5 MMHG
BH CV ECHO MEAS - AO MEAN PG: 29.7 MMHG
BH CV ECHO MEAS - AO ROOT DIAM: 3.6 CM
BH CV ECHO MEAS - AO V2 MAX: 362.3 CM/SEC
BH CV ECHO MEAS - AO V2 VTI: 83.5 CM
BH CV ECHO MEAS - AVA(I,D): 1.68 CM2
BH CV ECHO MEAS - EDV(MOD-SP4): 58.1 ML
BH CV ECHO MEAS - EF(MOD-SP4): 56.1 %
BH CV ECHO MEAS - ESV(MOD-SP4): 25.5 ML
BH CV ECHO MEAS - LA DIMENSION: 4.4 CM
BH CV ECHO MEAS - LAT PEAK E' VEL: 4.4 CM/SEC
BH CV ECHO MEAS - LV DIASTOLIC VOL/BSA (35-75): 23.8 CM2
BH CV ECHO MEAS - LV MAX PG: 5.5 MMHG
BH CV ECHO MEAS - LV MEAN PG: 3 MMHG
BH CV ECHO MEAS - LV SYSTOLIC VOL/BSA (12-30): 10.5 CM2
BH CV ECHO MEAS - LV V1 MAX: 117 CM/SEC
BH CV ECHO MEAS - LV V1 VTI: 28.5 CM
BH CV ECHO MEAS - LVOT AREA: 4.9 CM2
BH CV ECHO MEAS - LVOT DIAM: 2.5 CM
BH CV ECHO MEAS - MED PEAK E' VEL: 3.5 CM/SEC
BH CV ECHO MEAS - MV A MAX VEL: 155 CM/SEC
BH CV ECHO MEAS - MV E MAX VEL: 94.9 CM/SEC
BH CV ECHO MEAS - MV E/A: 0.61
BH CV ECHO MEAS - PA ACC TIME: 0.1 SEC
BH CV ECHO MEAS - PA PR(ACCEL): 34.4 MMHG
BH CV ECHO MEAS - SI(MOD-SP4): 13.4 ML/M2
BH CV ECHO MEAS - SV(LVOT): 139.9 ML
BH CV ECHO MEAS - SV(MOD-SP4): 32.6 ML
BH CV ECHO MEASUREMENTS AVERAGE E/E' RATIO: 24.03
LEFT ATRIUM VOLUME INDEX: 28.2 ML/M2
MAXIMAL PREDICTED HEART RATE: 148 BPM
STRESS TARGET HR: 126 BPM

## 2023-01-18 ENCOUNTER — TELEPHONE (OUTPATIENT)
Dept: CARDIOLOGY | Facility: CLINIC | Age: 73
End: 2023-01-18
Payer: MEDICARE

## 2023-01-20 ENCOUNTER — OFFICE VISIT (OUTPATIENT)
Dept: ONCOLOGY | Facility: CLINIC | Age: 73
End: 2023-01-20
Payer: MEDICARE

## 2023-01-20 VITALS
HEIGHT: 73 IN | RESPIRATION RATE: 18 BRPM | SYSTOLIC BLOOD PRESSURE: 179 MMHG | DIASTOLIC BLOOD PRESSURE: 103 MMHG | TEMPERATURE: 97.5 F | WEIGHT: 271 LBS | OXYGEN SATURATION: 97 % | HEART RATE: 103 BPM | BODY MASS INDEX: 35.92 KG/M2

## 2023-01-20 DIAGNOSIS — D50.0 IRON DEFICIENCY ANEMIA DUE TO CHRONIC BLOOD LOSS: ICD-10-CM

## 2023-01-20 DIAGNOSIS — I42.1 CARDIOMYOPATHY, HYPERTROPHIC OBSTRUCTIVE: ICD-10-CM

## 2023-01-20 DIAGNOSIS — F10.10 ALCOHOL ABUSE: ICD-10-CM

## 2023-01-20 DIAGNOSIS — C20 MALIGNANT NEOPLASM OF RECTUM: Primary | ICD-10-CM

## 2023-01-20 DIAGNOSIS — R97.20 ELEVATED PROSTATE SPECIFIC ANTIGEN (PSA): ICD-10-CM

## 2023-01-20 DIAGNOSIS — R97.20 ELEVATED PSA: ICD-10-CM

## 2023-01-20 PROCEDURE — G0008 ADMIN INFLUENZA VIRUS VAC: HCPCS | Performed by: INTERNAL MEDICINE

## 2023-01-20 PROCEDURE — 99214 OFFICE O/P EST MOD 30 MIN: CPT | Performed by: INTERNAL MEDICINE

## 2023-01-20 PROCEDURE — 90662 IIV NO PRSV INCREASED AG IM: CPT | Performed by: INTERNAL MEDICINE

## 2023-01-26 ENCOUNTER — TELEPHONE (OUTPATIENT)
Dept: CARDIOLOGY | Facility: CLINIC | Age: 73
End: 2023-01-26
Payer: MEDICARE

## 2023-01-26 NOTE — TELEPHONE ENCOUNTER
HUB CAN READ:     Called pt to advise him that we need to repeat his monitor due to it having poor reading. No answer LM.

## 2023-01-30 ENCOUNTER — TELEPHONE (OUTPATIENT)
Dept: CARDIOLOGY | Facility: CLINIC | Age: 73
End: 2023-01-30
Payer: MEDICARE

## 2023-01-30 NOTE — TELEPHONE ENCOUNTER
Caller: David Mandujano    Relationship: Self    Best call back number: [phone]    What is the best time to reach you: ANY    Who are you requesting to speak with (clinical staff, provider,  specific staff member): JOSE RAFAEL      What was the call regarding: PATIENT WANTING TO SPEAK WITH ZACH IN REGARDS OF HIS HEART MONITOR    Do you require a callback: YES

## 2023-03-17 ENCOUNTER — TELEPHONE (OUTPATIENT)
Dept: CARDIOLOGY | Facility: CLINIC | Age: 73
End: 2023-03-17
Payer: MEDICARE

## 2023-03-17 NOTE — TELEPHONE ENCOUNTER
Pt called back and stated he wants to wait til his f/up with Lilly to discuss about redoing the monitor again.

## 2023-03-17 NOTE — TELEPHONE ENCOUNTER
Hub can read:      Called pt to advise him that the monitor he redone only recorded 4 minutes on it. Going to ask pt if he wants to redo the monitor again. No answer KRISTOPHER

## 2023-04-03 ENCOUNTER — TELEPHONE (OUTPATIENT)
Dept: CARDIOLOGY | Facility: CLINIC | Age: 73
End: 2023-04-03
Payer: MEDICARE

## 2023-04-03 NOTE — TELEPHONE ENCOUNTER
"Caller: David Mandujano \"Franky\"    Relationship: Self    Best call back number: [phone]    What orders are you requesting (i.e. lab or imaging): CAROTID ARTERY TESTING    In what timeframe would the patient need to come in: ASAP    Where will you receive your lab/imaging services: [facility]    Additional notes: PT REPORTS DR. GARCIA MENTIONED HIM HAVING A CAROTID ARTERY SCAN ON HIS NECK BUT NO ONE HAS EVER CONTACTED HIM ABOUT THIS - HE WAS WONDERING IF KP WOULD BE ABLE TO ORDER THIS TESTING FOR HIM   "

## 2023-04-06 NOTE — TELEPHONE ENCOUNTER
Pt made aware of provider recommendations. Agreeable to discuss w/ Lilly at his scheduled follow up. Verbalizes ok.

## 2023-04-17 ENCOUNTER — OFFICE VISIT (OUTPATIENT)
Dept: CARDIOLOGY | Facility: CLINIC | Age: 73
End: 2023-04-17
Payer: MEDICARE

## 2023-04-17 VITALS
BODY MASS INDEX: 36.55 KG/M2 | DIASTOLIC BLOOD PRESSURE: 103 MMHG | SYSTOLIC BLOOD PRESSURE: 157 MMHG | HEART RATE: 104 BPM | HEIGHT: 73 IN | WEIGHT: 275.8 LBS

## 2023-04-17 DIAGNOSIS — I35.0 AORTIC STENOSIS, MODERATE: ICD-10-CM

## 2023-04-17 DIAGNOSIS — I42.1 CARDIOMYOPATHY, HYPERTROPHIC OBSTRUCTIVE: ICD-10-CM

## 2023-04-17 DIAGNOSIS — I25.10 ASCVD (ARTERIOSCLEROTIC CARDIOVASCULAR DISEASE): Primary | ICD-10-CM

## 2023-04-17 DIAGNOSIS — I10 ESSENTIAL HYPERTENSION: ICD-10-CM

## 2023-04-17 DIAGNOSIS — R42 DIZZINESS: ICD-10-CM

## 2023-04-17 PROCEDURE — 1159F MED LIST DOCD IN RCRD: CPT | Performed by: NURSE PRACTITIONER

## 2023-04-17 PROCEDURE — 1160F RVW MEDS BY RX/DR IN RCRD: CPT | Performed by: NURSE PRACTITIONER

## 2023-04-17 PROCEDURE — 3080F DIAST BP >= 90 MM HG: CPT | Performed by: NURSE PRACTITIONER

## 2023-04-17 PROCEDURE — 3077F SYST BP >= 140 MM HG: CPT | Performed by: NURSE PRACTITIONER

## 2023-04-17 PROCEDURE — 99214 OFFICE O/P EST MOD 30 MIN: CPT | Performed by: NURSE PRACTITIONER

## 2023-04-17 NOTE — LETTER
April 19, 2023     Raj Wang MD  [address]  [place]  [place] [address]    Patient: David Mandujano   YOB: 1950   Date of Visit: 4/17/2023       Dear Dr. Kathleen MD:    Thank you for referring David Mandujano to me for evaluation. Below are the relevant portions of my assessment and plan of care.    If you have questions, please do not hesitate to call me. I look forward to following David along with you.         Sincerely,        JOSE CARLOS Hoff        CC: No Recipients    Lilly Matos APRN  04/19/23 1503  Signed  Raj Wang MD  David Mandujano  1950 04/17/2023    Patient Active Problem List   Diagnosis   • Iron deficiency anemia, unspecified   • Rectal cancer   • Preoperative cardiovascular examination   • Hypertrophic cardiomyopathy with LV OT gradient of about 68 mmHg in February 2019.   • Essential hypertension   • Port-A-Cath in place   • Iron deficiency anemia due to chronic blood loss   • Malabsorption of iron       Dear Raj Wang MD:    Subjective      Chief Complaint   Patient presents with   • Follow-up     ECHO     DOES NOT WANT DO MONITOR           History of Present Illness:    David Mandujano is a 72 y.o. male with a past medical history of nonobstructive ASCVD noted on left heart catheterization at  in 2020, hypertension, moderate aortic valve stenosis, hypertrophic cardiomyopathy with asymmetrical septal hypertrophy with LVOT obstruction status post alcohol septal ablation by Dr. Oneill at [facility] in 2020.  He presents today for cardiology follow-up.  Previously, he had experienced an episode of dizzinessafter he had not eaten all day and was working outside.  A cardiac event monitor was ordered to rule out any cardiac arrhythmias which could have caused his symptoms.  He reports he had difficulty wearing the monitor and could not get anything to record so he was unable to complete the monitoring period.  He states he has  not had any further episodes of dizziness.  He denies any chest pains or shortness of breath.  He is concerned about having carotid artery disease and would like this evaluated.  His blood pressure is elevated in the office today.  However, he states he has been traveling and forgot all of his medications so he has not taken them in several days.  He recently underwent echocardiogram which revealed LVEF 61 to 65% with grade 1 LV diastolic dysfunction, mild mitral valve vegetation, mild aortic valve stenosis.          No Known Allergies:      Current Outpatient Medications:   •  allopurinol (ZYLOPRIM) 100 MG tablet, Take 1 tablet by mouth Daily., Disp: 30 tablet, Rfl: 0  •  amLODIPine (NORVASC) 10 MG tablet, Take 1 tablet by mouth Daily., Disp: 30 tablet, Rfl: 11  •  ASCORBIC ACID PO, Take  by mouth Daily., Disp: , Rfl:   •  aspirin (aspirin) 81 MG EC tablet, Take 1 tablet by mouth Daily., Disp: 30 tablet, Rfl: 5  •  atorvastatin (LIPITOR) 20 MG tablet, Take 1 tablet by mouth Daily., Disp: 30 tablet, Rfl: 11  •  cyanocobalamin (VITAMIN B-12) 1000 MCG tablet, Take 1 tablet by mouth Daily., Disp: , Rfl:   •  folic acid (FOLVITE) 1 MG tablet, TAKE ONE TABLET BY MOUTH DAILY, Disp: 30 tablet, Rfl: 11  •  lisinopril (PRINIVIL,ZESTRIL) 20 MG tablet, Take 1 tablet by mouth Daily. FOR BLOOD PRESSURE, Disp: 30 tablet, Rfl: 5  •  metoprolol succinate XL (TOPROL-XL) 100 MG 24 hr tablet, TAKE ONE TABLET BY MOUTH DAILY FOR HEART AND/OR BLOOD PRESSURE, Disp: 30 tablet, Rfl: 5  •  vitamin B-6 (PYRIDOXINE) 100 MG tablet, Take 1 tablet by mouth Daily., Disp: 30 tablet, Rfl: 5      The following portions of the patient's history were reviewed and updated as appropriate: allergies, current medications, past family history, past medical history, past social history, past surgical history and problem list.    Social History     Tobacco Use   • Smoking status: Never   • Smokeless tobacco: Current     Types: Chew   Vaping Use   • Vaping  "Use: Never used   Substance Use Topics   • Alcohol use: Yes     Comment: occasional   • Drug use: No       Review of Systems   Constitutional: Negative for decreased appetite and malaise/fatigue.   Cardiovascular: Negative for chest pain, dyspnea on exertion and palpitations.   Respiratory: Negative for cough and shortness of breath.        Objective    Vitals:    04/17/23 1406   BP: (!) 157/103   Pulse: 104   Weight: 125 kg (275 lb 12.8 oz)   Height: 185.4 cm (73\")     Body mass index is 36.39 kg/m².        Vitals reviewed.   Constitutional:       Appearance: Healthy appearance. Well-developed and not in distress.   HENT:      Head: Normocephalic and atraumatic.   Pulmonary:      Effort: Pulmonary effort is normal.      Breath sounds: Normal breath sounds. No wheezing. No rales.   Cardiovascular:      Normal rate. Regular rhythm.      Murmurs: There is a grade 3/6 holosystolic murmur at the URSB.      . No S3 and S4 gallop.   Edema:     Peripheral edema absent.   Abdominal:      General: Bowel sounds are normal.      Palpations: Abdomen is soft.   Skin:     General: Skin is warm and dry.   Neurological:      Mental Status: Alert, oriented to person, place, and time and oriented to person, place and time.   Psychiatric:         Mood and Affect: Mood normal.         Behavior: Behavior normal.         Lab Results   Component Value Date     12/21/2022    K 4.1 12/21/2022     12/21/2022    CO2 25.3 12/21/2022    BUN 17 12/21/2022    CREATININE 1.25 12/21/2022    GLUCOSE 106 (H) 12/21/2022    CALCIUM 9.4 12/21/2022    AST 27 12/21/2022    ALT 19 12/21/2022    ALKPHOS 85 12/21/2022    LABIL2 1.5 06/25/2014     No results found for: CKTOTAL  Lab Results   Component Value Date    WBC 10.93 (H) 12/21/2022    HGB 15.7 12/21/2022    HCT 45.7 12/21/2022     12/21/2022     No results found for: INR  Lab Results   Component Value Date    MG 1.8 (L) 06/26/2021     Lab Results   Component Value Date    TSH 2.860 " 01/19/2021    PSA 2.950 12/21/2022    CHLPL 261 (H) 06/25/2014    TRIG 117 12/07/2022    HDL 77 (H) 12/07/2022     (H) 12/07/2022      No results found for: BNP        Procedures      Assessment & Plan    Diagnosis Plan   1. ASCVD (arteriosclerotic cardiovascular disease)  US Carotid Bilateral      2. Aortic stenosis, moderate        3. Essential hypertension        4. Cardiomyopathy, hypertrophic obstructive with LVOT gradient of about 99 mmHg, status post recent alcohol septal ablation at [facility]..        5. Dizziness  US Carotid Bilateral                  Recommendations:    1. ASCVD-no recent anginal symptoms.  Continue low-dose aspirin, atorvastatin, and metoprolol.  2. Essential hypertension-BP uncontrolled.  However, he has not had any of his antihypertensives in several days.  I stressed the importance of medication compliance and asked him to take his medications immediately after arriving home.  I asked him to keep BP log and call us if it is running greater than 140/80.  3. Moderate aortic valve stenosis- we discussed results of the echocardiogram.  We will continue to monitor periodically.  4. Hypertrophic cardiomyopathy status post alcohol septal ablation-following with , will also continue to monitor with echocardiogram.  5. Dizziness-she does not want to pursue any further cardiac monitoring.  We will order a carotid Doppler to rule out any significant carotid artery disease.  6. Follow-up in 3 months or sooner if needed.        Return in about 3 months (around 7/17/2023) for Recheck.    As always, I appreciate very much the opportunity to participate in the cardiovascular care of your patients.      With Best Regards,    JOSE CARLOS Hoff

## 2023-04-17 NOTE — PROGRESS NOTES
Raj Wang MD  David Mandujano  1950 04/17/2023    Patient Active Problem List   Diagnosis   • Iron deficiency anemia, unspecified   • Rectal cancer   • Preoperative cardiovascular examination   • Hypertrophic cardiomyopathy with LV OT gradient of about 68 mmHg in February 2019.   • Essential hypertension   • Port-A-Cath in place   • Iron deficiency anemia due to chronic blood loss   • Malabsorption of iron       Dear Raj Wang MD:    Subjective     Chief Complaint   Patient presents with   • Follow-up     ECHO     DOES NOT WANT DO MONITOR           History of Present Illness:    David Mandujano is a 72 y.o. male with a past medical history of nonobstructive ASCVD noted on left heart catheterization at  in 2020, hypertension, moderate aortic valve stenosis, hypertrophic cardiomyopathy with asymmetrical septal hypertrophy with LVOT obstruction status post alcohol septal ablation by Dr. Oneill at [facility] in 2020.  He presents today for cardiology follow-up.  Previously, he had experienced an episode of dizzinessafter he had not eaten all day and was working outside.  A cardiac event monitor was ordered to rule out any cardiac arrhythmias which could have caused his symptoms.  He reports he had difficulty wearing the monitor and could not get anything to record so he was unable to complete the monitoring period.  He states he has not had any further episodes of dizziness.  He denies any chest pains or shortness of breath.  He is concerned about having carotid artery disease and would like this evaluated.  His blood pressure is elevated in the office today.  However, he states he has been traveling and forgot all of his medications so he has not taken them in several days.  He recently underwent echocardiogram which revealed LVEF 61 to 65% with grade 1 LV diastolic dysfunction, mild mitral valve vegetation, mild aortic valve stenosis.          No Known Allergies:      Current  "Outpatient Medications:   •  allopurinol (ZYLOPRIM) 100 MG tablet, Take 1 tablet by mouth Daily., Disp: 30 tablet, Rfl: 0  •  amLODIPine (NORVASC) 10 MG tablet, Take 1 tablet by mouth Daily., Disp: 30 tablet, Rfl: 11  •  ASCORBIC ACID PO, Take  by mouth Daily., Disp: , Rfl:   •  aspirin (aspirin) 81 MG EC tablet, Take 1 tablet by mouth Daily., Disp: 30 tablet, Rfl: 5  •  atorvastatin (LIPITOR) 20 MG tablet, Take 1 tablet by mouth Daily., Disp: 30 tablet, Rfl: 11  •  cyanocobalamin (VITAMIN B-12) 1000 MCG tablet, Take 1 tablet by mouth Daily., Disp: , Rfl:   •  folic acid (FOLVITE) 1 MG tablet, TAKE ONE TABLET BY MOUTH DAILY, Disp: 30 tablet, Rfl: 11  •  lisinopril (PRINIVIL,ZESTRIL) 20 MG tablet, Take 1 tablet by mouth Daily. FOR BLOOD PRESSURE, Disp: 30 tablet, Rfl: 5  •  metoprolol succinate XL (TOPROL-XL) 100 MG 24 hr tablet, TAKE ONE TABLET BY MOUTH DAILY FOR HEART AND/OR BLOOD PRESSURE, Disp: 30 tablet, Rfl: 5  •  vitamin B-6 (PYRIDOXINE) 100 MG tablet, Take 1 tablet by mouth Daily., Disp: 30 tablet, Rfl: 5      The following portions of the patient's history were reviewed and updated as appropriate: allergies, current medications, past family history, past medical history, past social history, past surgical history and problem list.    Social History     Tobacco Use   • Smoking status: Never   • Smokeless tobacco: Current     Types: Chew   Vaping Use   • Vaping Use: Never used   Substance Use Topics   • Alcohol use: Yes     Comment: occasional   • Drug use: No       Review of Systems   Constitutional: Negative for decreased appetite and malaise/fatigue.   Cardiovascular: Negative for chest pain, dyspnea on exertion and palpitations.   Respiratory: Negative for cough and shortness of breath.        Objective   Vitals:    04/17/23 1406   BP: (!) 157/103   Pulse: 104   Weight: 125 kg (275 lb 12.8 oz)   Height: 185.4 cm (73\")     Body mass index is 36.39 kg/m².        Vitals reviewed.   Constitutional:       " Appearance: Healthy appearance. Well-developed and not in distress.   HENT:      Head: Normocephalic and atraumatic.   Pulmonary:      Effort: Pulmonary effort is normal.      Breath sounds: Normal breath sounds. No wheezing. No rales.   Cardiovascular:      Normal rate. Regular rhythm.      Murmurs: There is a grade 3/6 holosystolic murmur at the URSB.      . No S3 and S4 gallop.   Edema:     Peripheral edema absent.   Abdominal:      General: Bowel sounds are normal.      Palpations: Abdomen is soft.   Skin:     General: Skin is warm and dry.   Neurological:      Mental Status: Alert, oriented to person, place, and time and oriented to person, place and time.   Psychiatric:         Mood and Affect: Mood normal.         Behavior: Behavior normal.         Lab Results   Component Value Date     12/21/2022    K 4.1 12/21/2022     12/21/2022    CO2 25.3 12/21/2022    BUN 17 12/21/2022    CREATININE 1.25 12/21/2022    GLUCOSE 106 (H) 12/21/2022    CALCIUM 9.4 12/21/2022    AST 27 12/21/2022    ALT 19 12/21/2022    ALKPHOS 85 12/21/2022    LABIL2 1.5 06/25/2014     No results found for: CKTOTAL  Lab Results   Component Value Date    WBC 10.93 (H) 12/21/2022    HGB 15.7 12/21/2022    HCT 45.7 12/21/2022     12/21/2022     No results found for: INR  Lab Results   Component Value Date    MG 1.8 (L) 06/26/2021     Lab Results   Component Value Date    TSH 2.860 01/19/2021    PSA 2.950 12/21/2022    CHLPL 261 (H) 06/25/2014    TRIG 117 12/07/2022    HDL 77 (H) 12/07/2022     (H) 12/07/2022      No results found for: BNP        Procedures      Assessment & Plan    Diagnosis Plan   1. ASCVD (arteriosclerotic cardiovascular disease)  US Carotid Bilateral      2. Aortic stenosis, moderate        3. Essential hypertension        4. Cardiomyopathy, hypertrophic obstructive with LVOT gradient of about 99 mmHg, status post recent alcohol septal ablation at [facility]..        5. Dizziness  US Carotid  Bilateral                   Recommendations:    1. ASCVD-no recent anginal symptoms.  Continue low-dose aspirin, atorvastatin, and metoprolol.  2. Essential hypertension-BP uncontrolled.  However, he has not had any of his antihypertensives in several days.  I stressed the importance of medication compliance and asked him to take his medications immediately after arriving home.  I asked him to keep BP log and call us if it is running greater than 140/80.  3. Moderate aortic valve stenosis- we discussed results of the echocardiogram.  We will continue to monitor periodically.  4. Hypertrophic cardiomyopathy status post alcohol septal ablation-following with , will also continue to monitor with echocardiogram.  5. Dizziness-she does not want to pursue any further cardiac monitoring.  We will order a carotid Doppler to rule out any significant carotid artery disease.  6. Follow-up in 3 months or sooner if needed.        Return in about 3 months (around 7/17/2023) for Recheck.    As always, I appreciate very much the opportunity to participate in the cardiovascular care of your patients.      With Best Regards,    JOSE CARLOS Hoff

## 2023-04-20 ENCOUNTER — HOSPITAL ENCOUNTER (OUTPATIENT)
Dept: CARDIOLOGY | Facility: HOSPITAL | Age: 73
Discharge: HOME | End: 2023-04-20
Payer: MEDICARE

## 2023-04-20 DIAGNOSIS — I25.10 ASCVD (ARTERIOSCLEROTIC CARDIOVASCULAR DISEASE): ICD-10-CM

## 2023-04-20 DIAGNOSIS — R42 DIZZINESS: ICD-10-CM

## 2023-04-20 PROCEDURE — 93880 EXTRACRANIAL BILAT STUDY: CPT

## 2023-05-22 DIAGNOSIS — I25.10 ASCVD (ARTERIOSCLEROTIC CARDIOVASCULAR DISEASE): ICD-10-CM

## 2023-05-22 RX ORDER — ATORVASTATIN CALCIUM 20 MG/1
TABLET, FILM COATED ORAL
Qty: 30 TABLET | Refills: 2 | Status: SHIPPED | OUTPATIENT
Start: 2023-05-22

## 2023-08-02 ENCOUNTER — HOSPITAL ENCOUNTER (OUTPATIENT)
Dept: CT IMAGING | Facility: HOSPITAL | Age: 73
Discharge: HOME | End: 2023-08-02
Payer: MEDICARE

## 2023-08-02 DIAGNOSIS — C20 MALIGNANT NEOPLASM OF RECTUM: ICD-10-CM

## 2023-08-02 DIAGNOSIS — I42.1 CARDIOMYOPATHY, HYPERTROPHIC OBSTRUCTIVE: ICD-10-CM

## 2023-08-02 DIAGNOSIS — I65.23 BILATERAL CAROTID ARTERY STENOSIS: ICD-10-CM

## 2023-08-02 DIAGNOSIS — D50.0 IRON DEFICIENCY ANEMIA DUE TO CHRONIC BLOOD LOSS: ICD-10-CM

## 2023-08-02 LAB — CREAT BLDA-MCNC: 1.4 MG/DL (ref 0.6–1.3)

## 2023-08-02 PROCEDURE — 71260 CT THORAX DX C+: CPT

## 2023-08-02 PROCEDURE — 70498 CT ANGIOGRAPHY NECK: CPT | Performed by: RADIOLOGY

## 2023-08-02 PROCEDURE — 25510000001 IOPAMIDOL 61 % SOLUTION: Performed by: NURSE PRACTITIONER

## 2023-08-02 PROCEDURE — 74177 CT ABD & PELVIS W/CONTRAST: CPT

## 2023-08-02 PROCEDURE — 70498 CT ANGIOGRAPHY NECK: CPT

## 2023-08-02 PROCEDURE — 71260 CT THORAX DX C+: CPT | Performed by: RADIOLOGY

## 2023-08-02 PROCEDURE — 82565 ASSAY OF CREATININE: CPT

## 2023-08-02 PROCEDURE — 74177 CT ABD & PELVIS W/CONTRAST: CPT | Performed by: RADIOLOGY

## 2023-08-02 RX ADMIN — IOPAMIDOL 92 ML: 612 INJECTION, SOLUTION INTRAVENOUS at 13:54

## 2023-08-03 DIAGNOSIS — I25.10 ASCVD (ARTERIOSCLEROTIC CARDIOVASCULAR DISEASE): Primary | ICD-10-CM

## 2023-08-03 DIAGNOSIS — I25.10 ASCVD (ARTERIOSCLEROTIC CARDIOVASCULAR DISEASE): ICD-10-CM

## 2023-08-03 DIAGNOSIS — I65.23 BILATERAL CAROTID ARTERY STENOSIS: ICD-10-CM

## 2023-08-03 DIAGNOSIS — R42 DIZZINESS: ICD-10-CM

## 2023-08-03 RX ORDER — ATORVASTATIN CALCIUM 20 MG/1
20 TABLET, FILM COATED ORAL DAILY
Qty: 30 TABLET | Refills: 5 | Status: SHIPPED | OUTPATIENT
Start: 2023-08-03

## 2023-08-15 ENCOUNTER — OFFICE VISIT (OUTPATIENT)
Dept: CARDIAC SURGERY | Facility: CLINIC | Age: 73
End: 2023-08-15
Payer: MEDICARE

## 2023-08-15 VITALS
HEART RATE: 99 BPM | DIASTOLIC BLOOD PRESSURE: 90 MMHG | BODY MASS INDEX: 36.7 KG/M2 | WEIGHT: 271 LBS | OXYGEN SATURATION: 96 % | SYSTOLIC BLOOD PRESSURE: 170 MMHG | TEMPERATURE: 97.9 F | HEIGHT: 72 IN

## 2023-08-15 DIAGNOSIS — I65.23 CAROTID STENOSIS, ASYMPTOMATIC, BILATERAL: Primary | ICD-10-CM

## 2023-08-15 PROCEDURE — 1160F RVW MEDS BY RX/DR IN RCRD: CPT | Performed by: THORACIC SURGERY (CARDIOTHORACIC VASCULAR SURGERY)

## 2023-08-15 PROCEDURE — 3080F DIAST BP >= 90 MM HG: CPT | Performed by: THORACIC SURGERY (CARDIOTHORACIC VASCULAR SURGERY)

## 2023-08-15 PROCEDURE — 1159F MED LIST DOCD IN RCRD: CPT | Performed by: THORACIC SURGERY (CARDIOTHORACIC VASCULAR SURGERY)

## 2023-08-15 PROCEDURE — 99203 OFFICE O/P NEW LOW 30 MIN: CPT | Performed by: THORACIC SURGERY (CARDIOTHORACIC VASCULAR SURGERY)

## 2023-08-15 PROCEDURE — 3077F SYST BP >= 140 MM HG: CPT | Performed by: THORACIC SURGERY (CARDIOTHORACIC VASCULAR SURGERY)

## 2023-08-16 PROBLEM — I65.23 CAROTID STENOSIS, ASYMPTOMATIC, BILATERAL: Status: ACTIVE | Noted: 2023-08-16

## 2023-08-16 NOTE — PROGRESS NOTES
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  Combined chemoradiation with Xeloda 825 mg/m2 BID D1-5 or M-F during the course of radiation.  His dose is 500 mg x 4 or 2000 mg BID  Treatment finished 19.    2.  Dr. Yadav performed LAR with coloanal anstomosis and loop ileostomy 19.  Pathology showed  Residual invasive adneocarcinoma.  Tumor invaded the muscularis propria.  Surgical margins clear.  0/14 resected LNs involved.  ypT2N0.      3.       4.  Ileostomy reversal 19 (Dr. Yadav)    5.        CHIEF COMPLAINT:  Follow up of rectal cancer and iron deficiency anemia    HISTORY OF PRESENT ILLNESS:   David Mandujano is a very pleasant 73 y.o. male who was referred by Dr. Barnhart for evaluation and treatment of colorectal cancer. He began to notice rectal bleeding in January or 2018, and he also began to notice fatigue in approximately July. He assumed he was out of shape when he started to become short of breath and started trying to exercise more frequently, which only exacerbated the shortness of breath. He was evaluated by his PCP, Raj Wang MD, who after testing, found him to be severely iron deficient, hyperglycemic, and he also had an elevated PSA. He was on vacation at the time, and his PCP asked him to return home for further evaluation. He was started on oral iron therapy and later received IV iron infusions. He was also scheduled for a colonoscopy with Dr. Barnhart, during which a suspicious rectal mass was biopsied and pathology was positive for an ulcerated invasive, moderately differentiated rectal adenocarcinoma.     INTERVAL HISTORY:  Mr. Mandujano is here today for follow up of rectal cancer.  Since his last visit, he has been doing well with regards to his cancer.  He does have significant bilateral carotid  artery stenosis noted on imaging with approximately 75% blockage on each side, but with good flow noted on duplex imaging.  He has seen Dr. Sheldon who recommended watchful waiting at this time.  He has several questions regarding this that he thought about after seeing Dr. Sheldon, and I recommended that he call his office.  He is eating well.  He says he has plans to cut back on alcohol use and plans to hit the gym instead to improve his health.  He will be due for follow-up colonoscopy in February.  He is eating and drinking well.  His bowels are moving well.  He has no rectal bleeding.  He is otherwise well without complaint.       PAST MEDICAL HISTORY:  Past Medical History:   Diagnosis Date    Anemia     Arthritis     Carotid stenosis     Carotid stenosis, asymptomatic, bilateral 8/16/2023    Colon cancer     s/p chemo therapy    Heart murmur     Hyperlipidemia     Hypertension     Wrist fracture     surgically repaired       PAST SURGICAL HISTORY:  Past Surgical History:   Procedure Laterality Date    ABDOMINAL SURGERY      CARDIAC ABLATION      COLON SURGERY      COLONOSCOPY      FRACTURE SURGERY Left     VENOUS ACCESS DEVICE (PORT) INSERTION N/A 05/20/2019    Procedure: INSERTION VENOUS ACCESS DEVICE;  Surgeon: Cindy Corrales MD;  Location: [facility];  Service: General    WRIST SURGERY         FAMILY HISTORY:  Family History   Problem Relation Age of Onset    Stroke Father     Hyperlipidemia Father     Other Sister     Heart disease Sister     Colon cancer Maternal Grandfather    No other family history of malignancy.    SOCIAL HISTORY:  Social History     Socioeconomic History    Marital status: Single    Number of children: 2   Tobacco Use    Smoking status: Some Days     Types: Cigars    Smokeless tobacco: Current     Types: Chew    Tobacco comments:     Occasional Cigars-Has not had one in 5 years   Vaping Use    Vaping Use: Never used   Substance and Sexual Activity    Alcohol use: Yes     Comment:  "occasional-[place]    Drug use: No    Sexual activity: Defer     REVIEW OF SYSTEMS:   A comprehensive 14 point review of systems was performed.  Significant findings as mentioned above.  All other systems reviewed and are negative.      MEDICATIONS:  The current medication list was reviewed in the EMR    Current Outpatient Medications:     allopurinol (ZYLOPRIM) 100 MG tablet, Take 1 tablet by mouth Daily. (Patient taking differently: Take 1 tablet by mouth As Needed.), Disp: 30 tablet, Rfl: 0    amLODIPine (NORVASC) 10 MG tablet, Take 1 tablet by mouth Daily., Disp: 30 tablet, Rfl: 11    ASCORBIC ACID PO, Take  by mouth Daily., Disp: , Rfl:     aspirin 81 MG EC tablet, Take 1 tablet by mouth Daily., Disp: 30 tablet, Rfl: 5    atorvastatin (LIPITOR) 20 MG tablet, Take 1 tablet by mouth Daily. for cholesterol., Disp: 30 tablet, Rfl: 5    cyanocobalamin (VITAMIN B-12) 1000 MCG tablet, Take 1 tablet by mouth Daily., Disp: , Rfl:     folic acid (FOLVITE) 1 MG tablet, TAKE ONE TABLET BY MOUTH DAILY, Disp: 30 tablet, Rfl: 11    lisinopril (PRINIVIL,ZESTRIL) 20 MG tablet, Take 1 tablet by mouth Daily. FOR BLOOD PRESSURE, Disp: 30 tablet, Rfl: 5    metoprolol succinate XL (TOPROL-XL) 100 MG 24 hr tablet, TAKE ONE TABLET BY MOUTH DAILY FOR HEART AND/OR BLOOD PRESSURE, Disp: 30 tablet, Rfl: 5    vitamin B-6 (PYRIDOXINE) 100 MG tablet, TAKE ONE TABLET BY MOUTH DAILY, Disp: 30 tablet, Rfl: 5    ALLERGIES:  No Known Allergies    PHYSICAL EXAM:  Vitals:    08/18/23 1052   BP: 137/65   Pulse: 60   Resp: 18   Temp: 97.7 øF (36.5 øC)   TempSrc: Temporal   SpO2: 96%   Weight: 125 kg (275 lb 9.6 oz)   Height: 182.9 cm (72\")   PainSc: 0-No pain       Body surface area is 2.44 meters squared.  Body mass index is 37.38 kg/mý.    ECOG score: 0   General:  Awake, alert and oriented, appears well, in good spirits.  HEENT:  Pupils are equal, round and reactive to light and accommodation, Extra-ocular movements full, Oropharyx clear, mucous " membranes moist  Neck:  No JVD, thyromegaly or lymphadenopathy.  No bruits  CV:  Regular rate and rhythm, II/IV systolic murmur, no rubs or gallops  Resp: Lungs are clear to auscultation bilaterally, no wheezing  Abd:  Soft, non-tender, non distended, bowel sounds present, no palpable hepatosplenomegaly or masses..  Surgical scars present.   Ext:  No clubbing, cyanosis, or lower extremity edema  Lymph:  No cervical, supraclavicular, axillary, adenopathy  Neuro:  Grossly non-focal exam    PATHOLOGY:  10-02-18      04-01-19:        04-09-19:        ENDOSCOPY:  10-02-18:      2-05-21 EGD/C-scope ([place])  -  Mild reflux esophagitis with small erosions at the GE junction and a slight stricture  -  Endoscopically normal stomach, duodenum.  - Unremarkable surgical anastomosis 5 cm above the anal verge.   -  Minimal diverticulosis.  -  Otherwise normal colon and terminal ileum with fair prep.      IMAGING:  10-04-18 CT Abdomen Pelvis With Contrast   Findings  - LOWER THORAX: Patchy nonspecific subpleural nodularity in the left lung base that could represent sequelae of chronic airspace disease or early fibrosis.     ABDOMEN:  - LIVER: Homogeneous. No focal hepatic mass or ductal dilatation.  - GALLBLADDER: GALLSTONES WITHIN THE GALLBLADDER.  - PANCREAS: Unremarkable. No mass or ductal dilatation.  - SPLEEN: Homogeneous. No splenomegaly.  - ADRENALS: No mass.  - KIDNEYS: RIGHT RENAL CYST MEASURING 3.6 CM.  - GI TRACT: NONSPECIFIC DISTAL SIGMOID COLONIC WALL THICKENING VERSUS NONDISTENTION.  - PERITONEUM: No free air. No free fluid or loculated fluid collections.  - MESENTERY: Unremarkable.  - LYMPH NODES: No lymphadenopathy.  - VASCULATURE: ATHEROSCLEROTIC VASCULAR CALCIFICATION.  - ABDOMINAL WALL: SMALL BILATERAL HUMERAL HERNIAS CONTAINING ONLY FAT.  - OTHER: None.     PELVIS:  - BLADDER: No focal mass or significant wall thickening  - REPRODUCTIVE: Unremarkable as visualized.  - APPENDIX: Nondistended. No surrounding  inflammation.  - BONES: No acute bony abnormality.     IMPRESSION:  1. Gallstones within the gallbladder.  2.Nonspecific distal sigmoid colonic wall thickening versus nondistention.    PET/CT 10-29-18    11-13-18 MRI Pelvis Without Contrast  FINDINGS:  1.) TUMOR LOCATION AND CHARACTERISTICS     i) Distance of Inferior margin of Tumor from the dentate line: 9.5 CM  ii) Tumor at or below the puborectalis sling:  NO  iii) Relationship to the anterior peritoneal reflection: BELOW  iv) Craniocaudal length of the tumor: 6cm  v) Clock face of tumor: 5o'clock to 2o'clock  vi) Polypoid/ Annular/ Semi-annular: SEMI-ANNULAR  vii) Mucinous: YES     2.) EXTRAMURAL DEPTH OF INVASION AND MR T-CATEGORY       i) Extramural depth of invasion (Use 0mm for T1 or T2 tumor):  APPROXIMATELY 5 MM   ii) T category: T3 B              *Please indicate structures with possible invasion.  Specify laterality,  sequence and slice#: (see list below)     *  Anterior peritoneal reflection (T4a tumor): NO  *  Puborectalis: NO  *  Bladder: NO  *  Vascular Involvement of Iliac Vessels: NO  *  Levator ani: NO  *  Ureters: NO  *  Obturator: NO  *  Prostate: MARGINAL/TETHERED  *  Piriformis: NO  *  Uterus: NOT APPLICABLE  *  Pelvic Bone: NO  *  Vagina: NOT APPLICABLE  *  Sacrum: NO  *  Urethra: NO  *  Other:          iii) For low rectal tumors (maximum tumor depth at or below the  puborectalis sling):     *  Not applicable (tumor above the puborectalis sling: NOT APPLICABLE  *    *  Level 1 (submucosa only, no involvement of internal anal sphincter):       *  Level 2 (confined to the internal anal sphincter; no involvement of  intersphincteric fat):      *  Level 3 (intersphincteric fat involved):      *  Level 4 (involves external sphincter or beyond):         3. RELATIONSHIP OF THE TUMOR TO MESORECTAL FASCIA (MRF)       i) Shortest distance 0mm of the definitive tumour border to the MRF  is: AT 12:00   ii) Are there any tumour spiculations closer to the  MRF? NO     iii) Location of Tumor margin to MRF: 12:00, AT THE LEVEL PROSTATE     4. EXTRAMURAL VENOUS INVASION       i) Extramural Venous Invasion (EMVI): ABSENT     5. MESORECTAL LYMPH NODES AND TUMOUR DEPOSITS       i) Any suspicious mesorectal lymph nodes/tumor deposits: YES      *If yes, the most suspicious node/tumor deposit is: 15 MM IN  DIAMETER, ALONG THE UPPER MARGIN OF the tumor with minimum distance 7  MMmm from the MRF at 7o'clock     6. EXTRAMESORECTAL LYMPH NODES        i) Any suspicious extramesorectal lymph nodes: NO      *If yes, location and laterality of suspicious nodes:             Int. Iliac:                Ext. Iliac:                Common Iliac:                Obturator:                Inguinal:                Other:           ii) Is the BETH node station in the field of view: NO        *If Yes, are these nodes suspicious:         7. OTHER FINDINGS (COMPLICATIONS, METASTASES, LIMITATIONS)         NOTE: THERE IS SOME UNCERTAINTY WHETHER THE APPARENT SMALL FOCUS OF TUMOR EXTENDING TO THE PROSTATE AT THE 12:00 POSITION COULD ACTUALLY BE PROSTATE NODULE EXTENDING TOWARD THE TUMOR. RECTAL TUMOR IS FAVORED; ALTHOUGH THIS DETERMINATION CANNOT BE MADE WITH COMPLETE CERTAINTY, TUMOR IS CONSIDERED T3 B.        IMPRESSIONS:  MRI rectal cancer T category is: T3 B  Maximum EMD of invasion is: 5  Minimum tumor to MRF distance is: 0  Low rectal tumor component: NO  Mesorectal nodes/tumor deposits: SUSPICIOUS  EMVI: ABSENT  Extramesorectal nodes: NEGATIVE    CTCAP 05-21-20     FINDINGS:  Adenopathy:No evidence of mediastinal or hilar lymph node enlargement.  No axillary lymph node enlargement.     Fluid:There are no pericardial or pleural effusions.     LUNGS:No parenchymal soft tissue nodules or masses are present.     Skeletal:Arthritic change in the thoracic spine.      Upper abdomen shows cholelithiasis.      IMPRESSION:  1. No parenchymal soft tissue nodules or masses.  2. No pericardial or pleural  effusions.  3. No adenopathy.   4. Coronary artery calcifications.   5. Cholelithiasis.     FINDINGS:   The lung bases are clear. There are no pleural effusions.     The liver is homogeneous.     There is cholelithiasis.     The spleen is homogeneous.      There is no peripancreatic stranding or pancreatic head mass.      There is no adrenal enlargement.     Large right renal cyst is present. There is no solid renal mass or  hydronephrosis..      Otherwise I do not see any free fluid or walled off fluid collections.     The rectal wall appears to be thickened.     IMPRESSION:  1. Rectal wall thickening.  2. Cholelithiasis.         CT CAP 09/15/2020  Findings  LUNGS: Unremarkable. No parenchymal soft tissue nodules.  No focal air  space disease.  HEART: CORONARY ARTERY CALCIFICATIONS ARE NOTED.  PERICARDIUM: No effusion.  MEDIASTINUM: No masses. No enlarged lymph nodes.  No fluid collections.  PLEURA: No pleural effusion. No pleural mass or abnormal calcification.  MAJOR AIRWAYS: Clear. No intrinsic mass.  VASCULATURE: No evidence of aneurysm.  VISUALIZED UPPER ABDOMEN:  LIVER: Homogeneous. No focal hepatic mass or ductal dilatation.  SPLEEN: Homogeneous. No splenomegaly.  ADRENALS: No mass.  KIDNEYS: RIGHT RENAL CYST MEASURING 6.7 CM.  GI TRACT: Non-dilated. No definite wall thickening.  PERITONEUM: No free air. No free fluid or loculated fluid  collections.  ABDOMINAL WALL: No focal hernia or mass.  OTHER: None.  BONES: No acute bony abnormality.     IMPRESSION:  Impression:  1. Unremarkable exam.  No source for the patient symptoms.  2. Other incidental/non-acute findings as above.    Findings  LOWER THORAX: Clear. No effusions.  ABDOMEN:  LIVER: Homogeneous. No focal hepatic mass or ductal dilatation.GALLBLADDER: GALLSTONES IN THE GALLBLADDER.  PANCREAS: Unremarkable. No mass or ductal dilatation.  SPLEEN: Homogeneous. No splenomegaly.  ADRENALS: No mass.  KIDNEYS: RIGHT RENAL CYST MEASURING 6.4 CM.  GI TRACT:  Possibly some rectal wall thickening.  PERITONEUM: No free air. No free fluid or loculated fluid  collections.  MESENTERY: Unremarkable.  LYMPH NODES: No lymphadenopathy.  VASCULATURE: No evidence of aneurysm.  ABDOMINAL WALL: No focal hernia or mass.  OTHER: None.   PELVIS:   BLADDER: No focal mass or significant wall thickening   REPRODUCTIVE: Unremarkable as visualized.   APPENDIX: Nondistended. No surrounding inflammation.  BONES: No acute bony abnormality.     IMPRESSION:  Impression:  Rectal wall thickening noted.      CTCAP 01-14-21  CT FINDINGS: On the lung windows, the lungs are both well aerated and  clear. No pulmonary parenchymal lung nodules to suggest metastatic  disease were seen. There were no inflammatory infiltrates or pleural  effusions. On the soft tissue windows, there was no evidence of  supraclavicular or axillary lymphadenopathy. No enlarged mediastinal or  hilar lymph nodes were demonstrated. There was no evidence of  pericardial effusion.     IMPRESSION:  No CT findings of metastatic disease in the chest.     CT FINDINGS: The liver and spleen are stable in size and shape; no focal  lesions have developed within the liver. There is a calcified stone in  the dependent portion of the gallbladder. The pancreas showed no  evidence of mass or inflammation. No adrenal lesions were demonstrated.  There is some thinning of the cortical tissues in the kidneys with a 6.5  cm cyst in the right kidney. There was no ascites. The aorta is normal  in caliber. No enlarged lymph nodes were seen in the retroperitoneum or  mesentery. In the pelvis, postoperative changes are noted near the  rectum where there is a line of  GI staples. No evidence of residual or recurrent tumor is demonstrated.  On today's study there was no significant rectal wall thickening.     IMPRESSION:  No CT findings to suggest metastatic disease in the abdomen  or pelvis. There is less thickening of the rectal wall than seen on  the  previous CT. The patient does have cholelithiasis and a large cyst in  the right kidney.       CTCAP 05-14-21  FINDINGS:     LUNGS: Unremarkable. No parenchymal soft tissue nodules.  No focal air  space disease.     HEART: Coronary artery calcifications.     MEDIASTINUM: No masses. No enlarged lymph nodes.  No fluid collections.     PLEURA: No pleural effusion. No pleural mass or abnormal calcification.  No pneumothorax.     VASCULATURE: No evidence of aneurysm. No evidence of pulmonary embolism.     BONES: Arthritic change.     VISUALIZED UPPER ABDOMEN:Please see the report for the CT of the abdomen  and pelvis     Other: None.     IMPRESSION:     1. No evidence of metastatic disease to the abdomen or pelvis.  2. Coronary artery calcifications.    FINDINGS:     Lower thorax: Clear. No effusions.     Abdomen:     Liver: Homogeneous. No focal hepatic mass or ductal dilatation.     Gallbladder: Cholelithiasis     Pancreas: Unremarkable. No mass or ductal dilatation.     Spleen: Homogeneous. No splenomegaly.     Adrenals: No mass.     Kidneys/ureters: Large right renal cyst     GI tract: Non-dilated. No definite wall thickening.. There is no  evidence of appendicitis     MESENTERY: No free fluid, walled off fluid collections, mesenteric  stranding, or enlarged lymph nodes         Vasculature: Evidence of atherosclerotic vascular disease     Abdominal wall: No focal hernia or mass.        Bladder: Urinary bladder wall thickening     Reproductive: Unremarkable as visualized     Bones: No acute bony abnormality.     IMPRESSION:     1. No evidence of metastatic disease to the abdomen or pelvis     2.Cholelithiasis     3. Urinary bladder wall thickening        CTCAP 11-12-21  FINDINGS:    LUNGS:  Unremarkable.  No mass.  No consolidation.    PLEURAL SPACE:  Unremarkable.  No pneumothorax.  No significant  effusion.    HEART:  Coronary artery calcifications are noted.  No significant  pericardial effusion.     BONES/JOINTS:  Anterior thoracic spine osteophyte formation.  No acute  fracture.  No dislocation.    SOFT TISSUES:  Unremarkable.    VASCULATURE:  Unremarkable.  No thoracic aortic aneurysm.    LYMPH NODES:  Unremarkable.  No enlarged lymph nodes.     IMPRESSION:    No acute findings in the chest.    FINDINGS:    LUNG BASES:  Unremarkable.  No mass.  No consolidation.      ABDOMEN:    LIVER:  Unremarkable.  No mass.    GALLBLADDER AND BILE DUCTS:  Gallstones in the gallbladder.  No ductal  dilation.    PANCREAS:  Unremarkable.  No mass.  No ductal dilation.    SPLEEN:  Unremarkable.  No splenomegaly.    ADRENALS:  Unremarkable.  No mass.    KIDNEYS AND URETERS:  6.6 cm right renal cyst.  No hydronephrosis.    STOMACH AND BOWEL:  Changes of distal colonic anastomosis noted.  No  obstruction.  No mucosal thickening.      PELVIS:    APPENDIX:  No findings to suggest acute appendicitis.    BLADDER:  Unremarkable.  No mass.    REPRODUCTIVE:  Unremarkable as visualized.      ABDOMEN and PELVIS:    INTRAPERITONEAL SPACE:  Unremarkable.  No free air.  No significant  fluid collection.    BONES/JOINTS:  No acute fracture.  No dislocation.    SOFT TISSUES:  Unremarkable.    VASCULATURE:  Unremarkable.  No abdominal aortic aneurysm.    LYMPH NODES:  Unremarkable.  No enlarged lymph nodes.     IMPRESSION:    No acute findings in the abdomen or pelvis.      CTCAP 05-26-22  FINDINGS:    Lungs:  Lungs remain clear with no suspicious lung nodules to suggest  metastatic disease.    Pleural space:  No pleural effusions.  No pneumothorax.    Heart:  Marked cardiomegaly is stable.  Severe coronary artery  calcifications are again noted.  No pericardial effusion.    Mediastinum:  No mediastinal or hilar lymphadenopathy is identified.    Bones/joints:  Unremarkable.  No acute fracture.  No dislocation.    Soft tissues:  Gynecomastia changes are noted.    Vasculature:  Atherosclerosis aorta without evidence of aneurysm.    Lymph nodes:   No axillary adenopathy is noted.    Liver:  Marked fatty infiltration of liver.    Gallbladder and bile ducts:  Cholelithiasis.    Kidneys and ureters:  Simple appearing cyst right kidney.    Tubes, lines and devices:  Left-sided Port-A-Cath is noted.     IMPRESSION:  1.  Stable exam demonstrating no evidence of metastatic disease to the  chest.  2.  Marked cardiomegaly and severe coronary artery calcifications again  noted.  3.  Fatty infiltration of liver.  4.  Cholelithiasis.  5.  Other nonacute findings above.    FINDINGS:    Lung bases:  Unremarkable.  No mass.  No consolidation.    Heart:  Cardiomegaly.  Coronary artery calcifications.      ABDOMEN:    Liver:  Fatty infiltration of liver. No focal liver lesion identified.   No focal liver lesion identified.    Gallbladder and bile ducts:  Cholelithiasis.  No ductal dilation.    Pancreas:  Pancreas is unremarkable.  No ductal dilation.    Spleen:  Spleen is unenlarged.    Adrenals:  Unremarkable.  No mass.    Kidneys and ureters:  Simple appearing cyst right kidney. No imaging  follow-up necessary.  Kidneys are otherwise unremarkable.  No  hydronephrosis.    Stomach and bowel:  Post surgical changes of the lower rectum with an  anastomosis site noted.  Mild constipation. No bowel obstruction.   Surgical anastomosis site in the right mid abdomen small bowel region.   No mucosal thickening.      PELVIS:    Appendix:  No findings to suggest acute appendicitis.    Bladder:  Decompressed urinary bladder.    Reproductive:  Unremarkable as visualized.      ABDOMEN and PELVIS:    Intraperitoneal space:  No ascites or abscess.  No omental masses.  No  free air.    Bones/joints:  Degenerative changes lumbar spine. No acute bony  findings.  No dislocation.    Soft tissues:  Fat-containing inguinal hernias.    Vasculature:  Advanced atherosclerosis. No aneurysm.    Lymph nodes:  Unremarkable.  No enlarged lymph nodes.    Other findings:  No mesenteric masses.      IMPRESSION:  1.  Stable exam of a sitting no evidence of metastatic disease to  abdomen or pelvis.  2.  Stable posterior vertebral changes involving the rectum.  3.  Diffuse fatty liver with no focal lesions.  4.  Cholelithiasis.  5.  Other nonacute findings above.        CTCAP 05-26-22  FINDINGS:    Lungs:  Lungs remain clear with no suspicious lung nodules to suggest  metastatic disease.    Pleural space:  No pleural effusions.  No pneumothorax.    Heart:  Marked cardiomegaly is stable.  Severe coronary artery  calcifications are again noted.  No pericardial effusion.    Mediastinum:  No mediastinal or hilar lymphadenopathy is identified.    Bones/joints:  Unremarkable.  No acute fracture.  No dislocation.    Soft tissues:  Gynecomastia changes are noted.    Vasculature:  Atherosclerosis aorta without evidence of aneurysm.    Lymph nodes:  No axillary adenopathy is noted.    Liver:  Marked fatty infiltration of liver.    Gallbladder and bile ducts:  Cholelithiasis.    Kidneys and ureters:  Simple appearing cyst right kidney.    Tubes, lines and devices:  Left-sided Port-A-Cath is noted.     IMPRESSION:  1.  Stable exam demonstrating no evidence of metastatic disease to the  chest.  2.  Marked cardiomegaly and severe coronary artery calcifications again  noted.  3.  Fatty infiltration of liver.  4.  Cholelithiasis.  5.  Other nonacute findings above.    FINDINGS:    Lung bases:  Unremarkable.  No mass.  No consolidation.    Heart:  Cardiomegaly.  Coronary artery calcifications.      ABDOMEN:    Liver:  Fatty infiltration of liver. No focal liver lesion identified.   No focal liver lesion identified.    Gallbladder and bile ducts:  Cholelithiasis.  No ductal dilation.    Pancreas:  Pancreas is unremarkable.  No ductal dilation.    Spleen:  Spleen is unenlarged.    Adrenals:  Unremarkable.  No mass.    Kidneys and ureters:  Simple appearing cyst right kidney. No imaging  follow-up necessary.  Kidneys are otherwise  unremarkable.  No  hydronephrosis.    Stomach and bowel:  Post surgical changes of the lower rectum with an  anastomosis site noted.  Mild constipation. No bowel obstruction.   Surgical anastomosis site in the right mid abdomen small bowel region.   No mucosal thickening.      PELVIS:    Appendix:  No findings to suggest acute appendicitis.    Bladder:  Decompressed urinary bladder.    Reproductive:  Unremarkable as visualized.      ABDOMEN and PELVIS:    Intraperitoneal space:  No ascites or abscess.  No omental masses.  No  free air.    Bones/joints:  Degenerative changes lumbar spine. No acute bony  findings.  No dislocation.    Soft tissues:  Fat-containing inguinal hernias.    Vasculature:  Advanced atherosclerosis. No aneurysm.    Lymph nodes:  Unremarkable.  No enlarged lymph nodes.    Other findings:  No mesenteric masses.     IMPRESSION:  1.  Stable exam of a sitting no evidence of metastatic disease to  abdomen or pelvis.  2.  Stable posterior vertebral changes involving the rectum.  3.  Diffuse fatty liver with no focal lesions.  4.  Cholelithiasis.  5.  Other nonacute findings above.    CT Chest With Contrast Diagnostic (12/07/2022 10:59)  FINDINGS:    Lungs:  There remain no suspicious lung nodules to suggest metastatic  disease.    Pleural space:  No pleural effusions identified.  No pneumothorax.    Heart:  Cardiomegaly is stable.  Coronary calcifications and valvular  calcifications are stable.  No pericardial effusion is noted.    Mediastinum:  No mediastinal or hilar lymphadenopathy identified.    Bones/joints:  The bony structures are stable.  Degenerative changes  thoracic spine appear stable.  No acute fracture.  No dislocation.    Soft tissues:  Gynecomastia noted.    Vasculature:  Unremarkable.  No thoracic aortic aneurysm.    Lymph nodes:  See above.    Liver:  Fatty infiltration of liver.    Gallbladder and bile ducts:  Cholelithiasis.     IMPRESSION:  1.  Stable exam demonstrating no  evidence of metastatic disease to the  chest.  2.  Cardiomegaly and coronary calcifications are stable.  3.  Cholelithiasis and fatty liver.  4.  Other nonacute/incidental findings detailed above.    CT Abdomen Pelvis With Contrast (12/07/2022 11:00)  FINDINGS:    Lung bases:  Lung bases are clear.    Heart:  Cardiomegaly is again noted.      ABDOMEN:    Liver:  Diffuse fatty infiltration of liver.    Gallbladder and bile ducts:  Cholelithiasis.  No ductal dilation.    Pancreas:  Unremarkable.  No mass.  No ductal dilation.    Spleen:  Spleen is unenlarged.    Adrenals:  Unremarkable.  No mass.    Kidneys and ureters:  Cyst right kidney appears stable.  No  hydronephrosis identified.    Stomach and bowel:  Post surgical changes involving the rectum with  unremarkable appearance of the anastomosis.  Sigmoid colon with  diverticulosis but no evidence of diverticulitis.  No obstruction.      PELVIS:    Appendix:  Normal appendix.    Bladder:  Incomplete distention of urinary bladder.    Reproductive:  Mild prostate enlargement.    Subperitoneal space:  No abnormal soft tissue in the perirectal fat  space.      ABDOMEN and PELVIS:    Intraperitoneal space:  No pneumoperitoneum identified.  No  significant fluid collection.    Bones/joints:  No acute fracture.  No dislocation.    Soft tissues:  Stable fat-containing inguinal hernias.    Vasculature:  Atherosclerosis stable without evidence of aneurysm.    Lymph nodes:  No iliac lymphadenopathy.    Other findings:  Anastomosis in the right upper quadrant is stable.     IMPRESSION:  1.  Stable post surgical changes involving the rectum.  2.  No evidence of metastatic disease to the abdomen or pelvis. No  adenopathy identified.  3.  Cholelithiasis.  4.  Fatty liver.  5.  Diverticulosis without diverticulitis.  6.  Other incidental/nonacute findings as above.    CT Abdomen Pelvis With Contrast (08/02/2023 13:54)   FINDINGS:    LUNG BASES:  Unremarkable.  No mass.  No  consolidation.      ABDOMEN:    LIVER:  Unremarkable.  No mass.    GALLBLADDER AND BILE DUCTS:  Gallstones.  Gallbladder otherwise  unremarkable.  No ductal dilation.    PANCREAS:  Unremarkable.  No mass.  No ductal dilation.    SPLEEN:  Unremarkable.  No splenomegaly.    ADRENALS:  Unremarkable.  No mass.    KIDNEYS AND URETERS:  Right renal cyst measuring 6 cm.  No  hydronephrosis.    STOMACH AND BOWEL:  Unremarkable.  No obstruction.  No mucosal  thickening.      PELVIS:    APPENDIX:  No findings to suggest acute appendicitis.    BLADDER:  Nonspecific urinary bladder wall thickening that could be  related to chronic outlet obstruction.    REPRODUCTIVE:  Unremarkable as visualized.      ABDOMEN and PELVIS:    INTRAPERITONEAL SPACE:  Unremarkable.  No free air.  No significant  fluid collection.    BONES/JOINTS:  No acute fracture.  No dislocation.    SOFT TISSUES:  Unremarkable.    VASCULATURE:  Atherosclerotic disease.  No abdominal aortic aneurysm.    LYMPH NODES:  Unremarkable.  No enlarged lymph nodes.     IMPRESSION:  1.  Right renal cyst measuring 6 cm.  2.  Nonspecific urinary bladder wall thickening that could be related to  chronic outlet obstruction.  3.  Gallstones.  Gallbladder otherwise unremarkable.    CT Chest With Contrast Diagnostic (08/02/2023 13:54)   FINDINGS:    LUNGS AND PLEURAL SPACES:  Unremarkable.  No mass.  No consolidation.   No pneumothorax.  No significant effusion.    HEART:  See below.      BONES/JOINTS:  Unremarkable.  No acute fracture.  No dislocation.    SOFT TISSUES:  Unremarkable.    VASCULATURE:  Atherosclerotic disease.  Aortic valvular change noted.   No thoracic aortic aneurysm.    LYMPH NODES:  Unremarkable.  No enlarged lymph nodes.    OTHER FINDINGS:  Myocardial megaly.     IMPRESSION:    No acute findings in the chest.    RECENT LABS:  Lab Results   Component Value Date    WBC 9.77 08/18/2023    HGB 15.0 08/18/2023    HCT 45.4 08/18/2023    MCV 98.1 (H) 08/18/2023     RDW 12.5 08/18/2023     08/18/2023    NEUTRORELPCT 72.7 08/18/2023    LYMPHORELPCT 20.1 08/18/2023    MONORELPCT 5.3 08/18/2023    EOSRELPCT 1.2 08/18/2023    BASORELPCT 0.3 08/18/2023    NEUTROABS 7.10 (H) 08/18/2023    LYMPHSABS 1.96 08/18/2023       Lab Results   Component Value Date     08/18/2023    K 4.4 08/18/2023    CO2 27.3 08/18/2023     08/18/2023    BUN 16 08/18/2023    CREATININE 1.20 08/18/2023    EGFRIFNONA 60 (L) 02/21/2022    EGFRIFAFRI >60 06/26/2021    GLUCOSE 121 (H) 08/18/2023    CALCIUM 9.8 08/18/2023    ALKPHOS 70 08/18/2023    AST 22 08/18/2023    ALT 14 08/18/2023    BILITOT 0.6 08/18/2023    ALBUMIN 4.1 08/18/2023    PROTEINTOT 7.4 08/18/2023    MG 1.8 (L) 06/26/2021       Lab Results   Component Value Date    CEA 1.52 12/21/2022    CEA 2.11 06/28/2022    CEA 1.77 06/01/2022    CEA 2.01 02/21/2022    CEA 2.02 11/16/2021    CEA 1.90 10/11/2021    CEA 2.25 05/18/2021    CEA 2.37 02/26/2021    CEA 1.82 08/31/2020    CEA 1.83 07/21/2020    CEA 2.00 06/25/2020    CEA 2.52 04/21/2020    CEA 2.28 01/28/2020    CEA 2.28 06/26/2019    CEA 3.40 03/20/2019     Lab Results   Component Value Date    PSA 2.950 12/21/2022    PSA 3.350 06/28/2022    PSA 2.610 06/01/2022    PSA 2.560 02/21/2022    PSA 1.050 07/21/2020    PSA 1.110 06/25/2020    PSA 4.930 (H) 10/19/2018     Lab Results   Component Value Date    TSH 2.860 01/19/2021     Lab Results   Component Value Date    HGBA1C 5.80 (H) 06/25/2020    HGBA1C 5.50 01/28/2020    HGBA1C 5.40 03/20/2019       Lab Results   Component Value Date    FERRITIN 274.10 08/18/2023    IRON 78 08/18/2023    TIBC 349 08/18/2023    LABIRON 22 08/18/2023    IGJKTIVC16 1,538 (H) 02/26/2021    FOLATE 9.92 02/26/2021     ASSESSMENT & PLAN:  David Mandujano is a very pleasant 73 y.o. male with newly diagnosed rectal cancer. Clinically stage IIIC ([id number]).    1.  Rectal cancer:  -  He received standard neoadjuvant chemoradiation as above given locally  advanced tumor with suspicious pelvic lymph node.    - Pre-treatment MRI showed a locally advanced tumor and suspicious pelvic LN.  PET-CT was negative except in the local tumor.   -  There was a non-specific sub-pleural nodular opacity in the L lung base which was PET negative.  Perhaps this was inflammatory in nature. This area will require f/u on future imaging.  - Recommended neoadjuvant chemoradiation with Xeloda 825 mg/m2 BID D1-5 or M-F during the course of radiaton.  His dose was 500 mg x 4 or 2000 mg BID. Overall, he tolerated this well and completed treatment 1-21-19.    -  He saw Dr. Marilynn Yadav and underwent successful surgical resection as above.    -  He took adjuvant CapeOx treatment to prevent recurrence and has completed 8 cycles. Overall, he tolerated treatment well aside from fatigue and diarrhea. He started to struggle with thrombocytopenia and possibly with neuropathy so gave his last cycle without Oxaliplatin.  -  He did well with take down of his ileostomy.  -  Most recent CT CAP from 8-23 without evidence of metastasis.  CEA has been normal with repeat value pending today..     -  He underwent c-scope with Dr. Barnhart on 2-05-21 without cause for concern.  Repeat exam recommended after 3 years in February 2024.  - Clinically, he continues to do well. Exam/labs from today without cause for concern.   -  He will RTC in 6 months to see me with repeat CT CAP with contrast as well as labs including CEA prior.     2.  Iron deficiency Anemia:  -  He previously took Niferex but did not appear to absorb it and has required IV iron replacement. Most recently, he was replaced in mid December 2021.     -  He did have EGD and c-scope.  He continues to f/u with Dr. Barnhart but has now stopped Protonix because he says he felt so much better off of it with normalization of his bowel movements.  -At present, he denies obvious bleeding from any source.   -Repeat CBC shows normal Hg and iron is now replete.      3. Neuropathy:  -  Etiology is uncertain.  He did complain of some vague symptoms toward the end of his treatment (though he thinks they happened later), so this could be related to Oxaliplatin.    - Thyroid function is wnl.  HbA1C has been normal.    - B12 and folate were previously low, but on therapy became replete. He stopped taking SL B12 and folate.  Recommended he restart them.  At present, he denies having neuropathy.  - He has been advised to avoid alcohol as this can cause or worsen peripheral neuropathy.      4.   Depressed mood / Anxiety and Alcohol Abuse:  - Previously given trial of Lexapro but he did not find this helpful and discontinued this. He said he did quit drinking completely around the time of his surgery but started drinking again. He says Dr. Yadav did mention to him that his liver didn't look good during his operation.  -  We previously discussed potential involvement in support groups and /or referral for alcohol abuse counseling, but he declined.  He does say that he is going to plan to hit the gym in the evenings instead of drinking alcohol and thinks this will help his overall health.  I encouraged him and his endeavor.  Will monitor.    5.  Lateral carotid stenosis:  - CT imaging showed bilateral calcific carotid stenosis of approximately 75%.  He saw Dr. Sheldon who had Doppler imaging done which apparently showed good flow and he recommended against intervention at this time.  Mr. Mandujano has several questions regarding his carotid stenosis, and iron encouraged him to call Dr. Sheldon's office for further discussion and follow-up.    6.  Prophylaxis:  He took 2022 influenza vaccine. He took Prevnar 13 on 11-20-18.   He completed COVID vaccine x 2.  Recommended booster.     7. ACO / KAVITHA/Other  Quality measures  -  David Mandujano received 2022 flu vaccine.  -  David Mandujano reports a pain score of 0.  Given his pain assessment as noted, treatment options were discussed and the  following options were decided upon as a follow-up plan to address the patient's pain:  No intervention needed at this time .  -  Current outpatient and discharge medications have been reconciled for the patient.  Reviewed by: Jyoti Larios MD      8.  Follow up:   - CTCAP with contrast, CBCD, CMP, ferritin, iron panel, CEA, PSA in 6 months   - f/u with me after above testing in 6 months.  - Return to see Dr. Barnhart for endoscopic exam as planned in February 2024  - Follow-up with Dr. Sheldon regarding carotid stenosis         I spent 30 minutes with David Mandujano today.  In the office today, more than 50% of this time was spent face-to-face with him  in counseling / coordination of care, reviewing his interim medical history and counseling on the current treatment plan.  All questions were answered to his satisfaction.         Electronically Signed by: Jyoti Larios MD         CC:   MD Kathleen Coppola Emmanuel C, MD Shawn Michael Acino, MD William Richards, MD Sandra Beck, MD

## 2023-08-17 ENCOUNTER — TELEPHONE (OUTPATIENT)
Dept: CARDIOLOGY | Facility: CLINIC | Age: 73
End: 2023-08-17
Payer: MEDICARE

## 2023-08-17 NOTE — TELEPHONE ENCOUNTER
"    Caller: David Mandujano \"Franky\"    Relationship to patient: Self    Best call back number: [phone]    Patient is needing: TO DISCUSS VISIT WITH DR CARMICHAEL, THANK YOU        "

## 2023-08-17 NOTE — TELEPHONE ENCOUNTER
felt like no surgeries were necessary at this time, but to repeat the Echo in April and he wanted to see if you agreed.

## 2023-08-18 ENCOUNTER — LAB (OUTPATIENT)
Dept: ONCOLOGY | Facility: CLINIC | Age: 73
End: 2023-08-18
Payer: MEDICARE

## 2023-08-18 ENCOUNTER — OFFICE VISIT (OUTPATIENT)
Dept: ONCOLOGY | Facility: CLINIC | Age: 73
End: 2023-08-18
Payer: MEDICARE

## 2023-08-18 VITALS
OXYGEN SATURATION: 96 % | HEART RATE: 60 BPM | HEIGHT: 72 IN | TEMPERATURE: 97.7 F | DIASTOLIC BLOOD PRESSURE: 65 MMHG | SYSTOLIC BLOOD PRESSURE: 137 MMHG | WEIGHT: 275.6 LBS | RESPIRATION RATE: 18 BRPM | BODY MASS INDEX: 37.33 KG/M2

## 2023-08-18 DIAGNOSIS — C20 MALIGNANT NEOPLASM OF RECTUM: Primary | ICD-10-CM

## 2023-08-18 DIAGNOSIS — D50.0 IRON DEFICIENCY ANEMIA DUE TO CHRONIC BLOOD LOSS: ICD-10-CM

## 2023-08-18 DIAGNOSIS — R97.20 ELEVATED PSA: ICD-10-CM

## 2023-08-18 DIAGNOSIS — R97.20 ELEVATED PROSTATE SPECIFIC ANTIGEN (PSA): ICD-10-CM

## 2023-08-18 DIAGNOSIS — C20 MALIGNANT NEOPLASM OF RECTUM: ICD-10-CM

## 2023-08-18 DIAGNOSIS — F10.10 ALCOHOL ABUSE: ICD-10-CM

## 2023-08-18 DIAGNOSIS — I65.23 CAROTID STENOSIS, ASYMPTOMATIC, BILATERAL: ICD-10-CM

## 2023-08-18 LAB
ALBUMIN SERPL-MCNC: 4.1 G/DL (ref 3.5–5.2)
ALBUMIN/GLOB SERPL: 1.2 G/DL
ALP SERPL-CCNC: 70 U/L (ref 39–117)
ALT SERPL W P-5'-P-CCNC: 14 U/L (ref 1–41)
ANION GAP SERPL CALCULATED.3IONS-SCNC: 12.7 MMOL/L (ref 5–15)
AST SERPL-CCNC: 22 U/L (ref 1–40)
BASOPHILS # BLD AUTO: 0.03 10*3/MM3 (ref 0–0.2)
BASOPHILS NFR BLD AUTO: 0.3 % (ref 0–1.5)
BILIRUB SERPL-MCNC: 0.6 MG/DL (ref 0–1.2)
BUN SERPL-MCNC: 16 MG/DL (ref 8–23)
BUN/CREAT SERPL: 13.3 (ref 7–25)
CALCIUM SPEC-SCNC: 9.8 MG/DL (ref 8.6–10.5)
CEA SERPL-MCNC: 1.9 NG/ML
CHLORIDE SERPL-SCNC: 104 MMOL/L (ref 98–107)
CO2 SERPL-SCNC: 27.3 MMOL/L (ref 22–29)
CREAT SERPL-MCNC: 1.2 MG/DL (ref 0.76–1.27)
DEPRECATED RDW RBC AUTO: 44.7 FL (ref 37–54)
EGFRCR SERPLBLD CKD-EPI 2021: 63.9 ML/MIN/1.73
EOSINOPHIL # BLD AUTO: 0.12 10*3/MM3 (ref 0–0.4)
EOSINOPHIL NFR BLD AUTO: 1.2 % (ref 0.3–6.2)
ERYTHROCYTE [DISTWIDTH] IN BLOOD BY AUTOMATED COUNT: 12.5 % (ref 12.3–15.4)
FERRITIN SERPL-MCNC: 274.1 NG/ML (ref 30–400)
GLOBULIN UR ELPH-MCNC: 3.3 GM/DL
GLUCOSE SERPL-MCNC: 121 MG/DL (ref 65–99)
HCT VFR BLD AUTO: 45.4 % (ref 37.5–51)
HGB BLD-MCNC: 15 G/DL (ref 13–17.7)
IMM GRANULOCYTES # BLD AUTO: 0.04 10*3/MM3 (ref 0–0.05)
IMM GRANULOCYTES NFR BLD AUTO: 0.4 % (ref 0–0.5)
IRON 24H UR-MRATE: 78 MCG/DL (ref 59–158)
IRON SATN MFR SERPL: 22 % (ref 20–50)
LYMPHOCYTES # BLD AUTO: 1.96 10*3/MM3 (ref 0.7–3.1)
LYMPHOCYTES NFR BLD AUTO: 20.1 % (ref 19.6–45.3)
MCH RBC QN AUTO: 32.4 PG (ref 26.6–33)
MCHC RBC AUTO-ENTMCNC: 33 G/DL (ref 31.5–35.7)
MCV RBC AUTO: 98.1 FL (ref 79–97)
MONOCYTES # BLD AUTO: 0.52 10*3/MM3 (ref 0.1–0.9)
MONOCYTES NFR BLD AUTO: 5.3 % (ref 5–12)
NEUTROPHILS NFR BLD AUTO: 7.1 10*3/MM3 (ref 1.7–7)
NEUTROPHILS NFR BLD AUTO: 72.7 % (ref 42.7–76)
NRBC BLD AUTO-RTO: 0 /100 WBC (ref 0–0.2)
PLATELET # BLD AUTO: 177 10*3/MM3 (ref 140–450)
PMV BLD AUTO: 9.9 FL (ref 6–12)
POTASSIUM SERPL-SCNC: 4.4 MMOL/L (ref 3.5–5.2)
PROT SERPL-MCNC: 7.4 G/DL (ref 6–8.5)
PSA SERPL-MCNC: 3.42 NG/ML (ref 0–4)
RBC # BLD AUTO: 4.63 10*6/MM3 (ref 4.14–5.8)
SODIUM SERPL-SCNC: 144 MMOL/L (ref 136–145)
TIBC SERPL-MCNC: 349 MCG/DL (ref 298–536)
TRANSFERRIN SERPL-MCNC: 234 MG/DL (ref 200–360)
WBC NRBC COR # BLD: 9.77 10*3/MM3 (ref 3.4–10.8)

## 2023-08-18 PROCEDURE — 3075F SYST BP GE 130 - 139MM HG: CPT | Performed by: INTERNAL MEDICINE

## 2023-08-18 PROCEDURE — 83540 ASSAY OF IRON: CPT | Performed by: INTERNAL MEDICINE

## 2023-08-18 PROCEDURE — 82378 CARCINOEMBRYONIC ANTIGEN: CPT | Performed by: INTERNAL MEDICINE

## 2023-08-18 PROCEDURE — 84153 ASSAY OF PSA TOTAL: CPT | Performed by: INTERNAL MEDICINE

## 2023-08-18 PROCEDURE — 3078F DIAST BP <80 MM HG: CPT | Performed by: INTERNAL MEDICINE

## 2023-08-18 PROCEDURE — 85025 COMPLETE CBC W/AUTO DIFF WBC: CPT | Performed by: INTERNAL MEDICINE

## 2023-08-18 PROCEDURE — 1126F AMNT PAIN NOTED NONE PRSNT: CPT | Performed by: INTERNAL MEDICINE

## 2023-08-18 PROCEDURE — 84466 ASSAY OF TRANSFERRIN: CPT | Performed by: INTERNAL MEDICINE

## 2023-08-18 PROCEDURE — 80053 COMPREHEN METABOLIC PANEL: CPT | Performed by: INTERNAL MEDICINE

## 2023-08-18 PROCEDURE — 82728 ASSAY OF FERRITIN: CPT | Performed by: INTERNAL MEDICINE

## 2023-08-18 PROCEDURE — 99214 OFFICE O/P EST MOD 30 MIN: CPT | Performed by: INTERNAL MEDICINE

## 2023-08-18 NOTE — PROGRESS NOTES
Venipuncture Blood Specimen Collection  Venipuncture performed in left arm by Oneida Mayfield MA with good hemostasis. Patient tolerated the procedure well without complications.   08/18/23   Oneida Mayfield MA

## 2023-08-21 NOTE — TELEPHONE ENCOUNTER
Yes, we can continue to monitor the carotid stenosis with annual ultrasounds since it does not yet require surgery.  Thanks.

## 2023-08-24 ENCOUNTER — TELEPHONE (OUTPATIENT)
Dept: CARDIOLOGY | Facility: CLINIC | Age: 73
End: 2023-08-24
Payer: MEDICARE

## 2023-08-24 NOTE — TELEPHONE ENCOUNTER
RELAYED MESSAGE TO PATIENT. ATTEMPTED TO CONFERENCE PATIENT IN WITH HOSPITAL SCHEDULING BUT WAIT TIME WAS 6 MINUTES. PATIENT STATED HE WOULD CALL THEM TO RESCHEDULE HIS TESTING THEN CALL BACK TO RESCHEDULE HIS APPOINTMENT WITH [facility].

## 2023-08-24 NOTE — TELEPHONE ENCOUNTER
Hub can read.     Called pt to see if he planned on getting his stress test done and labs. If he does he will need to RS his apt next week and call the hospital to RS his stress test. No answer FERCHO.

## 2023-09-27 ENCOUNTER — HOSPITAL ENCOUNTER (OUTPATIENT)
Dept: NUCLEAR MEDICINE | Facility: HOSPITAL | Age: 73
Discharge: HOME | End: 2023-09-27
Payer: MEDICARE

## 2023-10-06 ENCOUNTER — TELEPHONE (OUTPATIENT)
Dept: CARDIOLOGY | Facility: CLINIC | Age: 73
End: 2023-10-06
Payer: MEDICARE

## 2023-10-06 NOTE — TELEPHONE ENCOUNTER
Hub can read.     Called pt to advise him that we will need to RS his apt due not having his stress test done and he will also need to get his labs done. He will need to fast prior to his labs.

## 2023-10-10 DIAGNOSIS — I25.10 ASCVD (ARTERIOSCLEROTIC CARDIOVASCULAR DISEASE): ICD-10-CM

## 2023-10-10 DIAGNOSIS — I10 ESSENTIAL HYPERTENSION: ICD-10-CM

## 2023-10-10 DIAGNOSIS — I42.1 CARDIOMYOPATHY, HYPERTROPHIC OBSTRUCTIVE: ICD-10-CM

## 2023-10-10 DIAGNOSIS — I35.0 AORTIC STENOSIS, MODERATE: ICD-10-CM

## 2023-10-10 RX ORDER — ATORVASTATIN CALCIUM 20 MG/1
20 TABLET, FILM COATED ORAL DAILY
Qty: 30 TABLET | Refills: 5 | Status: SHIPPED | OUTPATIENT
Start: 2023-10-10

## 2023-10-10 RX ORDER — METOPROLOL SUCCINATE 100 MG/1
TABLET, EXTENDED RELEASE ORAL
Qty: 30 TABLET | Refills: 5 | Status: SHIPPED | OUTPATIENT
Start: 2023-10-10

## 2023-10-10 NOTE — TELEPHONE ENCOUNTER
"Caller: David Mandujano \"Franky\"    Relationship: Self    Best call back number: [phone]    Requested Prescriptions:   Requested Prescriptions     Pending Prescriptions Disp Refills    atorvastatin (LIPITOR) 20 MG tablet 30 tablet 5     Sig: Take 1 tablet by mouth Daily. for cholesterol.        Pharmacy where request should be sent: [facility] [address].  [phone] [facility] [phone]      Last office visit with prescribing clinician: 7/19/2023   Last telemedicine visit with prescribing clinician: Visit date not found   Next office visit with prescribing clinician: Visit date not found     Additional details provided by patient: PT NEEDS TO GET A NEW APPT SCHEDULED FOR HIS TESTING. TESTING HAS BEEN RESCHEDULED AND WILL NEED A NEW ONE    Does the patient have less than a 3 day supply:  [x] Yes  [] No    Would you like a call back once the refill request has been completed: [] Yes [x] No    If the office needs to give you a call back, can they leave a voicemail: [] Yes [x] No    Alvaro Millan Rep   10/10/23 14:46 EDT         "

## 2023-11-27 ENCOUNTER — APPOINTMENT (OUTPATIENT)
Dept: NUCLEAR MEDICINE | Facility: HOSPITAL | Age: 73
End: 2023-11-27
Payer: MEDICARE

## 2023-11-27 ENCOUNTER — HOSPITAL ENCOUNTER (OUTPATIENT)
Dept: NUCLEAR MEDICINE | Facility: HOSPITAL | Age: 73
End: 2023-11-27
Payer: MEDICARE

## 2023-11-27 ENCOUNTER — HOSPITAL ENCOUNTER (OUTPATIENT)
Dept: NUCLEAR MEDICINE | Facility: HOSPITAL | Age: 73
Discharge: HOME | End: 2023-11-27
Payer: MEDICARE

## 2023-11-27 ENCOUNTER — HOSPITAL ENCOUNTER (OUTPATIENT)
Dept: CARDIOLOGY | Facility: HOSPITAL | Age: 73
Discharge: HOME | End: 2023-11-27
Payer: MEDICARE

## 2023-11-27 DIAGNOSIS — I25.10 ASCVD (ARTERIOSCLEROTIC CARDIOVASCULAR DISEASE): ICD-10-CM

## 2023-11-27 DIAGNOSIS — I20.89 ANGINAL EQUIVALENT: ICD-10-CM

## 2023-11-27 LAB
BH CV REST NUCLEAR ISOTOPE DOSE: 10.2 MCI
BH CV STRESS COMMENTS STAGE 1: NORMAL
BH CV STRESS DOSE REGADENOSON STAGE 1: 0.4
BH CV STRESS DURATION MIN STAGE 1: 0
BH CV STRESS DURATION SEC STAGE 1: 10
BH CV STRESS NUCLEAR ISOTOPE DOSE: 30.1 MCI
BH CV STRESS PROTOCOL 1: NORMAL
BH CV STRESS RECOVERY BP: NORMAL MMHG
BH CV STRESS RECOVERY HR: 71 BPM
BH CV STRESS STAGE 1: 1
LV EF NUC BP: 58 %
MAXIMAL PREDICTED HEART RATE: 147 BPM
PERCENT MAX PREDICTED HR: 53.06 %
STRESS BASELINE BP: NORMAL MMHG
STRESS BASELINE HR: 72 BPM
STRESS PERCENT HR: 62 %
STRESS POST PEAK BP: NORMAL MMHG
STRESS POST PEAK HR: 78 BPM
STRESS TARGET HR: 125 BPM

## 2023-11-27 PROCEDURE — 78452 HT MUSCLE IMAGE SPECT MULT: CPT | Performed by: INTERNAL MEDICINE

## 2023-11-27 PROCEDURE — 0 TECHNETIUM SESTAMIBI: Performed by: NURSE PRACTITIONER

## 2023-11-27 PROCEDURE — 93017 CV STRESS TEST TRACING ONLY: CPT

## 2023-11-27 PROCEDURE — A9500 TC99M SESTAMIBI: HCPCS | Performed by: NURSE PRACTITIONER

## 2023-11-27 PROCEDURE — 25010000002 REGADENOSON 0.4 MG/5ML SOLUTION: Performed by: NURSE PRACTITIONER

## 2023-11-27 PROCEDURE — 93018 CV STRESS TEST I&R ONLY: CPT | Performed by: INTERNAL MEDICINE

## 2023-11-27 PROCEDURE — 78452 HT MUSCLE IMAGE SPECT MULT: CPT

## 2023-11-27 RX ORDER — REGADENOSON 0.08 MG/ML
0.4 INJECTION, SOLUTION INTRAVENOUS
Status: COMPLETED | OUTPATIENT
Start: 2023-11-27 | End: 2023-11-27

## 2023-11-27 RX ADMIN — TECHNETIUM TC 99M SESTAMIBI 1 DOSE: 1 INJECTION INTRAVENOUS at 12:07

## 2023-11-27 RX ADMIN — REGADENOSON 0.4 MG: 0.08 INJECTION, SOLUTION INTRAVENOUS at 13:13

## 2023-11-27 RX ADMIN — TECHNETIUM TC 99M SESTAMIBI 1 DOSE: 1 INJECTION INTRAVENOUS at 13:13

## 2023-12-12 ENCOUNTER — TELEPHONE (OUTPATIENT)
Dept: CARDIOLOGY | Facility: CLINIC | Age: 73
End: 2023-12-12

## 2023-12-12 NOTE — TELEPHONE ENCOUNTER
"Caller: David Mandujano \"Franky\"    Relationship: Self    Best call back number: [phone]     Caller requesting test results: SELF     What test was performed: STRESS TEST     When was the test performed: 11/27/23     Where was the test performed: [facility]     Additional notes: PT STATES A FEW WEEKS AGO HE HAD A STRESS TEST WITH [facility], HE IS WONDERING WHEN THESE RESULTS WILL COME BACK. PLEASE ADVISE.       "

## 2024-02-01 ENCOUNTER — TELEPHONE (OUTPATIENT)
Dept: ONCOLOGY | Facility: CLINIC | Age: 74
End: 2024-02-01
Payer: MEDICARE

## 2024-02-01 NOTE — TELEPHONE ENCOUNTER
RN spoke with patient and informed him that he has CTs scheduled for March 1 and an office follow up visit on March 18. Patient verbalized understanding. No further questions or concerns at this time.

## 2024-02-01 NOTE — TELEPHONE ENCOUNTER
"  Caller: David Mandujano \"Franky\"    Relationship: Self    Best call back number: [phone]    What is the best time to reach you: ANYTIME    Who are you requesting to speak with (clinical staff, provider,  specific staff member): CLINICAL     Do you know the name of the person who called:     What was the call regarding: PATIENT IS NOT SCHEDULED TILL APRIL. DO YOU STILL WANT TO SEE HIM ON 3/18/2024?    CALL TO ADVISE    Is it okay if the provider responds through MyChart:     "

## 2024-02-06 ENCOUNTER — OFFICE VISIT (OUTPATIENT)
Dept: CARDIOLOGY | Facility: CLINIC | Age: 74
End: 2024-02-06
Payer: MEDICARE

## 2024-02-06 VITALS
OXYGEN SATURATION: 95 % | RESPIRATION RATE: 16 BRPM | HEART RATE: 47 BPM | WEIGHT: 275.4 LBS | DIASTOLIC BLOOD PRESSURE: 71 MMHG | BODY MASS INDEX: 37.3 KG/M2 | HEIGHT: 72 IN | SYSTOLIC BLOOD PRESSURE: 135 MMHG

## 2024-02-06 DIAGNOSIS — I25.10 ASCVD (ARTERIOSCLEROTIC CARDIOVASCULAR DISEASE): ICD-10-CM

## 2024-02-06 DIAGNOSIS — I42.1 CARDIOMYOPATHY, HYPERTROPHIC OBSTRUCTIVE: ICD-10-CM

## 2024-02-06 DIAGNOSIS — I65.23 BILATERAL CAROTID ARTERY STENOSIS: ICD-10-CM

## 2024-02-06 DIAGNOSIS — I10 ESSENTIAL HYPERTENSION: ICD-10-CM

## 2024-02-06 DIAGNOSIS — I35.0 AORTIC STENOSIS, MODERATE: Primary | ICD-10-CM

## 2024-02-06 PROCEDURE — 1159F MED LIST DOCD IN RCRD: CPT | Performed by: NURSE PRACTITIONER

## 2024-02-06 PROCEDURE — 1160F RVW MEDS BY RX/DR IN RCRD: CPT | Performed by: NURSE PRACTITIONER

## 2024-02-06 PROCEDURE — 93000 ELECTROCARDIOGRAM COMPLETE: CPT | Performed by: NURSE PRACTITIONER

## 2024-02-06 PROCEDURE — 3075F SYST BP GE 130 - 139MM HG: CPT | Performed by: NURSE PRACTITIONER

## 2024-02-06 PROCEDURE — 3078F DIAST BP <80 MM HG: CPT | Performed by: NURSE PRACTITIONER

## 2024-02-06 PROCEDURE — 99214 OFFICE O/P EST MOD 30 MIN: CPT | Performed by: NURSE PRACTITIONER

## 2024-02-06 RX ORDER — METOPROLOL SUCCINATE 50 MG/1
50 TABLET, EXTENDED RELEASE ORAL DAILY
Qty: 30 TABLET | Refills: 5 | Status: SHIPPED | OUTPATIENT
Start: 2024-02-06

## 2024-02-06 RX ORDER — AMLODIPINE BESYLATE 10 MG/1
10 TABLET ORAL DAILY
Qty: 30 TABLET | Refills: 11 | Status: SHIPPED | OUTPATIENT
Start: 2024-02-06

## 2024-02-06 RX ORDER — LISINOPRIL 20 MG/1
20 TABLET ORAL DAILY
Qty: 30 TABLET | Refills: 5 | Status: SHIPPED | OUTPATIENT
Start: 2024-02-06

## 2024-02-06 NOTE — PROGRESS NOTES
Joshua Barnhart MD  David Mandujano  1950 02/06/2024    Patient Active Problem List   Diagnosis    Iron deficiency anemia, unspecified    Rectal cancer    Preoperative cardiovascular examination    Hypertrophic cardiomyopathy with LV OT gradient of about 68 mmHg in February 2019.    Essential hypertension    Port-A-Cath in place    Iron deficiency anemia due to chronic blood loss    Malabsorption of iron    Carotid stenosis, asymptomatic, bilateral       Dear Joshua Barnhart MD:    Subjective     Chief Complaint   Patient presents with    Follow-up     7 mos    surg clearane     colonoscopy    Med Management     No info           History of Present Illness:    David Mandujano is a 73 y.o. male with a past medical history of nonobstructive ASCVD noted on left heart catheterization at  in 2020, hypertension, moderate aortic valve stenosis, hypertrophic cardiomyopathy with asymmetrical septal hypertrophy with LVOT obstruction status post alcohol septal ablation by Dr. Oneill at [facility] in 2020, and bilateral carotid artery stenosis.  He presents today for cardiology follow-up.  Reports his blood pressure has been well-controlled at home.  Reports he has been feeling well from a cardiac standpoint.  Denies any chest pain, shortness of breath, palpitations, dizziness, or lightheadedness.  Since his last visit, was evaluated by CT surgery and surveillance was recommended for his carotid artery stenosis.  He does have follow-up there in April.  He did undergo Lexiscan stress test in November 2023 which was negative for evidence of myocardial ischemia.  He has had no further anginal symptoms.  He is requesting preoperative cardiac risk assessment prior to undergoing colonoscopy.          No Known Allergies:      Current Outpatient Medications:     allopurinol (ZYLOPRIM) 100 MG tablet, Take 1 tablet by mouth Daily. (Patient taking differently: Take 1 tablet by mouth As Needed.), Disp: 30 tablet, Rfl: 0     "amLODIPine (NORVASC) 10 MG tablet, Take 1 tablet by mouth Daily., Disp: 30 tablet, Rfl: 11    lisinopril (PRINIVIL,ZESTRIL) 20 MG tablet, Take 1 tablet by mouth Daily. FOR BLOOD PRESSURE, Disp: 30 tablet, Rfl: 5    metoprolol succinate XL (TOPROL-XL) 50 MG 24 hr tablet, Take 1 tablet by mouth Daily., Disp: 30 tablet, Rfl: 5    ASCORBIC ACID PO, Take  by mouth Daily., Disp: , Rfl:     aspirin 81 MG EC tablet, Take 1 tablet by mouth Daily., Disp: 30 tablet, Rfl: 5    atorvastatin (LIPITOR) 20 MG tablet, Take 1 tablet by mouth Daily. for cholesterol., Disp: 30 tablet, Rfl: 5    cyanocobalamin (VITAMIN B-12) 1000 MCG tablet, Take 1 tablet by mouth Daily., Disp: , Rfl:     folic acid (FOLVITE) 1 MG tablet, TAKE ONE TABLET BY MOUTH DAILY, Disp: 30 tablet, Rfl: 11    vitamin B-6 (PYRIDOXINE) 100 MG tablet, TAKE ONE TABLET BY MOUTH DAILY, Disp: 30 tablet, Rfl: 5      The following portions of the patient's history were reviewed and updated as appropriate: allergies, current medications, past family history, past medical history, past social history, past surgical history and problem list.    Social History     Tobacco Use    Smoking status: Some Days     Types: Cigars    Smokeless tobacco: Current     Types: Chew    Tobacco comments:     Occasional Cigars-Has not had one in 5 years   Vaping Use    Vaping Use: Never used   Substance Use Topics    Alcohol use: Yes     Comment: occasional-[place]    Drug use: No       Review of Systems   Constitutional: Negative for decreased appetite and malaise/fatigue.   Cardiovascular:  Negative for chest pain, dyspnea on exertion and palpitations.   Respiratory:  Negative for cough and shortness of breath.        Objective   Vitals:    02/06/24 1424   BP: 135/71   Pulse: (!) 47   Resp: 16   SpO2: 95%   Weight: 125 kg (275 lb 6.4 oz)   Height: 182.9 cm (72\")     Body mass index is 37.35 kg/m².    1/15/2023  Normal left ventricular cavity size and wall thickness noted. All left ventricular " wall segments contract normally    Left ventricular ejection fraction appears to be 61 - 65%.    Left ventricular diastolic function is consistent with (grade I) impaired relaxation.    The aortic valve is not well visualized. The aortic valve is abnormal in structure. There is mild calcification of the aortic valve. There is moderate thickening of the non-coronary, left coronary and right coronary cusp(s) of the aortic valve.    Mitral annular calcification is present. There is moderate calcification of the mitral valve posterior leaflet(s). Mild mitral valve regurgitation is present. No significant mitral valve stenosis is present.    There is no evidence of pericardial effusion. .     11/27/23 echo    A pharmacological stress test was performed using regadenoson.    Findings consistent with an indeterminate ECG stress test.    Myocardial perfusion imaging indicates a normal myocardial perfusion study with no evidence of ischemia.    TID:1.03    Normal LV cavity size. Normal LV wall motion noted.    Left ventricular ejection fraction is normal (Calculated EF = 58%).    Impressions are consistent with a low risk study.    6/25/21    CONCLUSIONS:  1. Mild-to-moderate diffuse coronary atherosclerosis with no hemodynamically significant obstructions.  2. Hypertrophic cardiomyopathy with severe resting left ventricular outflow tract obstruction.  3. Successful alcohol septal ablation with near complete resolution of left ventricular outflow tract gradient.  4. Residual, mild valvular aortic stenosis as determined by echocardiography.  5. Intact AV conduction.    RECOMMENDATIONS:  1. Overnight observation in the CCU.  2. Continue beta-blocker therapy.  3. Dual anti-platelet therapy for 4-6 weeks, followed by single anti-platelet therapy with low-dose aspirin.  4. Post ablation echocardiogram.  5. Follow-up in the cardiology outpatient clinic in 4-6 weeks with echocardiogram at that time.  6. Results and recommendations  were communicated to the inpatient CCU Team.    Coronary Findings Diagnostic Dominance: Left  Left Main: Ost LM to Mid LM lesion is 30% stenosed. Mid LM to Dist LAD lesion is 30% stenosed.  Left Circumflex: Ost Cx to Dist Cx lesion is 30% stenosed.  Right Coronary Artery: 0% stenosed Ost RCA to Prox RCA lesion.    Intervention  No interventions have been documented.  Exam End: 06/25/21 15:49 Last Resulted: 06/25/21 16:27   Received From: [facility]  Result Received: 04/28/22 11:06       Vitals reviewed.   Constitutional:       Appearance: Healthy appearance. Well-developed and not in distress.   HENT:      Head: Normocephalic and atraumatic.   Pulmonary:      Effort: Pulmonary effort is normal.      Breath sounds: Normal breath sounds. No wheezing. No rales.   Cardiovascular:      Bradycardia present. Regular rhythm.      Murmurs: There is a grade 3to 2/6 holosystolic murmur at the URSB.      . No S3 and S4 gallop.   Edema:     Peripheral edema absent.   Abdominal:      General: Bowel sounds are normal.      Palpations: Abdomen is soft.   Skin:     General: Skin is warm and dry.   Neurological:      Mental Status: Alert, oriented to person, place, and time and oriented to person, place and time.   Psychiatric:         Mood and Affect: Mood normal.         Behavior: Behavior normal.         Lab Results   Component Value Date     08/18/2023    K 4.4 08/18/2023     08/18/2023    CO2 27.3 08/18/2023    BUN 16 08/18/2023    CREATININE 1.20 08/18/2023    GLUCOSE 121 (H) 08/18/2023    CALCIUM 9.8 08/18/2023    AST 22 08/18/2023    ALT 14 08/18/2023    ALKPHOS 70 08/18/2023    LABIL2 1.5 06/25/2014      Lab Results   Component Value Date    WBC 9.77 08/18/2023    HGB 15.0 08/18/2023    HCT 45.4 08/18/2023     08/18/2023        Lab Results   Component Value Date    MG 1.8 (L) 06/26/2021     Lab Results   Component Value Date    TSH 2.860 01/19/2021    PSA 3.420 08/18/2023    CHLPL 261 (H) 06/25/2014     TRIG 117 12/07/2022    HDL 77 (H) 12/07/2022     (H) 12/07/2022                 ECG 12 Lead    Date/Time: 2/6/2024 2:28 PM  Performed by: Lilly Matos APRN    Authorized by: Lilly Matos APRN  Comparison: compared with previous ECG   Similar to previous ECG  Rhythm: sinus bradycardia  BPM: 47  Conduction: right bundle branch block            Assessment & Plan    Diagnosis Plan   1. Aortic stenosis, moderate  ECG 12 Lead    metoprolol succinate XL (TOPROL-XL) 50 MG 24 hr tablet    Adult Transthoracic Echo Complete W/ Cont if Necessary Per Protocol      2. Essential hypertension  ECG 12 Lead    metoprolol succinate XL (TOPROL-XL) 50 MG 24 hr tablet    lisinopril (PRINIVIL,ZESTRIL) 20 MG tablet    amLODIPine (NORVASC) 10 MG tablet      3. Cardiomyopathy, hypertrophic obstructive with LVOT gradient of about 99 mmHg, status post recent alcohol septal ablation at [facility]..  ECG 12 Lead    metoprolol succinate XL (TOPROL-XL) 50 MG 24 hr tablet      4. Bilateral carotid artery stenosis  ECG 12 Lead    Comprehensive Metabolic Panel    Lipid Panel      5. ASCVD (arteriosclerotic cardiovascular disease)  ECG 12 Lead    Comprehensive Metabolic Panel    Lipid Panel                   Recommendations:    Moderate aortic valve stenosis-we will order an echocardiogram to reevaluate valvular function.  Essential hypertension-BP at goal.  However, patient is bradycardic today.  Will decrease metoprolol succinate to 50 mg daily and continue to monitor.  Continue lisinopril and amlodipine.  Hypertrophic cardiomyopathy status post septal ablation-follows with Dr. Oneill at .  Bilateral carotid artery stenosis-following with CT surgery.  Follow-up scheduled for April 2024.  Continue aspirin and statin therapy.  CMP and lipid panel ordered.  ASCVD-no recent anginal symptoms.  Stress test negative for evidence of myocardial ischemia.  Continue low-dose aspirin, statin, metoprolol, and lisinopril.  Preoperative  cardiac risk assessment-I reviewed his plan of care with Dr. Joe.  He should be a low to moderate cardiac risk for the upcoming colonoscopy.  Follow-up in 6 weeks or sooner if needed.        Return in about 6 weeks (around 3/19/2024) for Recheck.    As always, I appreciate very much the opportunity to participate in the cardiovascular care of your patients.      With Best Regards,    JOSE CARLOS Hoff

## 2024-02-09 ENCOUNTER — TELEPHONE (OUTPATIENT)
Dept: CARDIOLOGY | Facility: CLINIC | Age: 74
End: 2024-02-09
Payer: MEDICARE

## 2024-03-20 ENCOUNTER — TELEPHONE (OUTPATIENT)
Dept: CARDIOLOGY | Facility: CLINIC | Age: 74
End: 2024-03-20

## 2024-03-20 NOTE — TELEPHONE ENCOUNTER
"  Caller: Leslee Diana \"Franky\"    Relationship: Self    Best call back number: [phone]     What is the best time to reach you: ANYTIME    Who are you requesting to speak with (clinical staff, provider,  specific staff member): CLINICAL STAFF    Do you know the name of the person who called: LESLEE DIANA    What was the call regarding: PATIENT WANTS TO KNOW IF IT IS SAFE FOR HIM TO TAKE VIAGRA. IF SO, HE IS REQUESTING THAT A PRESCRIPTION BE SENT TO [facility] IN [place].    THE PATIENT ALSO REPORTS THAT THE MEDICATION CHANGE HAS GONE WELL. HIS BLOOD PRESSURE HAS DROPPED SOME. HE REPORTS A READING ON 03.20.24 /40.      "

## 2024-04-04 DIAGNOSIS — I65.23 CAROTID STENOSIS, ASYMPTOMATIC, BILATERAL: Primary | ICD-10-CM

## 2024-04-05 ENCOUNTER — HOSPITAL ENCOUNTER (OUTPATIENT)
Dept: CARDIOLOGY | Facility: HOSPITAL | Age: 74
Discharge: HOME | End: 2024-04-05
Payer: MEDICARE

## 2024-04-05 ENCOUNTER — HOSPITAL ENCOUNTER (OUTPATIENT)
Dept: CT IMAGING | Facility: HOSPITAL | Age: 74
Discharge: HOME | End: 2024-04-05
Payer: MEDICARE

## 2024-04-05 DIAGNOSIS — I65.23 CAROTID STENOSIS, ASYMPTOMATIC, BILATERAL: ICD-10-CM

## 2024-04-05 DIAGNOSIS — I35.0 AORTIC STENOSIS, MODERATE: ICD-10-CM

## 2024-04-05 DIAGNOSIS — D50.0 IRON DEFICIENCY ANEMIA DUE TO CHRONIC BLOOD LOSS: ICD-10-CM

## 2024-04-05 DIAGNOSIS — R97.20 ELEVATED PSA: ICD-10-CM

## 2024-04-05 DIAGNOSIS — C20 MALIGNANT NEOPLASM OF RECTUM: ICD-10-CM

## 2024-04-05 LAB — CREAT BLDA-MCNC: 1.3 MG/DL (ref 0.6–1.3)

## 2024-04-05 PROCEDURE — 93880 EXTRACRANIAL BILAT STUDY: CPT

## 2024-04-05 PROCEDURE — 82565 ASSAY OF CREATININE: CPT

## 2024-04-05 PROCEDURE — 71260 CT THORAX DX C+: CPT

## 2024-04-05 PROCEDURE — 74177 CT ABD & PELVIS W/CONTRAST: CPT

## 2024-04-05 PROCEDURE — 25510000001 IOPAMIDOL 61 % SOLUTION: Performed by: INTERNAL MEDICINE

## 2024-04-05 PROCEDURE — 93306 TTE W/DOPPLER COMPLETE: CPT

## 2024-04-05 RX ADMIN — IOPAMIDOL 100 ML: 612 INJECTION, SOLUTION INTRAVENOUS at 13:28

## 2024-04-06 LAB
BH CV ECHO MEAS - AO ROOT DIAM: 3.3 CM
BH CV ECHO MEAS - EDV(CUBED): 68.9 ML
BH CV ECHO MEAS - EDV(MOD-SP4): 50.3 ML
BH CV ECHO MEAS - EF(MOD-SP4): 49.9 %
BH CV ECHO MEAS - ESV(CUBED): 39.3 ML
BH CV ECHO MEAS - ESV(MOD-SP4): 25.2 ML
BH CV ECHO MEAS - FS: 17.1 %
BH CV ECHO MEAS - IVS/LVPW: 1 CM
BH CV ECHO MEAS - IVSD: 1.9 CM
BH CV ECHO MEAS - LA DIMENSION: 3.5 CM
BH CV ECHO MEAS - LAT PEAK E' VEL: 4.2 CM/SEC
BH CV ECHO MEAS - LV DIASTOLIC VOL/BSA (35-75): 20.6 CM2
BH CV ECHO MEAS - LV MASS(C)D: 353.5 GRAMS
BH CV ECHO MEAS - LV MAX PG: 4 MMHG
BH CV ECHO MEAS - LV MEAN PG: 3 MMHG
BH CV ECHO MEAS - LV SYSTOLIC VOL/BSA (12-30): 10.3 CM2
BH CV ECHO MEAS - LV V1 MAX: 99.8 CM/SEC
BH CV ECHO MEAS - LV V1 VTI: 27.1 CM
BH CV ECHO MEAS - LVIDD: 4.1 CM
BH CV ECHO MEAS - LVIDS: 3.4 CM
BH CV ECHO MEAS - LVOT AREA: 4.9 CM2
BH CV ECHO MEAS - LVOT DIAM: 2.5 CM
BH CV ECHO MEAS - LVPWD: 1.9 CM
BH CV ECHO MEAS - MED PEAK E' VEL: 4.5 CM/SEC
BH CV ECHO MEAS - MV A MAX VEL: 162 CM/SEC
BH CV ECHO MEAS - MV E MAX VEL: 90 CM/SEC
BH CV ECHO MEAS - MV E/A: 0.56
BH CV ECHO MEAS - PA ACC TIME: 0.09 SEC
BH CV ECHO MEAS - SI(MOD-SP4): 10.3 ML/M2
BH CV ECHO MEAS - SV(LVOT): 133 ML
BH CV ECHO MEAS - SV(MOD-SP4): 25.1 ML
BH CV ECHO MEAS - TAPSE (>1.6): 2.42 CM
BH CV ECHO MEASUREMENTS AVERAGE E/E' RATIO: 20.69
LEFT ATRIUM VOLUME INDEX: 36.9 ML/M2

## 2024-04-09 ENCOUNTER — LAB (OUTPATIENT)
Dept: ONCOLOGY | Facility: CLINIC | Age: 74
End: 2024-04-09
Payer: MEDICARE

## 2024-04-09 ENCOUNTER — OFFICE VISIT (OUTPATIENT)
Dept: ONCOLOGY | Facility: CLINIC | Age: 74
End: 2024-04-09
Payer: MEDICARE

## 2024-04-09 VITALS
WEIGHT: 274.6 LBS | HEART RATE: 67 BPM | RESPIRATION RATE: 18 BRPM | OXYGEN SATURATION: 98 % | DIASTOLIC BLOOD PRESSURE: 73 MMHG | BODY MASS INDEX: 37.24 KG/M2 | SYSTOLIC BLOOD PRESSURE: 156 MMHG | TEMPERATURE: 97.5 F

## 2024-04-09 DIAGNOSIS — D50.0 IRON DEFICIENCY ANEMIA DUE TO CHRONIC BLOOD LOSS: ICD-10-CM

## 2024-04-09 DIAGNOSIS — C20 MALIGNANT NEOPLASM OF RECTUM: Primary | ICD-10-CM

## 2024-04-09 DIAGNOSIS — R97.20 ELEVATED PSA: ICD-10-CM

## 2024-04-09 DIAGNOSIS — I35.0 AORTIC VALVE STENOSIS, ETIOLOGY OF CARDIAC VALVE DISEASE UNSPECIFIED: ICD-10-CM

## 2024-04-09 DIAGNOSIS — C20 MALIGNANT NEOPLASM OF RECTUM: ICD-10-CM

## 2024-04-09 DIAGNOSIS — I65.23 CAROTID STENOSIS, ASYMPTOMATIC, BILATERAL: ICD-10-CM

## 2024-04-09 DIAGNOSIS — I25.10 ASCVD (ARTERIOSCLEROTIC CARDIOVASCULAR DISEASE): ICD-10-CM

## 2024-04-09 LAB
ALBUMIN SERPL-MCNC: 4 G/DL (ref 3.5–5.2)
ALBUMIN/GLOB SERPL: 1.1 G/DL
ALP SERPL-CCNC: 97 U/L (ref 39–117)
ALT SERPL W P-5'-P-CCNC: 18 U/L (ref 1–41)
ANION GAP SERPL CALCULATED.3IONS-SCNC: 12.6 MMOL/L (ref 5–15)
AST SERPL-CCNC: 18 U/L (ref 1–40)
BASOPHILS # BLD AUTO: 0.04 10*3/MM3 (ref 0–0.2)
BASOPHILS NFR BLD AUTO: 0.4 % (ref 0–1.5)
BILIRUB SERPL-MCNC: 0.5 MG/DL (ref 0–1.2)
BUN SERPL-MCNC: 14 MG/DL (ref 8–23)
BUN/CREAT SERPL: 11.2 (ref 7–25)
CALCIUM SPEC-SCNC: 9.3 MG/DL (ref 8.6–10.5)
CHLORIDE SERPL-SCNC: 105 MMOL/L (ref 98–107)
CO2 SERPL-SCNC: 27.4 MMOL/L (ref 22–29)
CREAT SERPL-MCNC: 1.25 MG/DL (ref 0.76–1.27)
DEPRECATED RDW RBC AUTO: 47.1 FL (ref 37–54)
EGFRCR SERPLBLD CKD-EPI 2021: 60.8 ML/MIN/1.73
EOSINOPHIL # BLD AUTO: 0.12 10*3/MM3 (ref 0–0.4)
EOSINOPHIL NFR BLD AUTO: 1.1 % (ref 0.3–6.2)
ERYTHROCYTE [DISTWIDTH] IN BLOOD BY AUTOMATED COUNT: 13.3 % (ref 12.3–15.4)
FERRITIN SERPL-MCNC: 256.9 NG/ML (ref 30–400)
GLOBULIN UR ELPH-MCNC: 3.6 GM/DL
GLUCOSE SERPL-MCNC: 123 MG/DL (ref 65–99)
HCT VFR BLD AUTO: 45 % (ref 37.5–51)
HGB BLD-MCNC: 15 G/DL (ref 13–17.7)
IMM GRANULOCYTES # BLD AUTO: 0.04 10*3/MM3 (ref 0–0.05)
IMM GRANULOCYTES NFR BLD AUTO: 0.4 % (ref 0–0.5)
IRON 24H UR-MRATE: 88 MCG/DL (ref 59–158)
IRON SATN MFR SERPL: 25 % (ref 20–50)
LYMPHOCYTES # BLD AUTO: 1.88 10*3/MM3 (ref 0.7–3.1)
LYMPHOCYTES NFR BLD AUTO: 17.9 % (ref 19.6–45.3)
MCH RBC QN AUTO: 32.2 PG (ref 26.6–33)
MCHC RBC AUTO-ENTMCNC: 33.3 G/DL (ref 31.5–35.7)
MCV RBC AUTO: 96.6 FL (ref 79–97)
MONOCYTES # BLD AUTO: 0.52 10*3/MM3 (ref 0.1–0.9)
MONOCYTES NFR BLD AUTO: 4.9 % (ref 5–12)
NEUTROPHILS NFR BLD AUTO: 7.93 10*3/MM3 (ref 1.7–7)
NEUTROPHILS NFR BLD AUTO: 75.3 % (ref 42.7–76)
NRBC BLD AUTO-RTO: 0 /100 WBC (ref 0–0.2)
PLATELET # BLD AUTO: 187 10*3/MM3 (ref 140–450)
PMV BLD AUTO: 9.7 FL (ref 6–12)
POTASSIUM SERPL-SCNC: 4.8 MMOL/L (ref 3.5–5.2)
PROT SERPL-MCNC: 7.6 G/DL (ref 6–8.5)
RBC # BLD AUTO: 4.66 10*6/MM3 (ref 4.14–5.8)
SODIUM SERPL-SCNC: 145 MMOL/L (ref 136–145)
TIBC SERPL-MCNC: 347 MCG/DL (ref 298–536)
TRANSFERRIN SERPL-MCNC: 233 MG/DL (ref 200–360)
WBC NRBC COR # BLD AUTO: 10.53 10*3/MM3 (ref 3.4–10.8)

## 2024-04-09 PROCEDURE — 80061 LIPID PANEL: CPT | Performed by: INTERNAL MEDICINE

## 2024-04-09 PROCEDURE — 1126F AMNT PAIN NOTED NONE PRSNT: CPT | Performed by: INTERNAL MEDICINE

## 2024-04-09 PROCEDURE — 80053 COMPREHEN METABOLIC PANEL: CPT | Performed by: INTERNAL MEDICINE

## 2024-04-09 PROCEDURE — 82728 ASSAY OF FERRITIN: CPT | Performed by: INTERNAL MEDICINE

## 2024-04-09 PROCEDURE — 3077F SYST BP >= 140 MM HG: CPT | Performed by: INTERNAL MEDICINE

## 2024-04-09 PROCEDURE — 99214 OFFICE O/P EST MOD 30 MIN: CPT | Performed by: INTERNAL MEDICINE

## 2024-04-09 PROCEDURE — 84153 ASSAY OF PSA TOTAL: CPT | Performed by: INTERNAL MEDICINE

## 2024-04-09 PROCEDURE — 84466 ASSAY OF TRANSFERRIN: CPT | Performed by: INTERNAL MEDICINE

## 2024-04-09 PROCEDURE — 82378 CARCINOEMBRYONIC ANTIGEN: CPT | Performed by: INTERNAL MEDICINE

## 2024-04-09 PROCEDURE — 3078F DIAST BP <80 MM HG: CPT | Performed by: INTERNAL MEDICINE

## 2024-04-09 PROCEDURE — 83540 ASSAY OF IRON: CPT | Performed by: INTERNAL MEDICINE

## 2024-04-09 PROCEDURE — 85025 COMPLETE CBC W/AUTO DIFF WBC: CPT | Performed by: INTERNAL MEDICINE

## 2024-04-09 NOTE — PROGRESS NOTES
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  Combined chemoradiation with Xeloda 825 mg/m2 BID D1-5 or M-F during the course of radiation.  His dose is 500 mg x 4 or 2000 mg BID  Treatment finished 19.    2.  Dr. Yadav performed LAR with coloanal anstomosis and loop ileostomy 19.  Pathology showed  Residual invasive adneocarcinoma.  Tumor invaded the muscularis propria.  Surgical margins clear.  0/14 resected LNs involved.  ypT2N0.      3.       4.  Ileostomy reversal 19 (Dr. Yadav)    5.        CHIEF COMPLAINT:  Follow up of rectal cancer and iron deficiency anemia    HISTORY OF PRESENT ILLNESS:   David Mandujano is a very pleasant 73 y.o. male who was referred by Dr. Barnhart for evaluation and treatment of colorectal cancer. He began to notice rectal bleeding in January or 2018, and he also began to notice fatigue in approximately July. He assumed he was out of shape when he started to become short of breath and started trying to exercise more frequently, which only exacerbated the shortness of breath. He was evaluated by his PCP, Joshua Barnhart MD, who after testing, found him to be severely iron deficient, hyperglycemic, and he also had an elevated PSA. He was on vacation at the time, and his PCP asked him to return home for further evaluation. He was started on oral iron therapy and later received IV iron infusions. He was also scheduled for a colonoscopy with Dr. Barnhart, during which a suspicious rectal mass was biopsied and pathology was positive for an ulcerated invasive, moderately differentiated rectal adenocarcinoma.     INTERVAL HISTORY:  Mr. Mandujano is here today for follow up of rectal cancer.  Since his last visit he has overall been well.  He says his bowels move fairly well.  He sometimes has some  constipation but denies rectal bleeding.  Plan had been for Dr. Barnhart to repeat c-scope in February 2024, but his situation has been complicated by development of moderate to severe Aortic Stenosis.  He has plans to f/u with Dr. Sheldon to discuss this further.  He is otherwise doing well.       PAST MEDICAL HISTORY:  Past Medical History:   Diagnosis Date    Anemia     Arthritis     Carotid stenosis     Carotid stenosis, asymptomatic, bilateral 8/16/2023    Colon cancer     s/p chemo therapy    Heart murmur     Hyperlipidemia     Hypertension     Wrist fracture     surgically repaired       PAST SURGICAL HISTORY:  Past Surgical History:   Procedure Laterality Date    ABDOMINAL SURGERY      CARDIAC ABLATION      COLON SURGERY      COLONOSCOPY      FRACTURE SURGERY Left     VENOUS ACCESS DEVICE (PORT) INSERTION N/A 05/20/2019    Procedure: INSERTION VENOUS ACCESS DEVICE;  Surgeon: Cindy Corrales MD;  Location: [facility];  Service: General    WRIST SURGERY         FAMILY HISTORY:  Family History   Problem Relation Age of Onset    Stroke Father     Hyperlipidemia Father     Other Sister     Heart disease Sister     Colon cancer Maternal Grandfather    No other family history of malignancy.    SOCIAL HISTORY:  Social History     Socioeconomic History    Marital status: Single    Number of children: 2   Tobacco Use    Smoking status: Some Days     Types: Cigars    Smokeless tobacco: Current     Types: Chew    Tobacco comments:     Occasional Cigars-Has not had one in 5 years   Vaping Use    Vaping status: Never Used   Substance and Sexual Activity    Alcohol use: Yes     Comment: occasional-[place]    Drug use: No    Sexual activity: Defer     REVIEW OF SYSTEMS:   A comprehensive 14 point review of systems was performed.  Significant findings as mentioned above.  All other systems reviewed and are negative.      MEDICATIONS:  The current medication list was reviewed in the EMR    Current Outpatient Medications:      allopurinol (ZYLOPRIM) 100 MG tablet, Take 1 tablet by mouth Daily. (Patient taking differently: Take 0.5 tablets by mouth As Needed.), Disp: 30 tablet, Rfl: 0    amLODIPine (NORVASC) 10 MG tablet, Take 1 tablet by mouth Daily., Disp: 30 tablet, Rfl: 11    ASCORBIC ACID PO, Take  by mouth Daily., Disp: , Rfl:     aspirin 81 MG EC tablet, Take 1 tablet by mouth Daily., Disp: 30 tablet, Rfl: 5    atorvastatin (LIPITOR) 20 MG tablet, Take 1 tablet by mouth Daily. for cholesterol., Disp: 30 tablet, Rfl: 5    cyanocobalamin (VITAMIN B-12) 1000 MCG tablet, Take 1 tablet by mouth Daily., Disp: , Rfl:     folic acid (FOLVITE) 1 MG tablet, TAKE ONE TABLET BY MOUTH DAILY, Disp: 30 tablet, Rfl: 11    lisinopril (PRINIVIL,ZESTRIL) 20 MG tablet, Take 1 tablet by mouth Daily. FOR BLOOD PRESSURE, Disp: 30 tablet, Rfl: 5    metoprolol succinate XL (TOPROL-XL) 50 MG 24 hr tablet, Take 1 tablet by mouth Daily., Disp: 30 tablet, Rfl: 5    vitamin B-6 (PYRIDOXINE) 100 MG tablet, TAKE ONE TABLET BY MOUTH DAILY, Disp: 30 tablet, Rfl: 5    ALLERGIES:  No Known Allergies    PHYSICAL EXAM:  Vitals:    04/09/24 1422   BP: 156/73   Pulse: 67   Resp: 18   Temp: 97.5 °F (36.4 °C)   TempSrc: Temporal   SpO2: 98%   Weight: 125 kg (274 lb 9.6 oz)   PainSc: 0-No pain   Body surface area is 2.44 meters squared.  Body mass index is 37.24 kg/m².  ECOG score: 0   General:  Awake, alert and oriented, appears well.  HEENT:  Pupils are equal, round and reactive to light and accommodation, Extra-ocular movements full, Oropharyx clear, mucous membranes moist  Neck:  No JVD, thyromegaly or lymphadenopathy.  No bruits  CV:  Regular rate and rhythm, II-III/IV systolic murmur, no rubs or gallops  Resp: Lungs are clear to auscultation bilaterally, no crackles  Abd:  Soft, non-tender, non distended, bowel sounds present, no palpable hepatosplenomegaly or masses..  Surgical scars present.   Ext:  No clubbing, cyanosis, or lower extremity edema  Lymph:  No  cervical, supraclavicular, axillary, adenopathy  Neuro:  Grossly non-focal exam    PATHOLOGY:  10-02-18      04-01-19:        04-09-19:        ENDOSCOPY:  10-02-18:      2-05-21 EGD/C-scope ([facility])  -  Mild reflux esophagitis with small erosions at the GE junction and a slight stricture  -  Endoscopically normal stomach, duodenum.  - Unremarkable surgical anastomosis 5 cm above the anal verge.   -  Minimal diverticulosis.  -  Otherwise normal colon and terminal ileum with fair prep.      IMAGING:  10-04-18 CT Abdomen Pelvis With Contrast   Findings  - LOWER THORAX: Patchy nonspecific subpleural nodularity in the left lung base that could represent sequelae of chronic airspace disease or early fibrosis.     ABDOMEN:  - LIVER: Homogeneous. No focal hepatic mass or ductal dilatation.  - GALLBLADDER: GALLSTONES WITHIN THE GALLBLADDER.  - PANCREAS: Unremarkable. No mass or ductal dilatation.  - SPLEEN: Homogeneous. No splenomegaly.  - ADRENALS: No mass.  - KIDNEYS: RIGHT RENAL CYST MEASURING 3.6 CM.  - GI TRACT: NONSPECIFIC DISTAL SIGMOID COLONIC WALL THICKENING VERSUS NONDISTENTION.  - PERITONEUM: No free air. No free fluid or loculated fluid collections.  - MESENTERY: Unremarkable.  - LYMPH NODES: No lymphadenopathy.  - VASCULATURE: ATHEROSCLEROTIC VASCULAR CALCIFICATION.  - ABDOMINAL WALL: SMALL BILATERAL HUMERAL HERNIAS CONTAINING ONLY FAT.  - OTHER: None.     PELVIS:  - BLADDER: No focal mass or significant wall thickening  - REPRODUCTIVE: Unremarkable as visualized.  - APPENDIX: Nondistended. No surrounding inflammation.  - BONES: No acute bony abnormality.     IMPRESSION:  1. Gallstones within the gallbladder.  2.Nonspecific distal sigmoid colonic wall thickening versus nondistention.    PET/CT 10-29-18    11-13-18 MRI Pelvis Without Contrast  FINDINGS:  1.) TUMOR LOCATION AND CHARACTERISTICS     i) Distance of Inferior margin of Tumor from the dentate line: 9.5 CM  ii) Tumor at or below the puborectalis sling:   NO  iii) Relationship to the anterior peritoneal reflection: BELOW  iv) Craniocaudal length of the tumor: 6cm  v) Clock face of tumor: 5o'clock to 2o'clock  vi) Polypoid/ Annular/ Semi-annular: SEMI-ANNULAR  vii) Mucinous: YES     2.) EXTRAMURAL DEPTH OF INVASION AND MR T-CATEGORY       i) Extramural depth of invasion (Use 0mm for T1 or T2 tumor):  APPROXIMATELY 5 MM   ii) T category: T3 B              *Please indicate structures with possible invasion.  Specify laterality,  sequence and slice#: (see list below)     *  Anterior peritoneal reflection (T4a tumor): NO  *  Puborectalis: NO  *  Bladder: NO  *  Vascular Involvement of Iliac Vessels: NO  *  Levator ani: NO  *  Ureters: NO  *  Obturator: NO  *  Prostate: MARGINAL/TETHERED  *  Piriformis: NO  *  Uterus: NOT APPLICABLE  *  Pelvic Bone: NO  *  Vagina: NOT APPLICABLE  *  Sacrum: NO  *  Urethra: NO  *  Other:          iii) For low rectal tumors (maximum tumor depth at or below the  puborectalis sling):     *  Not applicable (tumor above the puborectalis sling: NOT APPLICABLE  *    *  Level 1 (submucosa only, no involvement of internal anal sphincter):       *  Level 2 (confined to the internal anal sphincter; no involvement of  intersphincteric fat):      *  Level 3 (intersphincteric fat involved):      *  Level 4 (involves external sphincter or beyond):         3. RELATIONSHIP OF THE TUMOR TO MESORECTAL FASCIA (MRF)       i) Shortest distance 0mm of the definitive tumour border to the MRF  is: AT 12:00   ii) Are there any tumour spiculations closer to the MRF? NO     iii) Location of Tumor margin to MRF: 12:00, AT THE LEVEL PROSTATE     4. EXTRAMURAL VENOUS INVASION       i) Extramural Venous Invasion (EMVI): ABSENT     5. MESORECTAL LYMPH NODES AND TUMOUR DEPOSITS       i) Any suspicious mesorectal lymph nodes/tumor deposits: YES      *If yes, the most suspicious node/tumor deposit is: 15 MM IN  DIAMETER, ALONG THE UPPER MARGIN OF the tumor with minimum  distance 7  MMmm from the MRF at 7o'clock     6. EXTRAMESORECTAL LYMPH NODES        i) Any suspicious extramesorectal lymph nodes: NO      *If yes, location and laterality of suspicious nodes:             Int. Iliac:                Ext. Iliac:                Common Iliac:                Obturator:                Inguinal:                Other:           ii) Is the BETH node station in the field of view: NO        *If Yes, are these nodes suspicious:         7. OTHER FINDINGS (COMPLICATIONS, METASTASES, LIMITATIONS)         NOTE: THERE IS SOME UNCERTAINTY WHETHER THE APPARENT SMALL FOCUS OF TUMOR EXTENDING TO THE PROSTATE AT THE 12:00 POSITION COULD ACTUALLY BE PROSTATE NODULE EXTENDING TOWARD THE TUMOR. RECTAL TUMOR IS FAVORED; ALTHOUGH THIS DETERMINATION CANNOT BE MADE WITH COMPLETE CERTAINTY, TUMOR IS CONSIDERED T3 B.        IMPRESSIONS:  MRI rectal cancer T category is: T3 B  Maximum EMD of invasion is: 5  Minimum tumor to MRF distance is: 0  Low rectal tumor component: NO  Mesorectal nodes/tumor deposits: SUSPICIOUS  EMVI: ABSENT  Extramesorectal nodes: NEGATIVE    CTCAP 05-21-20     FINDINGS:  Adenopathy:No evidence of mediastinal or hilar lymph node enlargement.  No axillary lymph node enlargement.     Fluid:There are no pericardial or pleural effusions.     LUNGS:No parenchymal soft tissue nodules or masses are present.     Skeletal:Arthritic change in the thoracic spine.      Upper abdomen shows cholelithiasis.      IMPRESSION:  1. No parenchymal soft tissue nodules or masses.  2. No pericardial or pleural effusions.  3. No adenopathy.   4. Coronary artery calcifications.   5. Cholelithiasis.     FINDINGS:   The lung bases are clear. There are no pleural effusions.     The liver is homogeneous.     There is cholelithiasis.     The spleen is homogeneous.      There is no peripancreatic stranding or pancreatic head mass.      There is no adrenal enlargement.     Large right renal cyst is present. There is no  solid renal mass or  hydronephrosis..      Otherwise I do not see any free fluid or walled off fluid collections.     The rectal wall appears to be thickened.     IMPRESSION:  1. Rectal wall thickening.  2. Cholelithiasis.         CT CAP 09/15/2020  Findings  LUNGS: Unremarkable. No parenchymal soft tissue nodules.  No focal air  space disease.  HEART: CORONARY ARTERY CALCIFICATIONS ARE NOTED.  PERICARDIUM: No effusion.  MEDIASTINUM: No masses. No enlarged lymph nodes.  No fluid collections.  PLEURA: No pleural effusion. No pleural mass or abnormal calcification.  MAJOR AIRWAYS: Clear. No intrinsic mass.  VASCULATURE: No evidence of aneurysm.  VISUALIZED UPPER ABDOMEN:  LIVER: Homogeneous. No focal hepatic mass or ductal dilatation.  SPLEEN: Homogeneous. No splenomegaly.  ADRENALS: No mass.  KIDNEYS: RIGHT RENAL CYST MEASURING 6.7 CM.  GI TRACT: Non-dilated. No definite wall thickening.  PERITONEUM: No free air. No free fluid or loculated fluid  collections.  ABDOMINAL WALL: No focal hernia or mass.  OTHER: None.  BONES: No acute bony abnormality.     IMPRESSION:  Impression:  1. Unremarkable exam.  No source for the patient symptoms.  2. Other incidental/non-acute findings as above.    Findings  LOWER THORAX: Clear. No effusions.  ABDOMEN:  LIVER: Homogeneous. No focal hepatic mass or ductal dilatation.GALLBLADDER: GALLSTONES IN THE GALLBLADDER.  PANCREAS: Unremarkable. No mass or ductal dilatation.  SPLEEN: Homogeneous. No splenomegaly.  ADRENALS: No mass.  KIDNEYS: RIGHT RENAL CYST MEASURING 6.4 CM.  GI TRACT: Possibly some rectal wall thickening.  PERITONEUM: No free air. No free fluid or loculated fluid  collections.  MESENTERY: Unremarkable.  LYMPH NODES: No lymphadenopathy.  VASCULATURE: No evidence of aneurysm.  ABDOMINAL WALL: No focal hernia or mass.  OTHER: None.   PELVIS:   BLADDER: No focal mass or significant wall thickening   REPRODUCTIVE: Unremarkable as visualized.   APPENDIX: Nondistended. No  surrounding inflammation.  BONES: No acute bony abnormality.     IMPRESSION:  Impression:  Rectal wall thickening noted.      [facility] 01-14-21  CT FINDINGS: On the lung windows, the lungs are both well aerated and  clear. No pulmonary parenchymal lung nodules to suggest metastatic  disease were seen. There were no inflammatory infiltrates or pleural  effusions. On the soft tissue windows, there was no evidence of  supraclavicular or axillary lymphadenopathy. No enlarged mediastinal or  hilar lymph nodes were demonstrated. There was no evidence of  pericardial effusion.     IMPRESSION:  No CT findings of metastatic disease in the chest.     CT FINDINGS: The liver and spleen are stable in size and shape; no focal  lesions have developed within the liver. There is a calcified stone in  the dependent portion of the gallbladder. The pancreas showed no  evidence of mass or inflammation. No adrenal lesions were demonstrated.  There is some thinning of the cortical tissues in the kidneys with a 6.5  cm cyst in the right kidney. There was no ascites. The aorta is normal  in caliber. No enlarged lymph nodes were seen in the retroperitoneum or  mesentery. In the pelvis, postoperative changes are noted near the  rectum where there is a line of  GI staples. No evidence of residual or recurrent tumor is demonstrated.  On today's study there was no significant rectal wall thickening.     IMPRESSION:  No CT findings to suggest metastatic disease in the abdomen  or pelvis. There is less thickening of the rectal wall than seen on the  previous CT. The patient does have cholelithiasis and a large cyst in  the right kidney.       [facility] 05-14-21  FINDINGS:     LUNGS: Unremarkable. No parenchymal soft tissue nodules.  No focal air  space disease.     HEART: Coronary artery calcifications.     MEDIASTINUM: No masses. No enlarged lymph nodes.  No fluid collections.     PLEURA: No pleural effusion. No pleural mass or abnormal  calcification.  No pneumothorax.     VASCULATURE: No evidence of aneurysm. No evidence of pulmonary embolism.     BONES: Arthritic change.     VISUALIZED UPPER ABDOMEN:Please see the report for the CT of the abdomen  and pelvis     Other: None.     IMPRESSION:     1. No evidence of metastatic disease to the abdomen or pelvis.  2. Coronary artery calcifications.    FINDINGS:     Lower thorax: Clear. No effusions.     Abdomen:     Liver: Homogeneous. No focal hepatic mass or ductal dilatation.     Gallbladder: Cholelithiasis     Pancreas: Unremarkable. No mass or ductal dilatation.     Spleen: Homogeneous. No splenomegaly.     Adrenals: No mass.     Kidneys/ureters: Large right renal cyst     GI tract: Non-dilated. No definite wall thickening.. There is no  evidence of appendicitis     MESENTERY: No free fluid, walled off fluid collections, mesenteric  stranding, or enlarged lymph nodes         Vasculature: Evidence of atherosclerotic vascular disease     Abdominal wall: No focal hernia or mass.        Bladder: Urinary bladder wall thickening     Reproductive: Unremarkable as visualized     Bones: No acute bony abnormality.     IMPRESSION:     1. No evidence of metastatic disease to the abdomen or pelvis     2.Cholelithiasis     3. Urinary bladder wall thickening        CTCAP 11-12-21  FINDINGS:    LUNGS:  Unremarkable.  No mass.  No consolidation.    PLEURAL SPACE:  Unremarkable.  No pneumothorax.  No significant  effusion.    HEART:  Coronary artery calcifications are noted.  No significant  pericardial effusion.    BONES/JOINTS:  Anterior thoracic spine osteophyte formation.  No acute  fracture.  No dislocation.    SOFT TISSUES:  Unremarkable.    VASCULATURE:  Unremarkable.  No thoracic aortic aneurysm.    LYMPH NODES:  Unremarkable.  No enlarged lymph nodes.     IMPRESSION:    No acute findings in the chest.    FINDINGS:    LUNG BASES:  Unremarkable.  No mass.  No consolidation.      ABDOMEN:    LIVER:   Unremarkable.  No mass.    GALLBLADDER AND BILE DUCTS:  Gallstones in the gallbladder.  No ductal  dilation.    PANCREAS:  Unremarkable.  No mass.  No ductal dilation.    SPLEEN:  Unremarkable.  No splenomegaly.    ADRENALS:  Unremarkable.  No mass.    KIDNEYS AND URETERS:  6.6 cm right renal cyst.  No hydronephrosis.    STOMACH AND BOWEL:  Changes of distal colonic anastomosis noted.  No  obstruction.  No mucosal thickening.      PELVIS:    APPENDIX:  No findings to suggest acute appendicitis.    BLADDER:  Unremarkable.  No mass.    REPRODUCTIVE:  Unremarkable as visualized.      ABDOMEN and PELVIS:    INTRAPERITONEAL SPACE:  Unremarkable.  No free air.  No significant  fluid collection.    BONES/JOINTS:  No acute fracture.  No dislocation.    SOFT TISSUES:  Unremarkable.    VASCULATURE:  Unremarkable.  No abdominal aortic aneurysm.    LYMPH NODES:  Unremarkable.  No enlarged lymph nodes.     IMPRESSION:    No acute findings in the abdomen or pelvis.      CTCAP 05-26-22  FINDINGS:    Lungs:  Lungs remain clear with no suspicious lung nodules to suggest  metastatic disease.    Pleural space:  No pleural effusions.  No pneumothorax.    Heart:  Marked cardiomegaly is stable.  Severe coronary artery  calcifications are again noted.  No pericardial effusion.    Mediastinum:  No mediastinal or hilar lymphadenopathy is identified.    Bones/joints:  Unremarkable.  No acute fracture.  No dislocation.    Soft tissues:  Gynecomastia changes are noted.    Vasculature:  Atherosclerosis aorta without evidence of aneurysm.    Lymph nodes:  No axillary adenopathy is noted.    Liver:  Marked fatty infiltration of liver.    Gallbladder and bile ducts:  Cholelithiasis.    Kidneys and ureters:  Simple appearing cyst right kidney.    Tubes, lines and devices:  Left-sided Port-A-Cath is noted.     IMPRESSION:  1.  Stable exam demonstrating no evidence of metastatic disease to the  chest.  2.  Marked cardiomegaly and severe coronary  artery calcifications again  noted.  3.  Fatty infiltration of liver.  4.  Cholelithiasis.  5.  Other nonacute findings above.    FINDINGS:    Lung bases:  Unremarkable.  No mass.  No consolidation.    Heart:  Cardiomegaly.  Coronary artery calcifications.      ABDOMEN:    Liver:  Fatty infiltration of liver. No focal liver lesion identified.   No focal liver lesion identified.    Gallbladder and bile ducts:  Cholelithiasis.  No ductal dilation.    Pancreas:  Pancreas is unremarkable.  No ductal dilation.    Spleen:  Spleen is unenlarged.    Adrenals:  Unremarkable.  No mass.    Kidneys and ureters:  Simple appearing cyst right kidney. No imaging  follow-up necessary.  Kidneys are otherwise unremarkable.  No  hydronephrosis.    Stomach and bowel:  Post surgical changes of the lower rectum with an  anastomosis site noted.  Mild constipation. No bowel obstruction.   Surgical anastomosis site in the right mid abdomen small bowel region.   No mucosal thickening.      PELVIS:    Appendix:  No findings to suggest acute appendicitis.    Bladder:  Decompressed urinary bladder.    Reproductive:  Unremarkable as visualized.      ABDOMEN and PELVIS:    Intraperitoneal space:  No ascites or abscess.  No omental masses.  No  free air.    Bones/joints:  Degenerative changes lumbar spine. No acute bony  findings.  No dislocation.    Soft tissues:  Fat-containing inguinal hernias.    Vasculature:  Advanced atherosclerosis. No aneurysm.    Lymph nodes:  Unremarkable.  No enlarged lymph nodes.    Other findings:  No mesenteric masses.     IMPRESSION:  1.  Stable exam of a sitting no evidence of metastatic disease to  abdomen or pelvis.  2.  Stable posterior vertebral changes involving the rectum.  3.  Diffuse fatty liver with no focal lesions.  4.  Cholelithiasis.  5.  Other nonacute findings above.        CTCAP 05-26-22  FINDINGS:    Lungs:  Lungs remain clear with no suspicious lung nodules to suggest  metastatic disease.     Pleural space:  No pleural effusions.  No pneumothorax.    Heart:  Marked cardiomegaly is stable.  Severe coronary artery  calcifications are again noted.  No pericardial effusion.    Mediastinum:  No mediastinal or hilar lymphadenopathy is identified.    Bones/joints:  Unremarkable.  No acute fracture.  No dislocation.    Soft tissues:  Gynecomastia changes are noted.    Vasculature:  Atherosclerosis aorta without evidence of aneurysm.    Lymph nodes:  No axillary adenopathy is noted.    Liver:  Marked fatty infiltration of liver.    Gallbladder and bile ducts:  Cholelithiasis.    Kidneys and ureters:  Simple appearing cyst right kidney.    Tubes, lines and devices:  Left-sided Port-A-Cath is noted.     IMPRESSION:  1.  Stable exam demonstrating no evidence of metastatic disease to the  chest.  2.  Marked cardiomegaly and severe coronary artery calcifications again  noted.  3.  Fatty infiltration of liver.  4.  Cholelithiasis.  5.  Other nonacute findings above.    FINDINGS:    Lung bases:  Unremarkable.  No mass.  No consolidation.    Heart:  Cardiomegaly.  Coronary artery calcifications.      ABDOMEN:    Liver:  Fatty infiltration of liver. No focal liver lesion identified.   No focal liver lesion identified.    Gallbladder and bile ducts:  Cholelithiasis.  No ductal dilation.    Pancreas:  Pancreas is unremarkable.  No ductal dilation.    Spleen:  Spleen is unenlarged.    Adrenals:  Unremarkable.  No mass.    Kidneys and ureters:  Simple appearing cyst right kidney. No imaging  follow-up necessary.  Kidneys are otherwise unremarkable.  No  hydronephrosis.    Stomach and bowel:  Post surgical changes of the lower rectum with an  anastomosis site noted.  Mild constipation. No bowel obstruction.   Surgical anastomosis site in the right mid abdomen small bowel region.   No mucosal thickening.      PELVIS:    Appendix:  No findings to suggest acute appendicitis.    Bladder:  Decompressed urinary bladder.     Reproductive:  Unremarkable as visualized.      ABDOMEN and PELVIS:    Intraperitoneal space:  No ascites or abscess.  No omental masses.  No  free air.    Bones/joints:  Degenerative changes lumbar spine. No acute bony  findings.  No dislocation.    Soft tissues:  Fat-containing inguinal hernias.    Vasculature:  Advanced atherosclerosis. No aneurysm.    Lymph nodes:  Unremarkable.  No enlarged lymph nodes.    Other findings:  No mesenteric masses.     IMPRESSION:  1.  Stable exam of a sitting no evidence of metastatic disease to  abdomen or pelvis.  2.  Stable posterior vertebral changes involving the rectum.  3.  Diffuse fatty liver with no focal lesions.  4.  Cholelithiasis.  5.  Other nonacute findings above.    CT Chest With Contrast Diagnostic (12/07/2022 10:59)  FINDINGS:    Lungs:  There remain no suspicious lung nodules to suggest metastatic  disease.    Pleural space:  No pleural effusions identified.  No pneumothorax.    Heart:  Cardiomegaly is stable.  Coronary calcifications and valvular  calcifications are stable.  No pericardial effusion is noted.    Mediastinum:  No mediastinal or hilar lymphadenopathy identified.    Bones/joints:  The bony structures are stable.  Degenerative changes  thoracic spine appear stable.  No acute fracture.  No dislocation.    Soft tissues:  Gynecomastia noted.    Vasculature:  Unremarkable.  No thoracic aortic aneurysm.    Lymph nodes:  See above.    Liver:  Fatty infiltration of liver.    Gallbladder and bile ducts:  Cholelithiasis.     IMPRESSION:  1.  Stable exam demonstrating no evidence of metastatic disease to the  chest.  2.  Cardiomegaly and coronary calcifications are stable.  3.  Cholelithiasis and fatty liver.  4.  Other nonacute/incidental findings detailed above.    CT Abdomen Pelvis With Contrast (12/07/2022 11:00)  FINDINGS:    Lung bases:  Lung bases are clear.    Heart:  Cardiomegaly is again noted.      ABDOMEN:    Liver:  Diffuse fatty infiltration of  liver.    Gallbladder and bile ducts:  Cholelithiasis.  No ductal dilation.    Pancreas:  Unremarkable.  No mass.  No ductal dilation.    Spleen:  Spleen is unenlarged.    Adrenals:  Unremarkable.  No mass.    Kidneys and ureters:  Cyst right kidney appears stable.  No  hydronephrosis identified.    Stomach and bowel:  Post surgical changes involving the rectum with  unremarkable appearance of the anastomosis.  Sigmoid colon with  diverticulosis but no evidence of diverticulitis.  No obstruction.      PELVIS:    Appendix:  Normal appendix.    Bladder:  Incomplete distention of urinary bladder.    Reproductive:  Mild prostate enlargement.    Subperitoneal space:  No abnormal soft tissue in the perirectal fat  space.      ABDOMEN and PELVIS:    Intraperitoneal space:  No pneumoperitoneum identified.  No  significant fluid collection.    Bones/joints:  No acute fracture.  No dislocation.    Soft tissues:  Stable fat-containing inguinal hernias.    Vasculature:  Atherosclerosis stable without evidence of aneurysm.    Lymph nodes:  No iliac lymphadenopathy.    Other findings:  Anastomosis in the right upper quadrant is stable.     IMPRESSION:  1.  Stable post surgical changes involving the rectum.  2.  No evidence of metastatic disease to the abdomen or pelvis. No  adenopathy identified.  3.  Cholelithiasis.  4.  Fatty liver.  5.  Diverticulosis without diverticulitis.  6.  Other incidental/nonacute findings as above.    CT Abdomen Pelvis With Contrast (08/02/2023 13:54)   FINDINGS:    LUNG BASES:  Unremarkable.  No mass.  No consolidation.      ABDOMEN:    LIVER:  Unremarkable.  No mass.    GALLBLADDER AND BILE DUCTS:  Gallstones.  Gallbladder otherwise  unremarkable.  No ductal dilation.    PANCREAS:  Unremarkable.  No mass.  No ductal dilation.    SPLEEN:  Unremarkable.  No splenomegaly.    ADRENALS:  Unremarkable.  No mass.    KIDNEYS AND URETERS:  Right renal cyst measuring 6 cm.  No  hydronephrosis.    STOMACH AND  BOWEL:  Unremarkable.  No obstruction.  No mucosal  thickening.      PELVIS:    APPENDIX:  No findings to suggest acute appendicitis.    BLADDER:  Nonspecific urinary bladder wall thickening that could be  related to chronic outlet obstruction.    REPRODUCTIVE:  Unremarkable as visualized.      ABDOMEN and PELVIS:    INTRAPERITONEAL SPACE:  Unremarkable.  No free air.  No significant  fluid collection.    BONES/JOINTS:  No acute fracture.  No dislocation.    SOFT TISSUES:  Unremarkable.    VASCULATURE:  Atherosclerotic disease.  No abdominal aortic aneurysm.    LYMPH NODES:  Unremarkable.  No enlarged lymph nodes.     IMPRESSION:  1.  Right renal cyst measuring 6 cm.  2.  Nonspecific urinary bladder wall thickening that could be related to  chronic outlet obstruction.  3.  Gallstones.  Gallbladder otherwise unremarkable.    CT Chest With Contrast Diagnostic (08/02/2023 13:54)   FINDINGS:    LUNGS AND PLEURAL SPACES:  Unremarkable.  No mass.  No consolidation.   No pneumothorax.  No significant effusion.    HEART:  See below.      BONES/JOINTS:  Unremarkable.  No acute fracture.  No dislocation.    SOFT TISSUES:  Unremarkable.    VASCULATURE:  Atherosclerotic disease.  Aortic valvular change noted.   No thoracic aortic aneurysm.    LYMPH NODES:  Unremarkable.  No enlarged lymph nodes.    OTHER FINDINGS:  Myocardial megaly.     IMPRESSION:    No acute findings in the chest.    CT Abdomen Pelvis With Contrast (04/05/2024 13:28) & CT Chest With Contrast Diagnostic (04/05/2024 13:30)   FINDINGS:      CHEST: There is no evidence of mediastinal or axillary adenopathy. Heart  size is normal. There are aortic valvular calcifications. There are no  pleural or pericardial effusions. Lung windows reveal no focal opacities  or suspicious pulmonary nodules. Bone windows reveal no lytic or  destructive lesions.     ABDOMEN: The liver is hypodense consistent with fatty infiltration.  Stones are present within the neck of the  gallbladder. The spleen is  unremarkable. No adrenal mass is present.  The pancreas is normal. There  are bilateral renal cysts. There is no solid renal mass or  hydronephrosis. The aorta is normal in caliber. There is no free fluid  or adenopathy. There is a small bowel anastomosis in the right  hemiabdomen. There is no evidence of a bowel obstruction.     PELVIS: The appendix is is normal. There is a suture line at the  rectosigmoid junction. There is wall thickening involving the rectum  unchanged compared to the prior exam. The urinary bladder is  unremarkable. There is no significant free fluid or adenopathy. Bone  windows reveal no lytic or destructive lesions.     IMPRESSION:  No evidence of metastatic disease involving the chest,  abdomen or pelvis.    Adult Transthoracic Echo Complete W/ Cont if Necessary Per Protocol (04/05/2024 13:53)   Interpretation Summary    Normal left ventricular cavity size and wall thickness noted. All left ventricular wall segments contract normally.    Left ventricular ejection fraction appears to be 56 - 60%.    Left ventricular diastolic function is consistent with (grade I) impaired relaxation.    The aortic valve is not well visualized. The aortic valve is abnormal in structure. There is moderate calcification of the aortic valve    Mild aortic valve regurgitation is present. Moderate to severe aortic valve stenosis is present.    There is moderate calcification of the mitral valve posterior leaflet(s). Mild mitral valve regurgitation is present. Mild mitral valve stenosis is present.    There is no evidence of pericardial effusion. .    Comments:May considerr KOTA to have a better assessment of the aortic and mitral valves.     RECENT LABS:  Lab Results   Component Value Date    WBC 10.53 04/09/2024    HGB 15.0 04/09/2024    HCT 45.0 04/09/2024    MCV 96.6 04/09/2024    RDW 13.3 04/09/2024     04/09/2024    NEUTRORELPCT 75.3 04/09/2024    LYMPHORELPCT 17.9 (L)  04/09/2024    MONORELPCT 4.9 (L) 04/09/2024    EOSRELPCT 1.1 04/09/2024    BASORELPCT 0.4 04/09/2024    NEUTROABS 7.93 (H) 04/09/2024    LYMPHSABS 1.88 04/09/2024       Lab Results   Component Value Date     04/09/2024    K 4.8 04/09/2024    CO2 27.4 04/09/2024     04/09/2024    BUN 14 04/09/2024    CREATININE 1.25 04/09/2024    EGFRIFNONA 60 (L) 02/21/2022    EGFRIFAFRI >60 06/26/2021    GLUCOSE 123 (H) 04/09/2024    CALCIUM 9.3 04/09/2024    ALKPHOS 97 04/09/2024    AST 18 04/09/2024    ALT 18 04/09/2024    BILITOT 0.5 04/09/2024    ALBUMIN 4.0 04/09/2024    PROTEINTOT 7.6 04/09/2024    MG 1.8 (L) 06/26/2021       Lab Results   Component Value Date    CEA 1.88 04/09/2024    CEA 1.90 08/18/2023    CEA 1.52 12/21/2022    CEA 2.11 06/28/2022    CEA 1.77 06/01/2022    CEA 2.01 02/21/2022    CEA 2.02 11/16/2021    CEA 1.90 10/11/2021    CEA 2.25 05/18/2021    CEA 2.37 02/26/2021    CEA 1.82 08/31/2020    CEA 1.83 07/21/2020    CEA 2.00 06/25/2020    CEA 2.52 04/21/2020    CEA 2.28 01/28/2020     Lab Results   Component Value Date    PSA 3.900 04/09/2024    PSA 3.420 08/18/2023    PSA 2.950 12/21/2022    PSA 3.350 06/28/2022    PSA 2.610 06/01/2022    PSA 2.560 02/21/2022    PSA 1.050 07/21/2020    PSA 1.110 06/25/2020    PSA 4.930 (H) 10/19/2018     Lab Results   Component Value Date    TSH 2.860 01/19/2021     Lab Results   Component Value Date    HGBA1C 5.80 (H) 06/25/2020    HGBA1C 5.50 01/28/2020    HGBA1C 5.40 03/20/2019       Lab Results   Component Value Date    FERRITIN 256.90 04/09/2024    IRON 88 04/09/2024    TIBC 347 04/09/2024    LABIRON 25 04/09/2024    WFMYTBND91 1,538 (H) 02/26/2021    FOLATE 9.92 02/26/2021     ASSESSMENT & PLAN:  David Mandujano is a very pleasant 73 y.o. male with newly diagnosed rectal cancer. Clinically stage IIIC ([id number]).    1.  Rectal cancer:  -  He received standard neoadjuvant chemoradiation as above given locally advanced tumor with suspicious pelvic lymph node.     - Pre-treatment MRI showed a locally advanced tumor and suspicious pelvic LN.  PET-CT was negative except in the local tumor.   -  There was a non-specific sub-pleural nodular opacity in the L lung base which was PET negative.  Perhaps this was inflammatory in nature. This area will require f/u on future imaging.  - Recommended neoadjuvant chemoradiation with Xeloda 825 mg/m2 BID D1-5 or M-F during the course of radiaton.  His dose was 500 mg x 4 or 2000 mg BID. Overall, he tolerated this well and completed treatment 1-21-19.    -  He saw Dr. Marilynn Yadav and underwent successful surgical resection as above.    -  He took adjuvant CapeOx treatment to prevent recurrence and has completed 8 cycles. Overall, he tolerated treatment well aside from fatigue and diarrhea. He started to struggle with thrombocytopenia and possibly with neuropathy so gave his last cycle without Oxaliplatin.  -  He did well with take down of his ileostomy.  -  Most recent CT CAP from 4-5-24 without evidence of metastasis.  CEA has been normal...     -  He underwent c-scope with Dr. Barnhart on 2-05-21 without cause for concern.  Repeat exam recommended after 3 years in February 2024, but this has been delayed b/c of development of moderate to severe Aortic Stenosis for which he is mid evaluation.  - Clinically, he continues to do well. Exam/labs from today without cause for concern.   -  He will RTC in 6 months to see me with repeat CT CAP with contrast as well as labs including CBCD, CMP, Ferritin, iron profile, CEA prior.     2.  Iron deficiency Anemia:  -  He previously took Niferex but did not appear to absorb it and has required IV iron replacement. Most recently, he was replaced in mid December 2021.     -  He did have EGD and c-scope.  He continues to f/u with Dr. Barnhart but has now stopped Protonix because he says he felt so much better off of it with normalization of his bowel movements.  -At present, he denies obvious bleeding from  any source.   -Repeat CBC shows normal Hg and iron is now replete.     3. Neuropathy:  -  Etiology is uncertain.  He did complain of some vague symptoms toward the end of his treatment (though he thinks they happened later), so this could be related to Oxaliplatin.    - Thyroid function is wnl.  HbA1C has been normal.    - B12 and folate were previously low, but on therapy became replete. He stopped taking SL B12 and folate.  Recommended he restart them.  At present, he denies having neuropathy.  - He has been advised to avoid alcohol as this can cause or worsen peripheral neuropathy.      4.   Depressed mood / Anxiety and Alcohol Abuse:  - Previously given trial of Lexapro but he did not find this helpful and discontinued this. He said he did quit drinking completely around the time of his surgery but started drinking again. He says Dr. Yadav did mention to him that his liver didn't look good during his operation.  -  We previously discussed potential involvement in support groups and /or referral for alcohol abuse counseling, but he declined.  Will monitor.    5.  Lateral carotid stenosis:  - CT imaging showed bilateral calcific carotid stenosis of approximately 75%.  He saw Dr. Sheldon who had Doppler imaging done which apparently showed good flow and he recommended against intervention at this time.      6.  Moderate to Severe Aortic Stenosis:  -  Has f/u with Dr. Sheldon to discuss further.    7.  Prophylaxis:  He took 2022 influenza vaccine. He took Prevnar 13 on 11-20-18.   He completed COVID vaccine x 2.  Recommended booster. Recommended 2023 influenza vaccine and offered this today but he declined.    8. ACO / KAVITHA/Other  Quality measures  -  David Mandujano did not receive 2023 flu vaccine.  This was recommended but declined.  -  David Mandujano reports a pain score of 0.  Given his pain assessment as noted, treatment options were discussed and the following options were decided upon as a follow-up plan to  address the patient's pain:  No intervention needed at this time .  -  Current outpatient and discharge medications have been reconciled for the patient.  Reviewed by: Jyoti Larios MD      8.  Follow up:   -  F/u with Dr. Sheldon regarding Aortic Stenosis, Carotid Stenosis  -  F/u with Dr. Barnhart for repeat endoscopy when permissible from cardiac standpoint.  -  RTC to see em in 6 months with CBCD, CMP, Ferritin, iron profile, CEA with CT CAP with contrast prior.       I spent 30 minutes with David Mandujano today.  In the office today, more than 50% of this time was spent face-to-face with him  in counseling / coordination of care, reviewing his interim medical history and counseling on the current treatment plan.  All questions were answered to his satisfaction.         Electronically Signed by: Jyoti Larios MD         CC:   MD Obey Coppola, MD Cole Strauss MD William Richards, MD Sandra Beck, MD

## 2024-04-09 NOTE — PROGRESS NOTES
Venipuncture Blood Specimen Collection  Venipuncture performed in right arm by Jemima Olmos MA with good hemostasis. Patient tolerated the procedure well without complications.   04/09/24   Jemima Olmos MA

## 2024-04-10 LAB
CEA SERPL-MCNC: 1.88 NG/ML
PSA SERPL-MCNC: 3.9 NG/ML (ref 0–4)

## 2024-04-11 ENCOUNTER — OFFICE VISIT (OUTPATIENT)
Dept: CARDIOLOGY | Facility: CLINIC | Age: 74
End: 2024-04-11
Payer: MEDICARE

## 2024-04-11 VITALS
HEART RATE: 64 BPM | OXYGEN SATURATION: 95 % | WEIGHT: 275.6 LBS | SYSTOLIC BLOOD PRESSURE: 131 MMHG | HEIGHT: 72 IN | DIASTOLIC BLOOD PRESSURE: 65 MMHG | BODY MASS INDEX: 37.33 KG/M2

## 2024-04-11 DIAGNOSIS — I35.0 AORTIC STENOSIS, MODERATE: ICD-10-CM

## 2024-04-11 DIAGNOSIS — I25.10 ASCVD (ARTERIOSCLEROTIC CARDIOVASCULAR DISEASE): ICD-10-CM

## 2024-04-11 DIAGNOSIS — I42.1 CARDIOMYOPATHY, HYPERTROPHIC OBSTRUCTIVE: ICD-10-CM

## 2024-04-11 DIAGNOSIS — I65.23 CAROTID STENOSIS, ASYMPTOMATIC, BILATERAL: Primary | ICD-10-CM

## 2024-04-11 DIAGNOSIS — I10 ESSENTIAL HYPERTENSION: ICD-10-CM

## 2024-04-11 LAB
CHOLEST SERPL-MCNC: 212 MG/DL (ref 0–200)
HDLC SERPL-MCNC: 62 MG/DL (ref 40–60)
LDLC SERPL CALC-MCNC: 127 MG/DL (ref 0–100)
LDLC/HDLC SERPL: 1.99 {RATIO}
TRIGL SERPL-MCNC: 133 MG/DL (ref 0–150)
VLDLC SERPL-MCNC: 23 MG/DL (ref 5–40)

## 2024-04-11 PROCEDURE — 3078F DIAST BP <80 MM HG: CPT | Performed by: NURSE PRACTITIONER

## 2024-04-11 PROCEDURE — 99214 OFFICE O/P EST MOD 30 MIN: CPT | Performed by: NURSE PRACTITIONER

## 2024-04-11 PROCEDURE — 1159F MED LIST DOCD IN RCRD: CPT | Performed by: NURSE PRACTITIONER

## 2024-04-11 PROCEDURE — 1160F RVW MEDS BY RX/DR IN RCRD: CPT | Performed by: NURSE PRACTITIONER

## 2024-04-11 PROCEDURE — 3075F SYST BP GE 130 - 139MM HG: CPT | Performed by: NURSE PRACTITIONER

## 2024-04-11 RX ORDER — ATORVASTATIN CALCIUM 80 MG/1
80 TABLET, FILM COATED ORAL NIGHTLY
Qty: 30 TABLET | Refills: 5 | Status: SHIPPED | OUTPATIENT
Start: 2024-04-11

## 2024-04-11 NOTE — H&P (VIEW-ONLY)
Joshua Barnhart MD  David Mandujnao  1950 04/11/2024    Patient Active Problem List   Diagnosis    Iron deficiency anemia, unspecified    Rectal cancer    Preoperative cardiovascular examination    Hypertrophic cardiomyopathy with LV OT gradient of about 68 mmHg in February 2019.    Essential hypertension    Port-A-Cath in place    Iron deficiency anemia due to chronic blood loss    Malabsorption of iron    Carotid stenosis, asymptomatic, bilateral       Dear Joshua Barnhart MD:    Subjective     Chief Complaint   Patient presents with    Follow-up     Labs  Echo   6 week follow up    Med Management     Verbal           History of Present Illness:    David Mandujano is a 73 y.o. male with a past medical history of nonobstructive ASCVD noted on left heart catheterization at  in 2020, hypertension, moderate aortic valve stenosis, hypertrophic cardiomyopathy with asymmetrical septal hypertrophy with LVOT obstruction status post alcohol septal ablation by Dr. Oneill at [facility] in 2020, and bilateral carotid artery stenosis.  He presents today for cardiology follow-up.  He reports he is doing well.  Denies any chest pain, shortness of breath, palpitations, dizziness, or lightheadedness.  He recently underwent echocardiogram which revealed LVEF of 56 to 60% with grade 1 LV diastolic dysfunction, moderate to severe aortic valve stenosis and mild mitral valve stenosis.  A KOTA was recommended to have a better assessment of the aortic and mitral valves.  A carotid Doppler revealed moderate plaque in the right carotid bulb producing in the 50 to 69% stenosis and mild plaque in the left carotid bulb and proximal ICA producing less than 50% stenosis.  He does have follow-up with CT surgery next week.          No Known Allergies:      Current Outpatient Medications:     allopurinol (ZYLOPRIM) 100 MG tablet, Take 1 tablet by mouth Daily. (Patient taking differently: Take 0.5 tablets by mouth As  "Needed.), Disp: 30 tablet, Rfl: 0    amLODIPine (NORVASC) 10 MG tablet, Take 1 tablet by mouth Daily., Disp: 30 tablet, Rfl: 11    ASCORBIC ACID PO, Take  by mouth Daily., Disp: , Rfl:     aspirin 81 MG EC tablet, Take 1 tablet by mouth Daily., Disp: 30 tablet, Rfl: 5    atorvastatin (LIPITOR) 20 MG tablet, Take 1 tablet by mouth Daily. for cholesterol., Disp: 30 tablet, Rfl: 5    cyanocobalamin (VITAMIN B-12) 1000 MCG tablet, Take 1 tablet by mouth Daily., Disp: , Rfl:     folic acid (FOLVITE) 1 MG tablet, TAKE ONE TABLET BY MOUTH DAILY, Disp: 30 tablet, Rfl: 11    lisinopril (PRINIVIL,ZESTRIL) 20 MG tablet, Take 1 tablet by mouth Daily. FOR BLOOD PRESSURE, Disp: 30 tablet, Rfl: 5    metoprolol succinate XL (TOPROL-XL) 50 MG 24 hr tablet, Take 1 tablet by mouth Daily., Disp: 30 tablet, Rfl: 5    vitamin B-6 (PYRIDOXINE) 100 MG tablet, TAKE ONE TABLET BY MOUTH DAILY, Disp: 30 tablet, Rfl: 5      The following portions of the patient's history were reviewed and updated as appropriate: allergies, current medications, past family history, past medical history, past social history, past surgical history and problem list.    Social History     Tobacco Use    Smoking status: Some Days     Types: Cigars    Smokeless tobacco: Current     Types: Chew    Tobacco comments:     Occasional Cigars-Has not had one in 5 years   Vaping Use    Vaping status: Never Used   Substance Use Topics    Alcohol use: Yes     Comment: occasional-[place]    Drug use: No       Review of Systems   Constitutional: Negative for decreased appetite and malaise/fatigue.   Cardiovascular:  Negative for chest pain, dyspnea on exertion and palpitations.   Respiratory:  Negative for cough and shortness of breath.        Objective   Vitals:    04/11/24 1403   BP: 131/65   BP Location: Left arm   Patient Position: Sitting   Cuff Size: Large Adult   Pulse: 64   SpO2: 95%   Weight: 125 kg (275 lb 9.6 oz)   Height: 182.9 cm (72.01\")     Body mass index is 37.37 " kg/m².        Vitals reviewed.   Constitutional:       Appearance: Healthy appearance. Well-developed and not in distress.   HENT:      Head: Normocephalic and atraumatic.   Pulmonary:      Effort: Pulmonary effort is normal.      Breath sounds: Normal breath sounds. No wheezing. No rales.   Cardiovascular:      Normal rate. Regular rhythm.      Murmurs: There is a grade 3/6 holosystolic murmur at the URSB.      . No S3 and S4 gallop.   Edema:     Peripheral edema absent.   Abdominal:      General: Bowel sounds are normal.      Palpations: Abdomen is soft.   Skin:     General: Skin is warm and dry.   Neurological:      Mental Status: Alert, oriented to person, place, and time and oriented to person, place and time.   Psychiatric:         Mood and Affect: Mood normal.         Behavior: Behavior normal.         Lab Results   Component Value Date     04/09/2024    K 4.8 04/09/2024     04/09/2024    CO2 27.4 04/09/2024    BUN 14 04/09/2024    CREATININE 1.25 04/09/2024    GLUCOSE 123 (H) 04/09/2024    CALCIUM 9.3 04/09/2024    AST 18 04/09/2024    ALT 18 04/09/2024    ALKPHOS 97 04/09/2024    LABIL2 1.5 06/25/2014     Lab Results   Component Value Date    WBC 10.53 04/09/2024    HGB 15.0 04/09/2024    HCT 45.0 04/09/2024     04/09/2024     Lab Results   Component Value Date    TSH 2.860 01/19/2021    PSA 3.900 04/09/2024    CHLPL 261 (H) 06/25/2014    TRIG 133 04/09/2024    HDL 62 (H) 04/09/2024     (H) 04/09/2024          Results for orders placed during the hospital encounter of 04/05/24    Adult Transthoracic Echo Complete W/ Cont if Necessary Per Protocol    Interpretation Summary    Normal left ventricular cavity size and wall thickness noted. All left ventricular wall segments contract normally.    Left ventricular ejection fraction appears to be 56 - 60%.    Left ventricular diastolic function is consistent with (grade I) impaired relaxation.    The aortic valve is not well visualized.  The aortic valve is abnormal in structure. There is moderate calcification of the aortic valve    Mild aortic valve regurgitation is present. Moderate to severe aortic valve stenosis is present.    There is moderate calcification of the mitral valve posterior leaflet(s). Mild mitral valve regurgitation is present. Mild mitral valve stenosis is present.    There is no evidence of pericardial effusion. .    Comments:May considerr KOTA to have a better assessment of the aortic and mitral valves.     Results for orders placed during the hospital encounter of 11/27/23    Stress Test With Myocardial Perfusion One Day    Interpretation Summary  Images from the original result were not included.      A pharmacological stress test was performed using regadenoson.    Findings consistent with an indeterminate ECG stress test.    Myocardial perfusion imaging indicates a normal myocardial perfusion study with no evidence of ischemia.    TID:1.03    Normal LV cavity size. Normal LV wall motion noted.    Left ventricular ejection fraction is normal (Calculated EF = 58%).    Impressions are consistent with a low risk study.           Procedures      Assessment & Plan    Diagnosis Plan   1. Carotid stenosis, asymptomatic, bilateral  Lipid Panel      2. Aortic stenosis, moderate  Adult Transesophageal Echo (KOTA) W/ Cont if Necessary Per Protocol      3. Essential hypertension        4. Cardiomyopathy, hypertrophic obstructive with LVOT gradient of about 99 mmHg, status post recent alcohol septal ablation at [facility]..        5. ASCVD (arteriosclerotic cardiovascular disease)  Lipid Panel                   Recommendations:    Carotid artery stenosis-following with CT surgery, follow-up next week.  Continue low-dose aspirin and atorvastatin  Moderate to severe aortic valve stenosis-discussed results of the transthoracic echocardiogram today.  Will order a KOTA to evaluate aortic valve further.  I discussed the risks and the  benefits of the procedure with the patient and he is agreeable.  I discussed with Dr. Joe and he agrees as well.  Essential hypertension-BP well-controlled.  HOCM status post alcohol septal ablation- continue to monitor with echocardiogram.  ASCVD - no anginal symptoms. Recent stress test normal. Continue low dose aspirin, statin, lisinopril, and metoprolol.  Will order a CMP and lipid panel adjust statin as indicated.  LDL goal less than 55.  Follow up in 4 weeks or sooner if needed.         Return in about 4 weeks (around 5/9/2024) for Recheck.    As always, I appreciate very much the opportunity to participate in the cardiovascular care of your patients.      With Best Regards,    JOSE CARLOS Hoff

## 2024-04-11 NOTE — PROGRESS NOTES
Joshua Barnhart MD  David Mandujano  1950 04/11/2024    Patient Active Problem List   Diagnosis    Iron deficiency anemia, unspecified    Rectal cancer    Preoperative cardiovascular examination    Hypertrophic cardiomyopathy with LV OT gradient of about 68 mmHg in February 2019.    Essential hypertension    Port-A-Cath in place    Iron deficiency anemia due to chronic blood loss    Malabsorption of iron    Carotid stenosis, asymptomatic, bilateral       Dear Joshua Barnhart MD:    Subjective     Chief Complaint   Patient presents with    Follow-up     Labs  Echo   6 week follow up    Med Management     Verbal           History of Present Illness:    David Mandujano is a 73 y.o. male with a past medical history of nonobstructive ASCVD noted on left heart catheterization at  in 2020, hypertension, moderate aortic valve stenosis, hypertrophic cardiomyopathy with asymmetrical septal hypertrophy with LVOT obstruction status post alcohol septal ablation by Dr. Oneill at [facility] in 2020, and bilateral carotid artery stenosis.  He presents today for cardiology follow-up.  He reports he is doing well.  Denies any chest pain, shortness of breath, palpitations, dizziness, or lightheadedness.  He recently underwent echocardiogram which revealed LVEF of 56 to 60% with grade 1 LV diastolic dysfunction, moderate to severe aortic valve stenosis and mild mitral valve stenosis.  A KOTA was recommended to have a better assessment of the aortic and mitral valves.  A carotid Doppler revealed moderate plaque in the right carotid bulb producing in the 50 to 69% stenosis and mild plaque in the left carotid bulb and proximal ICA producing less than 50% stenosis.  He does have follow-up with CT surgery next week.          No Known Allergies:      Current Outpatient Medications:     allopurinol (ZYLOPRIM) 100 MG tablet, Take 1 tablet by mouth Daily. (Patient taking differently: Take 0.5 tablets by mouth As  "Needed.), Disp: 30 tablet, Rfl: 0    amLODIPine (NORVASC) 10 MG tablet, Take 1 tablet by mouth Daily., Disp: 30 tablet, Rfl: 11    ASCORBIC ACID PO, Take  by mouth Daily., Disp: , Rfl:     aspirin 81 MG EC tablet, Take 1 tablet by mouth Daily., Disp: 30 tablet, Rfl: 5    atorvastatin (LIPITOR) 20 MG tablet, Take 1 tablet by mouth Daily. for cholesterol., Disp: 30 tablet, Rfl: 5    cyanocobalamin (VITAMIN B-12) 1000 MCG tablet, Take 1 tablet by mouth Daily., Disp: , Rfl:     folic acid (FOLVITE) 1 MG tablet, TAKE ONE TABLET BY MOUTH DAILY, Disp: 30 tablet, Rfl: 11    lisinopril (PRINIVIL,ZESTRIL) 20 MG tablet, Take 1 tablet by mouth Daily. FOR BLOOD PRESSURE, Disp: 30 tablet, Rfl: 5    metoprolol succinate XL (TOPROL-XL) 50 MG 24 hr tablet, Take 1 tablet by mouth Daily., Disp: 30 tablet, Rfl: 5    vitamin B-6 (PYRIDOXINE) 100 MG tablet, TAKE ONE TABLET BY MOUTH DAILY, Disp: 30 tablet, Rfl: 5      The following portions of the patient's history were reviewed and updated as appropriate: allergies, current medications, past family history, past medical history, past social history, past surgical history and problem list.    Social History     Tobacco Use    Smoking status: Some Days     Types: Cigars    Smokeless tobacco: Current     Types: Chew    Tobacco comments:     Occasional Cigars-Has not had one in 5 years   Vaping Use    Vaping status: Never Used   Substance Use Topics    Alcohol use: Yes     Comment: occasional-[place]    Drug use: No       Review of Systems   Constitutional: Negative for decreased appetite and malaise/fatigue.   Cardiovascular:  Negative for chest pain, dyspnea on exertion and palpitations.   Respiratory:  Negative for cough and shortness of breath.        Objective   Vitals:    04/11/24 1403   BP: 131/65   BP Location: Left arm   Patient Position: Sitting   Cuff Size: Large Adult   Pulse: 64   SpO2: 95%   Weight: 125 kg (275 lb 9.6 oz)   Height: 182.9 cm (72.01\")     Body mass index is 37.37 " kg/m².        Vitals reviewed.   Constitutional:       Appearance: Healthy appearance. Well-developed and not in distress.   HENT:      Head: Normocephalic and atraumatic.   Pulmonary:      Effort: Pulmonary effort is normal.      Breath sounds: Normal breath sounds. No wheezing. No rales.   Cardiovascular:      Normal rate. Regular rhythm.      Murmurs: There is a grade 3/6 holosystolic murmur at the URSB.      . No S3 and S4 gallop.   Edema:     Peripheral edema absent.   Abdominal:      General: Bowel sounds are normal.      Palpations: Abdomen is soft.   Skin:     General: Skin is warm and dry.   Neurological:      Mental Status: Alert, oriented to person, place, and time and oriented to person, place and time.   Psychiatric:         Mood and Affect: Mood normal.         Behavior: Behavior normal.         Lab Results   Component Value Date     04/09/2024    K 4.8 04/09/2024     04/09/2024    CO2 27.4 04/09/2024    BUN 14 04/09/2024    CREATININE 1.25 04/09/2024    GLUCOSE 123 (H) 04/09/2024    CALCIUM 9.3 04/09/2024    AST 18 04/09/2024    ALT 18 04/09/2024    ALKPHOS 97 04/09/2024    LABIL2 1.5 06/25/2014     Lab Results   Component Value Date    WBC 10.53 04/09/2024    HGB 15.0 04/09/2024    HCT 45.0 04/09/2024     04/09/2024     Lab Results   Component Value Date    TSH 2.860 01/19/2021    PSA 3.900 04/09/2024    CHLPL 261 (H) 06/25/2014    TRIG 133 04/09/2024    HDL 62 (H) 04/09/2024     (H) 04/09/2024          Results for orders placed during the hospital encounter of 04/05/24    Adult Transthoracic Echo Complete W/ Cont if Necessary Per Protocol    Interpretation Summary    Normal left ventricular cavity size and wall thickness noted. All left ventricular wall segments contract normally.    Left ventricular ejection fraction appears to be 56 - 60%.    Left ventricular diastolic function is consistent with (grade I) impaired relaxation.    The aortic valve is not well visualized.  The aortic valve is abnormal in structure. There is moderate calcification of the aortic valve    Mild aortic valve regurgitation is present. Moderate to severe aortic valve stenosis is present.    There is moderate calcification of the mitral valve posterior leaflet(s). Mild mitral valve regurgitation is present. Mild mitral valve stenosis is present.    There is no evidence of pericardial effusion. .    Comments:May considerr KOTA to have a better assessment of the aortic and mitral valves.     Results for orders placed during the hospital encounter of 11/27/23    Stress Test With Myocardial Perfusion One Day    Interpretation Summary  Images from the original result were not included.      A pharmacological stress test was performed using regadenoson.    Findings consistent with an indeterminate ECG stress test.    Myocardial perfusion imaging indicates a normal myocardial perfusion study with no evidence of ischemia.    TID:1.03    Normal LV cavity size. Normal LV wall motion noted.    Left ventricular ejection fraction is normal (Calculated EF = 58%).    Impressions are consistent with a low risk study.           Procedures      Assessment & Plan    Diagnosis Plan   1. Carotid stenosis, asymptomatic, bilateral  Lipid Panel      2. Aortic stenosis, moderate  Adult Transesophageal Echo (KOTA) W/ Cont if Necessary Per Protocol      3. Essential hypertension        4. Cardiomyopathy, hypertrophic obstructive with LVOT gradient of about 99 mmHg, status post recent alcohol septal ablation at [facility]..        5. ASCVD (arteriosclerotic cardiovascular disease)  Lipid Panel                   Recommendations:    Carotid artery stenosis-following with CT surgery, follow-up next week.  Continue low-dose aspirin and atorvastatin  Moderate to severe aortic valve stenosis-discussed results of the transthoracic echocardiogram today.  Will order a KOTA to evaluate aortic valve further.  I discussed the risks and the  benefits of the procedure with the patient and he is agreeable.  I discussed with Dr. Joe and he agrees as well.  Essential hypertension-BP well-controlled.  HOCM status post alcohol septal ablation- continue to monitor with echocardiogram.  ASCVD - no anginal symptoms. Recent stress test normal. Continue low dose aspirin, statin, lisinopril, and metoprolol.  Will order a CMP and lipid panel adjust statin as indicated.  LDL goal less than 55.  Follow up in 4 weeks or sooner if needed.         Return in about 4 weeks (around 5/9/2024) for Recheck.    As always, I appreciate very much the opportunity to participate in the cardiovascular care of your patients.      With Best Regards,    JOSE CARLOS Hoff

## 2024-04-15 NOTE — PROGRESS NOTES
[facility] Cardiothoracic Surgery Office Follow Up Note     Date of Encounter: 2024     Name: David Mandujano  : 1950     Referred By: No ref. provider found  PCP: Joshua Barnhart MD    Chief Complaint:    Chief Complaint   Patient presents with    Follow-up     FU with Carotid Duplex       Subjective      History of Present Illness:    David Mandujano is a 73 y.o. male followed by Dr. Sheldon for bilateral DAYANNA.  PMH: HTN, hyperlipidemia, hypertrophic cardiomyopathy s/p transseptal alcohol ablation, colon cancer s/p colon resection, active smokeless tobacco abuse, and DAYANNA.  Patient last seen in clinic per Dr. Sheldon on 8/15/2023 without symptoms of vision loss, ataxia, or focal weakness.  Patient underwent CTA carotid demonstrating severe stenosis bilaterally.  Dr. Sheldon noted patient was asymptomatic without hemodynamically significant disease per carotid duplex.  Per Dr. Sheldon, carotid stenosis warrants observation at this time.  Tobacco cessation strongly recommended.  He returns to CT surgery clinic for follow-up carotid duplex ultrasound results.     Patient follows with [facility].  Transthoracic echocardiogram was performed on 2024 with findings of moderate to severe aortic valve stenosis and mild mitral valve regurgitation and stenosis.  A KOTA is planned to better evaluate his valvular disease.  Current medical therapy includes aspirin, statin, and beta-blocker therapy.  He continues to report no amaurosis fugax, TIA or CVA symptoms.    Review of Systems:  Review of Systems   Constitutional: Negative for chills, decreased appetite, diaphoresis, fever, malaise/fatigue, night sweats, weight gain and weight loss.   HENT:  Negative for hoarse voice.    Eyes:  Negative for blurred vision, double vision and visual disturbance.   Cardiovascular:  Positive for dyspnea on exertion. Negative for chest pain, claudication, irregular heartbeat, leg swelling, near-syncope,  orthopnea, palpitations, paroxysmal nocturnal dyspnea and syncope.   Respiratory:  Negative for cough, hemoptysis, shortness of breath, sputum production and wheezing.    Hematologic/Lymphatic: Negative for adenopathy and bleeding problem. Does not bruise/bleed easily.   Skin:  Negative for color change, nail changes, poor wound healing and rash.   Musculoskeletal:  Negative for back pain, falls and muscle cramps.   Gastrointestinal:  Negative for abdominal pain, dysphagia and heartburn.   Genitourinary:  Negative for flank pain.   Neurological:  Negative for brief paralysis, disturbances in coordination, dizziness, focal weakness, headaches, light-headedness, loss of balance, numbness, paresthesias, sensory change, vertigo and weakness.   Psychiatric/Behavioral:  Negative for depression and suicidal ideas.    Allergic/Immunologic: Negative for persistent infections.       I have reviewed the following portions of the patient's history: problem list, current medications, allergies, past surgical history, past medical history, past social history, past family history, and ROS and confirm it's accurate.    Allergies:  No Known Allergies    Medications:      Current Outpatient Medications:     allopurinol (ZYLOPRIM) 100 MG tablet, Take 1 tablet by mouth Daily. (Patient taking differently: Take 0.5 tablets by mouth As Needed.), Disp: 30 tablet, Rfl: 0    amLODIPine (NORVASC) 10 MG tablet, Take 1 tablet by mouth Daily., Disp: 30 tablet, Rfl: 11    ASCORBIC ACID PO, Take  by mouth Daily., Disp: , Rfl:     aspirin 81 MG EC tablet, Take 1 tablet by mouth Daily., Disp: 30 tablet, Rfl: 5    atorvastatin (LIPITOR) 80 MG tablet, Take 1 tablet by mouth Every Night. for cholesterol., Disp: 30 tablet, Rfl: 5    cyanocobalamin (VITAMIN B-12) 1000 MCG tablet, Take 1 tablet by mouth Daily., Disp: , Rfl:     folic acid (FOLVITE) 1 MG tablet, TAKE ONE TABLET BY MOUTH DAILY, Disp: 30 tablet, Rfl: 11    lisinopril (PRINIVIL,ZESTRIL) 20  "MG tablet, Take 1 tablet by mouth Daily. FOR BLOOD PRESSURE, Disp: 30 tablet, Rfl: 5    metoprolol succinate XL (TOPROL-XL) 50 MG 24 hr tablet, Take 1 tablet by mouth Daily., Disp: 30 tablet, Rfl: 5    vitamin B-6 (PYRIDOXINE) 100 MG tablet, TAKE ONE TABLET BY MOUTH DAILY, Disp: 30 tablet, Rfl: 5    History:   Past Medical History:   Diagnosis Date    Anemia     Arthritis     Carotid stenosis     Carotid stenosis, asymptomatic, bilateral 8/16/2023    Colon cancer     s/p chemo therapy    Heart murmur     Hyperlipidemia     Hypertension     Wrist fracture     surgically repaired       Past Surgical History:   Procedure Laterality Date    ABDOMINAL SURGERY      CARDIAC ABLATION      COLON SURGERY      COLONOSCOPY      FRACTURE SURGERY Left     VENOUS ACCESS DEVICE (PORT) INSERTION N/A 05/20/2019    Procedure: INSERTION VENOUS ACCESS DEVICE;  Surgeon: Cindy Corrales MD;  [facility]: [facility];  Service: General    WRIST SURGERY         Social History     Socioeconomic History    Marital status: Single    Number of children: 2   Tobacco Use    Smoking status: Former     Types: Cigars     Quit date: 2014     Years since quitting: 10.2    Smokeless tobacco: Current     Types: Chew    Tobacco comments:     Occasional Cigars-Has not had one in 5 years   Vaping Use    Vaping status: Never Used   Substance and Sexual Activity    Alcohol use: Yes     Comment: occasional-[place]    Drug use: No    Sexual activity: Defer        Family History   Problem Relation Age of Onset    Stroke Father     Hyperlipidemia Father     Other Sister     Heart disease Sister     Colon cancer Maternal Grandfather        Objective   Physical Exam:  Vitals:    04/17/24 0926   BP: 158/80   BP Location: Left arm   Patient Position: Sitting   Pulse: 93   Temp: 97.1 °F (36.2 °C)   SpO2: 96%   Weight: 123 kg (272 lb)   Height: 182.9 cm (72\")  Comment: patient reported      Body mass index is 36.89 kg/m².    Physical Exam  Vitals reviewed. "   Constitutional:       General: He is not in acute distress.     Appearance: He is not toxic-appearing.   HENT:      Head: Normocephalic and atraumatic.   Eyes:      General: Lids are normal.      Conjunctiva/sclera: Conjunctivae normal.      Pupils: Pupils are equal, round, and reactive to light.   Neck:      Vascular: No carotid bruit.   Cardiovascular:      Rate and Rhythm: Normal rate and regular rhythm.      Heart sounds: S1 normal and S2 normal. No murmur heard.  Pulmonary:      Effort: Pulmonary effort is normal. No respiratory distress.      Breath sounds: No decreased breath sounds, wheezing, rhonchi or rales.   Musculoskeletal:         General: Normal range of motion.      Cervical back: Normal range of motion and neck supple.      Right lower leg: No edema.      Left lower leg: No edema.   Lymphadenopathy:      Cervical: No cervical adenopathy.      Upper Body:      Right upper body: No supraclavicular adenopathy.      Left upper body: No supraclavicular adenopathy.   Skin:     General: Skin is warm and dry.      Capillary Refill: Capillary refill takes less than 2 seconds.   Neurological:      General: No focal deficit present.      Mental Status: He is alert and oriented to person, place, and time.   Psychiatric:         Attention and Perception: Attention normal.         Mood and Affect: Mood normal.         Speech: Speech normal.         Behavior: Behavior normal. Behavior is cooperative.         Imaging/Labs:  TTE 4/6/24 Interpretation Summary     Normal left ventricular cavity size and wall thickness noted. All left ventricular wall segments contract normally.    Left ventricular ejection fraction appears to be 56 - 60%.    Left ventricular diastolic function is consistent with (grade I) impaired relaxation.    The aortic valve is not well visualized. The aortic valve is abnormal in structure. There is moderate calcification of the aortic valve    Mild aortic valve regurgitation is present.  Moderate to severe aortic valve stenosis is present.    There is moderate calcification of the mitral valve posterior leaflet(s). Mild mitral valve regurgitation is present. Mild mitral valve stenosis is present.    There is no evidence of pericardial effusion. .    Comments:May considerr KOTA to have a better assessment of the aortic and mitral valves.    US Carotid Bilateral: 4/5/2024  Impression:  1. Moderate plaque in the right carotid bulb and proximal ICA producing a 50-69% stenosis.   2. Mild plaque in the left carotid bulb and proximal ICA producing a less than 50% stenosis.      This report was finalized on 4/5/2024 2:04 PM by Alta Kahn M.D.  Peak velocity right  cm/s and peak velocity LICA 108 cm/s    CT Angiography Neck With Intravenous Contrast: 8/2/2023    FINDINGS:    VASCULATURE:    RIGHT COMMON CAROTID ARTERY:  Unremarkable.  No significant stenosis. No dissection or occlusion.    RIGHT INTERNAL CAROTID ARTERY:  Greater than 75% stenosis of the right ICA secondary to calcification.  No dissection or occlusion.    RIGHT EXTERNAL CAROTID ARTERY:  Unremarkable.  No occlusion.    RIGHT VERTEBRAL ARTERY:  Unremarkable.  No significant stenosis.  No dissection or occlusion.     LEFT COMMON CAROTID ARTERY:  Unremarkable.  No significant stenosis. No dissection or occlusion.    LEFT INTERNAL CAROTID ARTERY:  Nearly 75% stenosis of the left ICA  secondary to calcification.  No dissection or occlusion.    LEFT EXTERNAL CAROTID ARTERY:  Unremarkable.  No occlusion.    LEFT VERTEBRAL ARTERY:  Unremarkable.  No significant stenosis.  No  dissection or occlusion.    NECK:    BONES/JOINTS:  No acute fracture.  No dislocation.    SOFT TISSUES:  Unremarkable as visualized.  No mass.     IMPRESSION:  1.  Nearly 75% stenosis of the left ICA secondary to calcification.  2.  Greater than 75% stenosis of the right ICA secondary to  calcification.   This report was finalized on 8/2/2023 2:06 PM by Dr. Mckeon  MD Ana.    US Duplex Bilateral Extracranial Arteries: 4/20/2023    FINDINGS:    RIGHT COMMON CAROTID ARTERY:  Unremarkable.  No occlusion or  significant stenosis on color flow and spectral Doppler imaging.    RIGHT INTERNAL CAROTID ARTERY:  Peak systolic velocity in the right  internal carotid artery (ICA) is 149 cm/s.    RIGHT EXTERNAL CAROTID ARTERY:  Unremarkable.  No occlusion or  significant stenosis on color flow and spectral Doppler imaging.    RIGHT VERTEBRAL ARTERY:  Unremarkable.  Antegrade flow.    RIGHT ICA/CCA RATIO:  The ICA/CCA peak systolic velocity ratio is 2.1  on the right.     LEFT COMMON CAROTID ARTERY:  Unremarkable.  No occlusion or  significant stenosis on color flow and spectral Doppler imaging.    LEFT INTERNAL CAROTID ARTERY:  Peak systolic velocity in the left  internal carotid artery (ICA) is 133 cm/s.    LEFT EXTERNAL CAROTID ARTERY:  Unremarkable.  No occlusion or  significant stenosis on color flow and spectral Doppler imaging.    LEFT VERTEBRAL ARTERY:  Unremarkable.  Antegrade flow.    LEFT ICA/CCA RATIO:  Unremarkable.  The ICA/CCA peak systolic velocity ratio is 1.2 on the left.     LYMPH NODES:  Unremarkable.  No lymphadenopathy.      CAROTID STENOSIS REFERENCE USING SRU CRITERIA:    Mild - <50% stenosis. ICA PSV is less than 125 cm/second and plaque or  intimal thickening is visible.    Moderate - 50-69% stenosis. ICA PSV is 125 to 230 cm/second and plaque  is visible.    Severe - 70-94% stenosis. ICA PSV is more than 230 cm/second and  visible plaque with lumen narrowing is seen.    Near occlusion - 95-99% stenosis. ICA PSV is variable and significant  plaque with luminal narrowing is seen.    Occluded - 100% stenosis. No flow identified.     IMPRESSION:  1.  50-69% stenosis in the right internal carotid artery due to  atherosclerotic plaque.  2.  50-69% stenosis in the left internal carotid artery due to  atherosclerotic plaque.   This report was finalized on 4/20/2023  12:45 PM by Dr. Mike Perez MD.  Assessment / Plan      Assessment / Plan:  1. Carotid stenosis, asymptomatic, bilateral (Primary)  - 73 y.o. male followed by Dr. Sheldon for bilateral DAYANNA.   - PMH: HTN, hyperlipidemia, hypertrophic cardiomyopathy s/p transseptal alcohol ablation, colon cancer s/p colon resection, active smokeless tobacco abuse, and DAYANNA.    - Last seen in clinic per Dr. Sheldon on 8/15/2023:   CTA carotid w/ severe stenosis bilaterally but asymptomatic without hemodynamically significant disease on duplex.    - Per Dr. Sheldon, carotid stenosis warrants observation at this time.    - Tobacco use cessation strongly recommended.    - Returns for follow-up carotid duplex ultrasound: stable bilateral ICA velocities per duplex.  LICA 108 cm/sec (was 133 cm/sec) and CRISTIANA 179 cm/sec (was 149 cm/sec)  - Continues to report no amaurosis fugax, TIA or CVA symptoms.  - Will plan close observation with carotid duplex again in 6 months with clinic re-visit  - continue ASA, statin, BB and DC nicotine intake  - Report any neurological symptoms promptly  - Follow up re: valvular disease per Dr. Joe's recommendations following next visit 4/19/2024    Patient Education: Report any neurological symptoms promptly or call 911.  Be FAST: Balance issues, Eyesight loss, Face drooping, Arm weakness, Speech changes, Time to get help       Follow Up:   Return for carotid duplex.   Or sooner for any further concerns or worsening sign and symptoms. If unable to reach us in the office please dial 911 or go to the nearest emergency department.      JOSE CARLOS Osorio  [facility] Cardiothoracic Surgery    Time Spent: I spent 26 minutes caring for David on this date of service. This time includes time spent by me in the following activities: preparing for the visit, reviewing tests, obtaining and/or reviewing a separately obtained history, performing a medically appropriate examination and/or evaluation, counseling and  educating the patient/family/caregiver, ordering medications, tests, or procedures, documenting information in the medical record, and care coordination.

## 2024-04-17 ENCOUNTER — OFFICE VISIT (OUTPATIENT)
Dept: CARDIAC SURGERY | Facility: CLINIC | Age: 74
End: 2024-04-17
Payer: MEDICARE

## 2024-04-17 VITALS
SYSTOLIC BLOOD PRESSURE: 158 MMHG | OXYGEN SATURATION: 96 % | HEIGHT: 72 IN | HEART RATE: 93 BPM | BODY MASS INDEX: 36.84 KG/M2 | WEIGHT: 272 LBS | TEMPERATURE: 97.1 F | DIASTOLIC BLOOD PRESSURE: 80 MMHG

## 2024-04-17 DIAGNOSIS — I65.23 CAROTID STENOSIS, ASYMPTOMATIC, BILATERAL: Primary | ICD-10-CM

## 2024-04-17 PROCEDURE — 1160F RVW MEDS BY RX/DR IN RCRD: CPT | Performed by: NURSE PRACTITIONER

## 2024-04-17 PROCEDURE — 3079F DIAST BP 80-89 MM HG: CPT | Performed by: NURSE PRACTITIONER

## 2024-04-17 PROCEDURE — 1159F MED LIST DOCD IN RCRD: CPT | Performed by: NURSE PRACTITIONER

## 2024-04-17 PROCEDURE — 3077F SYST BP >= 140 MM HG: CPT | Performed by: NURSE PRACTITIONER

## 2024-04-17 PROCEDURE — 99213 OFFICE O/P EST LOW 20 MIN: CPT | Performed by: NURSE PRACTITIONER

## 2024-04-23 ENCOUNTER — ANESTHESIA (OUTPATIENT)
Dept: CARDIOLOGY | Facility: HOSPITAL | Age: 74
End: 2024-04-23
Payer: MEDICARE

## 2024-04-23 ENCOUNTER — ANESTHESIA EVENT (OUTPATIENT)
Dept: CARDIOLOGY | Facility: HOSPITAL | Age: 74
End: 2024-04-23
Payer: MEDICARE

## 2024-04-23 ENCOUNTER — HOSPITAL ENCOUNTER (OUTPATIENT)
Dept: CARDIOLOGY | Facility: HOSPITAL | Age: 74
Discharge: HOME | End: 2024-04-23
Payer: MEDICARE

## 2024-04-23 VITALS — SYSTOLIC BLOOD PRESSURE: 140 MMHG | DIASTOLIC BLOOD PRESSURE: 70 MMHG | TEMPERATURE: 98 F | OXYGEN SATURATION: 92 %

## 2024-04-23 VITALS
HEIGHT: 72 IN | TEMPERATURE: 98.2 F | OXYGEN SATURATION: 98 % | RESPIRATION RATE: 18 BRPM | DIASTOLIC BLOOD PRESSURE: 61 MMHG | SYSTOLIC BLOOD PRESSURE: 133 MMHG | BODY MASS INDEX: 36.84 KG/M2 | WEIGHT: 272 LBS | HEART RATE: 61 BPM

## 2024-04-23 DIAGNOSIS — I35.0 AORTIC STENOSIS, MODERATE: ICD-10-CM

## 2024-04-23 PROCEDURE — 25010000002 PROPOFOL 200 MG/20ML EMULSION: Performed by: NURSE ANESTHETIST, CERTIFIED REGISTERED

## 2024-04-23 PROCEDURE — 93325 DOPPLER ECHO COLOR FLOW MAPG: CPT

## 2024-04-23 PROCEDURE — 25810000003 SODIUM CHLORIDE 0.9 % SOLUTION: Performed by: NURSE ANESTHETIST, CERTIFIED REGISTERED

## 2024-04-23 RX ORDER — SODIUM CHLORIDE 9 MG/ML
INJECTION, SOLUTION INTRAVENOUS CONTINUOUS PRN
Status: DISCONTINUED | OUTPATIENT
Start: 2024-04-23 | End: 2024-04-23 | Stop reason: SURG

## 2024-04-23 RX ORDER — PROPOFOL 10 MG/ML
INJECTION, EMULSION INTRAVENOUS AS NEEDED
Status: DISCONTINUED | OUTPATIENT
Start: 2024-04-23 | End: 2024-04-23 | Stop reason: SURG

## 2024-04-23 RX ADMIN — PROPOFOL 40 MG: 10 INJECTION, EMULSION INTRAVENOUS at 10:05

## 2024-04-23 RX ADMIN — SODIUM CHLORIDE: 0.9 INJECTION, SOLUTION INTRAVENOUS at 09:48

## 2024-04-23 RX ADMIN — PROPOFOL 50 MG: 10 INJECTION, EMULSION INTRAVENOUS at 09:56

## 2024-04-23 RX ADMIN — PROPOFOL 50 MG: 10 INJECTION, EMULSION INTRAVENOUS at 09:58

## 2024-04-23 RX ADMIN — PROPOFOL 40 MG: 10 INJECTION, EMULSION INTRAVENOUS at 10:02

## 2024-04-23 RX ADMIN — PROPOFOL 30 MG: 10 INJECTION, EMULSION INTRAVENOUS at 10:00

## 2024-04-23 NOTE — ANESTHESIA POSTPROCEDURE EVALUATION
Patient: David Mandujano    Procedure Summary       Date: 04/23/24 Room / Location: [facility] NONINVASIVE LAB    Anesthesia Start: 0948 Anesthesia Stop: 1008    Procedure: ADULT TRANSESOPHAGEAL ECHO (KOTA) W/ CONT IF NECESSARY PER PROTOCOL Diagnosis:       Aortic stenosis, moderate      (Valvular Disease)      (Aortic Valve Assessment)    Scheduled Providers: Rogelio Joe MD Provider: Sven Doherty DO    Anesthesia Type: general ASA Status: 4            Anesthesia Type: general    Vitals  Vitals Value Taken Time   /61 04/23/24 1026   Temp 98 °F (36.7 °C) 04/23/24 1005   Pulse 61 04/23/24 1026   Resp 18 04/23/24 1026   SpO2 98 % 04/23/24 1026           Post Anesthesia Care and Evaluation    Patient location during evaluation: bedside  Patient participation: complete - patient participated  Level of consciousness: sleepy but conscious  Pain score: 0  Pain management: adequate    Airway patency: patent  Anesthetic complications: No anesthetic complications  PONV Status: controlled  Cardiovascular status: acceptable  Respiratory status: acceptable and nasal cannula  Hydration status: euvolemic  No anesthesia care post op

## 2024-04-23 NOTE — ANESTHESIA PREPROCEDURE EVALUATION
Anesthesia Evaluation     Patient summary reviewed and Nursing notes reviewed   no history of anesthetic complications:   NPO Solid Status: > 8 hours  NPO Liquid Status: > 8 hours           Airway   Mallampati: III  TM distance: >3 FB  Neck ROM: full  Possible difficult intubation  Dental - normal exam     Pulmonary - normal exam   (+) a smoker Former,  Cardiovascular   Exercise tolerance: good (4-7 METS)    NYHA Classification: II  ECG reviewed  Patient on routine beta blocker and Beta blocker given within 24 hours of surgery    (+) hypertension, valvular problems/murmurs, murmur, hyperlipidemia,  carotid artery disease      Neuro/Psych- negative ROS  GI/Hepatic/Renal/Endo    (+) obesity, morbid obesity    Musculoskeletal     Abdominal  - normal exam    Bowel sounds: normal.   Substance History - negative use     OB/GYN negative ob/gyn ROS         Other   arthritis,   history of cancer    ROS/Med Hx Other: Echo 4/5/2024:  ·  Normal left ventricular cavity size and wall thickness noted. All left ventricular wall segments contract normally.  ·  Left ventricular ejection fraction appears to be 56 - 60%.  ·  Left ventricular diastolic function is consistent with (grade I) impaired relaxation.  ·  The aortic valve is not well visualized. The aortic valve is abnormal in structure. There is moderate calcification of the aortic valve  ·  Mild aortic valve regurgitation is present. Moderate to severe aortic valve stenosis is present.  ·  There is moderate calcification of the mitral valve posterior leaflet(s). Mild mitral valve regurgitation is present. Mild mitral valve stenosis is present.  ·  There is no evidence of pericardial effusion. .  ·  Comments:May considerr KOTA to have a better assessment of the aortic and mitral valves.                     Anesthesia Plan    ASA 4     general   total IV anesthesia  intravenous induction     Anesthetic plan, risks, benefits, and alternatives have been provided, discussed and  informed consent has been obtained with: patient.  Pre-procedure education provided  Plan discussed with CRNA.

## 2024-04-23 NOTE — INTERVAL H&P NOTE
H&P reviewed. The patient was examined and there are no changes to the H&P.    ROS: All systems reviewed and are negative.

## 2024-05-09 ENCOUNTER — OFFICE VISIT (OUTPATIENT)
Dept: CARDIOLOGY | Facility: CLINIC | Age: 74
End: 2024-05-09
Payer: MEDICARE

## 2024-05-09 VITALS
SYSTOLIC BLOOD PRESSURE: 146 MMHG | HEIGHT: 72 IN | DIASTOLIC BLOOD PRESSURE: 69 MMHG | OXYGEN SATURATION: 95 % | BODY MASS INDEX: 36.44 KG/M2 | WEIGHT: 269 LBS | HEART RATE: 68 BPM

## 2024-05-09 DIAGNOSIS — I65.23 BILATERAL CAROTID ARTERY STENOSIS: ICD-10-CM

## 2024-05-09 DIAGNOSIS — I25.10 ASCVD (ARTERIOSCLEROTIC CARDIOVASCULAR DISEASE): Primary | ICD-10-CM

## 2024-05-09 DIAGNOSIS — I10 ESSENTIAL HYPERTENSION: ICD-10-CM

## 2024-05-09 DIAGNOSIS — I35.0 AORTIC STENOSIS, MODERATE: ICD-10-CM

## 2024-05-09 PROCEDURE — 3078F DIAST BP <80 MM HG: CPT | Performed by: NURSE PRACTITIONER

## 2024-05-09 PROCEDURE — 3077F SYST BP >= 140 MM HG: CPT | Performed by: NURSE PRACTITIONER

## 2024-05-09 PROCEDURE — 1160F RVW MEDS BY RX/DR IN RCRD: CPT | Performed by: NURSE PRACTITIONER

## 2024-05-09 PROCEDURE — 99213 OFFICE O/P EST LOW 20 MIN: CPT | Performed by: NURSE PRACTITIONER

## 2024-05-09 PROCEDURE — 1159F MED LIST DOCD IN RCRD: CPT | Performed by: NURSE PRACTITIONER

## 2024-05-09 RX ORDER — LISINOPRIL 20 MG/1
20 TABLET ORAL DAILY
Qty: 30 TABLET | Refills: 5 | Status: SHIPPED | OUTPATIENT
Start: 2024-05-09

## 2024-05-14 ENCOUNTER — TELEPHONE (OUTPATIENT)
Dept: CARDIOLOGY | Facility: CLINIC | Age: 74
End: 2024-05-14

## 2024-05-14 NOTE — TELEPHONE ENCOUNTER
"Caller: David Mandujano \"Franky\"    Relationship: Self    Best call back number: [phone]     What orders are you requesting (i.e. lab or imaging): PERORA    Where will you receive your lab/imaging services: [facility]"

## 2024-05-20 ENCOUNTER — TELEPHONE (OUTPATIENT)
Dept: CARDIOLOGY | Facility: CLINIC | Age: 74
End: 2024-05-20
Payer: MEDICARE

## 2024-05-20 NOTE — TELEPHONE ENCOUNTER
[facility] called and want to know if patient can be cleared for a colonoscopy.     Pending approval of clearance before scheduled.     Provider: Dr. Barnhart     Phone: [phone]   Fax: [phone]

## 2024-05-21 NOTE — TELEPHONE ENCOUNTER
Pt is needing a cardiac risk assessment for colonoscopy.     Form filled out and placed on Lilly's desk.       LS 5/9/24  F/up 11/07/2024  KOTA 4/26/24  Echo 4/6/24  Stress 11/27/23

## 2024-05-24 NOTE — TELEPHONE ENCOUNTER
Called pt and advised him that he has been cleared and of the instructions. He expressed understanding.

## 2024-09-06 DIAGNOSIS — I65.23 CAROTID STENOSIS, ASYMPTOMATIC, BILATERAL: Primary | ICD-10-CM

## 2024-10-21 ENCOUNTER — HOSPITAL ENCOUNTER (OUTPATIENT)
Dept: ULTRASOUND IMAGING | Facility: HOSPITAL | Age: 74
Discharge: HOME | End: 2024-10-21
Payer: MEDICARE

## 2024-10-21 ENCOUNTER — HOSPITAL ENCOUNTER (OUTPATIENT)
Dept: CT IMAGING | Facility: HOSPITAL | Age: 74
Discharge: HOME | End: 2024-10-21
Payer: MEDICARE

## 2024-10-21 DIAGNOSIS — I65.23 CAROTID STENOSIS, ASYMPTOMATIC, BILATERAL: ICD-10-CM

## 2024-10-21 DIAGNOSIS — C20 MALIGNANT NEOPLASM OF RECTUM: ICD-10-CM

## 2024-10-21 PROCEDURE — 93880 EXTRACRANIAL BILAT STUDY: CPT

## 2024-10-21 PROCEDURE — 74177 CT ABD & PELVIS W/CONTRAST: CPT

## 2024-10-21 PROCEDURE — 71260 CT THORAX DX C+: CPT | Performed by: RADIOLOGY

## 2024-10-21 PROCEDURE — 82565 ASSAY OF CREATININE: CPT

## 2024-10-21 PROCEDURE — 25510000001 IOPAMIDOL 61 % SOLUTION: Performed by: INTERNAL MEDICINE

## 2024-10-21 PROCEDURE — 74177 CT ABD & PELVIS W/CONTRAST: CPT | Performed by: RADIOLOGY

## 2024-10-21 PROCEDURE — 93880 EXTRACRANIAL BILAT STUDY: CPT | Performed by: RADIOLOGY

## 2024-10-21 PROCEDURE — 71260 CT THORAX DX C+: CPT

## 2024-10-21 RX ORDER — IOPAMIDOL 612 MG/ML
100 INJECTION, SOLUTION INTRAVASCULAR
Status: COMPLETED | OUTPATIENT
Start: 2024-10-21 | End: 2024-10-21

## 2024-10-21 RX ADMIN — IOPAMIDOL 100 ML: 612 INJECTION, SOLUTION INTRAVENOUS at 14:20

## 2024-10-22 LAB — CREAT BLDA-MCNC: 1.4 MG/DL (ref 0.6–1.3)

## 2024-11-25 ENCOUNTER — OFFICE VISIT (OUTPATIENT)
Dept: ONCOLOGY | Facility: CLINIC | Age: 74
End: 2024-11-25
Payer: MEDICARE

## 2024-11-25 ENCOUNTER — LAB (OUTPATIENT)
Dept: ONCOLOGY | Facility: CLINIC | Age: 74
End: 2024-11-25
Payer: MEDICARE

## 2024-11-25 VITALS
HEIGHT: 72 IN | BODY MASS INDEX: 34.21 KG/M2 | RESPIRATION RATE: 18 BRPM | TEMPERATURE: 97 F | WEIGHT: 252.6 LBS | OXYGEN SATURATION: 96 % | DIASTOLIC BLOOD PRESSURE: 86 MMHG | SYSTOLIC BLOOD PRESSURE: 149 MMHG | HEART RATE: 101 BPM

## 2024-11-25 DIAGNOSIS — I35.0 AORTIC VALVE STENOSIS, ETIOLOGY OF CARDIAC VALVE DISEASE UNSPECIFIED: ICD-10-CM

## 2024-11-25 DIAGNOSIS — D50.0 IRON DEFICIENCY ANEMIA DUE TO CHRONIC BLOOD LOSS: ICD-10-CM

## 2024-11-25 DIAGNOSIS — C20 MALIGNANT NEOPLASM OF RECTUM: ICD-10-CM

## 2024-11-25 DIAGNOSIS — C20 MALIGNANT NEOPLASM OF RECTUM: Primary | ICD-10-CM

## 2024-11-25 DIAGNOSIS — I65.23 CAROTID STENOSIS, ASYMPTOMATIC, BILATERAL: ICD-10-CM

## 2024-11-25 LAB
ALBUMIN SERPL-MCNC: 4.3 G/DL (ref 3.5–5.2)
ALBUMIN/GLOB SERPL: 1.2 G/DL
ALP SERPL-CCNC: 81 U/L (ref 39–117)
ALT SERPL W P-5'-P-CCNC: 25 U/L (ref 1–41)
ANION GAP SERPL CALCULATED.3IONS-SCNC: 15.4 MMOL/L (ref 5–15)
AST SERPL-CCNC: 38 U/L (ref 1–40)
BASOPHILS # BLD AUTO: 0.03 10*3/MM3 (ref 0–0.2)
BASOPHILS NFR BLD AUTO: 0.3 % (ref 0–1.5)
BILIRUB SERPL-MCNC: 0.8 MG/DL (ref 0–1.2)
BUN SERPL-MCNC: 26 MG/DL (ref 8–23)
BUN/CREAT SERPL: 17.8 (ref 7–25)
CALCIUM SPEC-SCNC: 9.9 MG/DL (ref 8.6–10.5)
CHLORIDE SERPL-SCNC: 100 MMOL/L (ref 98–107)
CO2 SERPL-SCNC: 23.6 MMOL/L (ref 22–29)
CREAT SERPL-MCNC: 1.46 MG/DL (ref 0.76–1.27)
DEPRECATED RDW RBC AUTO: 45.7 FL (ref 37–54)
EGFRCR SERPLBLD CKD-EPI 2021: 50.2 ML/MIN/1.73
EOSINOPHIL # BLD AUTO: 0.07 10*3/MM3 (ref 0–0.4)
EOSINOPHIL NFR BLD AUTO: 0.7 % (ref 0.3–6.2)
ERYTHROCYTE [DISTWIDTH] IN BLOOD BY AUTOMATED COUNT: 13.1 % (ref 12.3–15.4)
FERRITIN SERPL-MCNC: 378.5 NG/ML (ref 30–400)
GLOBULIN UR ELPH-MCNC: 3.7 GM/DL
GLUCOSE SERPL-MCNC: 127 MG/DL (ref 65–99)
HCT VFR BLD AUTO: 48.8 % (ref 37.5–51)
HGB BLD-MCNC: 16.7 G/DL (ref 13–17.7)
IMM GRANULOCYTES # BLD AUTO: 0.03 10*3/MM3 (ref 0–0.05)
IMM GRANULOCYTES NFR BLD AUTO: 0.3 % (ref 0–0.5)
IRON 24H UR-MRATE: 98 MCG/DL (ref 59–158)
IRON SATN MFR SERPL: 26 % (ref 20–50)
LYMPHOCYTES # BLD AUTO: 1.85 10*3/MM3 (ref 0.7–3.1)
LYMPHOCYTES NFR BLD AUTO: 17.7 % (ref 19.6–45.3)
MCH RBC QN AUTO: 32.5 PG (ref 26.6–33)
MCHC RBC AUTO-ENTMCNC: 34.2 G/DL (ref 31.5–35.7)
MCV RBC AUTO: 94.9 FL (ref 79–97)
MONOCYTES # BLD AUTO: 0.68 10*3/MM3 (ref 0.1–0.9)
MONOCYTES NFR BLD AUTO: 6.5 % (ref 5–12)
NEUTROPHILS NFR BLD AUTO: 7.8 10*3/MM3 (ref 1.7–7)
NEUTROPHILS NFR BLD AUTO: 74.5 % (ref 42.7–76)
NRBC BLD AUTO-RTO: 0 /100 WBC (ref 0–0.2)
PLATELET # BLD AUTO: 159 10*3/MM3 (ref 140–450)
PMV BLD AUTO: 9.6 FL (ref 6–12)
POTASSIUM SERPL-SCNC: 4 MMOL/L (ref 3.5–5.2)
PROT SERPL-MCNC: 8 G/DL (ref 6–8.5)
RBC # BLD AUTO: 5.14 10*6/MM3 (ref 4.14–5.8)
SODIUM SERPL-SCNC: 139 MMOL/L (ref 136–145)
TIBC SERPL-MCNC: 378 MCG/DL (ref 298–536)
TRANSFERRIN SERPL-MCNC: 254 MG/DL (ref 200–360)
WBC NRBC COR # BLD AUTO: 10.46 10*3/MM3 (ref 3.4–10.8)

## 2024-11-25 PROCEDURE — 3077F SYST BP >= 140 MM HG: CPT | Performed by: NURSE PRACTITIONER

## 2024-11-25 PROCEDURE — 80053 COMPREHEN METABOLIC PANEL: CPT | Performed by: INTERNAL MEDICINE

## 2024-11-25 PROCEDURE — 82378 CARCINOEMBRYONIC ANTIGEN: CPT | Performed by: INTERNAL MEDICINE

## 2024-11-25 PROCEDURE — 3079F DIAST BP 80-89 MM HG: CPT | Performed by: NURSE PRACTITIONER

## 2024-11-25 PROCEDURE — 1126F AMNT PAIN NOTED NONE PRSNT: CPT | Performed by: NURSE PRACTITIONER

## 2024-11-25 PROCEDURE — 85025 COMPLETE CBC W/AUTO DIFF WBC: CPT | Performed by: INTERNAL MEDICINE

## 2024-11-25 PROCEDURE — 1159F MED LIST DOCD IN RCRD: CPT | Performed by: NURSE PRACTITIONER

## 2024-11-25 PROCEDURE — 99214 OFFICE O/P EST MOD 30 MIN: CPT | Performed by: NURSE PRACTITIONER

## 2024-11-25 PROCEDURE — 82728 ASSAY OF FERRITIN: CPT | Performed by: INTERNAL MEDICINE

## 2024-11-25 PROCEDURE — 1160F RVW MEDS BY RX/DR IN RCRD: CPT | Performed by: NURSE PRACTITIONER

## 2024-11-25 PROCEDURE — 36415 COLL VENOUS BLD VENIPUNCTURE: CPT | Performed by: INTERNAL MEDICINE

## 2024-11-25 PROCEDURE — 84466 ASSAY OF TRANSFERRIN: CPT | Performed by: INTERNAL MEDICINE

## 2024-11-25 PROCEDURE — 83540 ASSAY OF IRON: CPT | Performed by: INTERNAL MEDICINE

## 2024-11-25 NOTE — PROGRESS NOTES
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  Combined chemoradiation with Xeloda 825 mg/m2 BID D1-5 or M-F during the course of radiation.  His dose is 500 mg x 4 or 2000 mg BID  Treatment finished 19.    2.  Dr. Yadav performed LAR with coloanal anstomosis and loop ileostomy 19.  Pathology showed  Residual invasive adneocarcinoma.  Tumor invaded the muscularis propria.  Surgical margins clear.  0/14 resected LNs involved.  ypT2N0.      3.       4.  Ileostomy reversal 19 (Dr. Yadav)    5.        CHIEF COMPLAINT:  Follow up of rectal cancer and iron deficiency anemia    HISTORY OF PRESENT ILLNESS:   David Mandujano is a very pleasant 74 y.o. male who was referred by Dr. Barnhart for evaluation and treatment of colorectal cancer. He began to notice rectal bleeding in January or 2018, and he also began to notice fatigue in approximately July. He assumed he was out of shape when he started to become short of breath and started trying to exercise more frequently, which only exacerbated the shortness of breath. He was evaluated by his PCP, Raj Wang MD, who after testing, found him to be severely iron deficient, hyperglycemic, and he also had an elevated PSA. He was on vacation at the time, and his PCP asked him to return home for further evaluation. He was started on oral iron therapy and later received IV iron infusions. He was also scheduled for a colonoscopy with Dr. Barnhart, during which a suspicious rectal mass was biopsied and pathology was positive for an ulcerated invasive, moderately differentiated rectal adenocarcinoma.     INTERVAL HISTORY:  Mr. Mandujano is here today for follow up of rectal cancer. Since his last visit, he states that he has been well. He reports fluctuating constipation and diarrhea and states  that this is bothersome for him at times. He has also had 17 pound weight loss in the last 6 months. He contributes this to poor appetite secondary to fluctuating bowel movements, as well as decreased intake of sweets and eating smaller portions. He has not had colonoscopy yet. He wonders if he could have his port removed. He is otherwise without specific complaints.         PAST MEDICAL HISTORY:  Past Medical History:   Diagnosis Date    Anemia     Arthritis     Carotid stenosis     Carotid stenosis, asymptomatic, bilateral 8/16/2023    Colon cancer     s/p chemo therapy    Heart murmur     Hyperlipidemia     Hypertension     Wrist fracture     surgically repaired       PAST SURGICAL HISTORY:  Past Surgical History:   Procedure Laterality Date    ABDOMINAL SURGERY      CARDIAC ABLATION      COLON SURGERY      COLONOSCOPY      FRACTURE SURGERY Left     VENOUS ACCESS DEVICE (PORT) INSERTION N/A 05/20/2019    Procedure: INSERTION VENOUS ACCESS DEVICE;  Surgeon: Cindy Corrales MD;  Location: [facility];  Service: General    WRIST SURGERY         FAMILY HISTORY:  Family History   Problem Relation Age of Onset    Stroke Father     Hyperlipidemia Father     Other Sister     Heart disease Sister     Colon cancer Maternal Grandfather    No other family history of malignancy.    SOCIAL HISTORY:  Social History     Socioeconomic History    Marital status: Single    Number of children: 2   Tobacco Use    Smoking status: Former     Types: Cigars     Quit date: 2014     Years since quitting: 10.9    Smokeless tobacco: Current     Types: Chew    Tobacco comments:     Occasional Cigars-Has not had one in 5 years   Vaping Use    Vaping status: Never Used   Substance and Sexual Activity    Alcohol use: Yes     Comment: occasional-[place]    Drug use: No    Sexual activity: Defer     REVIEW OF SYSTEMS:   A comprehensive 14 point review of systems was performed.  Significant findings as mentioned above.  All other systems reviewed  "and are negative.      MEDICATIONS:  The current medication list was reviewed in the EMR    Current Outpatient Medications:     allopurinol (ZYLOPRIM) 100 MG tablet, Take 1 tablet by mouth Daily. (Patient taking differently: Take 0.5 tablets by mouth As Needed.), Disp: 30 tablet, Rfl: 0    amLODIPine (NORVASC) 10 MG tablet, Take 1 tablet by mouth Daily., Disp: 30 tablet, Rfl: 11    ASCORBIC ACID PO, Take  by mouth Daily., Disp: , Rfl:     aspirin 81 MG EC tablet, Take 1 tablet by mouth Daily., Disp: 30 tablet, Rfl: 5    atorvastatin (LIPITOR) 80 MG tablet, Take 1 tablet by mouth Every Night. for cholesterol., Disp: 30 tablet, Rfl: 5    cyanocobalamin (VITAMIN B-12) 1000 MCG tablet, Take 1 tablet by mouth Daily., Disp: , Rfl:     folic acid (FOLVITE) 1 MG tablet, TAKE ONE TABLET BY MOUTH DAILY, Disp: 30 tablet, Rfl: 11    lisinopril (PRINIVIL,ZESTRIL) 20 MG tablet, Take 1 tablet by mouth Daily. FOR BLOOD PRESSURE, Disp: 30 tablet, Rfl: 5    metoprolol succinate XL (TOPROL-XL) 50 MG 24 hr tablet, Take 1 tablet by mouth Daily., Disp: 30 tablet, Rfl: 5    vitamin B-6 (PYRIDOXINE) 100 MG tablet, TAKE ONE TABLET BY MOUTH DAILY, Disp: 30 tablet, Rfl: 5    ALLERGIES:  No Known Allergies    PHYSICAL EXAM:  Vitals:    11/25/24 1324   BP: 149/86   Pulse: 101   Resp: 18   Temp: 97 °F (36.1 °C)   TempSrc: Temporal   SpO2: 96%   Weight: 115 kg (252 lb 9.6 oz)   Height: 182.9 cm (72.01\")   PainSc: 0-No pain     Body surface area is 2.36 meters squared.  Body mass index is 34.25 kg/m².  ECOG score: 0   General:  Awake, alert and oriented, appears well.  HEENT:  Pupils are equal, round and reactive to light and accommodation, Extra-ocular movements full, Oropharyx clear, mucous membranes moist  Neck:  No JVD, thyromegaly or lymphadenopathy.  No bruits  CV:  Regular rate and rhythm, II-III/IV systolic murmur, no rubs or gallops  Resp: Lungs are clear to auscultation bilaterally, no crackles  Abd:  Soft, non-tender, non distended, " bowel sounds present, no palpable hepatosplenomegaly or masses..  Surgical scars present.   Ext:  No clubbing, cyanosis, or lower extremity edema  Lymph:  No cervical, supraclavicular, axillary, adenopathy  Neuro:  Grossly non-focal exam    PATHOLOGY:  10-02-18      04-01-19:        04-09-19:        ENDOSCOPY:  10-02-18:      2-05-21 EGD/C-scope ([place])  -  Mild reflux esophagitis with small erosions at the GE junction and a slight stricture  -  Endoscopically normal stomach, duodenum.  - Unremarkable surgical anastomosis 5 cm above the anal verge.   -  Minimal diverticulosis.  -  Otherwise normal colon and terminal ileum with fair prep.      IMAGING:  10-04-18 CT Abdomen Pelvis With Contrast   Findings  - LOWER THORAX: Patchy nonspecific subpleural nodularity in the left lung base that could represent sequelae of chronic airspace disease or early fibrosis.     ABDOMEN:  - LIVER: Homogeneous. No focal hepatic mass or ductal dilatation.  - GALLBLADDER: GALLSTONES WITHIN THE GALLBLADDER.  - PANCREAS: Unremarkable. No mass or ductal dilatation.  - SPLEEN: Homogeneous. No splenomegaly.  - ADRENALS: No mass.  - KIDNEYS: RIGHT RENAL CYST MEASURING 3.6 CM.  - GI TRACT: NONSPECIFIC DISTAL SIGMOID COLONIC WALL THICKENING VERSUS NONDISTENTION.  - PERITONEUM: No free air. No free fluid or loculated fluid collections.  - MESENTERY: Unremarkable.  - LYMPH NODES: No lymphadenopathy.  - VASCULATURE: ATHEROSCLEROTIC VASCULAR CALCIFICATION.  - ABDOMINAL WALL: SMALL BILATERAL HUMERAL HERNIAS CONTAINING ONLY FAT.  - OTHER: None.     PELVIS:  - BLADDER: No focal mass or significant wall thickening  - REPRODUCTIVE: Unremarkable as visualized.  - APPENDIX: Nondistended. No surrounding inflammation.  - BONES: No acute bony abnormality.     IMPRESSION:  1. Gallstones within the gallbladder.  2.Nonspecific distal sigmoid colonic wall thickening versus nondistention.    PET/CT 10-29-18    11-13-18 MRI Pelvis Without Contrast  FINDINGS:  1.)  TUMOR LOCATION AND CHARACTERISTICS     i) Distance of Inferior margin of Tumor from the dentate line: 9.5 CM  ii) Tumor at or below the puborectalis sling:  NO  iii) Relationship to the anterior peritoneal reflection: BELOW  iv) Craniocaudal length of the tumor: 6cm  v) Clock face of tumor: 5o'clock to 2o'clock  vi) Polypoid/ Annular/ Semi-annular: SEMI-ANNULAR  vii) Mucinous: YES     2.) EXTRAMURAL DEPTH OF INVASION AND MR T-CATEGORY       i) Extramural depth of invasion (Use 0mm for T1 or T2 tumor):  APPROXIMATELY 5 MM   ii) T category: T3 B              *Please indicate structures with possible invasion.  Specify laterality,  sequence and slice#: (see list below)     *  Anterior peritoneal reflection (T4a tumor): NO  *  Puborectalis: NO  *  Bladder: NO  *  Vascular Involvement of Iliac Vessels: NO  *  Levator ani: NO  *  Ureters: NO  *  Obturator: NO  *  Prostate: MARGINAL/TETHERED  *  Piriformis: NO  *  Uterus: NOT APPLICABLE  *  Pelvic Bone: NO  *  Vagina: NOT APPLICABLE  *  Sacrum: NO  *  Urethra: NO  *  Other:          iii) For low rectal tumors (maximum tumor depth at or below the  puborectalis sling):     *  Not applicable (tumor above the puborectalis sling: NOT APPLICABLE  *    *  Level 1 (submucosa only, no involvement of internal anal sphincter):       *  Level 2 (confined to the internal anal sphincter; no involvement of  intersphincteric fat):      *  Level 3 (intersphincteric fat involved):      *  Level 4 (involves external sphincter or beyond):         3. RELATIONSHIP OF THE TUMOR TO MESORECTAL FASCIA (MRF)       i) Shortest distance 0mm of the definitive tumour border to the MRF  is: AT 12:00   ii) Are there any tumour spiculations closer to the MRF? NO     iii) Location of Tumor margin to MRF: 12:00, AT THE LEVEL PROSTATE     4. EXTRAMURAL VENOUS INVASION       i) Extramural Venous Invasion (EMVI): ABSENT     5. MESORECTAL LYMPH NODES AND TUMOUR DEPOSITS       i) Any suspicious mesorectal lymph  nodes/tumor deposits: YES      *If yes, the most suspicious node/tumor deposit is: 15 MM IN  DIAMETER, ALONG THE UPPER MARGIN OF the tumor with minimum distance 7  MMmm from the MRF at 7o'clock     6. EXTRAMESORECTAL LYMPH NODES        i) Any suspicious extramesorectal lymph nodes: NO      *If yes, location and laterality of suspicious nodes:             Int. Iliac:                Ext. Iliac:                Common Iliac:                Obturator:                Inguinal:                Other:           ii) Is the BETH node station in the field of view: NO        *If Yes, are these nodes suspicious:         7. OTHER FINDINGS (COMPLICATIONS, METASTASES, LIMITATIONS)         NOTE: THERE IS SOME UNCERTAINTY WHETHER THE APPARENT SMALL FOCUS OF TUMOR EXTENDING TO THE PROSTATE AT THE 12:00 POSITION COULD ACTUALLY BE PROSTATE NODULE EXTENDING TOWARD THE TUMOR. RECTAL TUMOR IS FAVORED; ALTHOUGH THIS DETERMINATION CANNOT BE MADE WITH COMPLETE CERTAINTY, TUMOR IS CONSIDERED T3 B.        IMPRESSIONS:  MRI rectal cancer T category is: T3 B  Maximum EMD of invasion is: 5  Minimum tumor to MRF distance is: 0  Low rectal tumor component: NO  Mesorectal nodes/tumor deposits: SUSPICIOUS  EMVI: ABSENT  Extramesorectal nodes: NEGATIVE    CTCAP 05-21-20     FINDINGS:  Adenopathy:No evidence of mediastinal or hilar lymph node enlargement.  No axillary lymph node enlargement.     Fluid:There are no pericardial or pleural effusions.     LUNGS:No parenchymal soft tissue nodules or masses are present.     Skeletal:Arthritic change in the thoracic spine.      Upper abdomen shows cholelithiasis.      IMPRESSION:  1. No parenchymal soft tissue nodules or masses.  2. No pericardial or pleural effusions.  3. No adenopathy.   4. Coronary artery calcifications.   5. Cholelithiasis.     FINDINGS:   The lung bases are clear. There are no pleural effusions.     The liver is homogeneous.     There is cholelithiasis.     The spleen is homogeneous.       There is no peripancreatic stranding or pancreatic head mass.      There is no adrenal enlargement.     Large right renal cyst is present. There is no solid renal mass or  hydronephrosis..      Otherwise I do not see any free fluid or walled off fluid collections.     The rectal wall appears to be thickened.     IMPRESSION:  1. Rectal wall thickening.  2. Cholelithiasis.         CT CAP 09/15/2020  Findings  LUNGS: Unremarkable. No parenchymal soft tissue nodules.  No focal air  space disease.  HEART: CORONARY ARTERY CALCIFICATIONS ARE NOTED.  PERICARDIUM: No effusion.  MEDIASTINUM: No masses. No enlarged lymph nodes.  No fluid collections.  PLEURA: No pleural effusion. No pleural mass or abnormal calcification.  MAJOR AIRWAYS: Clear. No intrinsic mass.  VASCULATURE: No evidence of aneurysm.  VISUALIZED UPPER ABDOMEN:  LIVER: Homogeneous. No focal hepatic mass or ductal dilatation.  SPLEEN: Homogeneous. No splenomegaly.  ADRENALS: No mass.  KIDNEYS: RIGHT RENAL CYST MEASURING 6.7 CM.  GI TRACT: Non-dilated. No definite wall thickening.  PERITONEUM: No free air. No free fluid or loculated fluid  collections.  ABDOMINAL WALL: No focal hernia or mass.  OTHER: None.  BONES: No acute bony abnormality.     IMPRESSION:  Impression:  1. Unremarkable exam.  No source for the patient symptoms.  2. Other incidental/non-acute findings as above.    Findings  LOWER THORAX: Clear. No effusions.  ABDOMEN:  LIVER: Homogeneous. No focal hepatic mass or ductal dilatation.GALLBLADDER: GALLSTONES IN THE GALLBLADDER.  PANCREAS: Unremarkable. No mass or ductal dilatation.  SPLEEN: Homogeneous. No splenomegaly.  ADRENALS: No mass.  KIDNEYS: RIGHT RENAL CYST MEASURING 6.4 CM.  GI TRACT: Possibly some rectal wall thickening.  PERITONEUM: No free air. No free fluid or loculated fluid  collections.  MESENTERY: Unremarkable.  LYMPH NODES: No lymphadenopathy.  VASCULATURE: No evidence of aneurysm.  ABDOMINAL WALL: No focal hernia or  mass.  OTHER: None.   PELVIS:   BLADDER: No focal mass or significant wall thickening   REPRODUCTIVE: Unremarkable as visualized.   APPENDIX: Nondistended. No surrounding inflammation.  BONES: No acute bony abnormality.     IMPRESSION:  Impression:  Rectal wall thickening noted.      [facility] 01-14-21  CT FINDINGS: On the lung windows, the lungs are both well aerated and  clear. No pulmonary parenchymal lung nodules to suggest metastatic  disease were seen. There were no inflammatory infiltrates or pleural  effusions. On the soft tissue windows, there was no evidence of  supraclavicular or axillary lymphadenopathy. No enlarged mediastinal or  hilar lymph nodes were demonstrated. There was no evidence of  pericardial effusion.     IMPRESSION:  No CT findings of metastatic disease in the chest.     CT FINDINGS: The liver and spleen are stable in size and shape; no focal  lesions have developed within the liver. There is a calcified stone in  the dependent portion of the gallbladder. The pancreas showed no  evidence of mass or inflammation. No adrenal lesions were demonstrated.  There is some thinning of the cortical tissues in the kidneys with a 6.5  cm cyst in the right kidney. There was no ascites. The aorta is normal  in caliber. No enlarged lymph nodes were seen in the retroperitoneum or  mesentery. In the pelvis, postoperative changes are noted near the  rectum where there is a line of  GI staples. No evidence of residual or recurrent tumor is demonstrated.  On today's study there was no significant rectal wall thickening.     IMPRESSION:  No CT findings to suggest metastatic disease in the abdomen  or pelvis. There is less thickening of the rectal wall than seen on the  previous CT. The patient does have cholelithiasis and a large cyst in  the right kidney.       [facility] 05-14-21  FINDINGS:     LUNGS: Unremarkable. No parenchymal soft tissue nodules.  No focal air  space disease.     HEART: Coronary artery  calcifications.     MEDIASTINUM: No masses. No enlarged lymph nodes.  No fluid collections.     PLEURA: No pleural effusion. No pleural mass or abnormal calcification.  No pneumothorax.     VASCULATURE: No evidence of aneurysm. No evidence of pulmonary embolism.     BONES: Arthritic change.     VISUALIZED UPPER ABDOMEN:Please see the report for the CT of the abdomen  and pelvis     Other: None.     IMPRESSION:     1. No evidence of metastatic disease to the abdomen or pelvis.  2. Coronary artery calcifications.    FINDINGS:     Lower thorax: Clear. No effusions.     Abdomen:     Liver: Homogeneous. No focal hepatic mass or ductal dilatation.     Gallbladder: Cholelithiasis     Pancreas: Unremarkable. No mass or ductal dilatation.     Spleen: Homogeneous. No splenomegaly.     Adrenals: No mass.     Kidneys/ureters: Large right renal cyst     GI tract: Non-dilated. No definite wall thickening.. There is no  evidence of appendicitis     MESENTERY: No free fluid, walled off fluid collections, mesenteric  stranding, or enlarged lymph nodes         Vasculature: Evidence of atherosclerotic vascular disease     Abdominal wall: No focal hernia or mass.        Bladder: Urinary bladder wall thickening     Reproductive: Unremarkable as visualized     Bones: No acute bony abnormality.     IMPRESSION:     1. No evidence of metastatic disease to the abdomen or pelvis     2.Cholelithiasis     3. Urinary bladder wall thickening        CTCAP 11-12-21  FINDINGS:    LUNGS:  Unremarkable.  No mass.  No consolidation.    PLEURAL SPACE:  Unremarkable.  No pneumothorax.  No significant  effusion.    HEART:  Coronary artery calcifications are noted.  No significant  pericardial effusion.    BONES/JOINTS:  Anterior thoracic spine osteophyte formation.  No acute  fracture.  No dislocation.    SOFT TISSUES:  Unremarkable.    VASCULATURE:  Unremarkable.  No thoracic aortic aneurysm.    LYMPH NODES:  Unremarkable.  No enlarged lymph nodes.      IMPRESSION:    No acute findings in the chest.    FINDINGS:    LUNG BASES:  Unremarkable.  No mass.  No consolidation.      ABDOMEN:    LIVER:  Unremarkable.  No mass.    GALLBLADDER AND BILE DUCTS:  Gallstones in the gallbladder.  No ductal  dilation.    PANCREAS:  Unremarkable.  No mass.  No ductal dilation.    SPLEEN:  Unremarkable.  No splenomegaly.    ADRENALS:  Unremarkable.  No mass.    KIDNEYS AND URETERS:  6.6 cm right renal cyst.  No hydronephrosis.    STOMACH AND BOWEL:  Changes of distal colonic anastomosis noted.  No  obstruction.  No mucosal thickening.      PELVIS:    APPENDIX:  No findings to suggest acute appendicitis.    BLADDER:  Unremarkable.  No mass.    REPRODUCTIVE:  Unremarkable as visualized.      ABDOMEN and PELVIS:    INTRAPERITONEAL SPACE:  Unremarkable.  No free air.  No significant  fluid collection.    BONES/JOINTS:  No acute fracture.  No dislocation.    SOFT TISSUES:  Unremarkable.    VASCULATURE:  Unremarkable.  No abdominal aortic aneurysm.    LYMPH NODES:  Unremarkable.  No enlarged lymph nodes.     IMPRESSION:    No acute findings in the abdomen or pelvis.      CTCAP 05-26-22  FINDINGS:    Lungs:  Lungs remain clear with no suspicious lung nodules to suggest  metastatic disease.    Pleural space:  No pleural effusions.  No pneumothorax.    Heart:  Marked cardiomegaly is stable.  Severe coronary artery  calcifications are again noted.  No pericardial effusion.    Mediastinum:  No mediastinal or hilar lymphadenopathy is identified.    Bones/joints:  Unremarkable.  No acute fracture.  No dislocation.    Soft tissues:  Gynecomastia changes are noted.    Vasculature:  Atherosclerosis aorta without evidence of aneurysm.    Lymph nodes:  No axillary adenopathy is noted.    Liver:  Marked fatty infiltration of liver.    Gallbladder and bile ducts:  Cholelithiasis.    Kidneys and ureters:  Simple appearing cyst right kidney.    Tubes, lines and devices:  Left-sided Port-A-Cath is  noted.     IMPRESSION:  1.  Stable exam demonstrating no evidence of metastatic disease to the  chest.  2.  Marked cardiomegaly and severe coronary artery calcifications again  noted.  3.  Fatty infiltration of liver.  4.  Cholelithiasis.  5.  Other nonacute findings above.    FINDINGS:    Lung bases:  Unremarkable.  No mass.  No consolidation.    Heart:  Cardiomegaly.  Coronary artery calcifications.      ABDOMEN:    Liver:  Fatty infiltration of liver. No focal liver lesion identified.   No focal liver lesion identified.    Gallbladder and bile ducts:  Cholelithiasis.  No ductal dilation.    Pancreas:  Pancreas is unremarkable.  No ductal dilation.    Spleen:  Spleen is unenlarged.    Adrenals:  Unremarkable.  No mass.    Kidneys and ureters:  Simple appearing cyst right kidney. No imaging  follow-up necessary.  Kidneys are otherwise unremarkable.  No  hydronephrosis.    Stomach and bowel:  Post surgical changes of the lower rectum with an  anastomosis site noted.  Mild constipation. No bowel obstruction.   Surgical anastomosis site in the right mid abdomen small bowel region.   No mucosal thickening.      PELVIS:    Appendix:  No findings to suggest acute appendicitis.    Bladder:  Decompressed urinary bladder.    Reproductive:  Unremarkable as visualized.      ABDOMEN and PELVIS:    Intraperitoneal space:  No ascites or abscess.  No omental masses.  No  free air.    Bones/joints:  Degenerative changes lumbar spine. No acute bony  findings.  No dislocation.    Soft tissues:  Fat-containing inguinal hernias.    Vasculature:  Advanced atherosclerosis. No aneurysm.    Lymph nodes:  Unremarkable.  No enlarged lymph nodes.    Other findings:  No mesenteric masses.     IMPRESSION:  1.  Stable exam of a sitting no evidence of metastatic disease to  abdomen or pelvis.  2.  Stable posterior vertebral changes involving the rectum.  3.  Diffuse fatty liver with no focal lesions.  4.  Cholelithiasis.  5.  Other nonacute  findings above.        CTCAP 05-26-22  FINDINGS:    Lungs:  Lungs remain clear with no suspicious lung nodules to suggest  metastatic disease.    Pleural space:  No pleural effusions.  No pneumothorax.    Heart:  Marked cardiomegaly is stable.  Severe coronary artery  calcifications are again noted.  No pericardial effusion.    Mediastinum:  No mediastinal or hilar lymphadenopathy is identified.    Bones/joints:  Unremarkable.  No acute fracture.  No dislocation.    Soft tissues:  Gynecomastia changes are noted.    Vasculature:  Atherosclerosis aorta without evidence of aneurysm.    Lymph nodes:  No axillary adenopathy is noted.    Liver:  Marked fatty infiltration of liver.    Gallbladder and bile ducts:  Cholelithiasis.    Kidneys and ureters:  Simple appearing cyst right kidney.    Tubes, lines and devices:  Left-sided Port-A-Cath is noted.     IMPRESSION:  1.  Stable exam demonstrating no evidence of metastatic disease to the  chest.  2.  Marked cardiomegaly and severe coronary artery calcifications again  noted.  3.  Fatty infiltration of liver.  4.  Cholelithiasis.  5.  Other nonacute findings above.    FINDINGS:    Lung bases:  Unremarkable.  No mass.  No consolidation.    Heart:  Cardiomegaly.  Coronary artery calcifications.      ABDOMEN:    Liver:  Fatty infiltration of liver. No focal liver lesion identified.   No focal liver lesion identified.    Gallbladder and bile ducts:  Cholelithiasis.  No ductal dilation.    Pancreas:  Pancreas is unremarkable.  No ductal dilation.    Spleen:  Spleen is unenlarged.    Adrenals:  Unremarkable.  No mass.    Kidneys and ureters:  Simple appearing cyst right kidney. No imaging  follow-up necessary.  Kidneys are otherwise unremarkable.  No  hydronephrosis.    Stomach and bowel:  Post surgical changes of the lower rectum with an  anastomosis site noted.  Mild constipation. No bowel obstruction.   Surgical anastomosis site in the right mid abdomen small bowel region.   No  mucosal thickening.      PELVIS:    Appendix:  No findings to suggest acute appendicitis.    Bladder:  Decompressed urinary bladder.    Reproductive:  Unremarkable as visualized.      ABDOMEN and PELVIS:    Intraperitoneal space:  No ascites or abscess.  No omental masses.  No  free air.    Bones/joints:  Degenerative changes lumbar spine. No acute bony  findings.  No dislocation.    Soft tissues:  Fat-containing inguinal hernias.    Vasculature:  Advanced atherosclerosis. No aneurysm.    Lymph nodes:  Unremarkable.  No enlarged lymph nodes.    Other findings:  No mesenteric masses.     IMPRESSION:  1.  Stable exam of a sitting no evidence of metastatic disease to  abdomen or pelvis.  2.  Stable posterior vertebral changes involving the rectum.  3.  Diffuse fatty liver with no focal lesions.  4.  Cholelithiasis.  5.  Other nonacute findings above.    CT Chest With Contrast Diagnostic (12/07/2022 10:59)  FINDINGS:    Lungs:  There remain no suspicious lung nodules to suggest metastatic  disease.    Pleural space:  No pleural effusions identified.  No pneumothorax.    Heart:  Cardiomegaly is stable.  Coronary calcifications and valvular  calcifications are stable.  No pericardial effusion is noted.    Mediastinum:  No mediastinal or hilar lymphadenopathy identified.    Bones/joints:  The bony structures are stable.  Degenerative changes  thoracic spine appear stable.  No acute fracture.  No dislocation.    Soft tissues:  Gynecomastia noted.    Vasculature:  Unremarkable.  No thoracic aortic aneurysm.    Lymph nodes:  See above.    Liver:  Fatty infiltration of liver.    Gallbladder and bile ducts:  Cholelithiasis.     IMPRESSION:  1.  Stable exam demonstrating no evidence of metastatic disease to the  chest.  2.  Cardiomegaly and coronary calcifications are stable.  3.  Cholelithiasis and fatty liver.  4.  Other nonacute/incidental findings detailed above.    CT Abdomen Pelvis With Contrast (12/07/2022  11:00)  FINDINGS:    Lung bases:  Lung bases are clear.    Heart:  Cardiomegaly is again noted.      ABDOMEN:    Liver:  Diffuse fatty infiltration of liver.    Gallbladder and bile ducts:  Cholelithiasis.  No ductal dilation.    Pancreas:  Unremarkable.  No mass.  No ductal dilation.    Spleen:  Spleen is unenlarged.    Adrenals:  Unremarkable.  No mass.    Kidneys and ureters:  Cyst right kidney appears stable.  No  hydronephrosis identified.    Stomach and bowel:  Post surgical changes involving the rectum with  unremarkable appearance of the anastomosis.  Sigmoid colon with  diverticulosis but no evidence of diverticulitis.  No obstruction.      PELVIS:    Appendix:  Normal appendix.    Bladder:  Incomplete distention of urinary bladder.    Reproductive:  Mild prostate enlargement.    Subperitoneal space:  No abnormal soft tissue in the perirectal fat  space.      ABDOMEN and PELVIS:    Intraperitoneal space:  No pneumoperitoneum identified.  No  significant fluid collection.    Bones/joints:  No acute fracture.  No dislocation.    Soft tissues:  Stable fat-containing inguinal hernias.    Vasculature:  Atherosclerosis stable without evidence of aneurysm.    Lymph nodes:  No iliac lymphadenopathy.    Other findings:  Anastomosis in the right upper quadrant is stable.     IMPRESSION:  1.  Stable post surgical changes involving the rectum.  2.  No evidence of metastatic disease to the abdomen or pelvis. No  adenopathy identified.  3.  Cholelithiasis.  4.  Fatty liver.  5.  Diverticulosis without diverticulitis.  6.  Other incidental/nonacute findings as above.    CT Abdomen Pelvis With Contrast (08/02/2023 13:54)   FINDINGS:    LUNG BASES:  Unremarkable.  No mass.  No consolidation.      ABDOMEN:    LIVER:  Unremarkable.  No mass.    GALLBLADDER AND BILE DUCTS:  Gallstones.  Gallbladder otherwise  unremarkable.  No ductal dilation.    PANCREAS:  Unremarkable.  No mass.  No ductal dilation.    SPLEEN:  Unremarkable.   No splenomegaly.    ADRENALS:  Unremarkable.  No mass.    KIDNEYS AND URETERS:  Right renal cyst measuring 6 cm.  No  hydronephrosis.    STOMACH AND BOWEL:  Unremarkable.  No obstruction.  No mucosal  thickening.      PELVIS:    APPENDIX:  No findings to suggest acute appendicitis.    BLADDER:  Nonspecific urinary bladder wall thickening that could be  related to chronic outlet obstruction.    REPRODUCTIVE:  Unremarkable as visualized.      ABDOMEN and PELVIS:    INTRAPERITONEAL SPACE:  Unremarkable.  No free air.  No significant  fluid collection.    BONES/JOINTS:  No acute fracture.  No dislocation.    SOFT TISSUES:  Unremarkable.    VASCULATURE:  Atherosclerotic disease.  No abdominal aortic aneurysm.    LYMPH NODES:  Unremarkable.  No enlarged lymph nodes.     IMPRESSION:  1.  Right renal cyst measuring 6 cm.  2.  Nonspecific urinary bladder wall thickening that could be related to  chronic outlet obstruction.  3.  Gallstones.  Gallbladder otherwise unremarkable.    CT Chest With Contrast Diagnostic (08/02/2023 13:54)   FINDINGS:    LUNGS AND PLEURAL SPACES:  Unremarkable.  No mass.  No consolidation.   No pneumothorax.  No significant effusion.    HEART:  See below.      BONES/JOINTS:  Unremarkable.  No acute fracture.  No dislocation.    SOFT TISSUES:  Unremarkable.    VASCULATURE:  Atherosclerotic disease.  Aortic valvular change noted.   No thoracic aortic aneurysm.    LYMPH NODES:  Unremarkable.  No enlarged lymph nodes.    OTHER FINDINGS:  Myocardial megaly.     IMPRESSION:    No acute findings in the chest.    CT Abdomen Pelvis With Contrast (04/05/2024 13:28) & CT Chest With Contrast Diagnostic (04/05/2024 13:30)   FINDINGS:      CHEST: There is no evidence of mediastinal or axillary adenopathy. Heart  size is normal. There are aortic valvular calcifications. There are no  pleural or pericardial effusions. Lung windows reveal no focal opacities  or suspicious pulmonary nodules. Bone windows reveal no  lytic or  destructive lesions.     ABDOMEN: The liver is hypodense consistent with fatty infiltration.  Stones are present within the neck of the gallbladder. The spleen is  unremarkable. No adrenal mass is present.  The pancreas is normal. There  are bilateral renal cysts. There is no solid renal mass or  hydronephrosis. The aorta is normal in caliber. There is no free fluid  or adenopathy. There is a small bowel anastomosis in the right  hemiabdomen. There is no evidence of a bowel obstruction.     PELVIS: The appendix is is normal. There is a suture line at the  rectosigmoid junction. There is wall thickening involving the rectum  unchanged compared to the prior exam. The urinary bladder is  unremarkable. There is no significant free fluid or adenopathy. Bone  windows reveal no lytic or destructive lesions.     IMPRESSION:  No evidence of metastatic disease involving the chest,  abdomen or pelvis.    Adult Transthoracic Echo Complete W/ Cont if Necessary Per Protocol (04/05/2024 13:53)   Interpretation Summary    Normal left ventricular cavity size and wall thickness noted. All left ventricular wall segments contract normally.    Left ventricular ejection fraction appears to be 56 - 60%.    Left ventricular diastolic function is consistent with (grade I) impaired relaxation.    The aortic valve is not well visualized. The aortic valve is abnormal in structure. There is moderate calcification of the aortic valve    Mild aortic valve regurgitation is present. Moderate to severe aortic valve stenosis is present.    There is moderate calcification of the mitral valve posterior leaflet(s). Mild mitral valve regurgitation is present. Mild mitral valve stenosis is present.    There is no evidence of pericardial effusion. .    Comments:May considerr KOTA to have a better assessment of the aortic and mitral valves.    CT Abdomen Pelvis With Contrast (10/21/2024 14:20) & CT Chest With Contrast Diagnostic (10/21/2024 14:20)    FINDINGS:      CHEST: There is no evidence of mediastinal or axillary adenopathy. Heart  size is normal. There are no pleural or pericardial effusions. Lung  windows reveal no focal opacities or suspicious pulmonary nodules. Bone  windows reveal no lytic or destructive lesions.     ABDOMEN: The liver is hypodense consistent with fatty infiltration.  Stones are present within the gallbladder. The spleen is unremarkable.  No adrenal mass is present.  The pancreas is normal. The kidneys enhance  appropriately. There are bilateral renal cysts. A nonobstructing stone  is seen at the superior pole of the left kidney. The aorta is normal in  caliber. There is no free fluid or adenopathy. The abdominal portions of  the GI tract are unremarkable.     PELVIS: The appendix is is normal. There are colonic diverticula without  evidence of diverticulitis. There is a suture line at the rectosigmoid  colon. There is mild wall thickening involving the rectum. There is mild  presacral inflammatory stranding likely posttreatment related. The  urinary bladder is unremarkable. There is no significant free fluid or  adenopathy. Bone windows reveal no lytic or destructive lesions.     IMPRESSION:  No evidence of metastatic disease involving the chest,  abdomen or pelvis.    CT Abdomen Pelvis With Contrast (10/21/2024 14:20)   FINDINGS:      CHEST: There is no evidence of mediastinal or axillary adenopathy. Heart  size is normal. There are no pleural or pericardial effusions. Lung  windows reveal no focal opacities or suspicious pulmonary nodules. Bone  windows reveal no lytic or destructive lesions.     ABDOMEN: The liver is hypodense consistent with fatty infiltration.  Stones are present within the gallbladder. The spleen is unremarkable.  No adrenal mass is present.  The pancreas is normal. The kidneys enhance  appropriately. There are bilateral renal cysts. A nonobstructing stone  is seen at the superior pole of the left kidney. The  aorta is normal in  caliber. There is no free fluid or adenopathy. The abdominal portions of  the GI tract are unremarkable.     PELVIS: The appendix is is normal. There are colonic diverticula without  evidence of diverticulitis. There is a suture line at the rectosigmoid  colon. There is mild wall thickening involving the rectum. There is mild  presacral inflammatory stranding likely posttreatment related. The  urinary bladder is unremarkable. There is no significant free fluid or  adenopathy. Bone windows reveal no lytic or destructive lesions.     IMPRESSION:  No evidence of metastatic disease involving the chest,  abdomen or pelvis.    CT Chest With Contrast Diagnostic (10/21/2024 14:20)   FINDINGS:      CHEST: There is no evidence of mediastinal or axillary adenopathy. Heart  size is normal. There are no pleural or pericardial effusions. Lung  windows reveal no focal opacities or suspicious pulmonary nodules. Bone  windows reveal no lytic or destructive lesions.     ABDOMEN: The liver is hypodense consistent with fatty infiltration.  Stones are present within the gallbladder. The spleen is unremarkable.  No adrenal mass is present.  The pancreas is normal. The kidneys enhance  appropriately. There are bilateral renal cysts. A nonobstructing stone  is seen at the superior pole of the left kidney. The aorta is normal in  caliber. There is no free fluid or adenopathy. The abdominal portions of  the GI tract are unremarkable.     PELVIS: The appendix is is normal. There are colonic diverticula without  evidence of diverticulitis. There is a suture line at the rectosigmoid  colon. There is mild wall thickening involving the rectum. There is mild  presacral inflammatory stranding likely posttreatment related. The  urinary bladder is unremarkable. There is no significant free fluid or  adenopathy. Bone windows reveal no lytic or destructive lesions.     IMPRESSION:  No evidence of metastatic disease involving the  chest,  abdomen or pelvis.    RECENT LABS:  Lab Results   Component Value Date    WBC 10.46 11/25/2024    HGB 16.7 11/25/2024    HCT 48.8 11/25/2024    MCV 94.9 11/25/2024    RDW 13.1 11/25/2024     11/25/2024    NEUTRORELPCT 74.5 11/25/2024    LYMPHORELPCT 17.7 (L) 11/25/2024    MONORELPCT 6.5 11/25/2024    EOSRELPCT 0.7 11/25/2024    BASORELPCT 0.3 11/25/2024    NEUTROABS 7.80 (H) 11/25/2024    LYMPHSABS 1.85 11/25/2024       Lab Results   Component Value Date     04/09/2024    K 4.8 04/09/2024    CO2 27.4 04/09/2024     04/09/2024    BUN 14 04/09/2024    CREATININE 1.40 (H) 10/21/2024    EGFRIFNONA 60 (L) 02/21/2022    EGFRIFAFRI >60 06/26/2021    GLUCOSE 123 (H) 04/09/2024    CALCIUM 9.3 04/09/2024    ALKPHOS 97 04/09/2024    AST 18 04/09/2024    ALT 18 04/09/2024    BILITOT 0.5 04/09/2024    ALBUMIN 4.0 04/09/2024    PROTEINTOT 7.6 04/09/2024    MG 1.8 (L) 06/26/2021     Lab Results   Component Value Date    CEA 1.88 04/09/2024    CEA 1.90 08/18/2023    CEA 1.52 12/21/2022    CEA 2.11 06/28/2022    CEA 1.77 06/01/2022    CEA 2.01 02/21/2022    CEA 2.02 11/16/2021    CEA 1.90 10/11/2021    CEA 2.25 05/18/2021    CEA 2.37 02/26/2021    CEA 1.82 08/31/2020    CEA 1.83 07/21/2020    CEA 2.00 06/25/2020    CEA 2.52 04/21/2020    CEA 2.28 01/28/2020     Lab Results   Component Value Date    PSA 3.900 04/09/2024    PSA 3.420 08/18/2023    PSA 2.950 12/21/2022    PSA 3.350 06/28/2022    PSA 2.610 06/01/2022    PSA 2.560 02/21/2022    PSA 1.050 07/21/2020    PSA 1.110 06/25/2020    PSA 4.930 (H) 10/19/2018     Lab Results   Component Value Date    TSH 2.860 01/19/2021     Lab Results   Component Value Date    HGBA1C 5.80 (H) 06/25/2020    HGBA1C 5.50 01/28/2020    HGBA1C 5.40 03/20/2019     Lab Results   Component Value Date    FERRITIN 256.90 04/09/2024    IRON 88 04/09/2024    TIBC 347 04/09/2024    LABIRON 25 04/09/2024    SXBUXUYH89 1,538 (H) 02/26/2021    FOLATE 9.92 02/26/2021     ASSESSMENT &  PLAN:  David Mandujano is a very pleasant 74 y.o. male with newly diagnosed rectal cancer. Clinically stage IIIC ([id number]).    1.  Rectal cancer:  -  He received standard neoadjuvant chemoradiation as above given locally advanced tumor with suspicious pelvic lymph node.    - Pre-treatment MRI showed a locally advanced tumor and suspicious pelvic LN.  PET-CT was negative except in the local tumor.   -  There was a non-specific sub-pleural nodular opacity in the L lung base which was PET negative.  Perhaps this was inflammatory in nature. This area will require f/u on future imaging.  - Recommended neoadjuvant chemoradiation with Xeloda 825 mg/m2 BID D1-5 or M-F during the course of radiaton.  His dose was 500 mg x 4 or 2000 mg BID. Overall, he tolerated this well and completed treatment 1-21-19.    -  He saw Dr. Marilynn Yadav and underwent successful surgical resection as above.    -  He took adjuvant CapeOx treatment to prevent recurrence and has completed 8 cycles. Overall, he tolerated treatment well aside from fatigue and diarrhea. He started to struggle with thrombocytopenia and possibly with neuropathy so gave his last cycle without Oxaliplatin.  -  He did well with take down of his ileostomy.  -  Most recent CT CAP from 4-5-24 without evidence of metastasis.  CEA has been normal.     -  He underwent c-scope with Dr. Barnhart on 2-05-21 without cause for concern.  Repeat exam recommended after 3 years in February 2024, but this has been delayed b/c of development of moderate to severe aortic stenosis. He received cardiac clearance for c-scope and is planning to have this done in January 2025.  -  Clinically, he continues to do well. He has had 17 pound weight loss but states that he has been eating smaller portions and less sweets. He also states that he has had poor appetite due to fluctuating bowel habits. I encouraged him to have c-scope as planned.  -  He will follow up with Dr. Larios in 6 months with CBCD,  CMP, iron profile, ferritin, CEA and CT CAP w/ contrast prior to appointment.     2.  Iron deficiency Anemia:  -  He previously took Niferex but did not appear to absorb it and has required IV iron replacement. Most recently, he was replaced in mid December 2021.     -  He did have EGD and c-scope.  He continues to f/u with Dr. Barnhart but has now stopped Protonix because he says he felt so much better off of it with normalization of his bowel movements.  -  At present, he denies obvious bleeding from any source.   -  Repeat CBC shows normal Hg and iron is now replete.     3. Neuropathy:  -  Etiology is uncertain.  He did complain of some vague symptoms toward the end of his treatment (though he thinks they happened later), so this could be related to Oxaliplatin.    - Thyroid function is wnl.  HbA1C has been normal.    - B12 and folate were previously low, but on therapy became replete. He stopped taking SL B12 and folate.  Recommended he restart them.  At present, he denies having neuropathy.  - He has been advised to avoid alcohol as this can cause or worsen peripheral neuropathy.      4.   Depressed mood / Anxiety and Alcohol Abuse:  - Previously given trial of Lexapro but he did not find this helpful and discontinued this. He said he did quit drinking completely around the time of his surgery but started drinking again. He says Dr. Yadav did mention to him that his liver didn't look good during his operation.  -  We previously discussed potential involvement in support groups and /or referral for alcohol abuse counseling, but he declined. Will monitor.    5.  Lateral carotid stenosis:  - CT imaging showed bilateral calcific carotid stenosis of approximately 75%.  He saw Dr. Sheldon who had Doppler imaging done which apparently showed good flow and he recommended against intervention at this time.      6.  Moderate to Severe Aortic Stenosis:  -  Follows with cardiology and Dr. Sheldon.  -  He has received cardiac  clearance as of May 2024 for c-scope, which he is planning to have done in January 2025.    7.  Prophylaxis:  He took 2022 influenza vaccine. He took Prevnar 13 on 11-20-18.   He completed COVID vaccine x 2.  Recommended booster. Recommended 2023 influenza vaccine and offered this today but he declined.    8. ACO / KAVITHA/Other  Quality measures  -  David Mandujano did not receive 2024 flu vaccine.  This was recommended but declined.  -  David Mandujano reports a pain score of 0.    -  Current outpatient and discharge medications have been reconciled for the patient.  Reviewed by: JOSE CARLOS Lee    9.  Follow up:   -  Follow up with cardiology as scheduled regarding carotid and aortic stenosis.  -  Follow up with Dr. Barnhart as planned for c-scope in January 2025.  -  Refer back to Dr. Corrales for port-a-cath removal at the request of the patient.  -  He will follow up with Dr. Larios in 6 months with CBCD, CMP, iron profile, ferritin, CEA and repeat CT CAP w/ contrast prior to appointment.         I spent 30 minutes with David Mandujano today.  In the office today, more than 50% of this time was spent face-to-face with him  in counseling / coordination of care, reviewing his interim medical history and counseling on the current treatment plan.  All questions were answered to his satisfaction.             Electronically Signed by: JOSE CARLOS Lee         CC:   MD Kathleen Coppola Emmanuel C, MD Shawn Michael Acino, MD William Richards, MD Sandra Beck, MD

## 2024-11-25 NOTE — PROGRESS NOTES
Venipuncture Blood Specimen Collection  Venipuncture performed in Left arm by Lima Deleon MA with good hemostasis. Patient tolerated the procedure well without complications.   11/25/24   Lima Deleon MA

## 2024-11-26 LAB — CEA SERPL-MCNC: 2.98 NG/ML

## 2025-04-03 DIAGNOSIS — I65.23 CAROTID STENOSIS, ASYMPTOMATIC, BILATERAL: Primary | ICD-10-CM

## 2025-04-15 NOTE — PROGRESS NOTES
[facility] Cardiothoracic Surgery Office Follow Up Note     Date of Encounter: 2025     Name: David Mandujano  : 1950     Referred By: No ref. provider found  PCP: Raj Wang MD    Chief Complaint:    Chief Complaint   Patient presents with    Carotid Artery Disease     1 year follow up with carotid ultrasound - pt was supposed to follow up in 6 months with ultrasound done 10/2024 but appointment was made for one year        Subjective      History of Present Illness:    David Mandujano is a 74 y.o. male followed by Dr. Sheldon for bilateral DAYANNA.  PMH: HTN, hyperlipidemia, hypertrophic cardiomyopathy s/p transseptal alcohol ablation, colon cancer s/p colon resection, active smokeless tobacco abuse, aortic valve disease, and DAYANNA.  Patient last seen in clinic 2024 w/ carotid duplex which demonstrated nonhemodynamically significant DAYANNA (R ICA  cm/s with ICA/CCA ratio 1.5, and LICA PSV 79 cm/s with ICA/CCA ratio 0.8).  CTA carotid 2023 demonstrated severe stenosis bilaterally.  Per Dr. Sheldon, carotid stenosis warrants observation at this time.  Returns to CT surgery clinic for follow-up carotid duplex review for annual surveillance.  He continues to follow with [facility].  Transthoracic echocardiogram was performed on 2024 with findings of moderate to severe aortic valve stenosis and mild mitral valve regurgitation and stenosis.  KOTA 2024 demonstrated moderate aortic regurgitation with moderate aortic stenosis.  Current medical therapy includes aspirin, statin, and beta-blocker therapy.  He continues to report no amaurosis fugax, TIA or CVA symptoms.  Continue to follow with [facility] hematology/oncology as well.    Review of Systems:  Review of Systems   Constitutional: Negative for chills, decreased appetite, diaphoresis, fever, malaise/fatigue, night sweats, weight gain and weight loss.   HENT:  Negative for hoarse voice.    Eyes:  Negative for blurred  vision, double vision and visual disturbance.   Cardiovascular:  Negative for chest pain, claudication, dyspnea on exertion, irregular heartbeat, leg swelling, near-syncope, orthopnea, palpitations, paroxysmal nocturnal dyspnea and syncope.   Respiratory:  Negative for cough, hemoptysis, shortness of breath, sputum production and wheezing.    Hematologic/Lymphatic: Negative for adenopathy and bleeding problem. Does not bruise/bleed easily.   Skin:  Negative for color change, nail changes, poor wound healing and rash.   Musculoskeletal:  Negative for back pain, falls and muscle cramps.   Gastrointestinal:  Negative for abdominal pain, dysphagia and heartburn.   Genitourinary:  Negative for flank pain.   Neurological:  Negative for brief paralysis, disturbances in coordination, dizziness, focal weakness, headaches, light-headedness, loss of balance, numbness, paresthesias, sensory change, vertigo and weakness.   Psychiatric/Behavioral:  Negative for depression and suicidal ideas.    Allergic/Immunologic: Negative for persistent infections.       I have reviewed the following portions of the patient's history: problem list, current medications, allergies, past surgical history, past medical history, past social history, past family history, and ROS and confirm it's accurate.    Allergies:  No Known Allergies    Medications:      Current Outpatient Medications:     allopurinol (ZYLOPRIM) 100 MG tablet, Take 1 tablet by mouth Daily. (Patient taking differently: Take 0.5 tablets by mouth As Needed.), Disp: 30 tablet, Rfl: 0    amLODIPine (NORVASC) 10 MG tablet, Take 1 tablet by mouth Daily., Disp: 30 tablet, Rfl: 11    ASCORBIC ACID PO, Take  by mouth Daily., Disp: , Rfl:     aspirin 81 MG EC tablet, Take 1 tablet by mouth Daily., Disp: 30 tablet, Rfl: 5    atorvastatin (LIPITOR) 80 MG tablet, Take 1 tablet by mouth Every Night. for cholesterol., Disp: 30 tablet, Rfl: 5    cyanocobalamin (VITAMIN B-12) 1000 MCG tablet,  Take 1 tablet by mouth Daily., Disp: , Rfl:     folic acid (FOLVITE) 1 MG tablet, TAKE ONE TABLET BY MOUTH DAILY, Disp: 30 tablet, Rfl: 11    lisinopril (PRINIVIL,ZESTRIL) 20 MG tablet, Take 1 tablet by mouth Daily. FOR BLOOD PRESSURE, Disp: 30 tablet, Rfl: 5    metoprolol succinate XL (TOPROL-XL) 50 MG 24 hr tablet, Take 1 tablet by mouth Daily., Disp: 30 tablet, Rfl: 5    vitamin B-6 (PYRIDOXINE) 100 MG tablet, TAKE ONE TABLET BY MOUTH DAILY, Disp: 30 tablet, Rfl: 5    History:   Past Medical History:   Diagnosis Date    Anemia     Arthritis     Carotid stenosis     Carotid stenosis, asymptomatic, bilateral 2023    Colon cancer     s/p chemo therapy    Heart murmur     Hyperlipidemia     Hypertension     Wrist fracture     surgically repaired       Past Surgical History:   Procedure Laterality Date    ABDOMINAL SURGERY      CARDIAC ABLATION      COLON SURGERY      COLONOSCOPY      FRACTURE SURGERY Left     VENOUS ACCESS DEVICE (PORT) INSERTION N/A 2019    Procedure: INSERTION VENOUS ACCESS DEVICE;  Surgeon: Cindy Corrales MD;  Location: [facility];  Service: General    WRIST SURGERY         Social History     Socioeconomic History    Marital status: Single    Number of children: 2   Tobacco Use    Smoking status: Former     Types: Cigars     Quit date:      Years since quittin.2    Smokeless tobacco: Current     Types: Chew    Tobacco comments:     Occasional Cigars-Has not had one in 5 years   Vaping Use    Vaping status: Never Used   Substance and Sexual Activity    Alcohol use: Yes     Comment: occasional-[place]    Drug use: No    Sexual activity: Defer        Family History   Problem Relation Age of Onset    Stroke Father     Hyperlipidemia Father     Other Sister     Heart disease Sister     Colon cancer Maternal Grandfather        Objective   Physical Exam:  Vitals:    25 1027   BP: 146/82   BP Location: Left arm   Patient Position: Sitting   Pulse: 79   Temp: 97.1 °F (36.2 °C)  "  SpO2: 98%   Weight: 118 kg (260 lb 6.4 oz)   Height: 182.9 cm (72\")      Body mass index is 35.32 kg/m².    Physical Exam  Vitals reviewed.   Constitutional:       General: He is not in acute distress.     Appearance: He is not toxic-appearing.   HENT:      Head: Normocephalic and atraumatic.   Eyes:      General: Lids are normal.      Conjunctiva/sclera: Conjunctivae normal.      Pupils: Pupils are equal, round, and reactive to light.   Neck:      Vascular: No carotid bruit.   Cardiovascular:      Rate and Rhythm: Normal rate and regular rhythm.      Heart sounds: S1 normal and S2 normal. No murmur heard.  Pulmonary:      Effort: Pulmonary effort is normal. No respiratory distress.      Breath sounds: No decreased breath sounds, wheezing, rhonchi or rales.   Musculoskeletal:         General: Normal range of motion.      Cervical back: Normal range of motion and neck supple.      Right lower leg: No edema.      Left lower leg: No edema.   Lymphadenopathy:      Cervical: No cervical adenopathy.      Upper Body:      Right upper body: No supraclavicular adenopathy.      Left upper body: No supraclavicular adenopathy.   Skin:     General: Skin is warm and dry.      Capillary Refill: Capillary refill takes less than 2 seconds.   Neurological:      General: No focal deficit present.      Mental Status: He is alert and oriented to person, place, and time.   Psychiatric:         Attention and Perception: Attention normal.         Mood and Affect: Mood normal.         Speech: Speech normal.         Behavior: Behavior normal. Behavior is cooperative.         Imaging/Labs:  US CAROTID BILATERAL-10/21/2024   Right carotid system:  CCA: 77 cm/s  ICA: 112 cm/s  ECA: 114 cm/s  Vertebral artery: Antegrade  ICA/CCA ratio: 1.5  Mild plaque is identified at the bifurcation.  Left carotid system:  CCA: 96 cm/s  ICA: 79 cm/s  ECA: 136 cm/s  Vertebral artery: Antegrade  ICA/CCA ratio: 0.8  Mild plaque is identified at the bifurcation. "   IMPRESSION:  Mild plaque within the carotid bulbs with no hemodynamically significant stenosis.   This report was finalized on 10/21/2024 2:31 PM by Alta Kahn M.D.      KOTA 4/26/2024 Interpretation Summary    Normal left ventricular cavity size and wall thickness noted. All left ventricular wall segments contract normally.    Left ventricular ejection fraction appears to be 61 - 65%.    There is moderate calcification of the aortic valve mainly affecting the non-coronary, left coronary and right coronary cusp(s).    . Moderate aortic valve regurgitation is present. Moderate aortic valve stenosis is present.    The mitral valve is structurally normal with no significant stenosis present. Mild mitral valve regurgitation is present.    There is no evidence of pericardial effusion. .     TTE 4/6/24 Interpretation Summary     Normal left ventricular cavity size and wall thickness noted. All left ventricular wall segments contract normally.    Left ventricular ejection fraction appears to be 56 - 60%.    Left ventricular diastolic function is consistent with (grade I) impaired relaxation.    The aortic valve is not well visualized. The aortic valve is abnormal in structure. There is moderate calcification of the aortic valve    Mild aortic valve regurgitation is present. Moderate to severe aortic valve stenosis is present.    There is moderate calcification of the mitral valve posterior leaflet(s). Mild mitral valve regurgitation is present. Mild mitral valve stenosis is present.    There is no evidence of pericardial effusion. .    Comments:May considerr KOTA to have a better assessment of the aortic and mitral valves.     US Carotid Bilateral: 4/5/2024  Impression:  1. Moderate plaque in the right carotid bulb and proximal ICA producing a 50-69% stenosis.   2. Mild plaque in the left carotid bulb and proximal ICA producing a less than 50% stenosis.      This report was finalized on 4/5/2024 2:04 PM by Alta  SAY Kahn  Peak velocity right  cm/s and peak velocity LICA 108 cm/s     CT Angiography Neck With Intravenous Contrast: 8/2/2023    FINDINGS:    VASCULATURE:    RIGHT COMMON CAROTID ARTERY:  Unremarkable.  No significant stenosis. No dissection or occlusion.    RIGHT INTERNAL CAROTID ARTERY:  Greater than 75% stenosis of the right ICA secondary to calcification.  No dissection or occlusion.    RIGHT EXTERNAL CAROTID ARTERY:  Unremarkable.  No occlusion.    RIGHT VERTEBRAL ARTERY:  Unremarkable.  No significant stenosis.  No dissection or occlusion.     LEFT COMMON CAROTID ARTERY:  Unremarkable.  No significant stenosis. No dissection or occlusion.    LEFT INTERNAL CAROTID ARTERY:  Nearly 75% stenosis of the left ICA  secondary to calcification.  No dissection or occlusion.    LEFT EXTERNAL CAROTID ARTERY:  Unremarkable.  No occlusion.    LEFT VERTEBRAL ARTERY:  Unremarkable.  No significant stenosis.  No  dissection or occlusion.    NECK:    BONES/JOINTS:  No acute fracture.  No dislocation.    SOFT TISSUES:  Unremarkable as visualized.  No mass.     IMPRESSION:  1.  Nearly 75% stenosis of the left ICA secondary to calcification.  2.  Greater than 75% stenosis of the right ICA secondary to  calcification.   This report was finalized on 8/2/2023 2:06 PM by Dr. Mike Perez MD.     US Duplex Bilateral Extracranial Arteries: 4/20/2023    FINDINGS:    RIGHT COMMON CAROTID ARTERY:  Unremarkable.  No occlusion or  significant stenosis on color flow and spectral Doppler imaging.    RIGHT INTERNAL CAROTID ARTERY:  Peak systolic velocity in the right  internal carotid artery (ICA) is 149 cm/s.    RIGHT EXTERNAL CAROTID ARTERY:  Unremarkable.  No occlusion or  significant stenosis on color flow and spectral Doppler imaging.    RIGHT VERTEBRAL ARTERY:  Unremarkable.  Antegrade flow.    RIGHT ICA/CCA RATIO:  The ICA/CCA peak systolic velocity ratio is 2.1  on the right.     LEFT COMMON CAROTID ARTERY:   Unremarkable.  No occlusion or  significant stenosis on color flow and spectral Doppler imaging.    LEFT INTERNAL CAROTID ARTERY:  Peak systolic velocity in the left  internal carotid artery (ICA) is 133 cm/s.    LEFT EXTERNAL CAROTID ARTERY:  Unremarkable.  No occlusion or  significant stenosis on color flow and spectral Doppler imaging.    LEFT VERTEBRAL ARTERY:  Unremarkable.  Antegrade flow.    LEFT ICA/CCA RATIO:  Unremarkable.  The ICA/CCA peak systolic velocity ratio is 1.2 on the left.     LYMPH NODES:  Unremarkable.  No lymphadenopathy.   IMPRESSION:  1.  50-69% stenosis in the right internal carotid artery due to  atherosclerotic plaque.  2.  50-69% stenosis in the left internal carotid artery due to  atherosclerotic plaque.   This report was finalized on 4/20/2023 12:45 PM by Dr. Mike Perez MD.      Assessment / Plan      Assessment / Plan:  1. Carotid stenosis, asymptomatic, bilateral  2. Aortic valvular disease  - 74 y.o. male followed by Dr. Sheldon for bilateral DAYANNA.    - PMH: HTN, hyperlipidemia, hypertrophic cardiomyopathy s/p transseptal alcohol ablation, colon cancer s/p colon resection, active smokeless tobacco abuse, aortic valve disease, and DAYANNA.  - Patient last seen in clinic 4/17/2024 w/ carotid duplex: non-hemodynamically significant DAYANNA (CRISTIANA  cm/s with ICA/CCA ratio 1.5, and LICA PSV 79 cm/s with ICA/CCA ratio 0.8).    - CTA carotid 8/2/2023 demonstrated severe stenosis bilaterally.  - Per Dr. Sheldon, carotid stenosis warrants observation at this time.    - Returns for follow-up carotid duplex review: R ICA  cm/s with ICA/CCA ratio 1.5 and antegrade vertebral flow.  LICA PSV 79 cm/s with ICA/CCA ratio 0.8 and antegrade vertebral flow  - Carotid stenosis remains noninterventional status  - Continues to follow with [facility] for cardiomyopathy and aortic valve disease  - Current medical therapy includes aspirin, statin, and beta-blocker therapy.    - Reports no  amaurosis fugax, TIA or CVA symptoms.  - Continues to follow with [facility]    Patient Education: Report any neurological symptoms promptly or call 911.  Be FAST: Balance issues, Eyesight loss, Face drooping, Arm weakness, Speech changes, Time to get help       Follow Up:   Return in about 1 year (around 4/16/2026) for carotid duplex.   Or sooner for any further concerns or worsening sign and symptoms. If unable to reach us in the office please dial 911 or go to the nearest emergency department.      JOSE CARLOS Osorio  [facility] Cardiothoracic Surgery    Time Spent: I spent 22 minutes caring for David on this date of service. This time includes time spent by me in the following activities: preparing for the visit, reviewing tests, obtaining and/or reviewing a separately obtained history, performing a medically appropriate examination and/or evaluation, counseling and educating the patient/family/caregiver, documenting information in the medical record, and care coordination.

## 2025-04-16 ENCOUNTER — OFFICE VISIT (OUTPATIENT)
Dept: CARDIAC SURGERY | Facility: CLINIC | Age: 75
End: 2025-04-16
Payer: MEDICARE

## 2025-04-16 VITALS
HEIGHT: 72 IN | BODY MASS INDEX: 35.27 KG/M2 | HEART RATE: 79 BPM | SYSTOLIC BLOOD PRESSURE: 146 MMHG | OXYGEN SATURATION: 98 % | DIASTOLIC BLOOD PRESSURE: 82 MMHG | WEIGHT: 260.4 LBS | TEMPERATURE: 97.1 F

## 2025-04-16 DIAGNOSIS — I35.9 AORTIC VALVULAR DISEASE: ICD-10-CM

## 2025-04-16 DIAGNOSIS — I65.23 CAROTID STENOSIS, ASYMPTOMATIC, BILATERAL: Primary | ICD-10-CM

## 2025-04-16 PROCEDURE — 3079F DIAST BP 80-89 MM HG: CPT | Performed by: NURSE PRACTITIONER

## 2025-04-16 PROCEDURE — 1160F RVW MEDS BY RX/DR IN RCRD: CPT | Performed by: NURSE PRACTITIONER

## 2025-04-16 PROCEDURE — 99213 OFFICE O/P EST LOW 20 MIN: CPT | Performed by: NURSE PRACTITIONER

## 2025-04-16 PROCEDURE — 3077F SYST BP >= 140 MM HG: CPT | Performed by: NURSE PRACTITIONER

## 2025-04-16 PROCEDURE — 1159F MED LIST DOCD IN RCRD: CPT | Performed by: NURSE PRACTITIONER

## 2025-06-10 ENCOUNTER — TELEPHONE (OUTPATIENT)
Dept: ONCOLOGY | Facility: CLINIC | Age: 75
End: 2025-06-10

## 2025-06-10 NOTE — TELEPHONE ENCOUNTER
"  Caller: Dvaid Mandujano \"Franky\"    Relationship to patient: Self    Best call back number: [phone]    Type of visit: LAB AND F/U    Requested date: AFTER 7/10 CT SCAN    If rescheduling, when is the original appointment: 7/7    "

## 2025-07-14 ENCOUNTER — TELEPHONE (OUTPATIENT)
Dept: ONCOLOGY | Facility: CLINIC | Age: 75
End: 2025-07-14
Payer: MEDICARE

## 2025-07-14 NOTE — TELEPHONE ENCOUNTER
"    Caller: David Mandujano \"Franky\"    Relationship to patient: Self    Best call back number: [phone]    Chief complaint: SCHEDULING     Type of visit: LAB, FOLLOW UP    Requested date: NEXT AVLIABLE AFTER 8-11     If rescheduling, when is the original appointment: 7- 15    Additional notes: PT'S CT SCAN WAS RESCHEDULED TO 8-11             "

## 2025-08-11 ENCOUNTER — HOSPITAL ENCOUNTER (OUTPATIENT)
Dept: CT IMAGING | Facility: HOSPITAL | Age: 75
Discharge: HOME | End: 2025-08-11
Payer: MEDICARE

## 2025-08-11 DIAGNOSIS — C20 MALIGNANT NEOPLASM OF RECTUM: ICD-10-CM

## 2025-08-11 LAB — CREAT BLDA-MCNC: 1.4 MG/DL (ref 0.6–1.3)

## 2025-08-11 PROCEDURE — 74177 CT ABD & PELVIS W/CONTRAST: CPT

## 2025-08-11 PROCEDURE — 25510000001 IOPAMIDOL 61 % SOLUTION: Performed by: NURSE PRACTITIONER

## 2025-08-11 PROCEDURE — 74177 CT ABD & PELVIS W/CONTRAST: CPT | Performed by: RADIOLOGY

## 2025-08-11 PROCEDURE — 82565 ASSAY OF CREATININE: CPT

## 2025-08-11 PROCEDURE — 71260 CT THORAX DX C+: CPT

## 2025-08-11 RX ORDER — IOPAMIDOL 612 MG/ML
100 INJECTION, SOLUTION INTRAVASCULAR
Status: COMPLETED | OUTPATIENT
Start: 2025-08-11 | End: 2025-08-11

## 2025-08-11 RX ADMIN — IOPAMIDOL 90 ML: 612 INJECTION, SOLUTION INTRAVENOUS at 10:24

## 2025-08-12 ENCOUNTER — LAB (OUTPATIENT)
Dept: ONCOLOGY | Facility: CLINIC | Age: 75
End: 2025-08-12
Payer: MEDICARE

## 2025-08-12 ENCOUNTER — OFFICE VISIT (OUTPATIENT)
Dept: ONCOLOGY | Facility: CLINIC | Age: 75
End: 2025-08-12
Payer: MEDICARE

## 2025-08-12 VITALS
RESPIRATION RATE: 18 BRPM | SYSTOLIC BLOOD PRESSURE: 180 MMHG | WEIGHT: 257 LBS | DIASTOLIC BLOOD PRESSURE: 96 MMHG | OXYGEN SATURATION: 98 % | TEMPERATURE: 97.3 F | BODY MASS INDEX: 34.86 KG/M2 | HEART RATE: 80 BPM

## 2025-08-12 DIAGNOSIS — D50.0 IRON DEFICIENCY ANEMIA DUE TO CHRONIC BLOOD LOSS: ICD-10-CM

## 2025-08-12 DIAGNOSIS — C20 MALIGNANT NEOPLASM OF RECTUM: ICD-10-CM

## 2025-08-12 DIAGNOSIS — C20 MALIGNANT NEOPLASM OF RECTUM: Primary | ICD-10-CM

## 2025-08-12 DIAGNOSIS — F10.10 ALCOHOL ABUSE: ICD-10-CM

## 2025-08-12 DIAGNOSIS — I35.0 AORTIC VALVE STENOSIS, ETIOLOGY OF CARDIAC VALVE DISEASE UNSPECIFIED: ICD-10-CM

## 2025-08-12 DIAGNOSIS — Z95.828 PORT-A-CATH IN PLACE: ICD-10-CM

## 2025-08-12 DIAGNOSIS — I65.23 CAROTID STENOSIS, ASYMPTOMATIC, BILATERAL: ICD-10-CM

## 2025-08-12 LAB
ALBUMIN SERPL-MCNC: 4.2 G/DL (ref 3.5–5.2)
ALBUMIN/GLOB SERPL: 1.1 G/DL
ALP SERPL-CCNC: 96 U/L (ref 39–117)
ALT SERPL W P-5'-P-CCNC: 21 U/L (ref 1–41)
ANION GAP SERPL CALCULATED.3IONS-SCNC: 14.1 MMOL/L (ref 5–15)
AST SERPL-CCNC: 28 U/L (ref 1–40)
BASOPHILS # BLD AUTO: 0.01 10*3/MM3 (ref 0–0.2)
BASOPHILS NFR BLD AUTO: 0.1 % (ref 0–1.5)
BILIRUB SERPL-MCNC: 0.7 MG/DL (ref 0–1.2)
BUN SERPL-MCNC: 16.5 MG/DL (ref 8–23)
BUN/CREAT SERPL: 13.1 (ref 7–25)
CALCIUM SPEC-SCNC: 9.4 MG/DL (ref 8.6–10.5)
CHLORIDE SERPL-SCNC: 100 MMOL/L (ref 98–107)
CO2 SERPL-SCNC: 24.9 MMOL/L (ref 22–29)
CREAT SERPL-MCNC: 1.26 MG/DL (ref 0.76–1.27)
DEPRECATED RDW RBC AUTO: 46 FL (ref 37–54)
EGFRCR SERPLBLD CKD-EPI 2021: 59.5 ML/MIN/1.73
EOSINOPHIL # BLD AUTO: 0.09 10*3/MM3 (ref 0–0.4)
EOSINOPHIL NFR BLD AUTO: 1.1 % (ref 0.3–6.2)
ERYTHROCYTE [DISTWIDTH] IN BLOOD BY AUTOMATED COUNT: 13 % (ref 12.3–15.4)
FERRITIN SERPL-MCNC: 350 NG/ML (ref 30–400)
GLOBULIN UR ELPH-MCNC: 3.7 GM/DL
GLUCOSE SERPL-MCNC: 116 MG/DL (ref 65–99)
HCT VFR BLD AUTO: 50.3 % (ref 37.5–51)
HGB BLD-MCNC: 17.2 G/DL (ref 13–17.7)
IMM GRANULOCYTES # BLD AUTO: 0.04 10*3/MM3 (ref 0–0.05)
IMM GRANULOCYTES NFR BLD AUTO: 0.5 % (ref 0–0.5)
IRON 24H UR-MRATE: 95 MCG/DL (ref 59–158)
IRON SATN MFR SERPL: 24 % (ref 20–50)
LYMPHOCYTES # BLD AUTO: 2.59 10*3/MM3 (ref 0.7–3.1)
LYMPHOCYTES NFR BLD AUTO: 33 % (ref 19.6–45.3)
MCH RBC QN AUTO: 33 PG (ref 26.6–33)
MCHC RBC AUTO-ENTMCNC: 34.2 G/DL (ref 31.5–35.7)
MCV RBC AUTO: 96.5 FL (ref 79–97)
MONOCYTES # BLD AUTO: 0.29 10*3/MM3 (ref 0.1–0.9)
MONOCYTES NFR BLD AUTO: 3.7 % (ref 5–12)
NEUTROPHILS NFR BLD AUTO: 4.84 10*3/MM3 (ref 1.7–7)
NEUTROPHILS NFR BLD AUTO: 61.6 % (ref 42.7–76)
NRBC BLD AUTO-RTO: 0 /100 WBC (ref 0–0.2)
PLATELET # BLD AUTO: 151 10*3/MM3 (ref 140–450)
PMV BLD AUTO: 9.8 FL (ref 6–12)
POTASSIUM SERPL-SCNC: 4.5 MMOL/L (ref 3.5–5.2)
PROT SERPL-MCNC: 7.9 G/DL (ref 6–8.5)
RBC # BLD AUTO: 5.21 10*6/MM3 (ref 4.14–5.8)
SODIUM SERPL-SCNC: 139 MMOL/L (ref 136–145)
TIBC SERPL-MCNC: 393 MCG/DL (ref 298–536)
TRANSFERRIN SERPL-MCNC: 264 MG/DL (ref 200–360)
WBC NRBC COR # BLD AUTO: 7.86 10*3/MM3 (ref 3.4–10.8)

## 2025-08-12 PROCEDURE — 80053 COMPREHEN METABOLIC PANEL: CPT | Performed by: NURSE PRACTITIONER

## 2025-08-12 PROCEDURE — 99214 OFFICE O/P EST MOD 30 MIN: CPT | Performed by: INTERNAL MEDICINE

## 2025-08-12 PROCEDURE — 3080F DIAST BP >= 90 MM HG: CPT | Performed by: INTERNAL MEDICINE

## 2025-08-12 PROCEDURE — 82378 CARCINOEMBRYONIC ANTIGEN: CPT | Performed by: NURSE PRACTITIONER

## 2025-08-12 PROCEDURE — 82728 ASSAY OF FERRITIN: CPT | Performed by: NURSE PRACTITIONER

## 2025-08-12 PROCEDURE — 84466 ASSAY OF TRANSFERRIN: CPT | Performed by: NURSE PRACTITIONER

## 2025-08-12 PROCEDURE — 1126F AMNT PAIN NOTED NONE PRSNT: CPT | Performed by: INTERNAL MEDICINE

## 2025-08-12 PROCEDURE — 83540 ASSAY OF IRON: CPT | Performed by: NURSE PRACTITIONER

## 2025-08-12 PROCEDURE — 3077F SYST BP >= 140 MM HG: CPT | Performed by: INTERNAL MEDICINE

## 2025-08-12 PROCEDURE — 85025 COMPLETE CBC W/AUTO DIFF WBC: CPT | Performed by: NURSE PRACTITIONER

## 2025-08-13 ENCOUNTER — TELEPHONE (OUTPATIENT)
Dept: ONCOLOGY | Facility: CLINIC | Age: 75
End: 2025-08-13
Payer: MEDICARE

## 2025-08-13 LAB — CEA SERPL-MCNC: 1.86 NG/ML

## 2025-08-28 ENCOUNTER — PROCEDURE VISIT (OUTPATIENT)
Dept: SURGERY | Facility: CLINIC | Age: 75
End: 2025-08-28
Payer: MEDICARE

## 2025-08-28 VITALS
SYSTOLIC BLOOD PRESSURE: 122 MMHG | BODY MASS INDEX: 34.81 KG/M2 | HEIGHT: 72 IN | DIASTOLIC BLOOD PRESSURE: 68 MMHG | HEART RATE: 90 BPM | WEIGHT: 257 LBS

## 2025-08-28 DIAGNOSIS — Z95.828 PORT-A-CATH IN PLACE: Primary | ICD-10-CM

## (undated) DEVICE — SUT VIC 4/0 P3 18IN J494G

## (undated) DEVICE — SUT VIC 3/0 SH 27IN J416H

## (undated) DEVICE — PK BASIC 70

## (undated) DEVICE — SYR LUERLOK 30CC

## (undated) DEVICE — DRP C/ARM W/BAND W/CLIPS 41X74IN

## (undated) DEVICE — SUT PROLN 3/0 8832H

## (undated) DEVICE — SYR LUERLOK 5CC

## (undated) DEVICE — BNDG ADHS CURAD FLX/FABRC 2X4IN STRL LF

## (undated) DEVICE — ENCORE® LATEX MICRO SIZE 7, STERILE LATEX POWDER-FREE SURGICAL GLOVE: Brand: ENCORE

## (undated) DEVICE — HOLDER: Brand: DEROYAL

## (undated) DEVICE — DRAPE,T,LAPARO,TRANS,STERILE: Brand: MEDLINE

## (undated) DEVICE — DECANT BG O JET